# Patient Record
Sex: FEMALE | Race: WHITE | Employment: OTHER | URBAN - METROPOLITAN AREA
[De-identification: names, ages, dates, MRNs, and addresses within clinical notes are randomized per-mention and may not be internally consistent; named-entity substitution may affect disease eponyms.]

---

## 2021-08-01 ENCOUNTER — APPOINTMENT (OUTPATIENT)
Dept: GENERAL RADIOLOGY | Age: 71
End: 2021-08-01
Payer: MEDICARE

## 2021-08-01 ENCOUNTER — HOSPITAL ENCOUNTER (EMERGENCY)
Age: 71
Discharge: HOME | End: 2021-08-01
Attending: EMERGENCY MEDICINE
Payer: MEDICARE

## 2021-08-01 VITALS
SYSTOLIC BLOOD PRESSURE: 150 MMHG | HEART RATE: 100 BPM | WEIGHT: 160 LBS | RESPIRATION RATE: 15 BRPM | OXYGEN SATURATION: 100 % | BODY MASS INDEX: 29.44 KG/M2 | DIASTOLIC BLOOD PRESSURE: 78 MMHG | HEIGHT: 62 IN | TEMPERATURE: 98 F

## 2021-08-01 DIAGNOSIS — S82.892A CLOSED FRACTURE DISLOCATION OF LEFT ANKLE, INITIAL ENCOUNTER: Primary | ICD-10-CM

## 2021-08-01 PROCEDURE — 6360000002 HC RX W HCPCS: Performed by: PHYSICIAN ASSISTANT

## 2021-08-01 PROCEDURE — 94760 N-INVAS EAR/PLS OXIMETRY 1: CPT

## 2021-08-01 PROCEDURE — 99285 EMERGENCY DEPT VISIT HI MDM: CPT

## 2021-08-01 PROCEDURE — 27818 TREATMENT OF ANKLE FRACTURE: CPT

## 2021-08-01 PROCEDURE — 2580000003 HC RX 258: Performed by: PHYSICIAN ASSISTANT

## 2021-08-01 PROCEDURE — 96374 THER/PROPH/DIAG INJ IV PUSH: CPT

## 2021-08-01 PROCEDURE — 2700000000 HC OXYGEN THERAPY PER DAY

## 2021-08-01 PROCEDURE — 6370000000 HC RX 637 (ALT 250 FOR IP): Performed by: PHYSICIAN ASSISTANT

## 2021-08-01 PROCEDURE — 73610 X-RAY EXAM OF ANKLE: CPT

## 2021-08-01 PROCEDURE — 99152 MOD SED SAME PHYS/QHP 5/>YRS: CPT

## 2021-08-01 RX ORDER — 0.9 % SODIUM CHLORIDE 0.9 %
1000 INTRAVENOUS SOLUTION INTRAVENOUS ONCE
Status: COMPLETED | OUTPATIENT
Start: 2021-08-01 | End: 2021-08-01

## 2021-08-01 RX ORDER — OXYCODONE HYDROCHLORIDE AND ACETAMINOPHEN 5; 325 MG/1; MG/1
1 TABLET ORAL ONCE
Status: COMPLETED | OUTPATIENT
Start: 2021-08-01 | End: 2021-08-01

## 2021-08-01 RX ORDER — KETAMINE HYDROCHLORIDE 50 MG/ML
1 INJECTION, SOLUTION, CONCENTRATE INTRAMUSCULAR; INTRAVENOUS ONCE
Status: DISCONTINUED | OUTPATIENT
Start: 2021-08-01 | End: 2021-08-02 | Stop reason: HOSPADM

## 2021-08-01 RX ORDER — OXYCODONE HYDROCHLORIDE AND ACETAMINOPHEN 5; 325 MG/1; MG/1
1 TABLET ORAL EVERY 6 HOURS PRN
Qty: 20 TABLET | Refills: 0 | Status: ON HOLD | OUTPATIENT
Start: 2021-08-01 | End: 2021-08-03 | Stop reason: HOSPADM

## 2021-08-01 RX ADMIN — SODIUM CHLORIDE 1000 ML: 9 INJECTION, SOLUTION INTRAVENOUS at 19:30

## 2021-08-01 RX ADMIN — OXYCODONE HYDROCHLORIDE AND ACETAMINOPHEN 1 TABLET: 5; 325 TABLET ORAL at 23:03

## 2021-08-01 RX ADMIN — HYDROMORPHONE HYDROCHLORIDE 1 MG: 1 INJECTION, SOLUTION INTRAMUSCULAR; INTRAVENOUS; SUBCUTANEOUS at 18:25

## 2021-08-01 ASSESSMENT — ENCOUNTER SYMPTOMS
COLOR CHANGE: 0
VOMITING: 0

## 2021-08-01 ASSESSMENT — PAIN DESCRIPTION - ORIENTATION: ORIENTATION: LEFT;LOWER

## 2021-08-01 ASSESSMENT — PAIN SCALES - GENERAL
PAINLEVEL_OUTOF10: 10
PAINLEVEL_OUTOF10: 10
PAINLEVEL_OUTOF10: 8
PAINLEVEL_OUTOF10: 8

## 2021-08-01 ASSESSMENT — PAIN DESCRIPTION - DESCRIPTORS: DESCRIPTORS: DISCOMFORT

## 2021-08-01 ASSESSMENT — PAIN DESCRIPTION - PAIN TYPE: TYPE: ACUTE PAIN

## 2021-08-01 ASSESSMENT — PAIN DESCRIPTION - LOCATION: LOCATION: LEG

## 2021-08-01 NOTE — ED PROVIDER NOTES
Date of evaluation: 8/1/2021    ED Attending Attestation Note     CHIEF COMPLAINT     I fell and hurt my left ankle  HISTORY OF PRESENT ILLNESS  (Location/Symptom, Timing/Onset,Context/Setting, Quality, Duration, Modifying Factors, Severity). Note limiting factors. This patient was seen by the advance practice provider. I have seen and examined the patient, agree with the workup, evaluation, management and diagnosis. The care plan has been discussed. Chief Complaint   Patient presents with   Jono Dutta     presents via EMS after falling off chair @ home/ injury to left lower leg/ appears deformed/ no loc no head injury        Karey Vega is a 70 y.o. female who presents to the emergency department secondary to concern for left ankle pain status post fall at home. She was standing on a chair when she lost her balance. Denies numbness or tingling. Endorses a lot of pain in her left ankle/foot area, has not been able to walk since then. She reports she is overall very healthy and does not take any medications. She has previously had a left hip arthroplasty. Denies being on any anticoagulant medication. Did not hit her head or lose consciousness. Past medical history significant for PACs.    Social History     Socioeconomic History    Marital status: Unknown     Spouse name: None    Number of children: None    Years of education: None    Highest education level: None   Occupational History    None   Tobacco Use    Smoking status: Never Smoker    Smokeless tobacco: Never Used   Vaping Use    Vaping Use: Never used   Substance and Sexual Activity    Alcohol use: Not Currently    Drug use: Never    Sexual activity: Not Currently   Other Topics Concern    None   Social History Narrative    None     Social Determinants of Health     Financial Resource Strain:     Difficulty of Paying Living Expenses:    Food Insecurity:     Worried About Running Out of Food in the Last Year:     Allyn de jesus Food in the Last Year:    Transportation Needs:     Lack of Transportation (Medical):  Lack of Transportation (Non-Medical):    Physical Activity:     Days of Exercise per Week:     Minutes of Exercise per Session:    Stress:     Feeling of Stress :    Social Connections:     Frequency of Communication with Friends and Family:     Frequency of Social Gatherings with Friends and Family:     Attends Temple Services:     Active Member of Clubs or Organizations:     Attends Club or Organization Meetings:     Marital Status:    Intimate Partner Violence:     Fear of Current or Ex-Partner:     Emotionally Abused:     Physically Abused:     Sexually Abused:      Aside from what is stated above denies any other symptoms or modifying factors. Nursing Notes reviewed. Past Surgical History:   Procedure Laterality Date    TOTAL HIP ARTHROPLASTY      left     History reviewed. No pertinent family history. CURRENT MEDICATIONS       Discharge Medication List as of 8/1/2021 10:48 PM         DIAGNOSTIC RESULTS   RADIOLOGY:   Interpretation per Radiologist below, if available at the time of this note:  XR ANKLE LEFT (MIN 3 VIEWS)   Final Result   Improved alignment of the trimalleolar fracture dislocation. XR ANKLE LEFT (MIN 3 VIEWS)   Final Result   Acute trimalleolar fracture with posterior talar subluxation and disruption   of the mortise. Patient was given the following medications:  Orders Placed This Encounter   Medications    HYDROmorphone (DILAUDID) injection 1 mg    0.9 % sodium chloride bolus    DISCONTD: ketamine (KETALAR) injection 75 mg    DISCONTD: ketamine (KETALAR) injection 75 mg    oxyCODONE-acetaminophen (PERCOCET) 5-325 MG per tablet     Sig: Take 1 tablet by mouth every 6 hours as needed for Pain for up to 20 doses. Sedation precautions. Do not drive or operate machinery while taking this medication. Do not drink alcohol.  This is an addictive medication and should be used sparingly. Dispense:  20 tablet     Refill:  0    oxyCODONE-acetaminophen (PERCOCET) 5-325 MG per tablet 1 tablet       INITIAL VITALS: BP: (!) 165/84, Temp: 98.2 °F (36.8 °C), Pulse: 95, Resp: 18, SpO2: 100 %   RECENT VITALS:  BP: (!) 150/78,Temp: 98 °F (36.7 °C), Pulse: 100, Resp: 15, SpO2: 100 %     My assessment reveals a moderately anxious appearing white female who is sitting up in bed. She answers questions appropriately. Abdomen is benign, no increased work of breathing, left lower extremity with swelling and tenderness to palpation. She has good/easily palpable and equal DP/PT pulses. Sensation is intact. She is able to wiggle her toes. Ketamine sedation for closed reduction of the dislocated fractured ankle was done at the bedside. Please see my procedure note below for sedation, see JEROME note for reduction. 70 35    Procedural sedation    Date/Time: 8/1/2021 8:30 PM  Performed by: Yovani Mota MD  Authorized by: Yovani Mota MD     Consent:     Consent obtained:  Verbal and written    Consent given by:  Patient    Risks discussed: Allergic reaction, inadequate sedation, respiratory compromise necessitating ventilatory assistance and intubation, prolonged hypoxia resulting in organ damage, nausea and vomiting    Alternatives discussed:  Analgesia without sedation and regional anesthesia  Indications:     Sedation is required to allow for: fracture dislocation reduction.     Procedure necessitating sedation performed by:  Physician performing sedation    Intended level of sedation:  Moderate (conscious sedation)  Pre-sedation assessment:     Time since last food or drink:  4 hours    NPO status caution: urgency dictates proceeding with non-ideal NPO status      ASA classification: class 1 - normal, healthy patient      Neck mobility: normal      Mouth opening:  3 or more finger widths    Mallampati score:  I - soft palate, uvula, fauces, pillars visible    Pre-sedation assessments completed and reviewed: airway patency, anesthesia/sedation history, cardiovascular function, hydration status, mental status, pain level and respiratory function      Pre-sedation assessment completed:  8/1/2021 8:35 PM  Procedure details (see MAR for exact dosages):     Sedation start time:  8/1/2021 8:47 PM    Preoxygenation:  Nasal cannula    Sedation:  Ketamine    Intra-procedure monitoring:  Blood pressure monitoring, cardiac monitor, continuous capnometry, continuous pulse oximetry, frequent vital sign checks and frequent LOC assessments    Intra-procedure events: none      Sedation end time:  8/2/2021 9:05 PM    Total sedation time (minutes):  18  Post-procedure details:     Post-sedation assessment completed:  8/1/2021 10:30 PM    Recovery: Patient returned to pre-procedure baseline      Complications:  N/a    Post-sedation assessments completed and reviewed: airway patency, cardiovascular function, hydration status, mental status, nausea/vomiting, pain level, respiratory function and temperature      Specimens recovered:  None    Patient is stable for discharge or admission: yes      Patient tolerance: Tolerated well, no immediate complications        FINAL IMPRESSION      1. Closed fracture dislocation of left ankle, initial encounter        DISPOSITION/PLAN   DISPOSITION Decision To Discharge 08/01/2021 10:28:21 PM      PATIENT REFERRED TO:  Maylin Quijano MD  [address] [address]  [phone]    Call in 1 day  For follow up with orthopedics      DISCHARGE MEDICATIONS:  Discharge Medication List as of 8/1/2021 10:48 PM      START taking these medications    Details   oxyCODONE-acetaminophen (PERCOCET) 5-325 MG per tablet Take 1 tablet by mouth every 6 hours as needed for Pain for up to 20 doses. Sedation precautions. Do not drive or operate machinery while taking this medication. Do not drink alcohol.  This is an addictive medication and should be used sparingly. , Disp-2 0 tablet, R-0Print                  (Please note that portions of this note were completed with a voice recognition program. Efforts were made to edit the dictations but occasionally words are mis-transcribed.)    Virgie Carrasco MD (electronically signed)  Attending Emergency Physician        Virgie Carrasco MD  08/02/21 9018

## 2021-08-02 ENCOUNTER — APPOINTMENT (OUTPATIENT)
Dept: GENERAL RADIOLOGY | Age: 71
DRG: 493 | End: 2021-08-02
Attending: ORTHOPAEDIC SURGERY
Payer: MEDICARE

## 2021-08-02 ENCOUNTER — ANESTHESIA (OUTPATIENT)
Dept: OPERATING ROOM | Age: 71
DRG: 493 | End: 2021-08-02
Payer: MEDICARE

## 2021-08-02 ENCOUNTER — ANESTHESIA EVENT (OUTPATIENT)
Dept: OPERATING ROOM | Age: 71
DRG: 493 | End: 2021-08-02
Payer: MEDICARE

## 2021-08-02 ENCOUNTER — HOSPITAL ENCOUNTER (OUTPATIENT)
Age: 71
Setting detail: OBSERVATION
Discharge: NURSING, REHAB OR LONG-TERM CARE | DRG: 493 | End: 2021-08-03
Attending: ORTHOPAEDIC SURGERY | Admitting: ORTHOPAEDIC SURGERY
Payer: MEDICARE

## 2021-08-02 VITALS
RESPIRATION RATE: 14 BRPM | SYSTOLIC BLOOD PRESSURE: 81 MMHG | TEMPERATURE: 98.6 F | OXYGEN SATURATION: 99 % | DIASTOLIC BLOOD PRESSURE: 51 MMHG

## 2021-08-02 DIAGNOSIS — S93.432A SYNDESMOTIC DISRUPTION OF LEFT ANKLE, INITIAL ENCOUNTER: Primary | ICD-10-CM

## 2021-08-02 PROBLEM — S82.892A CLOSED LEFT ANKLE FRACTURE, INITIAL ENCOUNTER: Status: ACTIVE | Noted: 2021-08-02

## 2021-08-02 PROCEDURE — 3700000000 HC ANESTHESIA ATTENDED CARE: Performed by: ORTHOPAEDIC SURGERY

## 2021-08-02 PROCEDURE — G0378 HOSPITAL OBSERVATION PER HR: HCPCS

## 2021-08-02 PROCEDURE — 6360000002 HC RX W HCPCS: Performed by: ORTHOPAEDIC SURGERY

## 2021-08-02 PROCEDURE — 6360000002 HC RX W HCPCS: Performed by: ANESTHESIOLOGY

## 2021-08-02 PROCEDURE — 3700000001 HC ADD 15 MINUTES (ANESTHESIA): Performed by: ORTHOPAEDIC SURGERY

## 2021-08-02 PROCEDURE — 3600000004 HC SURGERY LEVEL 4 BASE: Performed by: ORTHOPAEDIC SURGERY

## 2021-08-02 PROCEDURE — 27829 TREAT LOWER LEG JOINT: CPT | Performed by: NURSE PRACTITIONER

## 2021-08-02 PROCEDURE — 3209999900 FLUORO FOR SURGICAL PROCEDURES

## 2021-08-02 PROCEDURE — 99219 PR INITIAL OBSERVATION CARE/DAY 50 MINUTES: CPT | Performed by: ORTHOPAEDIC SURGERY

## 2021-08-02 PROCEDURE — 27823 TREATMENT OF ANKLE FRACTURE: CPT | Performed by: NURSE PRACTITIONER

## 2021-08-02 PROCEDURE — 7100000000 HC PACU RECOVERY - FIRST 15 MIN: Performed by: ORTHOPAEDIC SURGERY

## 2021-08-02 PROCEDURE — 7100000001 HC PACU RECOVERY - ADDTL 15 MIN: Performed by: ORTHOPAEDIC SURGERY

## 2021-08-02 PROCEDURE — 2580000003 HC RX 258: Performed by: ORTHOPAEDIC SURGERY

## 2021-08-02 PROCEDURE — 6360000002 HC RX W HCPCS

## 2021-08-02 PROCEDURE — 27829 TREAT LOWER LEG JOINT: CPT | Performed by: ORTHOPAEDIC SURGERY

## 2021-08-02 PROCEDURE — 3600000014 HC SURGERY LEVEL 4 ADDTL 15MIN: Performed by: ORTHOPAEDIC SURGERY

## 2021-08-02 PROCEDURE — 2709999900 HC NON-CHARGEABLE SUPPLY: Performed by: ORTHOPAEDIC SURGERY

## 2021-08-02 PROCEDURE — 6370000000 HC RX 637 (ALT 250 FOR IP): Performed by: ORTHOPAEDIC SURGERY

## 2021-08-02 PROCEDURE — C1713 ANCHOR/SCREW BN/BN,TIS/BN: HCPCS | Performed by: ORTHOPAEDIC SURGERY

## 2021-08-02 PROCEDURE — 96375 TX/PRO/DX INJ NEW DRUG ADDON: CPT

## 2021-08-02 PROCEDURE — 6360000002 HC RX W HCPCS: Performed by: NURSE ANESTHETIST, CERTIFIED REGISTERED

## 2021-08-02 PROCEDURE — 2720000010 HC SURG SUPPLY STERILE: Performed by: ORTHOPAEDIC SURGERY

## 2021-08-02 PROCEDURE — 73600 X-RAY EXAM OF ANKLE: CPT

## 2021-08-02 PROCEDURE — 27823 TREATMENT OF ANKLE FRACTURE: CPT | Performed by: ORTHOPAEDIC SURGERY

## 2021-08-02 PROCEDURE — 2580000003 HC RX 258: Performed by: ANESTHESIOLOGY

## 2021-08-02 PROCEDURE — 2500000003 HC RX 250 WO HCPCS: Performed by: ORTHOPAEDIC SURGERY

## 2021-08-02 PROCEDURE — 2500000003 HC RX 250 WO HCPCS

## 2021-08-02 DEVICE — SCREW BNE L18MM DIA3.5MM HD DIA2.7MM PERIARTC CORT S STL ST: Type: IMPLANTABLE DEVICE | Site: ANKLE | Status: FUNCTIONAL

## 2021-08-02 DEVICE — SCREW BNE L18MM DIA2.7MM HEX HD DIA2.5MM CANC BIODUR 108C: Type: IMPLANTABLE DEVICE | Site: ANKLE | Status: FUNCTIONAL

## 2021-08-02 DEVICE — SCREW BNE L22MM DIA2.7MM CORT ANK FT S STL ST NONCANNULATED: Type: IMPLANTABLE DEVICE | Site: ANKLE | Status: FUNCTIONAL

## 2021-08-02 DEVICE — SCREW BNE L14MM DIA3.5MM HD DIA2.7MM CORT PERIARTC S STL ST: Type: IMPLANTABLE DEVICE | Site: ANKLE | Status: FUNCTIONAL

## 2021-08-02 DEVICE — SCREW BNE L40MM DIA4MM CANC SELF DRL ST CANN NONLOCKING 1/3: Type: IMPLANTABLE DEVICE | Site: ANKLE | Status: FUNCTIONAL

## 2021-08-02 DEVICE — PLATE BNE L80MM 4 H L LAT DST PERIARTC FIBULAR S STL LOK: Type: IMPLANTABLE DEVICE | Site: ANKLE | Status: FUNCTIONAL

## 2021-08-02 DEVICE — SCREW BNE L16MM DIA2.7MM HEX HD DIA2.5MM CANC BIODUR 108C: Type: IMPLANTABLE DEVICE | Site: ANKLE | Status: FUNCTIONAL

## 2021-08-02 DEVICE — SCREW BNE L12MM DIA3.5MM HD DIA2.7MM CORT PERIARTC S STL ST: Type: IMPLANTABLE DEVICE | Site: ANKLE | Status: FUNCTIONAL

## 2021-08-02 DEVICE — IMPLANTABLE DEVICE: Type: IMPLANTABLE DEVICE | Site: ANKLE | Status: FUNCTIONAL

## 2021-08-02 DEVICE — SCREW BNE L44MM DIA3.5MM CORT S STL ST NONCANNULATED IM
Type: IMPLANTABLE DEVICE | Site: ANKLE | Status: NON-FUNCTIONAL
Removed: 2022-01-10

## 2021-08-02 DEVICE — SCREW BNE L14MM DIA2.7MM HEX HD DIA2.5MM CANC BIODUR 108C: Type: IMPLANTABLE DEVICE | Site: ANKLE | Status: FUNCTIONAL

## 2021-08-02 RX ORDER — MEPERIDINE HYDROCHLORIDE 25 MG/ML
12.5 INJECTION INTRAMUSCULAR; INTRAVENOUS; SUBCUTANEOUS
Status: DISCONTINUED | OUTPATIENT
Start: 2021-08-02 | End: 2021-08-02 | Stop reason: HOSPADM

## 2021-08-02 RX ORDER — SODIUM CHLORIDE, SODIUM LACTATE, POTASSIUM CHLORIDE, CALCIUM CHLORIDE 600; 310; 30; 20 MG/100ML; MG/100ML; MG/100ML; MG/100ML
INJECTION, SOLUTION INTRAVENOUS CONTINUOUS
Status: DISCONTINUED | OUTPATIENT
Start: 2021-08-02 | End: 2021-08-03 | Stop reason: HOSPADM

## 2021-08-02 RX ORDER — PROPOFOL 10 MG/ML
INJECTION, EMULSION INTRAVENOUS PRN
Status: DISCONTINUED | OUTPATIENT
Start: 2021-08-02 | End: 2021-08-02 | Stop reason: SDUPTHER

## 2021-08-02 RX ORDER — OXYCODONE HYDROCHLORIDE AND ACETAMINOPHEN 5; 325 MG/1; MG/1
1 TABLET ORAL EVERY 6 HOURS PRN
Status: DISCONTINUED | OUTPATIENT
Start: 2021-08-02 | End: 2021-08-02

## 2021-08-02 RX ORDER — CEFAZOLIN SODIUM 1 G/3ML
2000 INJECTION, POWDER, FOR SOLUTION INTRAMUSCULAR; INTRAVENOUS EVERY 8 HOURS
Status: DISCONTINUED | OUTPATIENT
Start: 2021-08-03 | End: 2021-08-03

## 2021-08-02 RX ORDER — DEXAMETHASONE SODIUM PHOSPHATE 4 MG/ML
INJECTION, SOLUTION INTRA-ARTICULAR; INTRALESIONAL; INTRAMUSCULAR; INTRAVENOUS; SOFT TISSUE PRN
Status: DISCONTINUED | OUTPATIENT
Start: 2021-08-02 | End: 2021-08-02 | Stop reason: SDUPTHER

## 2021-08-02 RX ORDER — FENTANYL CITRATE 50 UG/ML
25 INJECTION, SOLUTION INTRAMUSCULAR; INTRAVENOUS EVERY 5 MIN PRN
Status: DISCONTINUED | OUTPATIENT
Start: 2021-08-02 | End: 2021-08-02 | Stop reason: HOSPADM

## 2021-08-02 RX ORDER — FENTANYL CITRATE 50 UG/ML
INJECTION, SOLUTION INTRAMUSCULAR; INTRAVENOUS PRN
Status: DISCONTINUED | OUTPATIENT
Start: 2021-08-02 | End: 2021-08-02 | Stop reason: SDUPTHER

## 2021-08-02 RX ORDER — SODIUM CHLORIDE 9 MG/ML
INJECTION, SOLUTION INTRAVENOUS CONTINUOUS
Status: DISCONTINUED | OUTPATIENT
Start: 2021-08-02 | End: 2021-08-02

## 2021-08-02 RX ORDER — ACETAMINOPHEN AND CODEINE PHOSPHATE 300; 30 MG/1; MG/1
1 TABLET ORAL EVERY 6 HOURS PRN
Status: DISCONTINUED | OUTPATIENT
Start: 2021-08-02 | End: 2021-08-03

## 2021-08-02 RX ORDER — BUPIVACAINE HYDROCHLORIDE 5 MG/ML
INJECTION, SOLUTION EPIDURAL; INTRACAUDAL
Status: COMPLETED | OUTPATIENT
Start: 2021-08-02 | End: 2021-08-02

## 2021-08-02 RX ORDER — FENTANYL CITRATE 50 UG/ML
50 INJECTION, SOLUTION INTRAMUSCULAR; INTRAVENOUS EVERY 5 MIN PRN
Status: COMPLETED | OUTPATIENT
Start: 2021-08-02 | End: 2021-08-02

## 2021-08-02 RX ORDER — LIDOCAINE HYDROCHLORIDE 20 MG/ML
INJECTION, SOLUTION EPIDURAL; INFILTRATION; INTRACAUDAL; PERINEURAL PRN
Status: DISCONTINUED | OUTPATIENT
Start: 2021-08-02 | End: 2021-08-02 | Stop reason: SDUPTHER

## 2021-08-02 RX ORDER — HYDRALAZINE HYDROCHLORIDE 20 MG/ML
5 INJECTION INTRAMUSCULAR; INTRAVENOUS EVERY 10 MIN PRN
Status: DISCONTINUED | OUTPATIENT
Start: 2021-08-02 | End: 2021-08-02 | Stop reason: HOSPADM

## 2021-08-02 RX ORDER — APREPITANT 40 MG/1
40 CAPSULE ORAL ONCE
Status: COMPLETED | OUTPATIENT
Start: 2021-08-02 | End: 2021-08-02

## 2021-08-02 RX ORDER — ONDANSETRON 2 MG/ML
4 INJECTION INTRAMUSCULAR; INTRAVENOUS
Status: DISCONTINUED | OUTPATIENT
Start: 2021-08-02 | End: 2021-08-02 | Stop reason: HOSPADM

## 2021-08-02 RX ORDER — ONDANSETRON 2 MG/ML
INJECTION INTRAMUSCULAR; INTRAVENOUS PRN
Status: DISCONTINUED | OUTPATIENT
Start: 2021-08-02 | End: 2021-08-02 | Stop reason: SDUPTHER

## 2021-08-02 RX ORDER — ACETAMINOPHEN 325 MG/1
650 TABLET ORAL EVERY 6 HOURS PRN
Status: DISCONTINUED | OUTPATIENT
Start: 2021-08-02 | End: 2021-08-03 | Stop reason: HOSPADM

## 2021-08-02 RX ORDER — GLYCOPYRROLATE 0.2 MG/ML
INJECTION INTRAMUSCULAR; INTRAVENOUS PRN
Status: DISCONTINUED | OUTPATIENT
Start: 2021-08-02 | End: 2021-08-02 | Stop reason: SDUPTHER

## 2021-08-02 RX ADMIN — SODIUM CHLORIDE: 9 INJECTION, SOLUTION INTRAVENOUS at 13:00

## 2021-08-02 RX ADMIN — FENTANYL CITRATE 50 MCG: 50 INJECTION INTRAMUSCULAR; INTRAVENOUS at 17:17

## 2021-08-02 RX ADMIN — LIDOCAINE HYDROCHLORIDE 100 MG: 20 INJECTION, SOLUTION EPIDURAL; INFILTRATION; INTRACAUDAL; PERINEURAL at 17:22

## 2021-08-02 RX ADMIN — FENTANYL CITRATE 50 MCG: 0.05 INJECTION, SOLUTION INTRAMUSCULAR; INTRAVENOUS at 19:36

## 2021-08-02 RX ADMIN — HYDROMORPHONE HYDROCHLORIDE 0.5 MG: 1 INJECTION, SOLUTION INTRAMUSCULAR; INTRAVENOUS; SUBCUTANEOUS at 21:39

## 2021-08-02 RX ADMIN — ONDANSETRON 4 MG: 2 INJECTION INTRAMUSCULAR; INTRAVENOUS at 17:38

## 2021-08-02 RX ADMIN — PROPOFOL 200 MG: 10 INJECTION, EMULSION INTRAVENOUS at 17:22

## 2021-08-02 RX ADMIN — DEXAMETHASONE SODIUM PHOSPHATE 8 MG: 4 INJECTION, SOLUTION INTRAMUSCULAR; INTRAVENOUS at 17:26

## 2021-08-02 RX ADMIN — CEFAZOLIN SODIUM 2000 MG: 10 INJECTION, POWDER, FOR SOLUTION INTRAVENOUS at 17:17

## 2021-08-02 RX ADMIN — GLYCOPYRROLATE 0.1 MG: 0.2 INJECTION, SOLUTION INTRAMUSCULAR; INTRAVENOUS at 17:17

## 2021-08-02 RX ADMIN — APREPITANT 40 MG: 40 CAPSULE ORAL at 16:31

## 2021-08-02 RX ADMIN — FENTANYL CITRATE 50 MCG: 50 INJECTION INTRAMUSCULAR; INTRAVENOUS at 17:35

## 2021-08-02 RX ADMIN — FENTANYL CITRATE 50 MCG: 0.05 INJECTION, SOLUTION INTRAMUSCULAR; INTRAVENOUS at 19:22

## 2021-08-02 RX ADMIN — ACETAMINOPHEN AND CODEINE PHOSPHATE 1 TABLET: 300; 30 TABLET ORAL at 20:39

## 2021-08-02 RX ADMIN — FENTANYL CITRATE 50 MCG: 0.05 INJECTION, SOLUTION INTRAMUSCULAR; INTRAVENOUS at 19:29

## 2021-08-02 RX ADMIN — FENTANYL CITRATE 50 MCG: 0.05 INJECTION, SOLUTION INTRAMUSCULAR; INTRAVENOUS at 19:16

## 2021-08-02 ASSESSMENT — PULMONARY FUNCTION TESTS
PIF_VALUE: 8
PIF_VALUE: 9
PIF_VALUE: 8
PIF_VALUE: 9
PIF_VALUE: 9
PIF_VALUE: 8
PIF_VALUE: 9
PIF_VALUE: 9
PIF_VALUE: 8
PIF_VALUE: 9
PIF_VALUE: 9
PIF_VALUE: 8
PIF_VALUE: 8
PIF_VALUE: 9
PIF_VALUE: 8
PIF_VALUE: 9
PIF_VALUE: 9
PIF_VALUE: 8
PIF_VALUE: 9
PIF_VALUE: 8
PIF_VALUE: 6
PIF_VALUE: 8
PIF_VALUE: 9
PIF_VALUE: 8
PIF_VALUE: 0
PIF_VALUE: 9
PIF_VALUE: 8
PIF_VALUE: 8
PIF_VALUE: 9
PIF_VALUE: 8
PIF_VALUE: 9
PIF_VALUE: 8
PIF_VALUE: 14
PIF_VALUE: 1
PIF_VALUE: 8
PIF_VALUE: 9
PIF_VALUE: 8
PIF_VALUE: 9
PIF_VALUE: 8
PIF_VALUE: 8
PIF_VALUE: 9
PIF_VALUE: 8
PIF_VALUE: 9
PIF_VALUE: 8
PIF_VALUE: 8
PIF_VALUE: 1
PIF_VALUE: 8
PIF_VALUE: 8
PIF_VALUE: 9
PIF_VALUE: 8
PIF_VALUE: 1
PIF_VALUE: 8
PIF_VALUE: 9
PIF_VALUE: 0
PIF_VALUE: 0
PIF_VALUE: 8
PIF_VALUE: 9
PIF_VALUE: 8
PIF_VALUE: 9
PIF_VALUE: 6
PIF_VALUE: 8
PIF_VALUE: 8
PIF_VALUE: 2
PIF_VALUE: 8
PIF_VALUE: 1
PIF_VALUE: 8
PIF_VALUE: 9
PIF_VALUE: 2
PIF_VALUE: 8
PIF_VALUE: 8

## 2021-08-02 ASSESSMENT — PAIN DESCRIPTION - PROGRESSION
CLINICAL_PROGRESSION: NOT CHANGED
CLINICAL_PROGRESSION: NOT CHANGED
CLINICAL_PROGRESSION: GRADUALLY IMPROVING
CLINICAL_PROGRESSION: GRADUALLY IMPROVING
CLINICAL_PROGRESSION: NOT CHANGED
CLINICAL_PROGRESSION: NOT CHANGED
CLINICAL_PROGRESSION: GRADUALLY WORSENING
CLINICAL_PROGRESSION: GRADUALLY WORSENING
CLINICAL_PROGRESSION: GRADUALLY IMPROVING

## 2021-08-02 ASSESSMENT — PAIN DESCRIPTION - ORIENTATION
ORIENTATION: LEFT

## 2021-08-02 ASSESSMENT — PAIN DESCRIPTION - FREQUENCY
FREQUENCY: CONTINUOUS

## 2021-08-02 ASSESSMENT — PAIN SCALES - GENERAL
PAINLEVEL_OUTOF10: 9
PAINLEVEL_OUTOF10: 8
PAINLEVEL_OUTOF10: 0
PAINLEVEL_OUTOF10: 8
PAINLEVEL_OUTOF10: 7
PAINLEVEL_OUTOF10: 9
PAINLEVEL_OUTOF10: 6
PAINLEVEL_OUTOF10: 7
PAINLEVEL_OUTOF10: 7

## 2021-08-02 ASSESSMENT — PAIN DESCRIPTION - ONSET
ONSET: ON-GOING

## 2021-08-02 ASSESSMENT — PAIN DESCRIPTION - LOCATION
LOCATION: ANKLE;FOOT
LOCATION: ANKLE
LOCATION: ANKLE;FOOT
LOCATION: ANKLE;FOOT
LOCATION: ANKLE
LOCATION: ANKLE;FOOT
LOCATION: ANKLE
LOCATION: ANKLE;FOOT

## 2021-08-02 ASSESSMENT — PAIN - FUNCTIONAL ASSESSMENT
PAIN_FUNCTIONAL_ASSESSMENT: PREVENTS OR INTERFERES SOME ACTIVE ACTIVITIES AND ADLS
PAIN_FUNCTIONAL_ASSESSMENT: 0-10
PAIN_FUNCTIONAL_ASSESSMENT: PREVENTS OR INTERFERES SOME ACTIVE ACTIVITIES AND ADLS

## 2021-08-02 ASSESSMENT — PAIN DESCRIPTION - DESCRIPTORS
DESCRIPTORS: ACHING
DESCRIPTORS: ACHING;DISCOMFORT
DESCRIPTORS: ACHING;DISCOMFORT
DESCRIPTORS: ACHING
DESCRIPTORS: ACHING;DISCOMFORT
DESCRIPTORS: ACHING

## 2021-08-02 ASSESSMENT — PAIN SCALES - WONG BAKER
WONGBAKER_NUMERICALRESPONSE: 4
WONGBAKER_NUMERICALRESPONSE: 0
WONGBAKER_NUMERICALRESPONSE: 4

## 2021-08-02 ASSESSMENT — PAIN DESCRIPTION - PAIN TYPE
TYPE: SURGICAL PAIN;ACUTE PAIN
TYPE: SURGICAL PAIN;ACUTE PAIN
TYPE: ACUTE PAIN;SURGICAL PAIN
TYPE: ACUTE PAIN;SURGICAL PAIN
TYPE: SURGICAL PAIN;ACUTE PAIN
TYPE: ACUTE PAIN;SURGICAL PAIN

## 2021-08-02 ASSESSMENT — LIFESTYLE VARIABLES: SMOKING_STATUS: 0

## 2021-08-02 NOTE — PROGRESS NOTES
In room for conscious sedation. Pt is on 2l/m nasal cannula with ETCO2. ETCO2 ranging between 30 to 34. Ambu bag also in room. No issues during procedure.  Spo2 has been 100%

## 2021-08-02 NOTE — H&P
Preoperative H&P Update    The patient's History and Physical in the medical record from today was reviewed by me today. Past Medical History:   Diagnosis Date    Arthritis     Premature atrial contraction      Past Surgical History:   Procedure Laterality Date    TOTAL HIP ARTHROPLASTY      left     No current facility-administered medications on file prior to encounter. Current Outpatient Medications on File Prior to Encounter   Medication Sig Dispense Refill    oxyCODONE-acetaminophen (PERCOCET) 5-325 MG per tablet Take 1 tablet by mouth every 6 hours as needed for Pain for up to 20 doses. Sedation precautions. Do not drive or operate machinery while taking this medication. Do not drink alcohol. This is an addictive medication and should be used sparingly. 20 tablet 0       Allergies   Allergen Reactions    Morphine     Cortizone-10 [Hydrocortisone] Rash    Phenergan [Promethazine] Rash    Sulfa Antibiotics Rash      I reviewed the HPI, medications, allergies, reason for surgery, diagnosis and treatment plan and there has been no change. The patient was evaluated by me today.  Physical exam findings for this update include:    Vitals:    08/02/21 1148   BP: (!) 143/87   Pulse: 88   Resp: 16   Temp: 97.5 °F (36.4 °C)   SpO2: 97%     Airway is intact  Chest: chest clear, no wheezing, rales, normal symmetric air entry, no tachypnea, retractions or cyanosis  Heart: regular rate and rhythm ; heart sounds normal  Findings on exam of the body region where surgery is to be performed include:  Left ankle fracture dislocation, ORIF    Electronically signed by KINA Yarbrough CNP on 8/2/2021 at 2:05 PM

## 2021-08-02 NOTE — ED PROVIDER NOTES
[facility]      Pt Name: Dino Acharya  MRN: [id number]  [place] 1950  Date of evaluation: 8/1/2021  Provider: SUNNY Dangelo    This patient was not seen and evaluated by the attending physician Andrew Steiner MD.    [address] [place] [address]       Chief Complaint   Patient presents with   Donnamaria Mary     presents via EMS after falling off chair @ home/ injury to left lower leg/ appears deformed/ no loc no head injury       CRITICAL CARE TIME   I performed a total Critical Care time of 15 minutes, excluding separately reportable procedures. There was a high probability of clinically significant/life threatening deterioration in the patient's condition which required my urgent intervention. Not limited to multiple reexaminations, discussions with attending physician and consultants. HISTORY OF PRESENT ILLNESS  (Location/Symptom, Timing/Onset, Context/Setting, Quality, Duration, Modifying Factors, Severity.)   Dino Acharya is a 70 y.o. female who presents to the emergency department via EMS accompanied by her daughter whom she lives with. She was standing on a chair when she lost her balance and landed on her left ankle. She denies head or neck injury. She states that the left hip was replaced in [place] couple of years ago but denies pain in the hip. She rates her pain at 10 out of 10. She reports past medical history of PACs but otherwise no known chronic medical problems. No blood thinner use. She was previously living in [place] but moved to [place] to live in June. Nursing Notes were reviewed and I agree. REVIEW OF SYSTEMS    (2-9 systems for level 4, 10 or more for level 5)     Review of Systems   Constitutional: Negative for fever. Gastrointestinal: Negative for vomiting. Musculoskeletal: Positive for arthralgias, gait problem and joint swelling. Skin: Negative for color change, rash and wound. Neurological: Negative for weakness, numbness and headaches. Psychiatric/Behavioral: Negative for agitation, behavioral problems and confusion. Except as noted above the remainder of the review of systems was reviewed and negative. PAST MEDICAL HISTORY         Diagnosis Date    Premature atrial contraction        SURGICAL HISTORY           Procedure Laterality Date    TOTAL HIP ARTHROPLASTY      left       CURRENT MEDICATIONS       Previous Medications    No medications on file       ALLERGIES     Cortizone-10 [hydrocortisone], Phenergan [promethazine], and Sulfa antibiotics    FAMILY HISTORY     History reviewed. No pertinent family history. No family status information on file. SOCIAL HISTORY      reports that she has never smoked. She has never used smokeless tobacco. She reports previous alcohol use. She reports that she does not use drugs. PHYSICAL EXAM    (up to 7 for level 4, 8 or more for level 5)     ED Triage Vitals [08/01/21 1809]   BP Temp Temp Source Pulse Resp SpO2 Height Weight   (!) 165/84 98.2 °F (36.8 °C) Oral 95 18 100 % 5' 2\" (1.575 m) 160 lb (72.6 kg)       Physical Exam  Vitals and nursing note reviewed. Constitutional:       Appearance: Normal appearance. HENT:      Head: Normocephalic and atraumatic. Eyes:      Pupils: Pupils are equal, round, and reactive to light. Cardiovascular:      Rate and Rhythm: Normal rate. Pulses: Normal pulses. Pulmonary:      Effort: Pulmonary effort is normal. No respiratory distress. Musculoskeletal:      Cervical back: Normal range of motion. Left ankle: Swelling present. No lacerations. Tenderness present. Decreased range of motion. Normal pulse. Skin:     General: Skin is warm. Neurological:      General: No focal deficit present. Mental Status: She is alert and oriented to person, place, and time.    Psychiatric:         Mood and Affect: Mood normal.         Behavior: Behavior normal. DIAGNOSTIC RESULTS     RADIOLOGY:   Non-plain film images such as CT, Ultrasound and MRI are read by the radiologist. Plain radiographic images are visualized and preliminarily interpreted by SUNNY Owen with the below findings:    Reviewed radiologist's interpretation. Interpretation per the Radiologist below, if available at the time of this note:    XR ANKLE LEFT (MIN 3 VIEWS)   Final Result   Improved alignment of the trimalleolar fracture dislocation. XR ANKLE LEFT (MIN 3 VIEWS)   Final Result   Acute trimalleolar fracture with posterior talar subluxation and disruption   of the mortise. LABS:  Labs Reviewed - No data to display    All other labs were within normal range or not returned as of this dictation. EMERGENCY DEPARTMENT COURSE and DIFFERENTIAL DIAGNOSIS/MDM:   Vitals:    Vitals:    08/01/21 2052 08/01/21 2056 08/01/21 2101 08/01/21 2113   BP:  (!) 193/120 (!) 196/109 (!) 185/99   Pulse: 107 124 131 107   Resp:  14 17 22   Temp:       TempSrc:       SpO2: 100% 100% 100% 100%   Weight:       Height:         I discussed with Taylor Rhoades and/or family the exam results, diagnosis, care, prognosis, reasons to return and the importance of follow up. Patient and/or family is in full agreement with plan and all questions have been answered. Specific discharge instructions explained, including reasons to return to the emergency department. Taylor Rhoades is well appearing, non-toxic, and afebrile at the time of discharge. Patient fell from a chair and sustained a left-sided trimalleolar ankle fracture. She has good sensation and pulses intact. No pain in her hips head or neck. No blood thinner use. X-ray showed fracture dislocation. She was sedated with her consent and reduction was successful. I contacted orthopedics and sent imaging to them. Should be placed in a sugar tong OCL splint neurovascular intact after placement instructed to rest ice elevate. Discharged with pain medication. Discharged home with her daughter whom she lives with. I estimate there is LOW risk for COMPARTMENT SYNDROME, DEEP VENOUS THROMBOSIS, SEPTIC ARTHRITIS, TENDON OR NEUROVASCULAR INJURY, thus I consider the discharge disposition reasonable. CONSULTS:  IP CONSULT TO ORTHOPEDIC SURGERY    PROCEDURES:  Ortho Injury    Date/Time: 8/1/2021 10:36 PM  Performed by: SUNNY Gerber  Authorized by: Nikko Ann MD   Consent: Verbal consent obtained. Written consent obtained. Consent given by: patient  Imaging studies: imaging studies available  Required items: required blood products, implants, devices, and special equipment available  Patient identity confirmed: arm band and verbally with patient  Injury location: ankle  Location details: left ankle  Injury type: fracture-dislocation  Fracture type: trimalleolar  Pre-procedure neurovascular assessment: neurovascularly intact    Sedation:  Patient sedated: yes  Sedatives: ketamine  Analgesia: hydromorphone  Vitals: Vital signs were monitored during sedation. Manipulation performed: yes  Reduction successful: yes  X-ray confirmed reduction: yes  Immobilization: splint  Splint type: short leg  Supplies used: Ortho-Glass  Post-procedure neurovascular assessment: post-procedure neurovascularly intact  Patient tolerance: patient tolerated the procedure well with no immediate complications  Comments: Mallampati 1  ASA 1            FINAL IMPRESSION      1.  Closed fracture dislocation of left ankle, initial encounter          DISPOSITION/PLAN   DISPOSITION Decision To Discharge 08/01/2021 10:28:21 PM      PATIENT REFERRED TO:  Fermin Ogden MD  [address] [place] [address]  [phone]    Call in 1 day  For follow up with orthopedics      DISCHARGE MEDICATIONS:  New Prescriptions    OXYCODONE-ACETAMINOPHEN (PERCOCET) 5-325 MG PER TABLET    Take 1 tablet by mouth every 6 hours as needed for Pain for up to 20 doses. Sedation precautions. Do not drive or operate machinery while taking this medication. Do not drink alcohol. This is an addictive medication and should be used sparingly.        (Please note that portions of this note were completed with a voice recognition program.  Efforts were made to edit the dictations but occasionally words are mis-transcribed.)    Aquiles Can, [address] , [place]  08/01/21 3341

## 2021-08-02 NOTE — ANESTHESIA POSTPROCEDURE EVALUATION
Department of Anesthesiology  Postprocedure Note    Patient: Ronaldo Ford  MRN: [id number]  YOB: 1950  Date of evaluation: 8/2/2021  Time:  7:01 PM     Procedure Summary     Date: 08/02/21 Room / Location: [address]    Anesthesia Start: 1717 Anesthesia Stop:     Procedure: LEFT ANKLE OPEN REDUCTION INTERNAL FIXATION (Left Ankle) Diagnosis: (unknown)    Surgeons: Ish House MD Responsible Provider: Edwin Torres MD    Anesthesia Type: general ASA Status: 2          Anesthesia Type: No value filed. Jere Phase I: Jere Score: 5    Jere Phase II:      Last vitals: Reviewed and per EMR flowsheets.        Anesthesia Post Evaluation    Patient location during evaluation: PACU  Patient participation: complete - patient participated  Level of consciousness: sleepy but conscious  Pain score: 4  Airway patency: patent  Nausea & Vomiting: no nausea and no vomiting  Complications: no  Cardiovascular status: blood pressure returned to baseline  Respiratory status: acceptable  Hydration status: euvolemic

## 2021-08-02 NOTE — CONSULTS
[facility] Orthopedic Surgery  Consult Note         This patient is seen in consultation at the request of Magaly Hargrove    Reason for Consult:  Left ankle pain / trimalleolar fracture dislocation. CHIEF COMPLAINT:  Left ankle pain / fall. History Obtained From:  patient, family member - daughter, electronic medical record    HISTORY OF PRESENT ILLNESS:    Ms. Shaggy Britt 70 y.o.  female presents today for consultation and evaluation of a left ankle injury which occurred when she fell off a chair. She was first seen and evaluated in [place] ED, where she was x-rayed, splinted and asked to f/u with Orthopedics. She is complaining of medial & lateral ankle pain and swelling. This is better with elevation and worse with bearing any wt. The pain is sharp and not radiating. No numbness or tingling sensation. Alleviating factors: rest. No other complaint. Past Medical History:        Diagnosis Date    Arthritis     Premature atrial contraction        Past Surgical History:        Procedure Laterality Date    ANKLE FRACTURE SURGERY Left 8/2/2021    LEFT ANKLE OPEN REDUCTION INTERNAL FIXATION performed by Miguelina Bustillo MD at [address] [place] [address]       Medications prior to admission:   Prior to Admission medications    Medication Sig Start Date End Date Taking? Authorizing Provider   oxyCODONE-acetaminophen (PERCOCET) 5-325 MG per tablet Take 1 tablet by mouth every 6 hours as needed for Pain for up to 20 doses. Sedation precautions. Do not drive or operate machinery while taking this medication. Do not drink alcohol. This is an addictive medication and should be used sparingly.  8/1/21 8/4/21 Yes SUNNY Hargrove       Current Medications:   Current Facility-Administered Medications: ceFAZolin (ANCEF) 2000 mg in dextrose 5 % 100 mL IVPB, 2,000 mg, Intravenous, Q8H  aspirin chewable tablet 81 mg, 81 mg, Oral, BID  lactated ringers infusion, , Intravenous, Continuous  acetaminophen (TYLENOL) tablet 650 mg, 650 mg, Oral, Q6H PRN  acetaminophen-codeine (TYLENOL #3) 300-30 MG per tablet 1 tablet, 1 tablet, Oral, Q6H PRN  HYDROmorphone (DILAUDID) injection 0.25 mg, 0.25 mg, Intravenous, Q3H PRN **OR** HYDROmorphone (DILAUDID) injection 0.5 mg, 0.5 mg, Intravenous, Q3H PRN    Allergies:  Morphine, Cortizone-10 [hydrocortisone], Phenergan [promethazine], and Sulfa antibiotics    Social History     Socioeconomic History    Marital status: Unknown     Spouse name: Not on file    Number of children: Not on file    Years of education: Not on file    Highest education level: Not on file   Occupational History    Not on file   Tobacco Use    Smoking status: Never Smoker    Smokeless tobacco: Never Used   Vaping Use    Vaping Use: Never used   Substance and Sexual Activity    Alcohol use: Not Currently    Drug use: Never    Sexual activity: Not Currently   Other Topics Concern    Not on file   Social History Narrative    Not on file     Social Determinants of Health     Financial Resource Strain:     Difficulty of Paying Living Expenses:    Food Insecurity:     Worried About Running Out of Food in the Last Year:     Ran Out of Food in the Last Year:    Transportation Needs:     Lack of Transportation (Medical):  Lack of Transportation (Non-Medical):    Physical Activity:     Days of Exercise per Week:     Minutes of Exercise per Session:    Stress:     Feeling of Stress :    Social Connections:     Frequency of Communication with Friends and Family:     Frequency of Social Gatherings with Friends and Family:     Attends Mosque Services:     Active Member of Clubs or Organizations:     Attends Club or Organization Meetings:     Marital Status:    Intimate Partner Violence:     Fear of Current or Ex-Partner:     Emotionally Abused:     Physically Abused:     Sexually Abused:        Family History:  History reviewed. No pertinent family history.       REVIEW OF SYSTEMS: CONSTITUTIONAL: Denies unexplained weight loss, fevers, chills or fatigue  NEUROLOGICAL: Denies unsteady gait or progressive weakness    PSYCHOLOGICAL: Denies anxiety, depression   SKIN: Denies skin changes, delayed healing, rash, itching   HEMATOLOGIC: Denies easy bleeding or bruising  ENDOCRINE: Denies excessive thirst, urination, heat/cold  RESPIRATORY: Denies current dyspnea, cough  CARDIOVASCULAR: Negative for chest pain at this time. EYES: Negative for photophobia and visual disturbance. ENT:  Negative for rhinorrhea, epistaxis, sore throat, or hearing loss. GI: Denies nausea, vomiting, diarrhea   : Denies bowel or bladder issues   MUSCULOSKELETAL: Left ankle pain. All other ROS reviewed in chart or with patient or family and are grossly negative. PHYSICAL EXAMINATION:  Ms. Esther Dean is a very pleasant 70 y.o. female who seen today in no acute distress, awake, alert, and oriented. She is well nourished and groomed. Patient with normal affect. Body mass index is 29.26 kg/m². . Skin warm and dry. Resting respiratory rate is 16. Resp deep and easy. Pulse is with regular rate and rhythm    BP (!) 143/87   Pulse 88   Temp 97.5 °F (36.4 °C) (Temporal)   Resp 16   Ht 5' 2\" (1.575 m)   Wt 160 lb (72.6 kg)   SpO2 97%   BMI 29.26 kg/m²        Airway is intact  Chest: chest clear, no wheezing, rales, normal symmetric air entry, no tachypnea, retractions or cyanosis  Heart: regular rate and rhythm ; heart sounds normal   Hearing intact, pupil equal and reactive bilateral  Lymphatics; No groin or axillary enlarged lymph nodes. Neck; No swelling  Abdomen; soft, non distended. MUSCULOSKELETAL:   Examination of both ankles showing a decreased range of motion of the left ankle compare to the other side. There is moderate swelling that can be seen, as well as ecchymosis. She has intact sensation and good pedal pulses.   She has significant tenderness on deep palpation over the medial & lateral malleolus of the left ankle. NEUROLOGIC:   Sensory:    Touch:                     Right Upper Extremity:  normal                   Left Upper Extremity:  normal                  Right Lower Extremity:  normal                  Left Lower Extremity:  normal        DATA:    CBC: No results found for: WBC, RBC, HGB, HCT, MCV, MCH, MCHC, RDW, PLT, MPV  WBC:  No results found for: WBC  PT/INR:  No results found for: PROTIME, INR  PTT:  No results found for: APTT[APTT    IMAGING: Xrays, 3 views of the left ankle dated 8/1/2021 from [facility],  were reviewed, and showed a markedly displaced trimalleolar fracture dislocation. IMPRESSION: Left ankle pain / trimalleolar fracture dislocation. PLAN:  I discussed with Betty Ayaz the overall alignment of the fracture and treatment options including both surgical and non-surgical treatment, and that my recommendation is an open reduction and internal fixation given the amount of displacement and comminution of the fracture. I discussed the risks and benefits of surgery with the patient, including but not limited to infection, bleeding, pain, injury to nerves or blood vessels failure of the surgery and need for additional surgery. All the patient's questions were answered. We discussed an expected post-operative course. She  is understanding of this and wishes to proceed. Thank you very much for the kind consultation and allowing me to participate in this patient's care. I will continue to keep you apprised of her progress.          Arabella Bond MD   8/2/2021  1:52 PM

## 2021-08-02 NOTE — BRIEF OP NOTE
Brief Postoperative Note      Patient: Aleks Fonseca  YOB: 1950  MRN: [id number]    Date of Procedure: 8/2/2021    Pre-Op Diagnosis: Left ankle trimalleolar fracture with syndesmosis disruption. Post-Op Diagnosis: Same       Procedure(s):  LEFT ANKLE OPEN REDUCTION INTERNAL FIXATION trimalleolar fracture with syndesmosis repair. Surgeon(s):  Judy Fernandez MD    Assistant:  Surgical Assistant: Tracy Moe  Physician Assistant: Andrew Ortega    Anesthesia: General    Estimated Blood Loss (mL): less than 50     Complications: None    Specimens:   * No specimens in log *    Implants:  Implant Name Type Inv. Item Serial No.  Lot No. LRB No. Used Action   PLATE BNE [id number] 4 H L LAT DST PERIARTC FIBULAR S STL SONIA  PLATE BNE [id number] 4 H L LAT DST PERIARTC FIBULAR S STL SONIA  [employer]ISEO [employer]-WD  Left 1 Implanted   SCREW BNE L44MM DIA3.5MM MILLY S STL ST NONCANNULATED IM  SCREW BNE L44MM DIA3.5MM MILLY S STL ST NONCANNULATED IM  [employer]-WD  Left 1 Implanted   SCREW BNE L14MM DIA3. 5MM HD DIA2.7MM MILLY PERIARTC S STL ST  SCREW BNE L14MM DIA3. 5MM HD DIA2.7MM MILLY PERIARTC S STL ST  [employer]ISE[employer]-WD  Left 1 Implanted   SCREW BNE L12MM DIA3. 5MM HD DIA2.7MM MILLY PERIARTC S STL ST  SCREW BNE L12MM DIA3. 5MM HD DIA2.7MM MILLY PERIARTC S STL ST  [employer]ISE[employer]-WD  Left 1 Implanted   SCREW BNE L18MM DIA2.7MM HEX HD DIA2.5MM CANC BIODUR 108C  SCREW BNE L18MM DIA2.7MM HEX HD DIA2.5MM CANC BIODUR 108C  [employer]ISE[employer]-WD  Left 3 Implanted   SCREW BNE L16MM DIA2.7MM HEX HD DIA2.5MM CANC BIODUR 108C  SCREW BNE L16MM DIA2.7MM HEX HD DIA2.5MM CANC BIODUR 108C  [employer]ISE[employer] TRAU[employer]-WD  Left 1 Implanted   SCREW BNE L14MM DIA2.7MM HEX HD DIA2.5MM CANC BIODUR 108C  SCREW BNE L14MM DIA2.7MM HEX HD DIA2.5MM CANC BIODUR 108C  [employer]ISE[employer] TRAU[employer]-WD  Left 1 Implanted   SCREW BNE L18MM DIA2.7MM MILLY ANK FT S STL ST JOSE  SCREW BNE L18MM DIA2.7MM MILLY ANK FT S STL ST JOSE  ELISEO [employer]-WD  Left 1 Implanted   SCREW BNE L22MM DIA2.7MM MILLY ANK FT S STL ST NONCANNULATED  SCREW BNE L22MM DIA2.7MM MILLY ANK FT S STL ST NONCANNULATED  ELISEO [employer]ET TRAUMA-WD  Left 1 Implanted   SCREW BNE L44MM DIA4MM CANC SELF DRL ST JOSE NONLOCKING 1/2  SCREW BNE L44MM DIA4MM CANC SELF DRL ST JOSE NONLOCKING 1/2  [employer]ISE[employer] TRAUMA-WD  Left 1 Implanted   SCREW BNE L40MM DIA4MM CANC SELF DRL ST JOSE NONLOCKING 1/3  SCREW BNE L40MM DIA4MM CANC SELF DRL ST JOSE NONLOCKING 1/3  [employer]ISE[employer]ET TRAU[employer]-WD  Left 1 Implanted         Drains: * No LDAs found *    Findings: Same    Electronically signed by Gretel Willis MD on 8/2/2021 at 6:46 PM

## 2021-08-02 NOTE — ANESTHESIA PRE PROCEDURE
[facility] Department of Anesthesiology  Pre-Anesthesia Evaluation/Consultation       Name:  Madyson Willis  : 1950  Age:  70 y.o. MRN:  [id number]  Date: 2021           Surgeon: Surgeon(s):  Marty Egan MD    Procedure: Procedure(s):  LEFT ANKLE OPEN REDUCTION INTERNAL FIXATION     Allergies   Allergen Reactions    Morphine     Cortizone-10 [Hydrocortisone] Rash    Phenergan [Promethazine] Rash    Sulfa Antibiotics Rash     There is no problem list on file for this patient. Past Medical History:   Diagnosis Date    Arthritis     Premature atrial contraction      Past Surgical History:   Procedure Laterality Date    TOTAL HIP ARTHROPLASTY      left     Social History     Tobacco Use    Smoking status: Never Smoker    Smokeless tobacco: Never Used   Vaping Use    Vaping Use: Never used   Substance Use Topics    Alcohol use: Not Currently    Drug use: Never     Medications  No current facility-administered medications on file prior to encounter. Current Outpatient Medications on File Prior to Encounter   Medication Sig Dispense Refill    oxyCODONE-acetaminophen (PERCOCET) 5-325 MG per tablet Take 1 tablet by mouth every 6 hours as needed for Pain for up to 20 doses. Sedation precautions. Do not drive or operate machinery while taking this medication. Do not drink alcohol. This is an addictive medication and should be used sparingly. 20 tablet 0     No current facility-administered medications for this encounter.      Vital Signs (Current)   Vitals:    21 1148   BP: (!) 143/87   Pulse: 88   Resp: 16   Temp: 97.5 °F (36.4 °C)   TempSrc: Temporal   SpO2: 97%   Weight: 160 lb (72.6 kg)   Height: 5' 2\" (1.575 m)                                            Vital Signs Statistics (for past 48 hrs)     Temp  Av.9 °F (36.6 °C)  Min: 97.5 °F (36.4 °C)   Min taken time: 21 1148  Max: 98.2 °F (36.8 °C)   Max taken time: 21 1809  Pulse  Av.7  Min: 80   Min taken time: 21 1148  Max: 133   Max taken time: 21 2100  Resp  Av.6  Min: 14   Min taken time: 21 205  Max: 22   Max taken time: 21 2113  BP  Min: 143/87   Min taken time: 21 1148  Max: 196/109   Max taken time: 21  MAP (mmHg)  Av  Min: 95   Min taken time: 21 223  Max: 127   Max taken time: 21  SpO2  Av.4 %  Min: 96 %   Min taken time: 21  Max: 100 %   Max taken time: 21  BP Readings from Last 3 Encounters:   21 (!) 143/87   21 (!) 150/78       BMI  Body mass index is 29.26 kg/m². Estimated body mass index is 29.26 kg/m² as calculated from the following:    Height as of this encounter: 5' 2\" (1.575 m). Weight as of this encounter: 160 lb (72.6 kg). CBC No results found for: WBC, RBC, HGB, HCT, MCV, RDW, PLT  CMP  No results found for: NA, K, CL, CO2, BUN, CREATININE, GFRAA, AGRATIO, LABGLOM, GLUCOSE, PROT, CALCIUM, BILITOT, ALKPHOS, AST, ALT  BMP  No results found for: NA, K, CL, CO2, BUN, CREATININE, CALCIUM, GFRAA, LABGLOM, GLUCOSE  POCGlucose  No results for input(s): GLUCOSE in the last 72 hours.    Coags  No results found for: PROTIME, INR, APTT  HCG (If Applicable) No results found for: PREGTESTUR, PREGSERUM, HCG, HCGQUANT   ABGs No results found for: PHART, PO2ART, YGH7YOC, SNE3WZD, BEART, A8QYSOHL   Type & Screen (If Applicable)  No results found for: LABABO, LABRH                         BMI: Wt Readings from Last 3 Encounters:       NPO Status:   Date of last liquid consumption: 21   Time of last liquid consumption: 830   Date of last solid food consumption: 21      Time of last solid consumption: 830       Anesthesia Evaluation  Patient summary reviewed no history of anesthetic complications:   Airway: Mallampati: II  TM distance: >3 FB   Neck ROM: full  Mouth opening: > = 3 FB Dental: normal exam         Pulmonary: breath sounds clear to auscultation      (-) COPD, asthma, sleep apnea and not a current smoker                           Cardiovascular:  Exercise tolerance: good (>4 METS),       (-) hypertension, past MI, dysrhythmias and no hyperlipidemia        Rate: normal                    Neuro/Psych:      (-) seizures, TIA and psychiatric history           GI/Hepatic/Renal:        (-) GERD       Endo/Other:        (-) diabetes mellitus, hypothyroidism               Abdominal:             Vascular:     - DVT and PE. Other Findings:             Anesthesia Plan      general     ASA 2       Induction: intravenous. MIPS: Postoperative opioids intended and Prophylactic antiemetics administered. Anesthetic plan and risks discussed with patient. Plan discussed with CRNA. This pre-anesthesia assessment may be used as a history and physical.    DOS STAFF ADDENDUM:    Pt seen and examined, chart reviewed (including anesthesia, drug and allergy history). No interval changes to history and physical examination. Anesthetic plan, risks, benefits, alternatives, and personnel involved discussed with patient. Patient verbalized an understanding and agrees to proceed.       Nitin Solis MD  August 2, 2021  12:20 PM

## 2021-08-02 NOTE — PROGRESS NOTES
Pt to PACU. Unresponsive with oral airway in. VSS. Does not appear to be in any distress. LLE dressing D&I. Warm to touch, brisk cap refill, normal distal pulses, +1 non-pitting edema noted to LLE. IV site WDL.

## 2021-08-03 ENCOUNTER — TELEPHONE (OUTPATIENT)
Dept: ORTHOPEDIC SURGERY | Age: 71
End: 2021-08-03

## 2021-08-03 ENCOUNTER — HOSPITAL ENCOUNTER (INPATIENT)
Age: 71
LOS: 8 days | Discharge: HOME HEALTH | DRG: 493 | End: 2021-08-11
Attending: PHYSICAL MEDICINE & REHABILITATION | Admitting: PHYSICAL MEDICINE & REHABILITATION
Payer: MEDICARE

## 2021-08-03 VITALS
OXYGEN SATURATION: 94 % | HEIGHT: 62 IN | WEIGHT: 164.24 LBS | BODY MASS INDEX: 30.22 KG/M2 | HEART RATE: 86 BPM | TEMPERATURE: 97.8 F | DIASTOLIC BLOOD PRESSURE: 69 MMHG | RESPIRATION RATE: 16 BRPM | SYSTOLIC BLOOD PRESSURE: 125 MMHG

## 2021-08-03 DIAGNOSIS — S82.852A LEFT TRIMALLEOLAR FRACTURE, CLOSED, INITIAL ENCOUNTER: Primary | ICD-10-CM

## 2021-08-03 LAB
ANION GAP SERPL CALCULATED.3IONS-SCNC: 13 MMOL/L (ref 3–16)
BASOPHILS ABSOLUTE: 0.1 K/UL (ref 0–0.2)
BASOPHILS RELATIVE PERCENT: 0.5 %
BILIRUBIN URINE: NEGATIVE
BILIRUBIN URINE: NEGATIVE
BLOOD, URINE: NEGATIVE
BLOOD, URINE: NEGATIVE
BUN BLDV-MCNC: 16 MG/DL (ref 7–20)
CALCIUM SERPL-MCNC: 9.7 MG/DL (ref 8.3–10.6)
CHLORIDE BLD-SCNC: 102 MMOL/L (ref 99–110)
CLARITY: CLEAR
CLARITY: CLEAR
CO2: 23 MMOL/L (ref 21–32)
COLOR: YELLOW
COLOR: YELLOW
CREAT SERPL-MCNC: 0.7 MG/DL (ref 0.6–1.2)
EOSINOPHILS ABSOLUTE: 0 K/UL (ref 0–0.6)
EOSINOPHILS RELATIVE PERCENT: 0.1 %
GFR AFRICAN AMERICAN: >60
GFR NON-AFRICAN AMERICAN: >60
GLUCOSE BLD-MCNC: 131 MG/DL (ref 70–99)
GLUCOSE URINE: NEGATIVE MG/DL
GLUCOSE URINE: NEGATIVE MG/DL
HCT VFR BLD CALC: 36.6 % (ref 36–48)
HEMOGLOBIN: 12.1 G/DL (ref 12–16)
KETONES, URINE: NEGATIVE MG/DL
KETONES, URINE: NEGATIVE MG/DL
LEUKOCYTE ESTERASE, URINE: NEGATIVE
LEUKOCYTE ESTERASE, URINE: NEGATIVE
LYMPHOCYTES ABSOLUTE: 2.4 K/UL (ref 1–5.1)
LYMPHOCYTES RELATIVE PERCENT: 15.4 %
MCH RBC QN AUTO: 32.1 PG (ref 26–34)
MCHC RBC AUTO-ENTMCNC: 33.1 G/DL (ref 31–36)
MCV RBC AUTO: 96.9 FL (ref 80–100)
MICROSCOPIC EXAMINATION: NORMAL
MICROSCOPIC EXAMINATION: NORMAL
MONOCYTES ABSOLUTE: 1.3 K/UL (ref 0–1.3)
MONOCYTES RELATIVE PERCENT: 8.2 %
NEUTROPHILS ABSOLUTE: 11.7 K/UL (ref 1.7–7.7)
NEUTROPHILS RELATIVE PERCENT: 75.8 %
NITRITE, URINE: NEGATIVE
NITRITE, URINE: NEGATIVE
PDW BLD-RTO: 12.8 % (ref 12.4–15.4)
PH UA: 6 (ref 5–8)
PH UA: 6 (ref 5–8)
PLATELET # BLD: 156 K/UL (ref 135–450)
PLATELET SLIDE REVIEW: ADEQUATE
PMV BLD AUTO: 10.7 FL (ref 5–10.5)
POTASSIUM SERPL-SCNC: 3.7 MMOL/L (ref 3.5–5.1)
PROTEIN UA: NEGATIVE MG/DL
PROTEIN UA: NEGATIVE MG/DL
RBC # BLD: 3.78 M/UL (ref 4–5.2)
SLIDE REVIEW: ABNORMAL
SODIUM BLD-SCNC: 138 MMOL/L (ref 136–145)
SPECIFIC GRAVITY UA: 1.01 (ref 1–1.03)
SPECIFIC GRAVITY UA: 1.01 (ref 1–1.03)
URINE REFLEX TO CULTURE: NORMAL
URINE REFLEX TO CULTURE: NORMAL
URINE TYPE: NORMAL
URINE TYPE: NORMAL
UROBILINOGEN, URINE: 0.2 E.U./DL
UROBILINOGEN, URINE: 0.2 E.U./DL
WBC # BLD: 15.4 K/UL (ref 4–11)

## 2021-08-03 PROCEDURE — 6370000000 HC RX 637 (ALT 250 FOR IP): Performed by: NURSE PRACTITIONER

## 2021-08-03 PROCEDURE — 36415 COLL VENOUS BLD VENIPUNCTURE: CPT

## 2021-08-03 PROCEDURE — G0378 HOSPITAL OBSERVATION PER HR: HCPCS

## 2021-08-03 PROCEDURE — 97162 PT EVAL MOD COMPLEX 30 MIN: CPT

## 2021-08-03 PROCEDURE — 81003 URINALYSIS AUTO W/O SCOPE: CPT

## 2021-08-03 PROCEDURE — 6360000002 HC RX W HCPCS: Performed by: ORTHOPAEDIC SURGERY

## 2021-08-03 PROCEDURE — 0QSH04Z REPOSITION LEFT TIBIA WITH INTERNAL FIXATION DEVICE, OPEN APPROACH: ICD-10-PCS | Performed by: ORTHOPAEDIC SURGERY

## 2021-08-03 PROCEDURE — 80048 BASIC METABOLIC PNL TOTAL CA: CPT

## 2021-08-03 PROCEDURE — 97165 OT EVAL LOW COMPLEX 30 MIN: CPT

## 2021-08-03 PROCEDURE — 6370000000 HC RX 637 (ALT 250 FOR IP): Performed by: PHYSICAL MEDICINE & REHABILITATION

## 2021-08-03 PROCEDURE — 96376 TX/PRO/DX INJ SAME DRUG ADON: CPT

## 2021-08-03 PROCEDURE — 96375 TX/PRO/DX INJ NEW DRUG ADDON: CPT

## 2021-08-03 PROCEDURE — 6370000000 HC RX 637 (ALT 250 FOR IP): Performed by: ORTHOPAEDIC SURGERY

## 2021-08-03 PROCEDURE — 96366 THER/PROPH/DIAG IV INF ADDON: CPT

## 2021-08-03 PROCEDURE — 1280000000 HC REHAB R&B

## 2021-08-03 PROCEDURE — 6360000002 HC RX W HCPCS: Performed by: NURSE PRACTITIONER

## 2021-08-03 PROCEDURE — 97535 SELF CARE MNGMENT TRAINING: CPT

## 2021-08-03 PROCEDURE — 85025 COMPLETE CBC W/AUTO DIFF WBC: CPT

## 2021-08-03 PROCEDURE — 0QSK04Z REPOSITION LEFT FIBULA WITH INTERNAL FIXATION DEVICE, OPEN APPROACH: ICD-10-PCS | Performed by: ORTHOPAEDIC SURGERY

## 2021-08-03 PROCEDURE — 97530 THERAPEUTIC ACTIVITIES: CPT

## 2021-08-03 PROCEDURE — 96365 THER/PROPH/DIAG IV INF INIT: CPT

## 2021-08-03 PROCEDURE — 94150 VITAL CAPACITY TEST: CPT

## 2021-08-03 RX ORDER — OXYCODONE HYDROCHLORIDE 10 MG/1
10 TABLET ORAL EVERY 4 HOURS PRN
Status: CANCELLED | OUTPATIENT
Start: 2021-08-03

## 2021-08-03 RX ORDER — BISACODYL 10 MG
10 SUPPOSITORY, RECTAL RECTAL DAILY PRN
Status: DISCONTINUED | OUTPATIENT
Start: 2021-08-03 | End: 2021-08-11 | Stop reason: HOSPADM

## 2021-08-03 RX ORDER — ONDANSETRON 4 MG/1
4 TABLET, FILM COATED ORAL 3 TIMES DAILY PRN
Qty: 30 TABLET | Refills: 0 | Status: ON HOLD | OUTPATIENT
Start: 2021-08-03 | End: 2021-08-10 | Stop reason: SDUPTHER

## 2021-08-03 RX ORDER — ACETAMINOPHEN AND CODEINE PHOSPHATE 300; 30 MG/1; MG/1
1 TABLET ORAL EVERY 6 HOURS PRN
Qty: 20 TABLET | Refills: 0 | Status: SHIPPED | OUTPATIENT
Start: 2021-08-03 | End: 2021-08-03 | Stop reason: HOSPADM

## 2021-08-03 RX ORDER — HYDRALAZINE HYDROCHLORIDE 10 MG/1
10 TABLET, FILM COATED ORAL EVERY 6 HOURS PRN
Status: CANCELLED | OUTPATIENT
Start: 2021-08-03

## 2021-08-03 RX ORDER — POLYETHYLENE GLYCOL 3350 17 G/17G
17 POWDER, FOR SOLUTION ORAL DAILY PRN
Status: DISCONTINUED | OUTPATIENT
Start: 2021-08-03 | End: 2021-08-11 | Stop reason: HOSPADM

## 2021-08-03 RX ORDER — ONDANSETRON 4 MG/1
4 TABLET, FILM COATED ORAL EVERY 8 HOURS PRN
Status: DISCONTINUED | OUTPATIENT
Start: 2021-08-03 | End: 2021-08-11 | Stop reason: HOSPADM

## 2021-08-03 RX ORDER — OXYCODONE HYDROCHLORIDE 5 MG/1
5 TABLET ORAL EVERY 4 HOURS PRN
Status: DISCONTINUED | OUTPATIENT
Start: 2021-08-03 | End: 2021-08-03 | Stop reason: HOSPADM

## 2021-08-03 RX ORDER — ONDANSETRON 2 MG/ML
4 INJECTION INTRAMUSCULAR; INTRAVENOUS EVERY 6 HOURS PRN
Status: DISCONTINUED | OUTPATIENT
Start: 2021-08-03 | End: 2021-08-11 | Stop reason: HOSPADM

## 2021-08-03 RX ORDER — ACETAMINOPHEN 325 MG/1
650 TABLET ORAL EVERY 6 HOURS PRN
Status: CANCELLED | OUTPATIENT
Start: 2021-08-03

## 2021-08-03 RX ORDER — ONDANSETRON 2 MG/ML
4 INJECTION INTRAMUSCULAR; INTRAVENOUS EVERY 6 HOURS PRN
Status: DISCONTINUED | OUTPATIENT
Start: 2021-08-03 | End: 2021-08-03 | Stop reason: HOSPADM

## 2021-08-03 RX ORDER — ONDANSETRON 2 MG/ML
4 INJECTION INTRAMUSCULAR; INTRAVENOUS EVERY 6 HOURS PRN
Status: CANCELLED | OUTPATIENT
Start: 2021-08-03

## 2021-08-03 RX ORDER — BISACODYL 10 MG
10 SUPPOSITORY, RECTAL RECTAL DAILY PRN
Status: CANCELLED | OUTPATIENT
Start: 2021-08-03

## 2021-08-03 RX ORDER — HYDRALAZINE HYDROCHLORIDE 10 MG/1
10 TABLET, FILM COATED ORAL EVERY 6 HOURS PRN
Status: DISCONTINUED | OUTPATIENT
Start: 2021-08-03 | End: 2021-08-11 | Stop reason: HOSPADM

## 2021-08-03 RX ORDER — ASPIRIN 81 MG/1
81 TABLET ORAL 2 TIMES DAILY
Qty: 60 TABLET | Refills: 0 | Status: ON HOLD | OUTPATIENT
Start: 2021-08-03 | End: 2021-08-10 | Stop reason: SDUPTHER

## 2021-08-03 RX ORDER — OXYCODONE HYDROCHLORIDE 10 MG/1
10 TABLET ORAL EVERY 4 HOURS PRN
Status: DISCONTINUED | OUTPATIENT
Start: 2021-08-03 | End: 2021-08-04

## 2021-08-03 RX ORDER — POLYETHYLENE GLYCOL 3350 17 G/17G
17 POWDER, FOR SOLUTION ORAL DAILY PRN
Status: CANCELLED | OUTPATIENT
Start: 2021-08-03

## 2021-08-03 RX ORDER — LANOLIN ALCOHOL/MO/W.PET/CERES
3 CREAM (GRAM) TOPICAL NIGHTLY PRN
Status: DISCONTINUED | OUTPATIENT
Start: 2021-08-03 | End: 2021-08-04

## 2021-08-03 RX ORDER — ASPIRIN 81 MG/1
81 TABLET, CHEWABLE ORAL 2 TIMES DAILY
Status: CANCELLED | OUTPATIENT
Start: 2021-08-03

## 2021-08-03 RX ORDER — CALAMINE
LOTION (ML) TOPICAL PRN
Status: DISCONTINUED | OUTPATIENT
Start: 2021-08-03 | End: 2021-08-03 | Stop reason: HOSPADM

## 2021-08-03 RX ORDER — SENNA AND DOCUSATE SODIUM 50; 8.6 MG/1; MG/1
1 TABLET, FILM COATED ORAL 2 TIMES DAILY
Status: CANCELLED | OUTPATIENT
Start: 2021-08-03

## 2021-08-03 RX ORDER — CALAMINE
LOTION (ML) TOPICAL PRN
Status: DISCONTINUED | OUTPATIENT
Start: 2021-08-03 | End: 2021-08-11 | Stop reason: HOSPADM

## 2021-08-03 RX ORDER — OXYCODONE HYDROCHLORIDE 5 MG/1
5 TABLET ORAL EVERY 4 HOURS PRN
Status: CANCELLED | OUTPATIENT
Start: 2021-08-03

## 2021-08-03 RX ORDER — ACETAMINOPHEN 325 MG/1
650 TABLET ORAL EVERY 6 HOURS PRN
Status: DISCONTINUED | OUTPATIENT
Start: 2021-08-03 | End: 2021-08-11 | Stop reason: HOSPADM

## 2021-08-03 RX ORDER — CALAMINE
LOTION (ML) TOPICAL
Qty: 1 BOTTLE | Refills: 0 | Status: SHIPPED | OUTPATIENT
Start: 2021-08-03 | End: 2021-10-04

## 2021-08-03 RX ORDER — ONDANSETRON 4 MG/1
4 TABLET, FILM COATED ORAL EVERY 8 HOURS PRN
Status: CANCELLED | OUTPATIENT
Start: 2021-08-03

## 2021-08-03 RX ORDER — LANOLIN ALCOHOL/MO/W.PET/CERES
3 CREAM (GRAM) TOPICAL NIGHTLY PRN
Status: CANCELLED | OUTPATIENT
Start: 2021-08-03

## 2021-08-03 RX ORDER — OXYCODONE HYDROCHLORIDE 5 MG/1
5 TABLET ORAL EVERY 4 HOURS PRN
Qty: 30 TABLET | Refills: 0 | Status: ON HOLD | OUTPATIENT
Start: 2021-08-03 | End: 2021-08-10 | Stop reason: HOSPADM

## 2021-08-03 RX ORDER — ASPIRIN 81 MG/1
81 TABLET, CHEWABLE ORAL 2 TIMES DAILY
Status: DISCONTINUED | OUTPATIENT
Start: 2021-08-03 | End: 2021-08-03 | Stop reason: HOSPADM

## 2021-08-03 RX ORDER — SENNA AND DOCUSATE SODIUM 50; 8.6 MG/1; MG/1
1 TABLET, FILM COATED ORAL 2 TIMES DAILY
Status: DISCONTINUED | OUTPATIENT
Start: 2021-08-03 | End: 2021-08-11 | Stop reason: HOSPADM

## 2021-08-03 RX ORDER — CALAMINE
LOTION (ML) TOPICAL PRN
Status: CANCELLED | OUTPATIENT
Start: 2021-08-03

## 2021-08-03 RX ORDER — ASPIRIN 81 MG/1
81 TABLET, CHEWABLE ORAL 2 TIMES DAILY
Status: DISCONTINUED | OUTPATIENT
Start: 2021-08-04 | End: 2021-08-11 | Stop reason: HOSPADM

## 2021-08-03 RX ORDER — IBUPROFEN 200 MG
800 TABLET ORAL EVERY 6 HOURS PRN
Status: ON HOLD | COMMUNITY
End: 2021-08-03 | Stop reason: HOSPADM

## 2021-08-03 RX ORDER — OXYCODONE HYDROCHLORIDE 5 MG/1
5 TABLET ORAL EVERY 4 HOURS PRN
Status: DISCONTINUED | OUTPATIENT
Start: 2021-08-03 | End: 2021-08-04

## 2021-08-03 RX ORDER — M-VIT,TX,IRON,MINS/CALC/FOLIC 27MG-0.4MG
1 TABLET ORAL DAILY
COMMUNITY

## 2021-08-03 RX ADMIN — ASPIRIN 81 MG: 81 TABLET, CHEWABLE ORAL at 17:33

## 2021-08-03 RX ADMIN — ONDANSETRON 4 MG: 2 INJECTION INTRAMUSCULAR; INTRAVENOUS at 08:44

## 2021-08-03 RX ADMIN — HYDROMORPHONE HYDROCHLORIDE 0.5 MG: 1 INJECTION, SOLUTION INTRAMUSCULAR; INTRAVENOUS; SUBCUTANEOUS at 06:27

## 2021-08-03 RX ADMIN — CEFAZOLIN 2000 MG: 10 INJECTION, POWDER, FOR SOLUTION INTRAVENOUS at 08:23

## 2021-08-03 RX ADMIN — DOCUSATE SODIUM AND SENNOSIDES 1 TABLET: 8.6; 5 TABLET, FILM COATED ORAL at 20:37

## 2021-08-03 RX ADMIN — CEFAZOLIN 2000 MG: 10 INJECTION, POWDER, FOR SOLUTION INTRAVENOUS at 17:37

## 2021-08-03 RX ADMIN — OXYCODONE 5 MG: 5 TABLET ORAL at 16:21

## 2021-08-03 RX ADMIN — ASPIRIN 81 MG: 81 TABLET, CHEWABLE ORAL at 08:13

## 2021-08-03 RX ADMIN — HYDROMORPHONE HYDROCHLORIDE 0.5 MG: 1 INJECTION, SOLUTION INTRAMUSCULAR; INTRAVENOUS; SUBCUTANEOUS at 03:36

## 2021-08-03 RX ADMIN — HYDROMORPHONE HYDROCHLORIDE 0.5 MG: 1 INJECTION, SOLUTION INTRAMUSCULAR; INTRAVENOUS; SUBCUTANEOUS at 09:54

## 2021-08-03 RX ADMIN — HYDROMORPHONE HYDROCHLORIDE 0.5 MG: 1 INJECTION, SOLUTION INTRAMUSCULAR; INTRAVENOUS; SUBCUTANEOUS at 00:37

## 2021-08-03 RX ADMIN — OXYCODONE HYDROCHLORIDE 10 MG: 10 TABLET ORAL at 20:37

## 2021-08-03 RX ADMIN — ACETAMINOPHEN AND CODEINE PHOSPHATE 1 TABLET: 300; 30 TABLET ORAL at 02:09

## 2021-08-03 RX ADMIN — Medication: at 10:48

## 2021-08-03 RX ADMIN — ACETAMINOPHEN 650 MG: 325 TABLET ORAL at 16:21

## 2021-08-03 RX ADMIN — CEFAZOLIN 2000 MG: 10 INJECTION, POWDER, FOR SOLUTION INTRAVENOUS at 00:45

## 2021-08-03 RX ADMIN — OXYCODONE 5 MG: 5 TABLET ORAL at 12:16

## 2021-08-03 RX ADMIN — ONDANSETRON 4 MG: 2 INJECTION INTRAMUSCULAR; INTRAVENOUS at 17:50

## 2021-08-03 RX ADMIN — ACETAMINOPHEN AND CODEINE PHOSPHATE 1 TABLET: 300; 30 TABLET ORAL at 08:13

## 2021-08-03 ASSESSMENT — PAIN DESCRIPTION - ORIENTATION
ORIENTATION: LEFT

## 2021-08-03 ASSESSMENT — PAIN DESCRIPTION - FREQUENCY
FREQUENCY: CONTINUOUS

## 2021-08-03 ASSESSMENT — PAIN - FUNCTIONAL ASSESSMENT
PAIN_FUNCTIONAL_ASSESSMENT: PREVENTS OR INTERFERES SOME ACTIVE ACTIVITIES AND ADLS

## 2021-08-03 ASSESSMENT — PAIN SCALES - GENERAL
PAINLEVEL_OUTOF10: 8
PAINLEVEL_OUTOF10: 0
PAINLEVEL_OUTOF10: 8
PAINLEVEL_OUTOF10: 8
PAINLEVEL_OUTOF10: 10
PAINLEVEL_OUTOF10: 8
PAINLEVEL_OUTOF10: 6
PAINLEVEL_OUTOF10: 0
PAINLEVEL_OUTOF10: 0
PAINLEVEL_OUTOF10: 8
PAINLEVEL_OUTOF10: 8
PAINLEVEL_OUTOF10: 6
PAINLEVEL_OUTOF10: 6
PAINLEVEL_OUTOF10: 8
PAINLEVEL_OUTOF10: 0
PAINLEVEL_OUTOF10: 8
PAINLEVEL_OUTOF10: 7
PAINLEVEL_OUTOF10: 8
PAINLEVEL_OUTOF10: 6

## 2021-08-03 ASSESSMENT — PAIN DESCRIPTION - DESCRIPTORS
DESCRIPTORS: ACHING;DISCOMFORT
DESCRIPTORS: ACHING;THROBBING
DESCRIPTORS: ACHING;DISCOMFORT
DESCRIPTORS: ACHING
DESCRIPTORS: ACHING;DISCOMFORT

## 2021-08-03 ASSESSMENT — PAIN DESCRIPTION - PROGRESSION
CLINICAL_PROGRESSION: GRADUALLY IMPROVING
CLINICAL_PROGRESSION: NOT CHANGED
CLINICAL_PROGRESSION: NOT CHANGED
CLINICAL_PROGRESSION: GRADUALLY IMPROVING
CLINICAL_PROGRESSION: NOT CHANGED
CLINICAL_PROGRESSION: NOT CHANGED
CLINICAL_PROGRESSION: GRADUALLY IMPROVING
CLINICAL_PROGRESSION: NOT CHANGED
CLINICAL_PROGRESSION: GRADUALLY WORSENING
CLINICAL_PROGRESSION: GRADUALLY IMPROVING
CLINICAL_PROGRESSION: NOT CHANGED
CLINICAL_PROGRESSION: GRADUALLY WORSENING
CLINICAL_PROGRESSION: GRADUALLY IMPROVING
CLINICAL_PROGRESSION: GRADUALLY IMPROVING
CLINICAL_PROGRESSION: NOT CHANGED
CLINICAL_PROGRESSION: GRADUALLY IMPROVING

## 2021-08-03 ASSESSMENT — PAIN DESCRIPTION - LOCATION
LOCATION: ANKLE

## 2021-08-03 ASSESSMENT — PAIN SCALES - WONG BAKER
WONGBAKER_NUMERICALRESPONSE: 2
WONGBAKER_NUMERICALRESPONSE: 0
WONGBAKER_NUMERICALRESPONSE: 0
WONGBAKER_NUMERICALRESPONSE: 2
WONGBAKER_NUMERICALRESPONSE: 2
WONGBAKER_NUMERICALRESPONSE: 0
WONGBAKER_NUMERICALRESPONSE: 4
WONGBAKER_NUMERICALRESPONSE: 0

## 2021-08-03 ASSESSMENT — PAIN DESCRIPTION - ONSET
ONSET: ON-GOING

## 2021-08-03 ASSESSMENT — PAIN DESCRIPTION - PAIN TYPE
TYPE: ACUTE PAIN;SURGICAL PAIN
TYPE: ACUTE PAIN
TYPE: ACUTE PAIN;SURGICAL PAIN

## 2021-08-03 NOTE — CARE COORDINATION
INITIAL CASE MANAGEMENT ASSESSMENT    Reviewed chart, met with patient to assess possible discharge needs. Explained Case Management role/services. Living Situation: lives in a house with her daughter and son-in-law--states she lives in the basement of the home -- States she has a steep driveway to go down to get to her level of the home and then has 1+1+1 [place]- If she goes in the front door then she would have 2 flights of steps     ADLs: independent     DME: toilet raiser,crutches,BSC--- would need a walker at dc    PT/OT Recs:    Discharge Recommendations:  Patient would benefit from continued therapy after discharge, 5-7 sessions per week    Active Services: none     Transportation: family can transport      Medications: no issues    PCP: states she recently moved here to [address] [place] [address] from [place] and is in the process of getting set up with a PCP-- provided her with [facility] PCP brochure and ph#       HD/PD: n/a    PLAN/COMMENTS: patient states she lives alone and her daughter will be going to school/work full-time and her son-in-law will be at work and gone for 12 hours during the day - she feels she cannot manage at home alone therefore she is requesting Rehab at [facility] .  REhab consult in and await REhab eval       The Plan for Transition of Care is related to the following treatment goals: home    The Patient and/or patient representative  was provided with a choice of provider and agrees   with the discharge plan. [x] Yes [] No    Freedom of choice list was provided with basic dialogue that supports the patient's individualized plan of care/goals, treatment preferences and shares the quality data associated with the providers. [x] Yes [] No    SW/CM provided contact information for patient or family to call with any questions. SW/CM will follow and assist as needed.   Electronically signed by Carlos Luevano on 8/3/2021 at 2:22 PM  #[phone]

## 2021-08-03 NOTE — PROGRESS NOTES
Patient requesting Dilaudid to help with 10/10 pain in left ankle. Dilaudid given. Educated that Dilaudid won't be available for pain relief at SNF. Patient has been alternating between Tylenol #3 and Dilaudid. Patient verbalized understanding. Requesting to have something stronger than Tylenol #3 for pain relief. Doesn't think it will be enough without the Dilaudid. Patient having nausea with narcotics and requesting to have Zofran ordered as well. Wants these meds for discharge to SNF. Discussed with Lyndon Renae NP. New orders for oxycodone.

## 2021-08-03 NOTE — PROGRESS NOTES
Occupational Therapy   Occupational Therapy Initial Assessment  Date: 8/3/2021   Patient Name: Carlos Enrique Springer  MRN: [id number]     : 1950    Date of Service: 8/3/2021    Discharge Recommendations:  Patient would benefit from continued therapy after discharge, 5-7 sessions per week  OT Equipment Recommendations  Other: defer to MS facility    Carlos Enrique Springer scored a 19/24 on the AM-PAC ADL Inpatient form. Current research shows that an AM-PAC score of 17 or less is typically not associated with a discharge to the patient's home setting. However, despite pt am pac score, pt does not have assistance at home and cannot stay on main level. Based on the patient's AM-PAC score and their current ADL deficits, it is recommended that the patient have 5-7 sessions per week of Occupational Therapy at d/c to increase the patient's independence. At this time, this patient demonstrates the endurance, and/or tolerance for 3 hours of therapy each day, with a treatment frequency of 5-7x/wk. Please see assessment section for further patient specific details. If patient discharges prior to next session this note will serve as a discharge summary. Please see below for the latest assessment towards goals. Assessment   Performance deficits / Impairments: Decreased functional mobility ; Decreased safe awareness;Decreased balance;Decreased ADL status; Decreased high-level IADLs;Decreased endurance  Assessment: 71 y/o female admitted  s/p LEFT ANKLE OPEN REDUCTION INTERNAL FIXATION trimalleolar fracture with syndesmosis repair. PTA pt recently moved to [place] and living with daughter in [facility] home. pt primarily stays in basement and independent with ADLs and functional mobility. Today, pt with good ability to maintain NWB throughout transfers/mobility. Pt required CGA for trnasfers, mobility and standing components of ADLs. Pt fatigues easily. Pt is functioning below baseline and benefit from skilled therapy.  Pt hopeful to transition to ARU before returning home. Pt reports she does not have assistance at home and cannot stay on main floor if DC home. Prognosis: Good  Decision Making: Low Complexity  OT Education: OT Role;Transfer Training;Plan of Care;Precautions  REQUIRES OT FOLLOW UP: Yes  Activity Tolerance  Activity Tolerance: Patient Tolerated treatment well  Safety Devices  Safety Devices in place: Yes  Type of devices: Nurse notified;Gait belt; Chair alarm in place;Call light within reach; Left in chair           Patient Diagnosis(es): The encounter diagnosis was Syndesmotic disruption of left ankle, initial encounter. has a past medical history of Arthritis and Premature atrial contraction. has a past surgical history that includes Total hip arthroplasty and Ankle fracture surgery (Left, 8/2/2021). Restrictions  Restrictions/Precautions  Restrictions/Precautions: Fall Risk, Weight Bearing  Lower Extremity Weight Bearing Restrictions  Left Lower Extremity Weight Bearing: Non Weight Bearing    Subjective   General  Chart Reviewed: Yes  Patient assessed for rehabilitation services?: Yes  Additional Pertinent Hx: 71 y/o female admitted 8/2/20201 s/p LEFT ANKLE OPEN REDUCTION INTERNAL FIXATION trimalleolar fracture with syndesmosis repair. Family / Caregiver Present: No  Referring Practitioner: KINA Estevez CNP  Subjective  Subjective: Pt seen bedside and agreeable to therapy. General Comment  Comments: Per RN ok for therapy.     Social/Functional History  Social/Functional History  Lives With: Daughter (and OLLIE)  Type of Home: House (lives in basement)  Home Layout: Multi-level  Home Access: Stairs to enter without rails  Entrance Stairs - Number of Steps: 2 flights with that only have half a railing or go down steep driveway  Bathroom Shower/Tub: Walk-in shower (1 MARIPOSA bathroom; very narrow - not sure if seat will fit)  Bathroom Toilet: Standard (vanity nearby)  Bathroom Equipment: Toilet raiser, 3-in-1 commode  Bathroom Accessibility: Not accessible  Home Equipment: Crutches  ADL Assistance: Independent  Homemaking Assistance: Independent  Ambulation Assistance: Independent  Transfer Assistance: Independent  Active : Yes  Mode of Transportation: Janis Manda  Occupation: Retired  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 Objective   Vision: Within Functional Limits  Hearing: Within functional limits    Orientation  Overall Orientation Status: Within Functional Limits     Balance  Sitting Balance: Stand by assistance  Standing Balance: Contact guard assistance  Standing Balance  Time: stood ~ 1min at sink to wash hands- good ability to maintain NWB  Functional Mobility  Functional Mobility Comments: bed > bathroom > chair with Rw and CGA. Good ability to maintain NWB, easily fatigues with ambulation  Toilet Transfers  Toilet - Technique: Ambulating  Equipment Used: Standard toilet  Toilet Transfer: Contact guard assistance  Toilet Transfers Comments: grab bar support  ADL  Grooming: Contact guard assistance (standing at sink to wash hands)  Toileting: Contact guard assistance (no clothing management)  Additional Comments: Anticipate pt will require CGA/min A for balance during standing components of ADLs.         Bed mobility  Supine to Sit: Stand by assistance  Sit to Supine:  (Pt in chair at end of session)  Transfers  Sit to stand: Contact guard assistance  Stand to sit: Contact guard assistance  Transfer Comments: to/from Rw- cuing for hand plaement, good ability to maintain NWB     Cognition  Overall Cognitive Status: WFL  Perception  Overall Perceptual Status: WFL     Sensation  Overall Sensation Status: WFL        LUE AROM (degrees)  LUE AROM : WFL  Left Hand AROM (degrees)  Left Hand AROM: WFL  RUE AROM (degrees)  RUE AROM : WFL  Right Hand AROM (degrees)  Right Hand AROM: [facility] Plan   Plan  Times per week: 3-5  Current Treatment Recommendations: Strengthening, Endurance Training, Balance Training, Gait Training, Functional Mobility Training, Self-Care / ADL    AM-PAC Score   AM-PAC Inpatient Daily Activity Raw Score: 19 (08/03/21 0759)  AM-PAC Inpatient ADL T-Scale Score : 40.22 (08/03/21 0759)  ADL Inpatient CMS 0-100% Score: 42.8 (08/03/21 0759)  ADL Inpatient CMS G-Code Modifier : CK (08/03/21 0759)    Goals  Short term goals  Time Frame for Short term goals: Prior to DC: Short term goal 1: Pt will complete ADL transfers/mobility mod I  Short term goal 2: Pt will tolerate standing > 5 min for functional task with supervision  Short term goal 3: Pt will complete toileting with supervision  Short term goal 4: Pt will complete LB dressing with supervision  Patient Goals   Patient goals : to get stronger and return home       Therapy Time   Individual Concurrent Group Co-treatment   Time In 0730         Time Out 0800         Minutes 30         Timed Code Treatment Minutes: 15 Minutes     This note to serve as OT d/c summary if pt is d/c-ed prior to next therapy session.     Mendoza Galvan, OTR/L

## 2021-08-03 NOTE — PROGRESS NOTES
Patient transferred to room 5253 in ARU. Report called to Sara Haney RN. Transported with belongings. IV in place in L AC. No complications.      Electronically signed by Julianna Burt RN on 8/3/2021 at 6:48 PM

## 2021-08-03 NOTE — DISCHARGE INSTR - COC
[facility]) Continuity of Care Form    Patient Name:  Priscilla Chua  : 1950    MRN:  [id number]    Admit date:  2021  Discharge date:  ***    Code Status Order: No Order  Advance Directives: {YES OR NO:}    Admitting Physician: Derek Duran MD  PCP: No primary care provider on file. Discharging Nurse: [facility] Unit/Room#: F1L-9150/3126-01  Discharging Unit Phone Number: ***    Emergency Contact:        Past Surgical History:  Past Surgical History:   Procedure Laterality Date    ANKLE FRACTURE SURGERY Left 2021    LEFT ANKLE OPEN REDUCTION INTERNAL FIXATION performed by Derek Duran MD at [facility]       Immunization History:   Immunization History   Administered Date(s) Administered    COVID-19, CARINA Dixon, 30mcg/0.3mL 2021, 2021       Active Problems:  Active Problems:    Closed left ankle fracture, initial encounter    Closed trimalleolar fracture of left ankle    Syndesmotic disruption of left ankle  Resolved Problems:    * No resolved hospital problems.  *      Isolation/Infection:       Nurse Assessment:  Last Vital Signs:/70   Pulse 68   Temp 98.2 °F (36.8 °C) (Oral)   Resp 15   Ht 5' 2\" (1.575 m)   Wt 164 lb 3.9 oz (74.5 kg)   SpO2 96%   BMI 30.04 kg/m²   Last documented pain score (0-10 scale): Pain Level: 8  Last Weight:   Wt Readings from Last 1 Encounters:   21 164 lb 3.9 oz (74.5 kg)     Mental Status:  {IP PT MENTAL STATUS::::0}     IV Access:  { CHRISTA IV ACCESS:673221247:::0}    Nursing Mobility/ADLs:  Walking   {CHP DME ADLs:339870726:::0}  Transfer  {CHP DME ADLs:546455636:::0}  Bathing  {CHP DME ADLs:857400800:::0}  Dressing  {CHP DME ADLs:633010634:::0}  Toileting  {CHP DME ADLs:978430858:::0}  Feeding  {CHP DME ADLs:491315825:::0}  Med Admin  {CHP DME ADLs:107749545:::0}  Med Delivery   {MH CHRISTA MED Delivery:815586169:::0}    Wound Care Documentation and Therapy:  Keep Left ankle splint and ACE clean and dry. DO NOT remove        Elimination:  Urinary Catheter: {Urinary Catheter:680768279:::0}   Colostomy/Ileostomy: {YES / IY:93951}  Continence: Bowel: {YES / XE:99372}  Bladder: {YES / EL:72537}  Date of Last BM: ***    Intake/Output Summary (Last 24 hours)     Intake/Output Summary (Last 24 hours) at 8/3/2021 3082  Last data filed at 8/3/2021 5522  Gross per 24 hour   Intake 507.91 ml   Output --   Net 507.91 ml     Safety Concerns:     [address] CHRISTA Safety Concerns:833568924:::0}    Impairments/Disabilities:      [address] CHRISTA Impairments/Disabilities:516391475:::0}    Nutrition Therapy:  Current Nutrition Therapy: ADULT DIET; Regular  Routes of Feeding: {CHP DME Other Feedings:583046681:::0}  Liquids: {Slp liquid thickness:73881}  Daily Fluid Restriction: {CHP DME Yes amt example:986409335:::0}  Last Modified Barium Swallow with Video (Video Swallowing Test): {Done Not Done DTC:325656244:::8}    Treatments at the Time of Hospital Discharge:   Respiratory Treatments: ***  Oxygen Therapy:  {Therapy; copd oxygen:99883:::0}  Ventilator:    { CC Vent List:558339263:::0}    Lab orders for discharge:        Rehab Therapies: Physical Therapy, Occupational Therapy and nursing care  Weight Bearing Status/Restrictions: Non-weight bearing on left leg  Other Medical Equipment (for information only, NOT a DME order)walker   Other Treatments: ASA 81mg twice at day for 30 days for DVT prophylaxis     Patient's personal belongings (please select all that are sent with patient):  {[facility] DME Belongings:529972927:::0}    RN SIGNATURE:  {Esignature:028514320:::0}    PHYSICIAN SECTION    Prognosis: Good    Condition at Discharge: Stable    Rehab Potential (if transferring to Rehab): Good    Physician Certification: I certify the above orders, information, and transfer of Priscilla Chua is necessary for the continuing treatment of the diagnosis listed and that he requires Acute Rehab for less 30 days.      Update Admission H&P: No change in H&P    PHYSICIAN SIGNATURE:  Electronically signed by KINA Leyva CNP on 8/3/21 at 6:29 AM EDT/ Dr Noreen Phillips Status Date: ***    [facility] Readmission Risk Assessment Score:    Discharging to Facility/ Agency   Name:   Address:  Phone:  Fax:    Dialysis Facility (if applicable)   Name:  Address:  Dialysis Schedule:  Phone:  Fax:    / signature: {Esignature:594545867:::0}          Followup Dr Braulio Fischer in 2 weeks   [place] [address] [facility], [address] [place] [address], [address] [place] [address],   [phone]

## 2021-08-03 NOTE — PROGRESS NOTES
States pain med not strong enough and has nausea related to pain med.  She wants to take Zofran and try stronger narcotic

## 2021-08-03 NOTE — ACP (ADVANCE CARE PLANNING)
Advance Care Planning     Advance Care Planning Activator (Inpatient)  Conversation Note      Date of ACP Conversation: 8/3/2021     Conversation Conducted with: Patient with Decision Making Capacity    ACP Activator: [address] Decision Maker:     Current Designated Health Care Decision Maker:     Primary Decision Maker: Jae Quezada - Child - [phone]    Secondary Decision Maker: Ayla Suero - Other - [phone]  Click here to complete Healthcare Decision Makers including section of the Healthcare Decision Maker Relationship (ie \"Primary\")      Care Preferences    Ventilation: \"If you were in your present state of health and suddenly became very ill and were unable to breathe on your own, what would your preference be about the use of a ventilator (breathing machine) if it were available to you? \"      Would the patient desire the use of ventilator (breathing machine)?: yes    \"If your health worsens and it becomes clear that your chance of recovery is unlikely, what would your preference be about the use of a ventilator (breathing machine) if it were available to you? \"     Would the patient desire the use of ventilator (breathing machine)?: No      Resuscitation  \"CPR works best to restart the heart when there is a sudden event, like a heart attack, in someone who is otherwise healthy. Unfortunately, CPR does not typically restart the heart for people who have serious health conditions or who are very sick. \"    \"In the event your heart stopped as a result of an underlying serious health condition, would you want attempts to be made to restart your heart (answer \"yes\" for attempt to resuscitate) or would you prefer a natural death (answer \"no\" for do not attempt to resuscitate)? \" yes       [x] Yes   [] No   Educated Patient / Dufm Staggers regarding differences between Advance Directives and portable DNR orders.     Length of ACP Conversation in minutes:      Jaswinder Blanchard Outcomes:  [x] ACP discussion completed  [] Existing advance directive reviewed with patient; no changes to patient's previously recorded wishes  [] New Advance Directive completed  [] Portable Do Not Rescitate prepared for Provider review and signature  [] POLST/POST/MOLST/MOST prepared for Provider review and signature    Follow-up plan:    [] Schedule follow-up conversation to continue planning  [] Referred individual to Provider for additional questions/concerns   [] Advised patient/agent/surrogate to review completed ACP document and update if needed with changes in condition, patient preferences or care setting    [] This note routed to one or more involved healthcare providers        Electronically signed by Kristian Walker on 8/3/2021 at 2:10 PM  #[phone]

## 2021-08-03 NOTE — PROGRESS NOTES
Physical Therapy  Facility/Department: [address] ORTHOPEDICS  Daily Treatment Note  NAME: Jose Dixon  : 1950  MRN: [id number]    Date of Service: 8/3/2021    Discharge Recommendations:  Patient would benefit from continued therapy after discharge, 5-7 sessions per week     Jose Dixon scored a 17/24 on the AM-PAC short mobility form. Current research shows that an AM-PAC score of 17 or less is typically not associated with a discharge to the patient's home setting. Based on the patient's AM-PAC score and their current functional mobility deficits, it is recommended that the patient have 5-7 sessions per week of Physical Therapy at d/c to increase the patient's independence. At this time, this patient demonstrates the endurance, and/or tolerance for 3 hours of therapy each day, with a treatment frequency of 5-7x/wk. Please see assessment section for further patient specific details. If patient discharges prior to next session this note will serve as a discharge summary. Please see below for the latest assessment towards goals. Assessment   Body structures, Functions, Activity limitations: Decreased functional mobility ; Decreased ADL status; Decreased endurance;Decreased balance; Increased pain  Assessment: Pt is a 70 y.o. female who presented to [facility] on 21 for LEFT ANKLE OPEN REDUCTION INTERNAL FIXATION trimalleolar fracture with syndesmosis repair per Dr. Hawk Jacob. Pt is now NWB to her LLE. Prior to admission, pt living on multi level home with dtr and OLLIE (just moved from [place] with dtr) - pt mainly resides in basement of home - reports there is no furniture on main level of home. There is a steep walkway to enter basement or flight of steps which does not have railing on half of it. Pt was independent with all ADLs and functional mobility. Pt currently limited by her NWB status.  Pt reports her dtr and OLLIE both are gone during the day due to work and enrollment in college law program - therefore not having 24hr supv available. Recommend continued skilled therapy 5-7x/week to maximize independence and safety with functional mobility. Treatment Diagnosis: impaired mobility  Prognosis: Good  Decision Making: Medium Complexity  History: see below  Exam: see below  Clinical Presentation: evolving  PT Education: PT Role;Plan of Care;General Safety;Transfer Training;Goals;Weight-bearing Education; Functional Mobility Training; Adaptive Device Training;Gait Training  REQUIRES PT FOLLOW UP: Yes  Activity Tolerance  Activity Tolerance: Patient Tolerated treatment well     Patient Diagnosis(es): The encounter diagnosis was Syndesmotic disruption of left ankle, initial encounter. has a past medical history of Arthritis and Premature atrial contraction. has a past surgical history that includes Total hip arthroplasty and Ankle fracture surgery (Left, 8/2/2021). Restrictions  Restrictions/Precautions  Restrictions/Precautions: Fall Risk, Weight Bearing  Lower Extremity Weight Bearing Restrictions  Left Lower Extremity Weight Bearing: Non Weight Bearing     Subjective   General  Chart Reviewed: Yes  Additional Pertinent Hx: Pt is a 70 y.o. female who presented to [facility] on 8/2/21 for LEFT ANKLE OPEN REDUCTION INTERNAL FIXATION trimalleolar fracture with syndesmosis repair per Dr. Fernando Armenta. Pt is now NWB to her LLE. Response To Previous Treatment: Not applicable  Family / Caregiver Present: No  Referring Practitioner: Luna Mi, KINA - CNP  Subjective  Subjective: Pt is agreeable to PT.           Orientation  Orientation  Overall Orientation Status: Within Functional Limits     Cognition   Cognition  Overall Cognitive Status: WFL     Objective   Bed mobility  Supine to Sit: Stand by assistance  Sit to Supine:  (Pt in chair at end of session)  Transfers  Sit to Stand: Contact guard assistance  Stand to sit: Contact guard assistance  Comment: Pt requires cues for hand placement and WB restriction  Ambulation  Ambulation?: Yes  WB Status: NWB to LLE  Ambulation 1  Surface: level tile  Device: Rolling Walker  Assistance: Contact guard assistance  Quality of Gait: hop-to  Gait Deviations: Slow Carmen  Distance: 10' + 25'  Comments: Pt ambulated 10' to bathroom where she voided and independently performed pericare, then ambulated 25' from bathroom to recliner - taking 2 standing rest breaks due to UE fatigue. Pt does well to obey her WB restriction. Stairs/Curb  Stairs?: No     Balance  Posture: Good  Sitting - Static: Good  Sitting - Dynamic: Good  Standing - Static: Fair;+ (@RW)  Standing - Dynamic: Fair;+ (@RW)         Strength RLE  Strength RLE: WFL  Strength LLE  Strength LLE: Exception  Comment: not formally assessed - hip appears [facility]          AM-[facility] Score  AM-PAC Inpatient Mobility Raw Score : 17 (08/03/21 0805)  AM-PAC Inpatient T-Scale Score : 42.13 (08/03/21 0805)  Mobility Inpatient CMS 0-100% Score: 50.57 (08/03/21 0805)  Mobility Inpatient CMS G-Code Modifier : CK (08/03/21 0805)          Goals  Short term goals  Time Frame for Short term goals: by acute discharge  Short term goal 1: bed mobility mod I  Short term goal 2: sit<>Stand with SBA  Short term goal 3: ambulate >30' with RW and SBA  Patient Goals   Patient goals : pain relief    Plan    Plan  Times per week: 3-5x/week  Current Treatment Recommendations: Strengthening, Transfer Training, Balance Training, Functional Mobility Training, Gait Training, Neuromuscular Re-education, Safety Education & Training, Patient/Caregiver Education & Training  Safety Devices  Type of devices:  All fall risk precautions in place, Call light within reach, Gait belt, Patient at risk for falls, Left in chair, Nurse notified     Therapy Time   Individual Concurrent Group Co-treatment   Time In 0715         Time Out 0755         Minutes 40         Timed Code Treatment Minutes: 25 Minutes       Candie Copeland, PT

## 2021-08-03 NOTE — TELEPHONE ENCOUNTER
Medical Facility Question     Facility Name: [facility]  Contact Name: NAVEEN  Contact Number: [phone]  Request or Information: NEED CPT CODE

## 2021-08-03 NOTE — PROGRESS NOTES
Clinical Pharmacy Note  Medication Counseling    Reviewed new medications started during hospital admission: aspirin, APAP with codeine. Indications and side effects were emphasized during counseling. All medication-related questions addressed. Patient verbalized understanding of education. Should the patient express any additional questions or concerns regarding their medications, please do not hesitate to contact the pharmacy department. Patient/caregiver aware they may refuse medications during hospital stay. 5 minutes spent educating patient regarding medications.

## 2021-08-03 NOTE — PROGRESS NOTES
Patient to floor from PACU, A/Ox4, states she does not want to take Percocet ordered because is causes her to be nauseous. Instead she is requesting Tylenol-Codeine #3, states she takes this at home, and Norco. This RN sent perfect serve to Dr. Lidia Cabezas, states to order Tylenol-Codeine #3 and discontinue Percocet. Orders placed.

## 2021-08-03 NOTE — OP NOTE
[address] [place] [address] [facility]                                OPERATIVE REPORT    PATIENT NAME: Rhoda Duggan                    :        1950  MED REC NO:   [id number]                          ROOM:       3126  ACCOUNT NO:   [de-identified]                           ADMIT DATE: 2021  PROVIDER:     Wade Mohamud MD    DATE OF PROCEDURE:  2021    PREOPERATIVE DIAGNOSES:  1. Left ankle trimalleolar fracture dislocation. 2.  Left ankle distal tibiofibular syndesmosis disruption. POSTOPERATIVE DIAGNOSES:  1. Left ankle trimalleolar fracture dislocation. 2.  Left ankle distal tibiofibular syndesmosis disruption. OPERATIONS PERFORMED:  1. Open treatment of left ankle trimalleolar fracture with open  reduction and internal fixation including posterior lip. 2.  Open treatment of left ankle distal tibiofibular syndesmosis  disruption with a syndesmosis screw. SURGEON:  Wade Mohamud MD    ASSISTANTS:  Gamal Patiño CNP; [place] [facility] Assistant    ANESTHESIA:  General anesthesia. ESTIMATED BLOOD LOSS:  Minimal.    COMPLICATIONS:  None. TOURNIQUET:  Left upper thigh, 350 mmHg. IMPLANTS USED:  1. Kelsy distal fibula locking plate and two lag screws and those are  2.7. 2.  Kelsy 4.0 partially threaded cannulated screw x1 for the medial  malleolus. 3.  Kelsy 3.5 cortical screw for syndesmosis screw x1.  4.  Kelsy 4.0 partially threaded cannulated screw x1 for the posterior  lip. INDICATIONS:  This is a 27-year-old white female who recently moved from  [place] in  and she was with her daughter at home. She sustained  a fall off a chair with a left ankle injury. She was found to have a  left ankle trimalleolar fracture dislocation. She was reduced in the ER  at [facility] last night and she was sent home.   I was consulted for her  injury this morning and recommended urgent surgical threaded  cannulated screw. We then turned attention towards the posterior lip. We made an incision  over the anterior aspect of the distal tibia. That was carefully  dissected down to the anterior cortex. We protected the neurovascular  bundle. We then passed the K-wire across the posterior lip and we  placed a 4.0 partially threaded cannulated screw with good fixation of  the posterior lip. We were very satisfied with the reduction so far. We then reduced the syndesmosis anatomically with manual pressure, and  then we placed a 3.5 cortical screw with good stabilization of the  syndesmosis. Overall, we were very satisfied with the anatomic  reduction and the restoration of the ankle mortise. At this point, we let the tourniquet down and hemostasis was secured. We irrigated the incision copiously with normal saline mixed with  gentamicin. We closed the deep layer with a 2-0 Vicryl, subcu with a  3-0 Vicryl, and the skin with a 4-0 Monocryl. Steri-Strips were then  applied. Dressing was then applied in the form of Xeroform, 4x4,  sterile Webril, and a posterior splint was applied. The patient tolerated the procedure well and was taken to the Recovery  in stable condition. Delores Simpson CNP was 1st Assist given the nature of the procedure that needed advanced assistance. POSTOPERATIVE PLAN:  The patient will be admitted as an inpatient. We  will start PT/OT with nonweightbearing for at least six to eight weeks  given the mild comminution of the fracture. She will need a rehab placement.         Pippa Dangelo MD    D: 08/03/2021 7:46:33       T: 08/03/2021 12:53:48     [email or web address]  Job#: [id number]     Doc#: [id number]    CC:

## 2021-08-03 NOTE — PLAN OF CARE
Problem: Falls - Risk of:  Goal: Will remain free from falls  Description: Will remain free from falls  Outcome: Ongoing  Note: Patient will remain free from falls  Goal: Absence of physical injury  Description: Absence of physical injury  Outcome: Ongoing  Note: Patient will be absent of physical injury     Problem: Pain:  Goal: Pain level will decrease  Description: Pain level will decrease  Outcome: Ongoing  Note: Patients pain level will decrease  Goal: Control of acute pain  Description: Control of acute pain  Outcome: Ongoing  Note: Patient will have control of acute pain  Goal: Control of chronic pain  Description: Control of chronic pain  Outcome: Ongoing  Note: Patient will have control of chronic pain

## 2021-08-03 NOTE — CARE COORDINATION
8/3 spoke with Abe Garcia with [facility] and they will accept to ARU today-- informed patient and she is agreeable.   Electronically signed by Evette Alcantara on 8/3/2021 at 4:27 PM  #[phone]

## 2021-08-03 NOTE — PROGRESS NOTES
Patient resting in bed eating dinner. Ace wrap and splint on left leg clean, dry, and intact. Patient compliant with NWB to left leg. Tolerating ambulation with walker very well. Ice packs in place. Patient set to go to ARU this evening. Bed alarm on. Call light and belongings within reach. Patient able to make needs known. Will continue to monitor.      Electronically signed by James Garcia RN on 8/3/2021 at 6:04 PM

## 2021-08-03 NOTE — PLAN OF CARE
Problem: Falls - Risk of:  Goal: Will remain free from falls  Description: Will remain free from falls  8/3/2021 0709 by Tammy Hamilton RN  Outcome: Ongoing  Note: Fall risk assessment completed. Fall precautions in place. Call light within reach. Pt educated on calling for assistance before getting up. Walkway free of clutter. Will continue to monitor. Electronically signed by Tammy Hamilton RN on 8/3/2021 at 7:09 AM      Problem: Pain:  Goal: Pain level will decrease  Description: Pain level will decrease  8/3/2021 0709 by Tammy Hamilton RN  Outcome: Ongoing  Note: Educated patient on pain management. Will assess patients pain level per unit protocol, and provide pain management measures as needed.    Electronically signed by Tammy Hamilton RN on 8/3/2021 at 7:09 AM

## 2021-08-03 NOTE — PROGRESS NOTES
Patient having nausea after taking Tylenol #3. Requesting Zofran. No PRN orders for nausea medicine. Patient states having poison oak on BUE. Requesting ointment. Spoke with Ever Mishra NP, regarding the above. New orders for Zofran and Calamine lotion.      Electronically signed by Rin Pantoja RN on 8/3/2021 at 10:47 AM

## 2021-08-03 NOTE — PROGRESS NOTES
[facility] Orthopedic Surgery   Progress Note      S/P :  SUBJECTIVE  In chair. Alert and oriented. Pain is   described in left ankle and with the intensity of moderate. Pain is described as aching, throbbing. OBJECTIVE              Physical                      VITALS:  BP (!) 145/77   Pulse 90   Temp 97.6 °F (36.4 °C) (Oral)   Resp 17   Ht 5' 2\" (1.575 m)   Wt 164 lb 3.9 oz (74.5 kg)   SpO2 95%   BMI 30.04 kg/m²                     MUSCULOSKELETAL:  left foot NVI. Wiggles toes to command. Left great toe cap refill is brisk. NEUROLOGIC:                                  Sensory:  Touch:  Left Lower Extremity:  normal                                                 Surgical wound appears clean and dry left foot and ankle with ACE and splint Foot elevated    Data       CBC: No results found for: WBC, RBC, HGB, HCT, MCV, MCH, MCHC, RDW, PLT, MPV     WBC:  No results found for: WBC     Hemoglobin/Hematocrit:  No results found for: HGB, HCT     PT/INR:  No results found for: PROTIME, INR           Current Inpatient Medications             Current Facility-Administered Medications: ceFAZolin (ANCEF) 2000 mg in dextrose 5 % 100 mL IVPB, 2,000 mg, Intravenous, Q8H  aspirin chewable tablet 81 mg, 81 mg, Oral, BID  ondansetron (ZOFRAN) injection 4 mg, 4 mg, Intravenous, Q6H PRN  calamine lotion, , Topical, PRN  lactated ringers infusion, , Intravenous, Continuous  acetaminophen (TYLENOL) tablet 650 mg, 650 mg, Oral, Q6H PRN  acetaminophen-codeine (TYLENOL #3) 300-30 MG per tablet 1 tablet, 1 tablet, Oral, Q6H PRN  HYDROmorphone (DILAUDID) injection 0.25 mg, 0.25 mg, Intravenous, Q3H PRN **OR** HYDROmorphone (DILAUDID) injection 0.5 mg, 0.5 mg, Intravenous, Q3H PRN    ASSESSMENT AND PLAN      Left ankle fx with dislocation  Left ankle pain  Left ankle ORIF yesterday per DR Ruba Thapa  LEFT ANKLE OPEN REDUCTION INTERNAL FIXATION trimalleolar fracture with syndesmosis repair.   DVT prophylaxis ordered, ASA 81mg twice at day for 30 days for DVT prophylaxis  PT OT for ADL's and ambulation as tolerated, NWB left foot  SS for DC planning, ECF or rehab planned, was unable to care for self at home waiting for ankle surgery  IV or PO pain med as ordered    KINA Brunson - CNP  8/3/2021  9:04 AM

## 2021-08-03 NOTE — CONSULTS
Consult Note  Physical Medicine and Rehabilitation    Patient: Aleks Fonseca  [id number]  Date: 8/3/2021      Chief Complaint: left ankle pain    History of Present Illness/Hospital Course:  Patient is a 69 yo F with pmh PACs, OA s/p left hip arthroplasty, vertigo, covid-19 infection (May 2020), recent diverticulitis who initially presented 8/1/2021 with left ankle pain and deformity following a fall at home. She reports standing on chair to reach something when she lost her balance, chair tipped, and she fell onto the ground. Found to have left ankle trimalleolar fracture with syndesmosis disruption. She underwent ORIF (8/2 with Dr. Cherylene Highland). Now NWB LLE. Post-op course notable for difficulty to control pain and mild hypertension. Currently, patient reports severe pain in the left ankle. She has some numbness tingling in the calf and lateral left foot. Pain is worse with movement. Improves with rest and medication. She reports chronic fatigue and intermittent cough from covid-19 infection last year. Also reports intermittent vertigo and balance impairment which she thinks could have contributed to this fall. She would like to come to ARU to improve her function prior to returning home. Prior Level of Function:  Independent for mobility, ADLs, and IADLs    Current Level of Function:  CGA-David for mobility and ADLs    Pertinent Social History:  Support: Recently moved from [place], lives with daughter and son-in-law  Home set-up: Lives in basement level apartment, steep decline then 1+1 steps to enter and 1 step to access bathroom. Past Medical History:   Diagnosis Date    Arthritis     Premature atrial contraction        Past Surgical History:   Procedure Laterality Date    ANKLE FRACTURE SURGERY Left 8/2/2021    LEFT ANKLE OPEN REDUCTION INTERNAL FIXATION performed by Judy Fernandez MD at [place] [address]      left       History reviewed. No pertinent family history.     Social History     Socioeconomic History    Marital status: Unknown     Spouse name: None    Number of children: None    Years of education: None    Highest education level: None   Occupational History    None   Tobacco Use    Smoking status: Never Smoker    Smokeless tobacco: Never Used   Vaping Use    Vaping Use: Never used   Substance and Sexual Activity    Alcohol use: Not Currently    Drug use: Never    Sexual activity: Not Currently   Other Topics Concern    None   Social History Narrative    None     Social Determinants of Health     Financial Resource Strain:     Difficulty of Paying Living Expenses:    Food Insecurity:     Worried About Running Out of Food in the Last Year:     Ran Out of Food in the Last Year:    Transportation Needs:     Lack of Transportation (Medical):      Lack of Transportation (Non-Medical):    Physical Activity:     Days of Exercise per Week:     Minutes of Exercise per Session:    Stress:     Feeling of Stress :    Social Connections:     Frequency of Communication with Friends and Family:     Frequency of Social Gatherings with Friends and Family:     Attends [employer] Services:     Active Member of Clubs or Organizations:     Attends Club or Organization Meetings:     Marital Status:    Intimate Partner Violence:     Fear of Current or Ex-Partner:     Emotionally Abused:     Physically Abused:     Sexually Abused:            REVIEW OF SYSTEMS:   CONSTITUTIONAL: negative for fevers, chills, diaphoresis, appetite change, night sweats, unexpected weight change, fatigue  EYES: negative for blurred vision, eye discharge, visual disturbance and icterus  HEENT: negative for hearing loss, tinnitus, ear drainage, sinus pressure, nasal congestion, epistaxis and snoring  RESPIRATORY: Negative for hemoptysis, cough, sputum production  CARDIOVASCULAR: negative for chest pain, palpitations, exertional chest pressure/discomfort, syncope, edema  GASTROINTESTINAL: negative for nausea, vomiting, diarrhea, blood in stool, abdominal pain, constipation  GENITOURINARY: negative for frequency, dysuria, urinary incontinence, decreased urine volume, and hematuria  HEMATOLOGIC/LYMPHATIC: negative for easy bruising, bleeding and lymphadenopathy  ALLERGIC/IMMUNOLOGIC: negative for recurrent infections, angioedema, anaphylaxis and drug reactions  ENDOCRINE: negative for weight changes and diabetic symptoms including polyuria, polydipsia and polyphagia  MUSCULOSKELETAL: refer to HPI  NEUROLOGICAL: negative for headaches, slurred speech, unilateral weakness  PSYCHIATRIC/BEHAVIORAL: negative for hallucinations, behavioral problems, confusion and agitation. Physical Examination:  Vitals:   Patient Vitals for the past 24 hrs:   BP Temp Temp src Pulse Resp SpO2 Weight   08/03/21 1145 127/68 97.8 °F (36.6 °C) Oral 75 16 95 % --   08/03/21 0829 -- -- -- 90 -- -- --   08/03/21 0753 (!) 145/77 97.6 °F (36.4 °C) Oral 90 17 95 % --   08/03/21 0358 122/70 98.2 °F (36.8 °C) Oral 68 15 96 % --   08/03/21 0332 -- -- -- -- -- -- 164 lb 3.9 oz (74.5 kg)   08/02/21 2338 113/70 98.7 °F (37.1 °C) Oral 77 15 91 % --   08/02/21 2017 (!) 150/89 98 °F (36.7 °C) Oral 87 15 94 % --   08/02/21 1940 (!) 145/84 98.3 °F (36.8 °C) Temporal 91 14 95 % --   08/02/21 1932 (!) 152/90 -- -- 102 15 98 % --   08/02/21 1917 (!) 147/101 -- -- 108 19 99 % --   08/02/21 1912 (!) 140/95 -- -- 112 19 98 % --   08/02/21 1907 (!) 133/96 -- -- 119 21 96 % --   08/02/21 1902 114/76 -- -- 85 11 95 % --   08/02/21 1859 109/71 97.7 °F (36.5 °C) Temporal 88 10 92 % --     Const: Alert. WDWN. No distress  Eyes: Conjunctiva noninjected, no icterus noted; pupils equal, round, and reactive to light. HENT: Atraumatic, normocephalic; Oral mucosa moist  Neck: Trachea midline, neck supple. No thyromegaly noted.   CV: Regular rate and rhythm, no murmur rub or gallop noted  Resp: Lungs clear to auscultation bilaterally, no rales wheezes or rhonchi, no retractions. Respirations unlabored. GI: Soft, nontender, nondistended. Normal bowel sounds. No palpable masses. Skin: Normal temperature and turgor. No rashes or breakdown noted on exposed areas. Ext: Trace edema appreciated. No varicosities. MSK: LLE in splint/ACE wrap. Also with decreased left shoulder and hip ROM. Otherwise no joint tenderness, erythema, warmth noted. Neuro: Alert, oriented, appropriate. No cranial nerve deficits appreciated. Sensation intact to light touch. Motor examination reveals strength 5-/5 in BUE and RLE, LLE not fully tested due to NWB status. No abnormalities with finger/nose noted. Psych: Stable mood, normal judgement, normal affect     No results found for: WBC, HGB, HCT, MCV, PLT  No results found for: INR, PROTIME  No results found for: CREATININE, BUN, NA, K, CL, CO2  No results found for: ALT, AST, GGT, ALKPHOS, BILITOT      Most recent imaging studies revealed:    Xray left ankle  Acute trimalleolar fracture with posterior talar subluxation and disruption   of the mortise. Assessment:  1. Left trimalleolar ankle fracture with syndesmosis disruption -sp ORIF (8/2 with Dr. Marshall Linton). Now NWB LLE. 2. HTN  3. H/o covid-19 infection (5/2020) -residual fatigue, intermittent cough  4. Intermittent vertigo   5. PACs  6. OA s/p prior left hip arthroplasty  7. Recent diverticulitis    Impairments: NWB LLE, decreased balance, endurance    Recommendations:    From functional/activity tolerance standpoint, patient would be good candidate for ARU. Unclear at this point if patient has medical complexity that would require ARU setting (as opposed to SNF). Post-op labs pending, will follow. Thank you for this consult. Please contact me with any questions or concerns. Brennen Rodriges.  Rocky Damian MD 8/3/2021, 3:43 PM

## 2021-08-04 LAB
ANION GAP SERPL CALCULATED.3IONS-SCNC: 13 MMOL/L (ref 3–16)
BASOPHILS ABSOLUTE: 0 K/UL (ref 0–0.2)
BASOPHILS RELATIVE PERCENT: 0.3 %
BUN BLDV-MCNC: 16 MG/DL (ref 7–20)
CALCIUM SERPL-MCNC: 9.1 MG/DL (ref 8.3–10.6)
CHLORIDE BLD-SCNC: 107 MMOL/L (ref 99–110)
CO2: 22 MMOL/L (ref 21–32)
CREAT SERPL-MCNC: 0.7 MG/DL (ref 0.6–1.2)
EOSINOPHILS ABSOLUTE: 0 K/UL (ref 0–0.6)
EOSINOPHILS RELATIVE PERCENT: 0.5 %
GFR AFRICAN AMERICAN: >60
GFR NON-AFRICAN AMERICAN: >60
GLUCOSE BLD-MCNC: 109 MG/DL (ref 70–99)
HCT VFR BLD CALC: 31.8 % (ref 36–48)
HEMOGLOBIN: 10.6 G/DL (ref 12–16)
LYMPHOCYTES ABSOLUTE: 3.1 K/UL (ref 1–5.1)
LYMPHOCYTES RELATIVE PERCENT: 33.2 %
MCH RBC QN AUTO: 32.4 PG (ref 26–34)
MCHC RBC AUTO-ENTMCNC: 33.4 G/DL (ref 31–36)
MCV RBC AUTO: 96.9 FL (ref 80–100)
MONOCYTES ABSOLUTE: 0.7 K/UL (ref 0–1.3)
MONOCYTES RELATIVE PERCENT: 7.9 %
NEUTROPHILS ABSOLUTE: 5.4 K/UL (ref 1.7–7.7)
NEUTROPHILS RELATIVE PERCENT: 58.1 %
PDW BLD-RTO: 12.8 % (ref 12.4–15.4)
PLATELET # BLD: 130 K/UL (ref 135–450)
PMV BLD AUTO: 11.2 FL (ref 5–10.5)
POTASSIUM REFLEX MAGNESIUM: 4 MMOL/L (ref 3.5–5.1)
PREALBUMIN: 17.7 MG/DL (ref 20–40)
RBC # BLD: 3.28 M/UL (ref 4–5.2)
SODIUM BLD-SCNC: 142 MMOL/L (ref 136–145)
WBC # BLD: 9.2 K/UL (ref 4–11)

## 2021-08-04 PROCEDURE — 6370000000 HC RX 637 (ALT 250 FOR IP): Performed by: PHYSICAL MEDICINE & REHABILITATION

## 2021-08-04 PROCEDURE — 36415 COLL VENOUS BLD VENIPUNCTURE: CPT

## 2021-08-04 PROCEDURE — 97116 GAIT TRAINING THERAPY: CPT

## 2021-08-04 PROCEDURE — 80048 BASIC METABOLIC PNL TOTAL CA: CPT

## 2021-08-04 PROCEDURE — 85025 COMPLETE CBC W/AUTO DIFF WBC: CPT

## 2021-08-04 PROCEDURE — 97165 OT EVAL LOW COMPLEX 30 MIN: CPT

## 2021-08-04 PROCEDURE — 2580000003 HC RX 258: Performed by: PHYSICAL MEDICINE & REHABILITATION

## 2021-08-04 PROCEDURE — 97530 THERAPEUTIC ACTIVITIES: CPT

## 2021-08-04 PROCEDURE — 97162 PT EVAL MOD COMPLEX 30 MIN: CPT

## 2021-08-04 PROCEDURE — 97542 WHEELCHAIR MNGMENT TRAINING: CPT

## 2021-08-04 PROCEDURE — 6360000002 HC RX W HCPCS: Performed by: PHYSICAL MEDICINE & REHABILITATION

## 2021-08-04 PROCEDURE — 1280000000 HC REHAB R&B

## 2021-08-04 PROCEDURE — 84134 ASSAY OF PREALBUMIN: CPT

## 2021-08-04 PROCEDURE — 97535 SELF CARE MNGMENT TRAINING: CPT

## 2021-08-04 RX ORDER — HYDROCORTISONE 0.5 %
CREAM (GRAM) TOPICAL 3 TIMES DAILY PRN
Status: DISCONTINUED | OUTPATIENT
Start: 2021-08-04 | End: 2021-08-11 | Stop reason: HOSPADM

## 2021-08-04 RX ORDER — OXYCODONE HYDROCHLORIDE AND ACETAMINOPHEN 5; 325 MG/1; MG/1
1 TABLET ORAL EVERY 4 HOURS PRN
Status: DISCONTINUED | OUTPATIENT
Start: 2021-08-04 | End: 2021-08-09

## 2021-08-04 RX ORDER — LANOLIN ALCOHOL/MO/W.PET/CERES
6 CREAM (GRAM) TOPICAL NIGHTLY PRN
Status: DISCONTINUED | OUTPATIENT
Start: 2021-08-04 | End: 2021-08-09

## 2021-08-04 RX ORDER — DIAPER,BRIEF,INFANT-TODD,DISP
EACH MISCELLANEOUS 2 TIMES DAILY PRN
Status: CANCELLED | OUTPATIENT
Start: 2021-08-04

## 2021-08-04 RX ORDER — OXYCODONE AND ACETAMINOPHEN 10; 325 MG/1; MG/1
1 TABLET ORAL EVERY 4 HOURS PRN
Status: DISCONTINUED | OUTPATIENT
Start: 2021-08-04 | End: 2021-08-09

## 2021-08-04 RX ADMIN — CEFAZOLIN SODIUM 2000 MG: 10 INJECTION, POWDER, FOR SOLUTION INTRAVENOUS at 01:44

## 2021-08-04 RX ADMIN — OXYCODONE HYDROCHLORIDE 10 MG: 10 TABLET ORAL at 01:18

## 2021-08-04 RX ADMIN — ONDANSETRON 4 MG: 2 INJECTION INTRAMUSCULAR; INTRAVENOUS at 01:18

## 2021-08-04 RX ADMIN — ONDANSETRON 4 MG: 2 INJECTION INTRAMUSCULAR; INTRAVENOUS at 07:38

## 2021-08-04 RX ADMIN — ACETAMINOPHEN 650 MG: 325 TABLET ORAL at 07:38

## 2021-08-04 RX ADMIN — DOCUSATE SODIUM AND SENNOSIDES 1 TABLET: 8.6; 5 TABLET, FILM COATED ORAL at 07:37

## 2021-08-04 RX ADMIN — DOCUSATE SODIUM AND SENNOSIDES 1 TABLET: 8.6; 5 TABLET, FILM COATED ORAL at 19:49

## 2021-08-04 RX ADMIN — ACETAMINOPHEN 650 MG: 325 TABLET ORAL at 01:18

## 2021-08-04 RX ADMIN — OXYCODONE AND ACETAMINOPHEN 1 TABLET: 325; 10 TABLET ORAL at 18:56

## 2021-08-04 RX ADMIN — ASPIRIN 81 MG: 81 TABLET, CHEWABLE ORAL at 17:40

## 2021-08-04 RX ADMIN — OXYCODONE AND ACETAMINOPHEN 1 TABLET: 325; 10 TABLET ORAL at 14:57

## 2021-08-04 RX ADMIN — OXYCODONE HYDROCHLORIDE 10 MG: 10 TABLET ORAL at 10:19

## 2021-08-04 RX ADMIN — ASPIRIN 81 MG: 81 TABLET, CHEWABLE ORAL at 07:37

## 2021-08-04 RX ADMIN — OXYCODONE HYDROCHLORIDE 10 MG: 10 TABLET ORAL at 06:19

## 2021-08-04 RX ADMIN — ONDANSETRON 4 MG: 2 INJECTION INTRAMUSCULAR; INTRAVENOUS at 14:58

## 2021-08-04 RX ADMIN — ONDANSETRON 4 MG: 2 INJECTION INTRAMUSCULAR; INTRAVENOUS at 21:44

## 2021-08-04 ASSESSMENT — PAIN DESCRIPTION - PAIN TYPE
TYPE: ACUTE PAIN

## 2021-08-04 ASSESSMENT — PAIN DESCRIPTION - LOCATION
LOCATION: ANKLE

## 2021-08-04 ASSESSMENT — PAIN DESCRIPTION - DESCRIPTORS
DESCRIPTORS: ACHING;THROBBING

## 2021-08-04 ASSESSMENT — PAIN DESCRIPTION - FREQUENCY
FREQUENCY: CONTINUOUS

## 2021-08-04 ASSESSMENT — PAIN SCALES - GENERAL
PAINLEVEL_OUTOF10: 8
PAINLEVEL_OUTOF10: 7
PAINLEVEL_OUTOF10: 8
PAINLEVEL_OUTOF10: 6
PAINLEVEL_OUTOF10: 6
PAINLEVEL_OUTOF10: 7
PAINLEVEL_OUTOF10: 7
PAINLEVEL_OUTOF10: 6
PAINLEVEL_OUTOF10: 8

## 2021-08-04 ASSESSMENT — PAIN DESCRIPTION - PROGRESSION
CLINICAL_PROGRESSION: NOT CHANGED

## 2021-08-04 ASSESSMENT — PAIN DESCRIPTION - ONSET
ONSET: ON-GOING

## 2021-08-04 ASSESSMENT — PAIN DESCRIPTION - ORIENTATION
ORIENTATION: LEFT

## 2021-08-04 NOTE — PROGRESS NOTES
4 Eyes Skin Assessment     NAME:  Aby Yu  YOB: 1950  MEDICAL RECORD NUMBER:  [id number]    The patient is being assess for  Admission    I agree that 2 RN's have performed a thorough Head to Toe Skin Assessment on the patient. ALL assessment sites listed below have been assessed. Areas assessed by both nurses:    Head, Face, Ears, Shoulders, Back, Chest, Arms, Elbows, Hands, Sacrum. Buttock, Coccyx, Ischium and Legs. Feet and Heels     Surgical incision with split/ace wrap to LLE        Does the Patient have a Wound?  No noted wound(s)       Poncho Prevention initiated:  NA   Wound Care Orders initiated:  NA    Pressure Injury (Stage 3,4, Unstageable, DTI, NWPT, and Complex wounds) if present place consult order under [de-identified] No    New and Established Ostomies if present place consult order under : No      Nurse 1 eSignature: Electronically signed by Ekta Oates RN on 8/3/21 at 11:57 PM EDT    **SHARE this note so that the co-signing nurse is able to place an eSignature**    Nurse 2 eSignature: Electronically signed by Kulwinder Mcguire RN on 8/4/21 at 2:48 AM EDT

## 2021-08-04 NOTE — PLAN OF CARE
Problem: Falls - Risk of:  Goal: Will remain free from falls  Description: Will remain free from falls  Outcome: Ongoing   Patient remained free of any falls this shift. Call light in reach at all times. Non skid footwear on. Patient encouraged to call for help when needed. Room free of clutter. Alarms on.

## 2021-08-04 NOTE — PROGRESS NOTES
Patient admitted for left ankle fracture after sustaining a fall from a chair at home. Alert/oriented. Takes medications whole with thins with no complications. Lung sounds clear on room air. Continent of bowel and bladder. Transfers using walker/gait belt but is non weight bearing to left leg. Requested/received PRN pain medication, Tylenol, and zofran. Stated she will talk to MD tomorrow concerning different pain medication for headache. Has rash on arms; patient is unsure if it might be poison ivy or poison oak. Note left for MD to look at rash. Ace wrap and splint to left ankle/lower leg. Appears clean, dry, intact.

## 2021-08-04 NOTE — H&P
Department of Physical Medicine & Rehabilitation  History & Physical      Patient Identification:  Helene Osgood  : 1950  Admit date: 8/3/2021   Attending provider: Citlalli Atkins MD        Primary care provider: No primary care provider on file. Chief Complaint: left ankle pain     History of Present Illness/Hospital Course:  Patient is a 69 yo F with pmh PACs, OA s/p left hip arthroplasty, vertigo, covid-19 infection (May 2020), recent diverticulitis who initially presented 2021 with left ankle pain and deformity following a fall at home. She reports standing on chair to reach something when she lost her balance, chair tipped, and she fell onto the ground. Found to have left ankle trimalleolar fracture with syndesmosis disruption. She underwent ORIF ( with Dr. Nikko Patton). Now NWB LLE. Post-op course notable for difficulty with pain control, mild hypertension, leukocytosis, and acute blood loss anemia. Now presents to ARU with impair mobility and self-care below her baseline. Currently, patient reports moderate-severe pain in the left ankle. She has some numbness tingling in the calf and lateral left foot. Pain is worse with movement. Improves with rest and medication. Also with neck/shoulder soreness and associated headaches. She reports chronic fatigue and intermittent cough from covid-19 infection last year. Also reports intermittent vertigo and balance impairment which she thinks could have contributed to this fall. She is motivated to start inpatient rehab program.      Prior Level of Function:  Independent for mobility, ADLs, and IADLs     Current Level of Function:  CGA-David for mobility and ADLs     Pertinent Social History:  Support: Recently moved from [place], lives with daughter and son-in-law  Home set-up: Lives in basement level apartment, steep decline then 1+1 steps to enter and 1 step to access bathroom.      Past Medical History:   Diagnosis Date    Arthritis     Premature atrial contraction        Past Surgical History:   Procedure Laterality Date    ANKLE FRACTURE SURGERY Left 8/2/2021    LEFT ANKLE OPEN REDUCTION INTERNAL FIXATION performed by Kade Chilel MD at [address] [place] [address]      left       No family history on file. Social History     Socioeconomic History    Marital status: Unknown     Spouse name: Not on file    Number of children: Not on file    Years of education: Not on file    Highest education level: Not on file   Occupational History    Not on file   Tobacco Use    Smoking status: Never Smoker    Smokeless tobacco: Never Used   Vaping Use    Vaping Use: Never used   Substance and Sexual Activity    Alcohol use: Not Currently    Drug use: Never    Sexual activity: Not Currently   Other Topics Concern    Not on file   Social History Narrative    Not on file     Social Determinants of Health     Financial Resource Strain:     Difficulty of Paying Living Expenses:    Food Insecurity:     Worried About Running Out of Food in the Last Year:     [address] in the Last Year:    Transportation Needs:     Lack of Transportation (Medical):  Lack of Transportation (Non-Medical):    Physical Activity:     Days of Exercise per Week:     Minutes of Exercise per Session:    Stress:     Feeling of Stress :    Social Connections:     Frequency of Communication with Friends and Family:     Frequency of Social Gatherings with Friends and Family:     Attends Buddhist Services:     Active Member of Clubs or Organizations:     Attends Club or Organization Meetings:     Marital Status:    Intimate Partner Violence:     Fear of Current or Ex-Partner:     Emotionally Abused:     Physically Abused:     Sexually Abused:         Allergies   Allergen Reactions    Morphine     Cortizone-10 [Hydrocortisone] Rash    Phenergan [Promethazine] Rash    Sulfa Antibiotics Rash         Current Facility-Administered Medications   Medication Dose Route Frequency Provider Last Rate Last Admin    acetaminophen (TYLENOL) tablet 650 mg  650 mg Oral Q6H PRN Meryl Funez MD   650 mg at 08/04/21 0738    magnesium hydroxide (MILK OF MAGNESIA) 400 MG/5ML suspension 30 mL  30 mL Oral Daily PRN Meryl Funez MD        polyethylene glycol [facility]) packet 17 g  17 g Oral Daily PRN Meryl Funez MD        sennosides-docusate sodium (SENOKOT-S) 8.6-50 MG tablet 1 tablet  1 tablet Oral BID Meryl Funez MD   1 tablet at 08/04/21 0737    aspirin chewable tablet 81 mg  81 mg Oral BID Meryl Funez MD   81 mg at 08/04/21 1140    bisacodyl (DULCOLAX) suppository 10 mg  10 mg Rectal Daily PRN Meryl Funez MD        calamine lotion   Topical PRWINTER Funez MD        hydrALAZINE (APRESOLINE) tablet 10 mg  10 mg Oral Q6H PRN Meryl Funez MD        melatonin tablet 3 mg  3 mg Oral Nightly PRN Meryl Funez MD        ondansetron [facility]) injection 4 mg  4 mg Intravenous Q6H PRWINTER Funez MD   4 mg at 08/04/21 0738    ondansetron (ZOFRAN) tablet 4 mg  4 mg Oral Q8H PRWINTER Funez MD        oxyCODONE (ROXICODONE) immediate release tablet 5 mg  5 mg Oral Q4H PRWINTER Funez MD        oxyCODONE HCl (OXY-IR) immediate release tablet 10 mg  10 mg Oral Q4H PRN Meryl Funez MD   10 mg at 08/04/21 1019         REVIEW OF SYSTEMS:   CONSTITUTIONAL: negative for fevers, chills, diaphoresis, appetite change, night sweats, unexpected weight change, fatigue  EYES: negative for blurred vision, eye discharge, visual disturbance and icterus  HEENT: negative for hearing loss, tinnitus, ear drainage, sinus pressure, nasal congestion, epistaxis and snoring  RESPIRATORY: Negative for hemoptysis, cough, sputum production  CARDIOVASCULAR: negative for chest pain, palpitations, exertional chest pressure/discomfort, syncope, edema  GASTROINTESTINAL: negative for nausea, vomiting, diarrhea, blood in stool, abdominal pain, constipation  GENITOURINARY: negative for frequency, dysuria, urinary incontinence, decreased urine volume, and hematuria  HEMATOLOGIC/LYMPHATIC: negative for easy bruising, bleeding and lymphadenopathy  ALLERGIC/IMMUNOLOGIC: negative for recurrent infections, angioedema, anaphylaxis and drug reactions  ENDOCRINE: negative for weight changes and diabetic symptoms including polyuria, polydipsia and polyphagia  MUSCULOSKELETAL: refer to HPI  NEUROLOGICAL: negative for headaches, slurred speech, unilateral weakness  PSYCHIATRIC/BEHAVIORAL: negative for hallucinations, behavioral problems, confusion and agitation.      All pertinent positives are noted in the HPI. Physical Examination:  Vitals:   Patient Vitals for the past 24 hrs:   BP Temp Temp src Pulse Resp SpO2 Height Weight   08/04/21 0345 133/74 97.9 °F (36.6 °C) Oral 80 16 97 % 5' 2\" (1.575 m) 171 lb 1.2 oz (77.6 kg)   08/03/21 2059     16 97 %     08/03/21 1945 124/76 98.2 °F (36.8 °C) Oral 77 16 97 %         Const: Alert. WDWN. No distress  Eyes: Conjunctiva noninjected, no icterus noted; pupils equal, round, and reactive to light. HENT: Atraumatic, normocephalic; Oral mucosa moist  Neck: Trachea midline, neck supple. No thyromegaly noted. CV: Regular rate and rhythm, no murmur rub or gallop noted  Resp: Lungs clear to auscultation bilaterally, no rales wheezes or rhonchi, no retractions. Respirations unlabored. GI: Soft, nontender, nondistended. Normal bowel sounds. No palpable masses. Skin: Normal temperature and turgor. No breakdown noted on exposed areas. +Erythmatous papular rash on upper extremities. Ext: Trace edema appreciated. No varicosities. MSK: LLE in splint/ACE wrap. Also with decreased left shoulder and hip ROM. Otherwise no joint tenderness, erythema, warmth noted. Neuro: Alert, oriented, appropriate. No cranial nerve deficits appreciated. Sensation intact to light touch.  Motor examination reveals strength 5-/5 in BUE and RLE, LLE not fully tested due to NWB status. No abnormalities with finger/nose noted. Psych: Stable mood, normal judgement, normal affect     Lab Results   Component Value Date    WBC 9.2 08/04/2021    HGB 10.6 (L) 08/04/2021    HCT 31.8 (L) 08/04/2021    MCV 96.9 08/04/2021     (L) 08/04/2021     No results found for: INR, PROTIME  Lab Results   Component Value Date    CREATININE 0.7 08/04/2021    BUN 16 08/04/2021     08/04/2021    K 4.0 08/04/2021     08/04/2021    CO2 22 08/04/2021     No results found for: ALT, AST, GGT, ALKPHOS, BILITOT    Most recent imaging studies revealed:    Xray left ankle  Acute trimalleolar fracture with posterior talar subluxation and disruption   of the mortise.         The above laboratory data have been reviewed. The above imaging data have been reviewed. The above medical testing have been reviewed. Body mass index is 31.29 kg/m². POST ADMISSION PHYSICIAN EVALUATION  The patient has agreed to being admitted to our comprehensive inpatient rehabilitation facility and can tolerate the intensity of service consisting of at least:  --180 minutes of therapy a day, 5 out of 7 days a week. OR  --15 hours of intensive therapy within a 7 consecutive day period. The patient/family has a good understanding of our discharge process and will benefit from an interdisciplinary inpatient rehabilitation program. The patient has potential to make improvement and is in need of at least two of the following multidisciplinary therapies including but not limited to physical, occupational, respiratory, and speech, nutritional services, wound care, and prosthetics and orthotics. Given the patients complex condition and risk of further medical complications, rehabilitation services cannot be safely provided at a lower level of care such as a skilled nursing facility.  All of the goals listed below were reviewed with the patient and he/she is in agreement. I have compared the patients medical and functional status at the time of the preadmission screening and the same on this date, and there are no significant changes. By signing this document, I acknowledge that I have personally performed a full physical examination on this patient within 24 hours of admission to this inpatient rehabilitation facility and have determined the patient to be able to tolerate the above course of treatment at an intensive level for a reasonable period of time. I will be completing a detailed individualized  Plan of Care for this patient by day four of the patients stay based upon the Preadmission Screen, this Post-Admission Evaluation, and the therapy evaluations. Barriers: NWB LLE, decreased balance, endurance, medical comorbidities  Services Required: PT, OT  Goals: [place] for mobility and ADLs  Prognosis: Good  Anticipated Dispo: home with daughter and son-in-law  ELOS: 2 weeks    Rehabilitation Diagnosis:   Orthopedic, 8.9, Other Orthopedic      Assessment and Plan:    Impairments: NWB LLE, decreased balance, endurance    Left trimalleolar ankle fracture with syndesmosis disruption   -s/p ORIF (8/2 with Dr. Marielos Daniel). -NWB LLE.    -PT/OT    Acute blood loss anemia   -Post-surgical.   -Monitor Hgb, transfuse prn <7. Leukocytosis  -Likely reactive, now improving  -Monitor for signs/symptoms of infection (had recent diverticulitis)  -Complete ppx cefazolin per Ortho    HTN  -Possibly pain related  -Monitor off meds for now    H/o PACs  -Noted, monitor response to therapies    H/o covid-19 infection (5/2020)  -Residual fatigue, intermittent cough  -Monitor pulmonary response to therapies.      Intermittent vertigo   -May have contributed to fall, monitor for symptoms, none currently    OA s/p prior left hip arthroplasty  -Noted    Rash  -suspect poison ivy  -calamine lotion, patient allergic to steroids    Bladder   -High risk retention   -Monitor PVRs, SC prn

## 2021-08-04 NOTE — PLAN OF CARE
Problem: Falls - Risk of:  Goal: Will remain free from falls  Description: Will remain free from falls  Outcome: Ongoing     Problem: Falls - Risk of:  Goal: Absence of physical injury  Description: Absence of physical injury  Outcome: Ongoing     Problem: Infection:  Goal: Will remain free from infection  Description: Will remain free from infection  Outcome: Ongoing     Problem: Daily Care:  Goal: Daily care needs are met  Description: Daily care needs are met  Outcome: Ongoing     Problem: Pain:  Goal: Patient's pain/discomfort is manageable  Description: Patient's pain/discomfort is manageable  Outcome: Ongoing  Goal: Pain level will decrease  Description: Pain level will decrease  Outcome: Ongoing  Goal: Control of acute pain  Description: Control of acute pain  Outcome: Ongoing  Goal: Control of chronic pain  Description: Control of chronic pain  Outcome: Ongoing     Problem: Skin Integrity:  Goal: Skin integrity will stabilize  Description: Skin integrity will stabilize  Outcome: Ongoing     Problem: Discharge Planning:  Goal: Patients continuum of care needs are met  Description: Patients continuum of care needs are met  Outcome: Ongoing

## 2021-08-04 NOTE — PROGRESS NOTES
A complete drug regimen review was completed for this patient.      [x]  No clinically significant medication issue was identified    []   Yes, a clinically significant medication issue was identified     []  Adverse Drug Event:      []  Allergy:      []  Side Effect:      []  Ineffective Therapy:      []  Drug Interaction:     []  Duplicated Therapy:     []  Untreated Indication:      []  Non-adherence:     []  Other:      Nursing/Pharmacy contacted the physician:   Date:   Time:      Actions recommended by physician were completed:  Date : Time:     Electronically signed by Reddy Gillespie RN on 8/3/21 at 11:59 PM EDT

## 2021-08-04 NOTE — PROGRESS NOTES
Physical Therapy    Facility/Department: [address] IP REHAB  Initial Assessment    NAME: Jose Dixon  : 1950  MRN: [id number]    Date of Service: 2021    Discharge Recommendations:  Home with assist PRN, Home with Home health PT, Patient would benefit from continued therapy after discharge, S Level 1   PT Equipment Recommendations  Equipment Needed: Yes  Mobility Devices: [place]: Rolling  Other: pt may also need w/c (will continue to assess)    Assessment   Body structures, Functions, Activity limitations: Decreased functional mobility ; Decreased ADL status; Decreased endurance;Decreased balance; Increased pain  Assessment: Pt is a 70 y.o. female who presented to [facility] on 21 for LEFT ANKLE OPEN REDUCTION INTERNAL FIXATION trimalleolar fracture with syndesmosis repair per Dr. Hawk Jacob. Pt is now NWB to her LLE. Prior to admission, pt living in the basement studio apt  of her [facility] and OLLIE's home (just moved from [place] [address] with [facility]). There is a steep walkway to enter [facility] with 1 MARIPOSA or flight of steps which does not have railing on half of it. PTA, pt was indep ambulating without an AD. Today, pt able to hop 7' with RW and CGA and completed transfers with CGA. She was able to hop up/down 4\" curb step with CGA-David. Pt did well to maintain her NWB status throughout the session but limited by severe pain. She is far below her functional baseline and will benefit from 1 week on ARU to improve safety and independence with functional mobility prior to returning home with assist PRN from family. Treatment Diagnosis: impaired mobility  Prognosis: Good  Decision Making: Medium Complexity  History: Per Balbir Doll MD \"Patient is a 71 yo F with pmh PACs, OA s/p left hip arthroplasty, vertigo, covid-19 infection (May 2020), recent diverticulitis who initially presented 2021 with left ankle pain and deformity following a fall at home.  She reports standing on chair to reach something when she lost her balance, chair tipped, and she fell onto the ground. Found to have left ankle trimalleolar fracture with syndesmosis disruption. She underwent ORIF (8/2 with Dr. Tien Quintero). Now NWB LLE. Post-op course notable for difficulty to control pain and mild hypertension. Currently, patient reports severe pain in the left ankle. She has some numbness tingling in the calf and lateral left foot. Pain is worse with movement. Improves with rest and medication. She reports chronic fatigue and intermittent cough from covid-19 infection last year. Also reports intermittent vertigo and balance impairment which she thinks could have contributed to this fall. \"  Exam: functional mobility, ROM, strength  Clinical Presentation: evolving  PT Education: PT Role;Plan of Care;General Safety;Transfer Training;Goals;Weight-bearing Education; Functional Mobility Training;Gait Training;Disease Specific Education  Barriers to Learning: none  REQUIRES PT FOLLOW UP: Yes  Activity Tolerance  Activity Tolerance: Patient limited by pain; Patient Tolerated treatment well       Patient Diagnosis(es): There were no encounter diagnoses. has a past medical history of Arthritis and Premature atrial contraction. has a past surgical history that includes Total hip arthroplasty and Ankle fracture surgery (Left, 8/2/2021).     Restrictions  Restrictions/Precautions  Restrictions/Precautions: Fall Risk, Weight Bearing  Lower Extremity Weight Bearing Restrictions  Right Lower Extremity Weight Bearing: Non Weight Bearing  Left Lower Extremity Weight Bearing: Non Weight Bearing     Vision/Hearing  Vision: Impaired  Vision Exceptions: Wears glasses for reading  Hearing: Within functional limits       Subjective  General  Chart Reviewed: Yes  Patient assessed for rehabilitation services?: Yes  Additional Pertinent Hx: Per Aurea Gonzalez MD \"Patient is a 69 yo F with [facility] PACs, OA s/p left hip arthroplasty, vertigo, covid-19 infection (May 2020), recent diverticulitis who initially presented 8/1/2021 with left ankle pain and deformity following a fall at home. She reports standing on chair to reach something when she lost her balance, chair tipped, and she fell onto the ground. Found to have left ankle trimalleolar fracture with syndesmosis disruption. She underwent ORIF (8/2 with Dr. Janeth Spaulding). Now NWB LLE. Post-op course notable for difficulty to control pain and mild hypertension. Currently, patient reports severe pain in the left ankle. She has some numbness tingling in the calf and lateral left foot. Pain is worse with movement. Improves with rest and medication. She reports chronic fatigue and intermittent cough from covid-19 infection last year. Also reports intermittent vertigo and balance impairment which she thinks could have contributed to this fall. \"  Response To Previous Treatment: Not applicable  Family / Caregiver Present: No  Referring Practitioner: Wil Estrada MD  Referral Date : 08/03/21  Diagnosis: LEFT ANKLE OPEN REDUCTION INTERNAL FIXATION trimalleolar fracture with syndesmosis repair  Follows Commands: Within Functional Limits          Orientation  Orientation  Overall Orientation Status: Within Functional Limits (BIMS 15/15)     Social/Functional History  Social/Functional History  Lives With: Daughter (and OLLIE; one works and one goes to law school)  Type of Home: House (lives in studio apt in walk-out basement; does not need to go upstairs for [employer], laundry or bathroom)  Home Layout: Multi-level  Home Access: Stairs to enter without rails  Entrance Stairs - Number of Steps: 2 flights with that only have half a railing or go down steep driveway but daughter can drive car to where pt does not have to negotiate steps but then has to hop approx 50' down a decline; then 1 MARIPOSA plus threshold to get into basement apt  Bathroom Shower/Tub: Walk-in shower, Curtain (1 MARIPOSA bathroom; very narrow - not sure if seat will fit)  Bathroom Toilet: Standard (vanity 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   Only one recent fall that lead to this hospitalization (fell from a chair)       Objective          AROM RLE (degrees)  RLE AROM: WFL  AROM LLE (degrees)  LLE General AROM: knee flex/ext and hip flex limited by pain; ankle limited by splint and post-op status  AROM RUE (degrees)  RUE AROM : WFL  AROM LUE (degrees)  LUE AROM : WFL  Strength RLE  Comment: knee flex/ext, ankle DF and hip flex 4+/5  Strength LLE  Comment: hip flex 2/5, knee not assess due to pain but at least 3/5 knee ext, ankle NT due to NWB status  Strength Other  Other:  strength WFL but slightly decreased on the L  Tone RLE  RLE Tone: Normotonic  Tone LLE  LLE Tone: Normotonic  Motor Control  Gross Motor?: WFL  Sensation  Overall Sensation Status: Impaired (reports numbness and tingling in last 2 digits of L hand and intermittent tingling in L foot since injury)     Bed mobility  Rolling to Left: Unable to assess (deferred due to post-op pain)  Rolling to Right: Stand by assistance  Supine to Sit: Stand by assistance  Sit to Supine: Stand by assistance  Scooting: Stand by assistance  Comment: flat bed in ADL apt, no rails     Transfers  Sit to Stand: Contact guard assistance  Stand to sit: Contact guard assistance  Bed to Chair: Contact guard assistance (with RW)  Car Transfer: Contact guard assistance (cues for technique and hand placement)  Comment: occasional cues for hand placement; pt able to maintain WB restriction without cuing or assist Ambulation  Ambulation?: Yes  WB Status: NWB to LLE  More Ambulation?: No  Ambulation 1  Surface: level tile  Device: Rolling Walker  Other Apparatus: Wheelchair follow  Assistance: Contact guard assistance  Quality of Gait: hop-to gait pattern with early fatigue; able to maintain NWB LLE without assist or cues  Gait Deviations: Slow Carmen  Distance: 7'  Stairs/Curb  Stairs?: Yes  Stairs  Curbs: 4\" (completed 4 trials)  Device: Rolling walker  Assistance: Contact guard assistance;Minimal assistance  Comment: pt ascended backwards and descended forwards 4\" curb step 4 trials with CGA-David for balance and did required David for balance when bringing walker up onto step; cues for technique  Wheelchair Activities  Wheelchair Size: 18\"  Wheelchair Type: Standard  Wheelchair Cushion:  (waffle cushion)  Pressure Relief Type: Self Adjusts  Wheelchair Parts Management: Yes  Right Leg Rest Level of Assistance: Stand by assistance  Left Brakes Level of Assistance: Stand by assistance  Propulsion: Yes  Propulsion 1  Propulsion: Manual  Level: Level Tile  Method: RUE;LUE  Level of Assistance: Stand by assistance  Description/ Details: pt able to propel safely including turns and backing up wtih minimal cuing  Distance: 150' with 2 turns     Balance  Posture: Good  Sitting - Static: Good  Sitting - Dynamic: Good  Standing - Static: Fair;+  Standing - Dynamic: Fair;+        Plan   Plan  Times per week: 5-6x/wk  Times per day: Twice a day  Plan weeks: 1  Current Treatment Recommendations: Strengthening, Transfer Training, Balance Training, Functional Mobility Training, Gait Training, Neuromuscular Re-education, Safety Education & Training, Patient/Caregiver Education & Training, Stair training, Pain Management, Home Exercise Program, ROM, Wheelchair Mobility Training, Equipment Evaluation, Education, & procurement  Safety Devices  Type of devices:  All fall risk precautions in place, Call light within reach, Gait belt, Patient at risk for falls, Bed alarm in place, Left in bed, Nurse notified  Restraints  Initially in place: No    Goals  Short term goals  Time Frame for Short term goals: 1 week from 8/4 or upon d/c  Short term goal 1: bed mobility mod I  Short term goal 2: transfers mod I  Short term goal 3: ambulate 48' with RW mod I maintaining NWB LLE  Short term goal 4: propel w/c 150' mod I  Short term goal 5: ascend/descend curb step with RW and SBA  Long term goals  Time Frame for Long term goals : LTG=STG  Patient Goals   Patient goals : \"to be able to get to and from the bathroom on my own and be independent\"       Therapy Time   Individual Concurrent Group Co-treatment   Time In 0900         Time Out 1030         Minutes 90         Timed Code Treatment Minutes: 75 Minutes         Electronically signed by Taylor Anthony, PT [id number] on 8/4/2021 at 11:28 AM

## 2021-08-04 NOTE — PROGRESS NOTES
past medical history of Arthritis and Premature atrial contraction. has a past surgical history that includes Total hip arthroplasty and Ankle fracture surgery (Left, 8/2/2021). Restrictions  Restrictions/Precautions  Restrictions/Precautions: Fall Risk, Weight Bearing  Lower Extremity Weight Bearing Restrictions  Right Lower Extremity Weight Bearing: Non Weight Bearing  Left Lower Extremity Weight Bearing: Non Weight Bearing    Subjective   General  Chart Reviewed: Yes, Orders, Progress Notes, History and Physical  Patient assessed for rehabilitation services?: Yes  Additional Pertinent Hx: Per Dr. Addison Diss is a 71 yo F with pmh PACs, OA s/p left hip arthroplasty, vertigo, covid-19 infection (May 2020), recent diverticulitis who initially presented 8/1/2021 with left ankle pain and deformity following a fall at home. She reports standing on chair to reach something when she lost her balance, chair tipped, and she fell onto the ground. Found to have left ankle trimalleolar fracture with syndesmosis disruption. She underwent ORIF (8/2 with Dr. Gildardo Fermin). Now NWB LLE. Post-op course notable for difficulty to control pain and mild hypertension. Currently, patient reports severe pain in the left ankle. She has some numbness tingling in the calf and lateral left foot. Pain is worse with movement. Improves with rest and medication. She reports chronic fatigue and intermittent cough from covid-19 infection last year. Also reports intermittent vertigo and balance impairment which she thinks could have contributed to this fall. She would like to come to ARU to improve her function prior to returning home. \"  Referring Practitioner: Dr. Song Dixon  Diagnosis: L ankle fx  Subjective  Subjective: Pt seen bedside and agreeable to shower, c/o pain \"8/10\", RN in to admin pain med.     Social/Functional History  Social/Functional History  Lives With: Daughter (and OLLIE)  Type of Home: House (lives in basement)  Home Layout: Multi-level  Home Access: Stairs to enter without rails  Entrance Stairs - Number of Steps: 2 flights with that only have half a railing or go down steep driveway  Bathroom Shower/Tub: Curtain  Bathroom Toilet: Standard (vanity nearby)  Bathroom Equipment: Toilet raiser, 3-in-1 commode  Bathroom Accessibility: Not accessible  Home Equipment: Crutches  ADL Assistance: Independent  Homemaking Assistance: Independent  Ambulation Assistance: Independent  Transfer Assistance: Independent  Active : Yes  Mode of Transportation: [employer].  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 Objective   Vision: Within Functional Limits  Hearing: Within functional limits    Orientation  Overall Orientation Status: Within Functional Limits     Balance  Sitting Balance: Stand by assistance  Standing Balance: Contact guard assistance  Functional Mobility  Functional - Mobility Device: Rolling Walker  Activity: To/from bathroom  Assist Level: Contact guard assistance  Functional Mobility Comments: Able to maneuver RW well for amb and maintaining NWB but fatigues fairly quickly (pt very interested in getting 4 WW for use at home to allow for seated rest breaks, defer to PT).   ADL  Feeding: Independent  Grooming: Stand by assistance;Setup (brushed teeth and hair while seated after set up)  UE Bathing: Stand by assistance  LE Bathing: Contact guard assistance (For stance to dry bottom, leans side to side while seated to wash buttocks)  UE Dressing: Stand by assistance  LE Dressing:  (OT donned socks, CGA for pants)  Toileting: Contact guard assistance (for stance)  Tone RUE  RUE Tone: Normotonic  Tone LUE  LUE Tone: Normotonic  Coordination  Movements Are Fluid And Coordinated: Yes     Bed mobility  Supine to Sit: Stand by assistance  Sit to Supine: Unable to assess (left up in recliner at end of session)  Transfers  Sit to stand: Contact guard assistance  Stand to sit: Contact guard assistance     Cognition  Overall Cognitive Status: WFL        Sensation  Overall Sensation Status: [facility]                               Plan   Plan  Times per week: 5-6  Times per day: Twice a day  Current Treatment Recommendations: Strengthening, Endurance Training, Balance Training, Functional Mobility Training, Self-Care / ADL, Equipment Evaluation, Education, & procurement, Patient/Caregiver Education & Training, Safety Education & Training, Home Management Training                 Goals  Short term goals  Time Frame for Short term goals: 1 week  Short term goal 1: Pt will be mod I with functional transfers  Short term goal 2: Pt will be mod I with functional mobility  Short term goal 3: Pt will be mod I with toileting  Short term goal 4: Pt will be mod I with bathing and dressing  Short term goal 5: Pt will be mod I with bed mobility  Long term goals  Time Frame for Long term goals : STG = LTG  Patient Goals   Patient goals :  To be able to \"do the step ups comfortably and go to the bathroom\" independently       Therapy Time   Individual Concurrent Group Co-treatment   Time In 0730         Time Out 0900         Minutes 90         Timed Code Treatment Minutes: Luis Enrique 91, OT

## 2021-08-04 NOTE — PLAN OF CARE
[address] [address]   [facility] [address]  [phone]    Benji Muñiz    : 1950  Acct #: [de-identified]  MRN: [id number]   PHYSICIAN:  Gloria Sanchez MD    Rehabilitation Diagnosis:    Left trimalleolar ankle fracture with syndesmosis disruption   -s/p ORIF ( with Dr. Supriya Larson).   -NWB LLE.    -PT/OT     Acute blood loss anemia   -Post-surgical.   -Monitor Hgb, transfuse prn <7.      Leukocytosis  -Likely reactive, now improving  -Monitor for signs/symptoms of infection (had recent diverticulitis)  -Complete ppx cefazolin per Ortho     HTN  -Possibly pain related  -Monitor off meds for now     H/o PACs  -Noted, monitor response to therapies     H/o covid-19 infection (2020)  -Residual fatigue, intermittent cough  -Monitor pulmonary response to therapies.      Intermittent vertigo   -May have contributed to fall, monitor for symptoms, none currently     OA s/p prior left hip arthroplasty  -Noted     Rash  -suspect poison ivy  -calamine lotion, patient allergic to steroids     Bladder   -High risk retention   -Monitor PVRs, SC prn >300cc     Bowel   -High risk constipation, note recent diverticulitis  -senna+colace BID, PRN miralax, MoM, and bisacodyl supp.     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      CARE PLAN     NURSING:  Benji Muñiz while on this unit will:     [x] Be continent of bowel and bladder     [x] Have an adequate number of bowel movements  [x] Urinate with no urinary retention >300ml in bladder  [] Complete bladder protocol with stacy removal  [x] Maintain O2 SATs at 92%  [x] Have pain managed while on ARU       [] Be pain free by discharge   [x] Have no skin breakdown while on ARU  [] Have improved skin integrity via wound measurements  [x] Have no signs/symptoms of infection at the wound site  [x] Be free from injury during hospitalization   [x] Complete education with patient/family with understanding demonstrated for:  [x] Adjustment   [x] Other: non weight bearing to left ankle, ace splint care. Nursing interventions may include bowel/bladder training, education for medical assistive devices, medication education, O2 saturation management, energy conservation, stress management techniques, fall prevention, alarms protocol, seating and positioning, skin/wound care, pressure relief instruction,dressing changes,  infection protection, DVT prophylaxis, and/or assistance with in room safety with transfers to bed, toilet, wheelchair, shower as well as bathroom activities and hygiene. Patient/caregiver education for:   [x] Disease/sustained injury/management      [x] Medication Use   [x] Surgical intervention   [x] Safety   [x] Body mechanics and or joint protection   [x] Health maintenance         PHYSICAL THERAPY:  Goals:                  Short term goals  Time Frame for Short term goals: 1 week from 8/4 or upon d/c  Short term goal 1: bed mobility mod I  Short term goal 2: transfers mod I  Short term goal 3: ambulate 48' with RW mod I maintaining NWB LLE  Short term goal 4: propel w/c 150' mod I  Short term goal 5: ascend/descend curb step with RW and SBA            Long term goals  Time Frame for Long term goals : LTG=STG  These goals were reviewed with this patient at the time of assessment and Tea Blunt is in agreement. Plan of Care: Pt to be seen 90 mins per day for 5-6 days/week for 1 week.                    Current Treatment Recommendations: Strengthening, Transfer Training, Balance Training, Functional Mobility Training, Gait Training, Neuromuscular Re-education, Safety Education & Training, Patient/Caregiver Education & Training, Stair training, Pain Management, Home Exercise Program, ROM, Wheelchair Mobility Training, Equipment Evaluation, Education, & procurement      OCCUPATIONAL THERAPY:  Goals:             Short term goals  Time Frame for Short term goals: 1 week  Short term goal 1: Pt will be mod I with functional transfers  Short term goal 2: Pt will be mod I with functional mobility  Short term goal 3: Pt will be mod I with toileting  Short term goal 4: Pt will be mod I with bathing and dressing  Short term goal 5: Pt will be mod I with bed mobility :  Long term goals  Time Frame for Long term goals : STG = LTG :    These goals were reviewed with this patient at the time of assessment and Mukesh Valdez is in agreement    Plan of Care:  Pt to be seen 90   mins per day for 5-6 day/week 1 week. Patient Education: rehab procedures      SPEECH THERAPY: Goals will be left blank if speech is not following this patient. Goals:                                                    CASE MANAGEMENT:  Goals:   Assist patient/family with discharge planning, patient/family counseling,   and coordination with insurance during ARU stay.       Admission Period/Goal QIM CODES usual performance per care team   QIM  Admit/Goal Score    Eating  6/6   Oral Hygiene  5/6   [address]  4/6   Shower/Bathe Self  3/6   Upper Body Dressing  4/6   Lower Body Dressing  3/6   Putting on/Taking off Footwear  2/6   Roll Left and Right  88/6   Sit to Lying  4/6   Lying to Sitting on Side of Bed  4/6   Sit to Stand  4/6   Chair/Bed to Chair Transfer  4/6   Toilet Transfer  4/6   Car Transfer  4/6   Walk 10 feet  88/6   Walk 50 feet with 2 Turns  88/6   Walk 150 feet with 2 turns  88   Walk 10 feet on Uneven Surfaces  88   1 Step  3/4   4 Steps chair to reach something when she lost her balance, chair tipped, and she fell onto the ground. Found to have left ankle trimalleolar fracture with syndesmosis disruption. She underwent ORIF (8/2 with Dr. Stevie Hartley). Now NWB LLE. Post-op course notable for difficulty with pain control, mild hypertension, leukocytosis, and acute blood loss anemia. Now presents to ARU with impair mobility and self-care below her baseline. She requires comprehensive inpatient rehab program in order to return to community setting. I have reviewed this initial plan of care and agree with its contents:    Title   Name    Date    Time    Physician: Rg Howell.  Andrew Edouard MD 8/5/2021, 3:17 PM      Case Mgmt:  [place], [place]     Case Management   [phone]    8/6/2021  4:35 PM      OT: Katie Laura OTR/TONA #[id number] 8/4/2021 10:53 AM    PT: Electronically signed by Tomas Jimenes on 8/4/21 at 10:54 AM EDT      RN: Charan Emerson RN, CRRN 08/04/2021 11:48 am    ST:    : Justus Noguera 8/5/21 2148    Other:

## 2021-08-05 LAB
ANION GAP SERPL CALCULATED.3IONS-SCNC: 7 MMOL/L (ref 3–16)
BASOPHILS ABSOLUTE: 0 K/UL (ref 0–0.2)
BASOPHILS RELATIVE PERCENT: 0.5 %
BUN BLDV-MCNC: 19 MG/DL (ref 7–20)
CALCIUM SERPL-MCNC: 8.9 MG/DL (ref 8.3–10.6)
CHLORIDE BLD-SCNC: 104 MMOL/L (ref 99–110)
CO2: 28 MMOL/L (ref 21–32)
CREAT SERPL-MCNC: 0.8 MG/DL (ref 0.6–1.2)
EOSINOPHILS ABSOLUTE: 0.1 K/UL (ref 0–0.6)
EOSINOPHILS RELATIVE PERCENT: 1.7 %
GFR AFRICAN AMERICAN: >60
GFR NON-AFRICAN AMERICAN: >60
GLUCOSE BLD-MCNC: 107 MG/DL (ref 70–99)
HCT VFR BLD CALC: 32.8 % (ref 36–48)
HEMOGLOBIN: 10.9 G/DL (ref 12–16)
LYMPHOCYTES ABSOLUTE: 2.6 K/UL (ref 1–5.1)
LYMPHOCYTES RELATIVE PERCENT: 34.5 %
MCH RBC QN AUTO: 32.4 PG (ref 26–34)
MCHC RBC AUTO-ENTMCNC: 33.3 G/DL (ref 31–36)
MCV RBC AUTO: 97.2 FL (ref 80–100)
MONOCYTES ABSOLUTE: 0.7 K/UL (ref 0–1.3)
MONOCYTES RELATIVE PERCENT: 8.7 %
NEUTROPHILS ABSOLUTE: 4.1 K/UL (ref 1.7–7.7)
NEUTROPHILS RELATIVE PERCENT: 54.6 %
PDW BLD-RTO: 13 % (ref 12.4–15.4)
PLATELET # BLD: 139 K/UL (ref 135–450)
PMV BLD AUTO: 10.8 FL (ref 5–10.5)
POTASSIUM REFLEX MAGNESIUM: 4.1 MMOL/L (ref 3.5–5.1)
RBC # BLD: 3.37 M/UL (ref 4–5.2)
SODIUM BLD-SCNC: 139 MMOL/L (ref 136–145)
WBC # BLD: 7.6 K/UL (ref 4–11)

## 2021-08-05 PROCEDURE — 36415 COLL VENOUS BLD VENIPUNCTURE: CPT

## 2021-08-05 PROCEDURE — 80048 BASIC METABOLIC PNL TOTAL CA: CPT

## 2021-08-05 PROCEDURE — 97530 THERAPEUTIC ACTIVITIES: CPT

## 2021-08-05 PROCEDURE — 97542 WHEELCHAIR MNGMENT TRAINING: CPT

## 2021-08-05 PROCEDURE — 85025 COMPLETE CBC W/AUTO DIFF WBC: CPT

## 2021-08-05 PROCEDURE — 1280000000 HC REHAB R&B

## 2021-08-05 PROCEDURE — 97110 THERAPEUTIC EXERCISES: CPT

## 2021-08-05 PROCEDURE — 97116 GAIT TRAINING THERAPY: CPT

## 2021-08-05 PROCEDURE — 6370000000 HC RX 637 (ALT 250 FOR IP): Performed by: PHYSICAL MEDICINE & REHABILITATION

## 2021-08-05 PROCEDURE — 94761 N-INVAS EAR/PLS OXIMETRY MLT: CPT

## 2021-08-05 RX ORDER — PANTOPRAZOLE SODIUM 40 MG/1
40 TABLET, DELAYED RELEASE ORAL
Status: DISCONTINUED | OUTPATIENT
Start: 2021-08-06 | End: 2021-08-11 | Stop reason: HOSPADM

## 2021-08-05 RX ORDER — CALCIUM CARBONATE 200(500)MG
500 TABLET,CHEWABLE ORAL 3 TIMES DAILY PRN
Status: DISCONTINUED | OUTPATIENT
Start: 2021-08-05 | End: 2021-08-06

## 2021-08-05 RX ADMIN — OXYCODONE AND ACETAMINOPHEN 1 TABLET: 325; 10 TABLET ORAL at 16:47

## 2021-08-05 RX ADMIN — ANTACID TABLETS 500 MG: 500 TABLET, CHEWABLE ORAL at 09:01

## 2021-08-05 RX ADMIN — ASPIRIN 81 MG: 81 TABLET, CHEWABLE ORAL at 08:42

## 2021-08-05 RX ADMIN — Medication 6 MG: at 00:21

## 2021-08-05 RX ADMIN — ANTACID TABLETS 500 MG: 500 TABLET, CHEWABLE ORAL at 22:21

## 2021-08-05 RX ADMIN — MAGNESIUM HYDROXIDE 30 ML: 400 SUSPENSION ORAL at 08:51

## 2021-08-05 RX ADMIN — OXYCODONE AND ACETAMINOPHEN 1 TABLET: 325; 10 TABLET ORAL at 08:41

## 2021-08-05 RX ADMIN — MENTHOL, METHYL SALICYLATE: 10; 15 CREAM TOPICAL at 00:21

## 2021-08-05 RX ADMIN — ASPIRIN 81 MG: 81 TABLET, CHEWABLE ORAL at 17:52

## 2021-08-05 RX ADMIN — OXYCODONE AND ACETAMINOPHEN 1 TABLET: 325; 10 TABLET ORAL at 20:58

## 2021-08-05 RX ADMIN — ANTACID TABLETS 500 MG: 500 TABLET, CHEWABLE ORAL at 15:36

## 2021-08-05 RX ADMIN — DOCUSATE SODIUM AND SENNOSIDES 1 TABLET: 8.6; 5 TABLET, FILM COATED ORAL at 20:58

## 2021-08-05 RX ADMIN — DOCUSATE SODIUM AND SENNOSIDES 1 TABLET: 8.6; 5 TABLET, FILM COATED ORAL at 08:40

## 2021-08-05 RX ADMIN — OXYCODONE AND ACETAMINOPHEN 1 TABLET: 325; 10 TABLET ORAL at 12:38

## 2021-08-05 ASSESSMENT — PAIN SCALES - GENERAL
PAINLEVEL_OUTOF10: 6
PAINLEVEL_OUTOF10: 5
PAINLEVEL_OUTOF10: 8
PAINLEVEL_OUTOF10: 0
PAINLEVEL_OUTOF10: 8
PAINLEVEL_OUTOF10: 7
PAINLEVEL_OUTOF10: 8
PAINLEVEL_OUTOF10: 7

## 2021-08-05 ASSESSMENT — PAIN DESCRIPTION - PAIN TYPE
TYPE: ACUTE PAIN
TYPE: ACUTE PAIN;SURGICAL PAIN
TYPE: ACUTE PAIN
TYPE: ACUTE PAIN;SURGICAL PAIN

## 2021-08-05 ASSESSMENT — PAIN DESCRIPTION - ORIENTATION
ORIENTATION: LEFT

## 2021-08-05 ASSESSMENT — PAIN - FUNCTIONAL ASSESSMENT
PAIN_FUNCTIONAL_ASSESSMENT: ACTIVITIES ARE NOT PREVENTED
PAIN_FUNCTIONAL_ASSESSMENT: PREVENTS OR INTERFERES SOME ACTIVE ACTIVITIES AND ADLS
PAIN_FUNCTIONAL_ASSESSMENT: PREVENTS OR INTERFERES SOME ACTIVE ACTIVITIES AND ADLS
PAIN_FUNCTIONAL_ASSESSMENT: ACTIVITIES ARE NOT PREVENTED

## 2021-08-05 ASSESSMENT — PAIN DESCRIPTION - DESCRIPTORS
DESCRIPTORS: ACHING;THROBBING
DESCRIPTORS: ACHING
DESCRIPTORS: ACHING;THROBBING
DESCRIPTORS: ACHING;THROBBING

## 2021-08-05 ASSESSMENT — PAIN DESCRIPTION - PROGRESSION
CLINICAL_PROGRESSION: NOT CHANGED

## 2021-08-05 ASSESSMENT — PAIN DESCRIPTION - FREQUENCY
FREQUENCY: CONTINUOUS

## 2021-08-05 ASSESSMENT — PAIN DESCRIPTION - LOCATION
LOCATION: ANKLE

## 2021-08-05 ASSESSMENT — PAIN DESCRIPTION - ONSET
ONSET: ON-GOING

## 2021-08-05 NOTE — PROGRESS NOTES
Occupational Therapy  Facility/Department: [address] REHAB  Daily Treatment Note  NAME: Sofía Record  : 1950  MRN: [id number]    Date of Service: 2021    Discharge Recommendations:  Home with assist PRN, Patient would benefit from continued therapy after discharge, Home with Home health OT  OT Equipment Recommendations  Equipment Needed: Yes  Mobility Devices: ADL Assistive Devices  ADL Assistive Devices: Shower Chair without back;Long-handled Sponge;Elastic Shoe Laces; Reacher    Assessment   Performance deficits / Impairments: Decreased functional mobility ; Decreased safe awareness;Decreased balance;Decreased ADL status; Decreased high-level IADLs;Decreased endurance  Assessment: Pt tolerated tx session well - she is very hardworking and motivated to progress. Pt completed shower transfer simulating home setup w/ SBA/CGA - she problem solved ways to complete independently by propping left LE up on bar of RW during shower. Pt practiced bed<>BSC transfer w/ CGA which she will be able to complete at nighttime upon d/c. Pt tolerated UE therex well. SHe is able to maintain NWB L LE throughout. Cont per POC  Prognosis: Good  OT Education: OT Role;Transfer Training;Plan of Care;Precautions; Equipment  Patient Education: Shower transfers/BSC transfers w/ NWB L LE  REQUIRES OT FOLLOW UP: Yes  Activity Tolerance  Activity Tolerance: Patient Tolerated treatment well  Safety Devices  Safety Devices in place: Yes  Type of devices: Gait belt; Chair alarm in place;Call light within reach; Left in chair         Patient Diagnosis(es): There were no encounter diagnoses. has a past medical history of Arthritis and Premature atrial contraction. has a past surgical history that includes Total hip arthroplasty and Ankle fracture surgery (Left, 2021).     Restrictions  Restrictions/Precautions  Restrictions/Precautions: Fall Risk, Weight Bearing  Lower Extremity Weight Bearing Restrictions  Right Lower Extremity Weight Bearing: Non Weight Bearing  Left Lower Extremity Weight Bearing: Non Weight Bearing  Subjective   General  Chart Reviewed: Yes  Patient assessed for rehabilitation services?: Yes  Additional Pertinent Hx: Per Dr. Ayden Valencia is a 69 yo F with pmh PACs, OA s/p left hip arthroplasty, vertigo, covid-19 infection (May 2020), recent diverticulitis who initially presented 8/1/2021 with left ankle pain and deformity following a fall at home. She reports standing on chair to reach something when she lost her balance, chair tipped, and she fell onto the ground. Found to have left ankle trimalleolar fracture with syndesmosis disruption. She underwent ORIF (8/2 with Dr. Cherylene Highland). Now NWB LLE. Post-op course notable for difficulty to control pain and mild hypertension. Currently, patient reports severe pain in the left ankle. She has some numbness tingling in the calf and lateral left foot. Pain is worse with movement. Improves with rest and medication. She reports chronic fatigue and intermittent cough from covid-19 infection last year. Also reports intermittent vertigo and balance impairment which she thinks could have contributed to this fall. She would like to come to ARU to improve her function prior to returning home. \"  Family / Caregiver Present: No  Referring Practitioner: Dr. Terrell Arriola  Diagnosis: L ankle fx  Subjective  Subjective: Pt met in dept for OT. Pt agreeable to therapy, c/o pain 8/10 in left ankle.       Orientation  Orientation  Overall Orientation Status: Within Functional Limits  Objective             Balance  Sitting Balance: Stand by assistance  Standing Balance: Contact guard assistance (SBA/CGA)  Functional Mobility  Functional - Mobility Device: Rolling Walker (+ w/c)  Activity: Other  Assist Level: Contact guard assistance  Functional Mobility Comments: Pt propelled w/c short distances in dept, performed fxl mobility w/ RW ~15 feet from bed > w/c w/ CGA/SBA - able to maintain NWB throughout  Toilet Transfers  Toilet - Technique: Stand pivot  Equipment Used: Standard bedside commode  Toilet Transfer: Contact guard assistance  Toilet Transfers Comments: Practiced transfer from bed <> BSC w/ CGA both ways - pt reports she needs a BSC for nighttime use as her basement apt has uneven rui and she is concerned w/ ambulating to/from BR at night  Shower Transfers  Shower - Transfer From: Franny Vargas - Transfer Type: To and From  Shower - Transfer To: Shower seat without back  Shower - Technique: Ambulating  Shower Transfers: Contact Guard  Shower Transfers Comments: Pt reports her shower at home is a small 3x3 stall and she will only be able to fit a small stool in there. Pt also reports she plans to prop her left LE up on bar of RW so that she can elevate her left LE during shower independently. Pt was able to back up to simulated shower stall w/ CGA using RW, sit<>stand to shower stool w/ CGA, then able to swing R LE in/out w/ SBA and prop left LE up on RW w/ SBA  Wheelchair Bed Transfers  Wheelchair/Bed - Technique: Ambulating  Equipment Used: Wheelchair; Other (therapy mat)  Level of Asssistance: Contact guard assistance;Stand by assistance                             Cognition  Overall Cognitive Status: WNL                    Type of ROM/Therapeutic Exercise  Type of ROM/Therapeutic Exercise: Free weights  Comment: 2# weights for the exercises below in order to increase strength/endurance for ADLs/transfers  Exercises  Shoulder Flexion: x10  Shoulder Extension: x10  Elbow Flexion: x15  Elbow Extension: x15  Supination: x15  Pronation: x15  Other: x10 chest press        PM Session:  Subjective: Pt met in dept for OT. Pt agreeable to therapy, c/o pain in left LE and both shoulders from pushing through RW    Objective:  IADL: Pt completed w/c mobility <> OT kitchen w/ SBA. Pt reports she plans to use only RW at home.  OT educated pt in use of walker basket - pt interested in purchasing for home. Also discussed rearranging kitchen so pt is able to reach table easily to pass items to from counter or fridge and pt reports she would be able to do this. Pt completed sit>stand from w/c SBA. Pt hopped in OT kitchen x1.5 mins to retrieve bowl, oatmeal packet from counter. After this, pt reported needing to sit d/t fatigue in B UEs and pain in left LE. Pt then reports there is no way she could be independent in IADLs at home w/ RW and her dtr and OLLIE work long hours. OT and pt discussed using w/c at home for safe mobility and increasing independence in IADL tasks and pt verbalized understanding. Pt then completed w/c mob in OT kitchen w/ SBA to prepare oatmeal in fridge, then transport to table in middle of the kitchen. Will continue to trial RW vs w/c at ARU, however OT feels pt will be much more independent and safer from w/c level - she will be home alone 12+ hours per day. Therex: Pt used 2# medicine ball for x10 reps: bicep curls, chest press, horizontal abduction/adduction, front raises, shoulder press. Pt tolerated well with no complaints. Assessment: Pt tolerated tx well. After attempting kitchen task using RW, pt now feels she will need to function from w/c level for IADLs at home in order to be independent.  Cont per POC    Plan   Plan  Times per week: 5-6  Times per day: Twice a day  Current Treatment Recommendations: Strengthening, Endurance Training, Balance Training, Functional Mobility Training, Self-Care / ADL, Equipment Evaluation, Education, & procurement, Patient/Caregiver Education & Training, Safety Education & Training, Home Management Training    Goals  Short term goals  Time Frame for Short term goals: 1 week  Short term goal 1: Pt will be mod I with functional transfers  Short term goal 2: Pt will be mod I with functional mobility  Short term goal 3: Pt will be mod I with toileting  Short term goal 4: Pt will be mod I with bathing and dressing  Short term goal 5: Pt will be mod I with bed mobility  Long term goals  Time Frame for Long term goals : STG = LTG  Patient Goals   Patient goals :  To be able to \"do the step ups comfortably and go to the bathroom\" independently       Therapy Time   Individual Concurrent Group Co-treatment   Time In 1115         Time Out 1200         Minutes 45               Therapy Time   Individual Co-treatment   Time In [address] [facility]     Time Out [place]     Minutes [address], [place] [address]

## 2021-08-05 NOTE — PROGRESS NOTES
Department of Physical Medicine & Rehabilitation  Progress Note    Patient Identification:  Sheree Weinberg  [id number]  : 1950  Admit date: 8/3/2021    Chief Complaint: Left trimalleolar fracture, closed, initial encounter    Subjective:   No acute events overnight. Patient seen this afternoon sitting up in room. She reports therapy going well. She is finding techniques very helpful. HAd heartburn overnight, improved with Tums today. Will start ppi as well. Of note, while I was in the room, PCA was bringing tray closer to patient and accidentally bumped Left foot. This caused severe pain for patient which seemed to subside over a few minutes. Will check xray to ensure proper alignment/hardware position. Labs reviewed. ROS: No f/c, n/v, cp     Objective:  Patient Vitals for the past 24 hrs:   BP Temp Temp src Pulse Resp SpO2 Height Weight   21 0944       5' 2\" (1.575 m)    21 0839      96 %     21 0530 126/69 98 °F (36.7 °C) Oral 73 16 94 %  175 lb 11.3 oz (79.7 kg)   21 1520 116/74 97.8 °F (36.6 °C) Oral 69 16 94 %       Const: Alert. No distress, pleasant. HEENT: Normocephalic, atraumatic. Normal sclera/conjunctiva. MMM. CV: Regular rate and rhythm. Resp: No respiratory distress. Lungs CTAB. Abd: Soft, nontender, nondistended, NABS+   Ext: LLE in splint/ace wrap, swelling of distal foot/digits noted. NVI. Neuro: Alert, oriented, appropriately interactive. Psych: Cooperative, appropriate mood and affect    Laboratory data: Available via EMR.    Last 24 hour lab  Recent Results (from the past 24 hour(s))   Basic Metabolic Panel w/ Reflex to MG    Collection Time: 21  6:39 AM   Result Value Ref Range    Sodium 139 136 - 145 mmol/L    Potassium reflex Magnesium 4.1 3.5 - 5.1 mmol/L    Chloride 104 99 - 110 mmol/L    CO2 28 21 - 32 mmol/L    Anion Gap 7 3 - 16    Glucose 107 (H) 70 - 99 mg/dL    BUN 19 7 - 20 mg/dL    CREATININE 0.8 0.6 - 1.2 mg/dL    GFR Non-African American >60 >60    GFR African American >60 >60    Calcium 8.9 8.3 - 10.6 mg/dL   CBC Auto Differential    Collection Time: 08/05/21  6:39 AM   Result Value Ref Range    WBC 7.6 4.0 - 11.0 K/uL    RBC 3.37 (L) 4.00 - 5.20 M/uL    Hemoglobin 10.9 (L) 12.0 - 16.0 g/dL    Hematocrit 32.8 (L) 36.0 - 48.0 %    MCV 97.2 80.0 - 100.0 fL    MCH 32.4 26.0 - 34.0 pg    MCHC 33.3 31.0 - 36.0 g/dL    RDW 13.0 12.4 - 15.4 %    Platelets 348 590 - 119 K/uL    MPV 10.8 (H) 5.0 - 10.5 fL    Neutrophils % 54.6 %    Lymphocytes % 34.5 %    Monocytes % 8.7 %    Eosinophils % 1.7 %    Basophils % 0.5 %    Neutrophils Absolute 4.1 1.7 - 7.7 K/uL    Lymphocytes Absolute 2.6 1.0 - 5.1 K/uL    Monocytes Absolute 0.7 0.0 - 1.3 K/uL    Eosinophils Absolute 0.1 0.0 - 0.6 K/uL    Basophils Absolute 0.0 0.0 - 0.2 K/uL       Therapy progress:  PT     Objective     Sit to Stand: Contact guard assistance  Stand to sit: Contact guard assistance  Bed to Chair: Contact guard assistance (with RW)  Device: Rolling Walker  Other Apparatus: Wheelchair follow  Assistance: Contact guard assistance  Distance: 6' with two turns x2  OT  PT Equipment Recommendations  Equipment Needed: Yes  Mobility Devices: Thamas Chao: Rolling  Other: pt may also need w/c (will continue to assess)  Toilet - Technique: Stand pivot  Equipment Used: Standard bedside commode  Toilet Transfers Comments: Practiced transfer from bed <> BSC w/ CGA both ways - pt reports she needs a BSC for nighttime use as her basement apt has uneven rui and she is concerned w/ ambulating to/from BR at night  Assessment        SLP          Body mass index is 32.14 kg/m². Rehabilitation Diagnosis:   Orthopedic, 8.9, Other Orthopedic     Assessment and Plan:     Impairments: NWB LLE, decreased balance, endurance     Left trimalleolar ankle fracture with syndesmosis disruption   -s/p ORIF (8/2 with Dr. Marielos Daniel).   -NWB LLE.    -PT/OT     Acute blood loss anemia -Post-surgical.   -Monitor Hgb, transfuse prn <7.      Leukocytosis  -Likely reactive, now resolved  -Monitor for signs/symptoms of infection (had recent diverticulitis)  -Complete ppx cefazolin per Ortho     HTN  -Possibly pain related  -Monitor off meds for now     H/o PACs  -Noted, monitor response to therapies     H/o covid-19 infection (5/2020)  -Residual fatigue, intermittent cough  -Monitor pulmonary response to therapies.      Intermittent vertigo   -May have contributed to fall, monitor for symptoms, none currently     OA s/p prior left hip arthroplasty  -Noted     Rash  -suspect poison ivy  -calamine lotion, patient allergic to steroids     Bladder   -High risk retention   -Monitor PVRs, SC prn >300cc     Bowel   -High risk constipation, note recent diverticulitis  -senna+colace BID, PRN miralax, MoM, and bisacodyl supp.     Safety   -fall precautions     Pain control  -prn percocet, add Bengay for neck/shoulder pain     DVT ppx  -ASA 81 BID per Ortho    Rehab Progress: Making progress. Working on functional mobility, compensatory strategies. Anticipated Dispo: home with daughter and son-in-law  Services/DME: TBD  ELOS: 7-10 days      [address]  Andrew Edouard MD 8/5/2021, 3:18 PM

## 2021-08-05 NOTE — PLAN OF CARE
Problem: Falls - Risk of:  Goal: Will remain free from falls  Description: Will remain free from falls  8/5/2021 0012 by Gucci Ann RN  Outcome: Ongoing     Problem: Pain:  Goal: Patient's pain/discomfort is manageable  Description: Patient's pain/discomfort is manageable  8/5/2021 0012 by Gucci Ann RN  Outcome: Ongoing     Problem: Skin Integrity:  Goal: Skin integrity will stabilize  Description: Skin integrity will stabilize  8/5/2021 0012 by Gucci Ann RN  Outcome: Ongoing     Problem: Discharge Planning:  Goal: Patients continuum of care needs are met  Description: Patients continuum of care needs are met  8/5/2021 0012 by Gucci Ann RN  Outcome: Ongoing

## 2021-08-05 NOTE — PROGRESS NOTES
Physical Therapy  Facility/Department: [address] REHAB  Daily Treatment Note  NAME: Marybeth Quijano  : 1950  MRN: [id number]    Date of Service: 2021    Discharge Recommendations:  Home with assist PRN, Home with Home health PT, Patient would benefit from continued therapy after discharge, S Level 1   PT Equipment Recommendations  Equipment Needed: Yes  Mobility Devices: Wheelchair  Anastasia Torres: Rolling  Wheelchair: Standard (elevating leg rests, antitippers, standard cushion)    Assessment   Body structures, Functions, Activity limitations: Decreased functional mobility ; Decreased ADL status; Decreased endurance;Decreased balance; Increased pain  Assessment: Pt is a 70 y.o. female who presented to [facility] on 21 for LEFT ANKLE OPEN REDUCTION INTERNAL FIXATION trimalleolar fracture with syndesmosis repair per Dr. Pushpa Guzman. Pt is now NWB to her LLE. Prior to admission, pt living in the basement studio apt  of her r and OLLIE's home (just moved from [place] [address] with [facility]). There is a steep walkway to enter [facility] apt with 1 [address]IP[address] or flight of steps which does not have railing on half of it. PTA, pt was indep ambulating without an AD. Today, ,  pt able to hop ~6' with 2 turns using RW and CGA. She completed transfers with CGA. She was able to hop up/down 4\" curb step using RW with CGA. Pt did well to maintain her NWB status throughout the session and did not require cues to maintain it. The pt shows good control while propelling the wc community distances. She is far below her functional baseline and will benefit from 1 week on ARU to improve safety and independence with functional mobility prior to returning home with assist PRN from family. Treatment Diagnosis: impaired mobility  Prognosis: Good  PT Education: PT Role;Plan of Care;General Safety;Transfer Training;Goals;Weight-bearing Education; Functional Mobility Training;Gait Training;Disease Specific Education  Activity Tolerance  Activity Tolerance: Patient able to take leg rests on/off wc SBA, brake management, putting L LE on and off leg rest  Sitting there ex: BL marching x15, BL LAQ x15, R TB HS Curls x15    15' hopping with RW to commode   Stand<>sit from RW to commode CGA  Ind for pants and wiping management  Hop 5' with RW to standing at sink to wash hands ~30 sec SBA  Hop 10' with RW and SBA-CGA to bed  Stand to sit on EOB SBA  Sit to supine SBA  A: The pt continues to show improvements in wc management and ambulation/hopping endurance using RW. She has continued BL UE and LE fatigue due to compensating. Pt motivated to continue exercises in room and a starting HEP was provided. The use of a wc and RW at home is recommended at this time.     Safety Device - Type of devices:  [x]  All fall risk precautions in place [x] Bed alarm in place  [x] Call light within reach [] Chair alarm in place [] Positioning belt [x] Gait belt [x] Patient at risk for falls [] Left in bed [] Left in chair [] Telesitter in use [] Sitter present [x] Nurse notified []  None     Goals  Short term goals  Time Frame for Short term goals: 1 week from 8/4 or upon d/c  Short term goal 1: bed mobility mod I  Short term goal 2: transfers mod I  Short term goal 3: ambulate 48' with RW mod I maintaining NWB LLE  Short term goal 4: propel w/c 150' mod I  Short term goal 5: ascend/descend curb step with RW and SBA  Long term goals  Time Frame for Long term goals : LTG=STG  Patient Goals   Patient goals : \"to be able to get to and from the bathroom on my own and be independent\"    Plan    Plan  Times per week: 5-6x/wk  Times per day: Twice a day  Plan weeks: 1  Current Treatment Recommendations: Strengthening, Transfer Training, Balance Training, Functional Mobility Training, Gait Training, Neuromuscular Re-education, Safety Education & Training, Patient/Caregiver Education & Training, Stair training, Pain Management, Home Exercise Program, ROM, Wheelchair Mobility Training, Equipment Evaluation, Education, & procurement  Safety Devices  Type of devices:  All fall risk precautions in place, Call light within reach, Gait belt, Patient at risk for falls, Bed alarm in place, Left in bed, Nurse notified  Restraints  Initially in place: No     Therapy Time   Individual Concurrent Group Co-treatment   Time In 1030         Time Out 1115         [address]   Time In 1430     Time Out 1515     Minutes 45          Electronically signed by Deyanira Noland, PT [id number] on 8/5/2021 at 2:32 PM

## 2021-08-05 NOTE — DISCHARGE SUMMARY
Physician Discharge Summary     Patient ID:  Marybeth Quijano  [id number]  70 y.o.  1950    Admit date: 8/2/2021    Discharge date and time: 8/3/2021  7:00 PM     Admitting Physician: Trisha Allen MD     Discharge Physician: Divine Cameron    Admission Diagnoses: Closed left ankle fracture, initial encounter [S82.892A]    Discharge Diagnoses: left close ankle fx    Admission Condition: good    Discharged Condition: good    Indication for Admission: The pt had a fall at  Her home and sustained left ankle fx. She was brought to ER at [facility] and required stabilization of left ankle in surgery. She was admitted for surgical intervention. Surgical procedure: ORIF left ankle    Consults: PT OT SS    This patient had no postoperative complications. They has PT and OT for ADL's . IV and PO pain med for pain control and was eventually DC in stable condition    Treatments: analgesia,  therapies: PT OT,  and surgery      Disposition: rehab unit    Patient Instructions:   [unfilled]  Activity: activity as tolerated  Diet: regular diet  Wound Care: keep wound clean and dry    Follow-up with Dr Pushpa Guzman in 2 weeks.     Signed:  KINA Orourke CNP  8/5/2021  1:16 PM

## 2021-08-06 ENCOUNTER — APPOINTMENT (OUTPATIENT)
Dept: GENERAL RADIOLOGY | Age: 71
DRG: 493 | End: 2021-08-06
Attending: PHYSICAL MEDICINE & REHABILITATION
Payer: MEDICARE

## 2021-08-06 PROCEDURE — 97110 THERAPEUTIC EXERCISES: CPT

## 2021-08-06 PROCEDURE — 97530 THERAPEUTIC ACTIVITIES: CPT

## 2021-08-06 PROCEDURE — 6370000000 HC RX 637 (ALT 250 FOR IP): Performed by: PHYSICAL MEDICINE & REHABILITATION

## 2021-08-06 PROCEDURE — 97535 SELF CARE MNGMENT TRAINING: CPT

## 2021-08-06 PROCEDURE — 94761 N-INVAS EAR/PLS OXIMETRY MLT: CPT

## 2021-08-06 PROCEDURE — 1280000000 HC REHAB R&B

## 2021-08-06 PROCEDURE — 73610 X-RAY EXAM OF ANKLE: CPT

## 2021-08-06 RX ORDER — CALCIUM CARBONATE 200(500)MG
1000 TABLET,CHEWABLE ORAL 3 TIMES DAILY PRN
Status: DISCONTINUED | OUTPATIENT
Start: 2021-08-06 | End: 2021-08-11 | Stop reason: HOSPADM

## 2021-08-06 RX ORDER — SUCRALFATE ORAL 1 G/10ML
1 SUSPENSION ORAL 4 TIMES DAILY PRN
Status: DISCONTINUED | OUTPATIENT
Start: 2021-08-06 | End: 2021-08-06

## 2021-08-06 RX ORDER — MAGNESIUM HYDROXIDE/ALUMINUM HYDROXICE/SIMETHICONE 120; 1200; 1200 MG/30ML; MG/30ML; MG/30ML
30 SUSPENSION ORAL EVERY 6 HOURS PRN
Status: DISCONTINUED | OUTPATIENT
Start: 2021-08-06 | End: 2021-08-11 | Stop reason: HOSPADM

## 2021-08-06 RX ORDER — SUCRALFATE 1 G/1
1 TABLET ORAL 4 TIMES DAILY PRN
Status: DISCONTINUED | OUTPATIENT
Start: 2021-08-06 | End: 2021-08-06

## 2021-08-06 RX ORDER — SUCRALFATE ORAL 1 G/10ML
1 SUSPENSION ORAL 4 TIMES DAILY PRN
Status: DISCONTINUED | OUTPATIENT
Start: 2021-08-06 | End: 2021-08-06 | Stop reason: CLARIF

## 2021-08-06 RX ORDER — SUCRALFATE 1 G/1
1 TABLET ORAL 4 TIMES DAILY PRN
Status: DISCONTINUED | OUTPATIENT
Start: 2021-08-06 | End: 2021-08-11 | Stop reason: HOSPADM

## 2021-08-06 RX ADMIN — POLYETHYLENE GLYCOL 3350 17 G: 17 POWDER, FOR SOLUTION ORAL at 17:48

## 2021-08-06 RX ADMIN — DOCUSATE SODIUM AND SENNOSIDES 1 TABLET: 8.6; 5 TABLET, FILM COATED ORAL at 19:54

## 2021-08-06 RX ADMIN — ASPIRIN 81 MG: 81 TABLET, CHEWABLE ORAL at 08:03

## 2021-08-06 RX ADMIN — PANTOPRAZOLE SODIUM 40 MG: 40 TABLET, DELAYED RELEASE ORAL at 06:05

## 2021-08-06 RX ADMIN — ANTACID TABLETS 500 MG: 500 TABLET, CHEWABLE ORAL at 11:49

## 2021-08-06 RX ADMIN — DOCUSATE SODIUM AND SENNOSIDES 1 TABLET: 8.6; 5 TABLET, FILM COATED ORAL at 08:03

## 2021-08-06 RX ADMIN — OXYCODONE AND ACETAMINOPHEN 1 TABLET: 325; 10 TABLET ORAL at 05:49

## 2021-08-06 RX ADMIN — OXYCODONE AND ACETAMINOPHEN 1 TABLET: 325; 10 TABLET ORAL at 17:51

## 2021-08-06 RX ADMIN — OXYCODONE AND ACETAMINOPHEN 1 TABLET: 325; 10 TABLET ORAL at 01:26

## 2021-08-06 RX ADMIN — SUCRALFATE 1 G: 1 TABLET ORAL at 23:37

## 2021-08-06 RX ADMIN — BISACODYL 10 MG: 10 SUPPOSITORY RECTAL at 14:40

## 2021-08-06 RX ADMIN — ASPIRIN 81 MG: 81 TABLET, CHEWABLE ORAL at 17:48

## 2021-08-06 RX ADMIN — OXYCODONE AND ACETAMINOPHEN 1 TABLET: 325; 10 TABLET ORAL at 09:52

## 2021-08-06 RX ADMIN — OXYCODONE AND ACETAMINOPHEN 1 TABLET: 325; 10 TABLET ORAL at 13:54

## 2021-08-06 ASSESSMENT — PAIN DESCRIPTION - PAIN TYPE
TYPE: ACUTE PAIN;SURGICAL PAIN

## 2021-08-06 ASSESSMENT — PAIN DESCRIPTION - ORIENTATION
ORIENTATION: LEFT

## 2021-08-06 ASSESSMENT — PAIN SCALES - GENERAL
PAINLEVEL_OUTOF10: 4
PAINLEVEL_OUTOF10: 4
PAINLEVEL_OUTOF10: 7
PAINLEVEL_OUTOF10: 8
PAINLEVEL_OUTOF10: 0
PAINLEVEL_OUTOF10: 8
PAINLEVEL_OUTOF10: 0
PAINLEVEL_OUTOF10: 7
PAINLEVEL_OUTOF10: 8

## 2021-08-06 ASSESSMENT — PAIN - FUNCTIONAL ASSESSMENT
PAIN_FUNCTIONAL_ASSESSMENT: ACTIVITIES ARE NOT PREVENTED

## 2021-08-06 ASSESSMENT — PAIN DESCRIPTION - DESCRIPTORS
DESCRIPTORS: ACHING;DISCOMFORT
DESCRIPTORS: ACHING;THROBBING
DESCRIPTORS: ACHING;DISCOMFORT
DESCRIPTORS: ACHING;THROBBING

## 2021-08-06 ASSESSMENT — PAIN DESCRIPTION - PROGRESSION
CLINICAL_PROGRESSION: NOT CHANGED

## 2021-08-06 ASSESSMENT — PAIN DESCRIPTION - ONSET
ONSET: ON-GOING

## 2021-08-06 ASSESSMENT — PAIN DESCRIPTION - FREQUENCY
FREQUENCY: CONTINUOUS

## 2021-08-06 ASSESSMENT — PAIN DESCRIPTION - LOCATION
LOCATION: ANKLE

## 2021-08-06 NOTE — PLAN OF CARE
Problem: Falls - Risk of:  Goal: Will remain free from falls  Description: Will remain free from falls  Outcome: Ongoing  Pt free from falls this shift. Fall precautions in place at all times. Call light always within reach. Pt able and agreeable to contact for safety appropriately. Problem: Pain:  Goal: Patient's pain/discomfort is manageable  Description: Patient's pain/discomfort is manageable  Outcome: Ongoing   Pain/discomfort being managed with PRN analgesics per MD orders. Pt able to express presence and absence of pain and rate pain appropriately using numerical scale. Problem: Skin Integrity:  Goal: Skin integrity will stabilize  Description: Skin integrity will stabilize  Outcome: Ongoing  Skin integrity being maintained at baseline at this time.

## 2021-08-06 NOTE — PATIENT CARE CONFERENCE
[facility]  Inpatient Rehabilitation  Weekly Team Conference Note    Date: 2021  Patient Name: Curtis Prescott        MRN: [id number]    : 1950  (75 y.o.)  Gender: female   Referring Practitioner: Wilfredo Canavan, MD  Diagnosis: LEFT ANKLE OPEN REDUCTION INTERNAL FIXATION trimalleolar fracture with syndesmosis repair    CASE MANAGEMENT  Assessment:   Goal is home with family support and home care      PHYSICAL THERAPY    Bed mobility  Rolling to Left: Unable to assess (deferred due to post-op pain)  Rolling to Right: Stand by assistance  Supine to Sit: Stand by assistance  Sit to Supine: Stand by assistance  Scooting: Stand by assistance  Comment: flat bed in ADL apt, no rails    Transfers:  Sit to Stand: Stand by assistance  Stand to sit: Stand by assistance  Bed to Chair: Contact guard assistance (with RW)  Comment: Pt did not require cues for hand placement    WB Status: NWB to LLE  Ambulation 1  Surface: level tile  Device: Rolling Walker  Other Apparatus: Wheelchair follow  Assistance: Stand by assistance, Contact guard assistance  Quality of Gait: hop-to gait pattern with early fatigue; able to maintain NWB LLE without assist or cues  Gait Deviations: Slow Carmen  Distance: 10'x2, 12' x2, 15' x2  Comments: from wc <> curb, wc <> commode, wc <>car    Propulsion: Manual  Level: Level Tile (50' on uneven therapy terrace)  Method: BRANDO STEWART  Level of Assistance: Stand by assistance  Description/ Details: pt able to propel safely including turns and backing up without cueing, intermittent cues for wc parts management  Distance: 200' x2, 40', 48' with two turns      Stairs  Curbs: 4\" (completed 1 trial)  Device: Rolling walker  Assistance: Contact guard assistance, Stand by assistance  Comment: pt ascended backwards and descended forwards 4\" curb step with SBA-CGA for balance; without cues for technique    Car Transfer: Stand by assistance (cues for technique and hand placement; pt sat first non-skid surface. Pt bagged own leg w/ min A + OT checking to ensure it was sealed (pt did not want to double bag it d/t pain)    Bed mobility  Rolling to Left: Unable to assess (deferred due to post-op pain)  Rolling to Right: Stand by assistance  Supine to Sit: Stand by assistance  Sit to Supine: Stand by assistance  Scooting: Stand by assistance  Comment: flat bed in ADL apt, no rails    Functional Transfers: Toilet Transfers  Toilet - Technique: Ambulating (RW)  Equipment Used: Raised toilet seat with rails (BSC placed over commode)  Toilet Transfer: Stand by assistance  Toilet Transfers Comments: Practiced transfer from bed <> BSC w/ CGA both ways - pt reports she needs a BSC for nighttime use as her basement apt has uneven rui and she is concerned w/ ambulating to/from BR at night     Shower Transfers  Shower - Transfer From: Aye Mendoza - Transfer Type: To and From  Shower - Transfer To: Shower seat without back  Shower - Technique: Ambulating, Stand pivot  Shower Transfers: Stand by assistance  Shower Transfers Comments: Ambulated w/ RW to enter shower; SPT to w/c to exit shower      UE Function:  WFL    Assessment: Pt tolerated tx session well and continues to make good progress toward goals. Pt completed fxl transfers and mobility w/ RW and w/c SBA. Pt completed shower-level bathing w/ SBA - completed all seated on shower chair. She was able to bag her left LE to keep dry w/ just min A + vc's. Pt completed UB dressing w/ setup, seated grooming w/ supervision, LB dressing w/ SBA using AE prn. Pt demo'd good safety, good problem solving throughout for best ways to complete ADL. Pt continues to be limited by left LE pain and needed it propped up during most of the session. Anticipate d/c by middle-end of next week. Pt will be home alone most of the day and would be most independent from w/c level - especially for IADLs.  Cont per POC      NUTRITION  Weight: 172 lb 6.4 oz (78.2 kg) / Body mass index is 31.53 kg/m². Please see nutrition note for details. NURSING  Continent of bladder and bowel. Monitor and maintain skin integrity, splint and ace to left ankle. Family Education: Patient education: non weight bearing left ankle, skin care and prevention, monitor of ace and splint, medications, education to prevent constipation, safety and fall prevention, pain control. MEDICAL   Active management of constipation and heartburn/indigestion      TEAM SUMMARY AND DISCHARGE PLAN  Estimated Length of Stay: [place] 8/11/21  Destination: home with home care and family support as needed (lives with daughter and OLLIE)  · Anticipated Services at Discharge:               [x]? OT    [x]? PT    []? SLP    [x]? RN   []? Home Health aide []? [facility] Resources: _______________________________  Equipment recommendations:  []? Hospital bed []? Tub bench  []? Shower chair []? Hand held shower  []? Raised toilet seat []? Toilet safety frame [x]? Bedside commode   [x]? W/C: _____  [x]? Rolling Walker []? Standard walker []? Gait belt []? cane: _________  []? Sliding board []? Alternate seating/furniture []? O2 []? Hip Kit: _______  []? Life Line []? Other: reacher, sock aid ______  Factors facilitating achievement of predicted outcomes: Family support, Caregiver support, Cooperative and Good insight into deficits   Barriers to the achievement of predicted outcomes/Interventions:   NWB LLE- strengthening, balance and coordination training,  Adaptive equipment, compensatory strategies for safe self care and mobility          Interdisciplinary Individualized Plan of Care Review:    · Continue Current Plan of Care: Yes and no    · Modifications:_begin transition home with home care _____     Special Needs in the Upcoming Week :    []? Family/Caregiver Education  []? Home visit  []? Therapeutic Pass   []? Consults:_______    []? Other;_______     Patient Rehab Team Goals for the Upcoming Week:  1.  Independent with transfers and household mobility with DME as needed   2. Self care independent with DME as needed   2. Safe transition home with home care 8/11/21          Team Members Present at Conference:  Physician:  Dr Jimmy Martinez  : Francisco Elizabeth    Occupational Therapist: Lester Carranza OTR/L  Physical Therapist: Curry Frank, DPT [id number]  Speech Therapist:   Nurse: Aj Colindres RN, CRRN   Rosie George      I led this team conference and I approve the established interdisciplinary plan of care as documented within the medical record of Claudia Muse. MD: Monie Montes.  Jimmy Martinez MD 8/9/2021, 10:38 AM

## 2021-08-06 NOTE — PROGRESS NOTES
Occupational Therapy  Facility/Department: [address] REHAB  Daily Treatment Note  NAME: Tej Jacob  : 1950  MRN: [id number]    Date of Service: 2021    Discharge Recommendations:  Home with assist PRN, Patient would benefit from continued therapy after discharge, Home with Home health OT  OT Equipment Recommendations  Equipment Needed: Yes  Mobility Devices: ADL Assistive Devices  ADL Assistive Devices: Shower Chair without back;Long-handled Sponge;Elastic Shoe Laces; Reacher  Other: has hand held shower head that she was about to return that she can use    Assessment   Performance deficits / Impairments: Decreased functional mobility ; Decreased safe awareness;Decreased balance;Decreased ADL status; Decreased high-level IADLs;Decreased endurance  Assessment: Pt tolerated tx session well and continues to make good progress toward goals. Pt completed fxl transfers and mobility w/ RW and w/c SBA. Pt completed shower-level bathing w/ SBA - completed all seated on shower chair. She was able to bag her left LE to keep dry w/ just min A + vc's. Pt completed UB dressing w/ setup, seated grooming w/ supervision, LB dressing w/ SBA using AE prn. Pt demo'd good safety, good problem solving throughout for best ways to complete ADL. Pt continues to be limited by left LE pain and needed it propped up during most of the session. Cont per POC  Prognosis: Good  OT Education: OT Role;Transfer Training;Plan of Care;Precautions; Equipment;ADL Adaptive Strategies  REQUIRES OT FOLLOW UP: Yes  Activity Tolerance  Activity Tolerance: Patient Tolerated treatment well  Safety Devices  Safety Devices in place: Yes  Type of devices: Gait belt;Call light within reach; Bed alarm in place; Left in bed         Patient Diagnosis(es): There were no encounter diagnoses. has a past medical history of Arthritis and Premature atrial contraction.    has a past surgical history that includes Total hip arthroplasty and Ankle fracture surgery (Left, 8/2/2021). Restrictions  Restrictions/Precautions  Restrictions/Precautions: Fall Risk, Weight Bearing  Lower Extremity Weight Bearing Restrictions  Right Lower Extremity Weight Bearing: Non Weight Bearing  Left Lower Extremity Weight Bearing: Non Weight Bearing  Subjective   General  Chart Reviewed: Yes  Patient assessed for rehabilitation services?: Yes  Additional Pertinent Hx: Per Dr. Black Jones is a 71 yo F with pmh PACs, OA s/p left hip arthroplasty, vertigo, covid-19 infection (May 2020), recent diverticulitis who initially presented 8/1/2021 with left ankle pain and deformity following a fall at home. She reports standing on chair to reach something when she lost her balance, chair tipped, and she fell onto the ground. Found to have left ankle trimalleolar fracture with syndesmosis disruption. She underwent ORIF (8/2 with Dr. Tien Quintero). Now NWB LLE. Post-op course notable for difficulty to control pain and mild hypertension. Currently, patient reports severe pain in the left ankle. She has some numbness tingling in the calf and lateral left foot. Pain is worse with movement. Improves with rest and medication. She reports chronic fatigue and intermittent cough from covid-19 infection last year. Also reports intermittent vertigo and balance impairment which she thinks could have contributed to this fall. She would like to come to ARU to improve her function prior to returning home. \"  Family / Caregiver Present: No  Referring Practitioner: Dr. Aurea Gonzalez  Diagnosis: L ankle fx  Subjective  Subjective: Pt met b/s for OT. Pt in bed, agreeable to therapy/ADL. C/o pain in left LE but did not rate - very tender to touch      Orientation     Objective    ADL  Grooming: Setup (Pt performed oral care during shower, completed hair care seated in w/c at sink)  UE Bathing: Supervision (seated on shower chair)  LE Bathing: Stand by assistance (Pt leaned to both sides to bathe buttocks.  Pt was able to bag own leg w/ just min A - pt very particular and sensitive to any touch to left LE)  UE Dressing: Setup  LE Dressing: Stand by assistance (Pt used reacher to push briefs/pants off left LE; donned briefs, pants, socks w/o AE)  Toileting: Stand by assistance (vioded urine + small BM, SBA for stance only)  Additional Comments: Shower seated on shower chair w/ towel on floor for non-skid surface. Pt bagged own leg w/ min A + OT checking to ensure it was sealed (pt did not want to double bag it d/t pain)        Balance  Sitting Balance: Supervision  Standing Balance: Stand by assistance (RW)  Functional Mobility  Functional - Mobility Device: Rolling Walker  Activity: To/from bathroom  Assist Level: Stand by assistance  Functional Mobility Comments: Pt completed fxl mobility from recliner > BR > bed w/ SBA using RW; also propelled w/c in BR short distances following shower  Toilet Transfers  Toilet - Technique: Ambulating (RW)  Equipment Used: Raised toilet seat with rails (BSC placed over commode)  Toilet Transfer: Stand by assistance  Shower Transfers  Shower - Transfer From: Walker  Shower - Transfer Type: To and From  Shower - Transfer To: Shower seat without back  Shower - Technique: Ambulating;Stand pivot  Shower Transfers: Stand by assistance  Shower Transfers Comments: Ambulated w/ RW to enter shower; SPT to w/c to exit shower  Wheelchair Bed Transfers  Wheelchair/Bed - Technique: Ambulating (RW)  Equipment Used: Other;Bed (recliner)  Level of Asssistance: Stand by assistance     Transfers  Sit to stand: Stand by assistance  Stand to sit: Stand by assistance  Transfer Comments: RW                       Cognition  Overall Cognitive Status: WNL  Cognition Comment: Good organization/problem solving during ADL           PM:  S: Pt met b/s for OT. Pt in bed, resting after PT d/t increased discomfort from heartburn. Pt agreeable, c/o pain 4/10 in LLE.  Requesting to do laundry    O:  Fxl mobility: Pt propelled w/c from room <> laundry area SBA. Pt able to flip w/c leg rests into place per self. Pt ambulated <> BR w/ SBA using RW. Toilet transfer w/ SBA to [facility] placed over commode. Sit<>stand to w/c SBA throughout session. IADL: Pt loaded front loading washer from w/c level w/ SBA; OT assisted to pour detergent into machine. Pt reports she plans to put RW in her laundry room prior to doing laundry at home so she will be able to stand PRN. Pt started washing machine per self w/o cues. ADL: Pt completed toilet (voided urine) w/ SBA for stance only, completed pericare seated. Pt stood at sink w/ SBA using RW to wash hands. Therex: Pt completed UE HEP using medium resistance theraband. OT issued handout for pt to complete in the room and at d/c. Pt completed x10 reps each and tolerated well. Returned to room in w/c. Transferred from w/c > bed w/ SBA. Left positioned for comfort in bed w/ alarm engaged and needs in reach. A: Pt tolerated tx well. She is making good progress w/ therapy but is below PLOF and will benefit from continued OT on ARU until at least the end of next week. Plan   Plan  Times per week: 5-6  Times per day: Twice a day  Current Treatment Recommendations: Strengthening, Endurance Training, Balance Training, Functional Mobility Training, Self-Care / ADL, Equipment Evaluation, Education, & procurement, Patient/Caregiver Education & Training, Safety Education & Training, Home Management Training       Goals  Short term goals  Time Frame for Short term goals: 1 week  Short term goal 1: Pt will be mod I with functional transfers  Short term goal 2: Pt will be mod I with functional mobility  Short term goal 3: Pt will be mod I with toileting  Short term goal 4: Pt will be mod I with bathing and dressing  Short term goal 5: Pt will be mod I with bed mobility  Long term goals  Time Frame for Long term goals : STG = LTG  Patient Goals   Patient goals :  To be able to \"do the step ups comfortably and go to the bathroom\" independently       Therapy Time   Individual Concurrent Group Co-treatment   Time In 0840         Time Out 0940         Minutes 60            Therapy Time   Individual Co-treatment   Time In [id number]     Time Out 1430     Minutes [address], [place] [address]

## 2021-08-06 NOTE — PROGRESS NOTES
Department of Physical Medicine & Rehabilitation  Progress Note    Patient Identification:  Aleks Fonseca  [id number]  : 1950  Admit date: 8/3/2021    Chief Complaint: Left trimalleolar fracture, closed, initial encounter    Subjective:   No acute events overnight. Repeat ankle xray pending. Patient seen this afternoon sitting up in room. Having persistent issue with heartburn and indigestions. Still no BM. Plan for supp this afternoon following therapies. Additional prn medication added for indigestion and ppu was started this morning. Labs reviewed. ROS: No f/c, n/v, cp     Objective:  Patient Vitals for the past 24 hrs:   BP Temp Temp src Pulse Resp SpO2 Weight   21 0930     18 94 %    21 0800 119/78 95 °F (35 °C) Oral 82 18 97 %    21 0341 139/82 98 °F (36.7 °C) Oral 81 18 92 % 172 lb 6.4 oz (78.2 kg)   21 1534 (!) 144/83 97.9 °F (36.6 °C) Oral 74 18 94 %      Const: Alert. No distress, pleasant. HEENT: Normocephalic, atraumatic. Normal sclera/conjunctiva. MMM. CV: Regular rate and rhythm. Resp: No respiratory distress. Lungs CTAB. Abd: Soft, nontender, nondistended, NABS+   Ext: LLE in splint/ace wrap, swelling of distal foot/digits noted. NVI. Neuro: Alert, oriented, appropriately interactive. Psych: Cooperative, appropriate mood and affect    Laboratory data: Available via EMR. Last 24 hour lab  No results found for this or any previous visit (from the past 24 hour(s)).     Therapy progress:  PT     Objective     Sit to Stand: Stand by assistance  Stand to sit: Stand by assistance  Bed to Chair: Contact guard assistance (with RW)  Device: Rolling Walker  Other Apparatus: Wheelchair follow  Assistance: Stand by assistance, Contact guard assistance  Distance: 10'x2, 15' x2, 15' x2  OT  PT Equipment Recommendations  Equipment Needed: Yes  Mobility Devices: Wheelchair  Walker: Rolling  Wheelchair: Standard (18\", elevating leg rests, antitippers, standard cushion)  Other: pt may also need w/c (will continue to assess)  Toilet - Technique: Ambulating (RW)  Equipment Used: Raised toilet seat with rails (BSC placed over commode)  Toilet Transfers Comments: Practiced transfer from bed <> BSC w/ CGA both ways - pt reports she needs a BSC for nighttime use as her basement apt has uneven rui and she is concerned w/ ambulating to/from BR at night  Assessment        SLP          Body mass index is 31.53 kg/m². Rehabilitation Diagnosis:   Orthopedic, 8.9, Other Orthopedic     Assessment and Plan:     Impairments: NWB LLE, decreased balance, endurance     Left trimalleolar ankle fracture with syndesmosis disruption   -s/p ORIF (8/2 with Dr. Ilda Anguiano). -NWB LLE.    -PT/OT     Acute blood loss anemia   -Post-surgical.   -Monitor Hgb, transfuse prn <7.      Leukocytosis  -Likely reactive, now resolved  -Monitor for signs/symptoms of infection (had recent diverticulitis)  -Complete ppx cefazolin per Ortho     HTN  -Possibly pain related  -Monitor off meds for now     H/o PACs  -Noted, monitor response to therapies     H/o covid-19 infection (5/2020)  -Residual fatigue, intermittent cough  -Monitor pulmonary response to therapies.      Intermittent vertigo   -May have contributed to fall, monitor for symptoms, none currently     OA s/p prior left hip arthroplasty  -Noted     Rash  -suspect poison ivy  -calamine lotion, patient allergic to steroids    Heartburn/indigestion  -ppi, prn tums and maalox     Bladder   -High risk retention   -Monitor PVRs, SC prn >300cc     Bowel   -High risk constipation, note recent diverticulitis  -senna+colace BID, PRN miralax, MoM, and bisacodyl supp. --Plan for supp this afternoon, enema if still no BM     Safety   -fall precautions     Pain control  -prn percocet, add Bengay for neck/shoulder pain     DVT ppx  -ASA 81 BID per Ortho    Rehab Progress: Making progress. Working on functional mobility, compensatory strategies. Anticipated Dispo: home with daughter and son-in-law  Services/DME: TBD  ELOS: 7-10 days      [address]  Russel Bucio MD 8/6/2021, 12:23 PM

## 2021-08-06 NOTE — PROGRESS NOTES
Patient c/o \"burning in chest\", Tums administered as ordered, patient was unable to eat lunch d/t irritation. Last BM this am, MD aware, will see patient during rounds.

## 2021-08-06 NOTE — PLAN OF CARE
Problem: Falls - Risk of:  Goal: Will remain free from falls  Description: Will remain free from falls  Note: Fall risk band on patient. Yellow light on outside of room. Non skid footwear in place. Alarms used appropriately. Patient instructed to call and wait for staff before getting up. Rounding done to anticipate needs. Appropriate safety devices used for transfers. Problem: Skin Integrity:  Goal: Skin integrity will stabilize  Description: Skin integrity will stabilize  Note: Skin assessment completed on admission and every shift. Barrier wipes used in the event of incontinence. Pressure relief techniques used as needed while in chair and in bed. Position changes encouraged at least every two hours while in bed.

## 2021-08-06 NOTE — PROGRESS NOTES
Physical Therapy  Facility/Department: [address] IP REHAB  Daily Treatment Note  NAME: Lea Anguiano  : 1950  MRN: [id number]    Date of Service: 2021    Discharge Recommendations:  Home with assist PRN, Home with Home health PT, Patient would benefit from continued therapy after discharge, S Level 1   PT Equipment Recommendations  Equipment Needed: Yes  Jodeen Risen: Rolling  Wheelchair: Standard (18\", elevating leg rests, antitippers, standard cushion)    Assessment   Body structures, Functions, Activity limitations: Decreased functional mobility ; Decreased ADL status; Decreased endurance;Decreased balance; Increased pain  Assessment: Pt is a 70 y.o. female who presented to [facility] on 21 for LEFT ANKLE OPEN REDUCTION INTERNAL FIXATION trimalleolar fracture with syndesmosis repair per Dr. William Pickett. Pt is now NWB to her LLE. Prior to admission, pt living in the basement studio apt  of her [facility] and OLLIE's home (just moved from [place] [address] with [facility]). There is a steep walkway to enter [facility] with 1 MARIPOSA or flight of steps which does not have railing on half of it. PTA, pt was indep ambulating without an AD. Today, ,  pt able to hop increased distances several times throughout the session using RW and SBA-CGA. She completed transfers with SBA-CGA, including at the car. She was able to hop up/down 4\" curb step using RW with SBA-CGA and minimal cueing. The pt shows improvement with wc parts management and set up for transfers. She completes wc navigation in tight spaces, over thresholds and for household and community distances. She is far below her functional baseline and will benefit from 1 week on ARU to improve safety and independence with functional mobility prior to returning home with assist PRN from family. Treatment Diagnosis: impaired mobility  Prognosis: Good  PT Education: PT Role;Plan of Care;General Safety;Transfer Training;Goals;Weight-bearing Education; Functional Mobility Training;Gait Training;Disease Specific Education  Patient Education: WC parts management, completing activities within the limits of pain  Activity Tolerance  Activity Tolerance: Patient Tolerated treatment well;Patient limited by pain     Patient Diagnosis(es): There were no encounter diagnoses. has a past medical history of Arthritis and Premature atrial contraction. has a past surgical history that includes Total hip arthroplasty and Ankle fracture surgery (Left, 8/2/2021). Restrictions  Restrictions/Precautions  Restrictions/Precautions: Fall Risk, Weight Bearing  Lower Extremity Weight Bearing Restrictions  Right Lower Extremity Weight Bearing: Non Weight Bearing  Left Lower Extremity Weight Bearing: Non Weight Bearing     Subjective   General  Chart Reviewed: Yes  Additional Pertinent Hx: Per Song Dixon MD \"Patient is a 69 yo F with [facility] PACs, OA s/p left hip arthroplasty, vertigo, covid-19 infection (May 2020), recent diverticulitis who initially presented 8/1/2021 with left ankle pain and deformity following a fall at home. She reports standing on chair to reach something when she lost her balance, chair tipped, and she fell onto the ground. Found to have left ankle trimalleolar fracture with syndesmosis disruption. She underwent ORIF (8/2 with Dr. Gildardo Fermin). Now NWB LLE. Post-op course notable for difficulty to control pain and mild hypertension. Currently, patient reports severe pain in the left ankle. She has some numbness tingling in the calf and lateral left foot. Pain is worse with movement. Improves with rest and medication. She reports chronic fatigue and intermittent cough from covid-19 infection last year. Also reports intermittent vertigo and balance impairment which she thinks could have contributed to this fall. \"  Response To Previous Treatment: Patient with no complaints from previous session. Family / Caregiver Present: No  Subjective  Subjective: Pt reports she did not sleep very well last night.  Reports 4/10 L foot/leg pain at this time. General Comment  Comments: Pt supine in bed upon arrival         Objective   Transfers  Sit to Stand: Stand by assistance  Stand to sit: Stand by assistance  Car Transfer: Stand by assistance (cues for technique and hand placement; pt sat first then brought LE into car; pt shows good understanding of NWB throughout)  Comment: Pt did not require cues for hand placement  Ambulation  Ambulation?: Yes  WB Status: NWB to LLE  More Ambulation?: No  Ambulation 1  Surface: level tile  Device: Rolling Walker  Assistance: Stand by assistance;Contact guard assistance  Quality of Gait: hop-to gait pattern with early fatigue; able to maintain NWB LLE without assist or cues  Gait Deviations: Slow Carmen  Distance: 10'x2, 15' x2, 15' x2  Comments: from wc <> curb, wc <> commode, wc <>car  Stairs/Curb  Stairs?: Yes  Stairs  Curbs: 4\" (completed 1 trial)  Device: Rolling walker  Assistance: Contact guard assistance;Stand by assistance  Comment: pt ascended backwards and descended forwards 4\" curb step with SBA-CGA for balance; without cues for technique  Wheelchair Activities  Wheelchair Size: 18\"  Wheelchair Type: Standard  Wheelchair Cushion:  (waffle cushion)  Pressure Relief Type: Self Adjusts  Wheelchair Parts Management: Yes  Left Leg Rest Level of Assistance: Stand by assistance  Right Leg Rest Level of Assistance: Stand by assistance  Left Brakes Level of Assistance: Stand by assistance  Right Brakes Level of Assistance: Stand by Assist  Propulsion: Yes  Propulsion 1  Propulsion: Manual  Level: Level Tile (50' on uneven therapy terrace)  Method: RUE;LUE  Level of Assistance: Stand by assistance  Description/ Details: pt able to propel safely including turns and backing up without cueing, intermittent cues for wc parts management  Distance: 200' x2, 40', 48' with two turns        Comment: Pty requested to use commode during the session.  SBA for amb to commode, SBA for pants management and wiping, SBA for RW<>commode              PM session:   S: pt laying in bed upon arrival. Pt reports \"i'm not well\" and that her stomach and chest are burning. States her L foot is not bothering her at this time due to distraction. Pt with increased burping throughout session. Pt reports 4-5/10 L foot/ankle pain. O:  Vitals taken in supine with HOB elevated : /100, pulse 73, SpO2 98%. RNPraveen notified of BP. Supine to sit on flat bed without Hrs SBA  Sit to stand SBA  amb 20' with RW and SBA  Sit<>stand from commode SBA  Pants management and wiping supervision  amb 6' to sink SBA  SBA to wash hands ~30 sec  With RW with UE support on sink   Sit to supine on flat bed   Rolling R and L SBA  Pt continued to report chest burning with increase burping. /99 pulse 77. RNPraveen, notified and she notified Dr. Arya Terry. Supine with HOB elevated ther ex: R marches x15, R heel slides with maxi slide x15, R hip abduction with maxi slide x15    A: The pt was limited in this session by increased heart burn and GI discomfort. She reports she has not had a decent BM in 4 days. She reported that being flat and standing made her feel worse. Continues to show improvements with transfers, bed mobility and ambulation/hopping.    Safety Device - Type of devices:  [x]  All fall risk precautions in place [x] Bed alarm in place  [x] Call light within reach [] Chair alarm in place [] Positioning belt [x] Gait belt [x] Patient at risk for falls [x] Left in bed [] Left in chair [] Telesitter in use [] Sitter present [x] Nurse notified []  None     Goals  Short term goals  Time Frame for Short term goals: 1 week from 8/4 or upon d/c  Short term goal 1: bed mobility mod I  Short term goal 2: transfers mod I  Short term goal 3: ambulate 48' with RW mod I maintaining NWB LLE  Short term goal 4: propel w/c 150' mod I  Short term goal 5: ascend/descend curb step with RW and SBA  Long term goals  Time Frame for Long term goals : LTG=STG  Patient Goals   Patient goals : \"to be able to get to and from the bathroom on my own and be independent\"    Plan    Plan  Times per week: 5-6x/wk  Times per day: Twice a day  Plan weeks: 1  Current Treatment Recommendations: Strengthening, Transfer Training, Balance Training, Functional Mobility Training, Gait Training, Neuromuscular Re-education, Safety Education & Training, Patient/Caregiver Education & Training, Stair training, Pain Management, Home Exercise Program, ROM, Wheelchair Mobility Training, Equipment Evaluation, Education, & procurement  Safety Devices  Type of devices:  All fall risk precautions in place, Call light within reach, Gait belt, Patient at risk for falls, Bed alarm in place, Left in bed, Nurse notified  Restraints  Initially in place: No     Therapy Time   Individual Concurrent Group Co-treatment   Time In 1030         Time Out 1120         Minutes 50             [facility]   Time In 1300     Time Out 1340     Minutes 40           Electronically signed by Marrian Shone, PT [id number] on 8/6/2021 at 11:37 AM

## 2021-08-06 NOTE — CARE COORDINATION
SOCIAL WORK ASSESSMENT      GOAL:     To return home with daughter and son in [address] [facility]:  Patient recently moved ( one month ago from [place]) to [facility] to [facility] with daughter who is in law school and her spouse who is a  for high school. They live in a house and she lives in basement apartment. She will pull her car around the back of the house and will walk to the house with one step plus one step to enter. There is no incline. She does have one step into the bathroom. Personal Goals: To return home. PRIOR LEVEL OF FUNCTIONING:               PERSONAL CARE:    Totally independent             Drives:   yes            Finances: Totally independent            Meals:   Likes to microwave meals and eat salads. She has a kitchenette in her apt. Grocery Shopping: They order via computer for [address] and family . DME CURRENTLY AT HOME:   Crutches, toilet riser, 3 in 1 commode. CURRENT HOME CARE/SERVICES:  None. Informed her of possible post acute services such as home care or outpatient services. Provided her with CMS star rated list of agencies and gave education regarding star rating. She prefers to use [facility]      PREFERRED HOME CARE:   [facility]. TEAM CONFERENCE DAY:  Mondays. Informd her of weekly Team Confrences where Team will review her progress. Goals, DME recs and DC date. This worker will return to give this update and plan for DC needs. [facility]     [place], [place]     Case Management   [phone]    8/6/2021  4:41 PM

## 2021-08-07 PROCEDURE — 6370000000 HC RX 637 (ALT 250 FOR IP): Performed by: PHYSICAL MEDICINE & REHABILITATION

## 2021-08-07 PROCEDURE — 97110 THERAPEUTIC EXERCISES: CPT

## 2021-08-07 PROCEDURE — 94761 N-INVAS EAR/PLS OXIMETRY MLT: CPT

## 2021-08-07 PROCEDURE — 1280000000 HC REHAB R&B

## 2021-08-07 PROCEDURE — 97116 GAIT TRAINING THERAPY: CPT

## 2021-08-07 PROCEDURE — 97535 SELF CARE MNGMENT TRAINING: CPT

## 2021-08-07 PROCEDURE — 97530 THERAPEUTIC ACTIVITIES: CPT

## 2021-08-07 RX ADMIN — OXYCODONE AND ACETAMINOPHEN 1 TABLET: 325; 10 TABLET ORAL at 18:25

## 2021-08-07 RX ADMIN — OXYCODONE AND ACETAMINOPHEN 1 TABLET: 325; 10 TABLET ORAL at 22:37

## 2021-08-07 RX ADMIN — OXYCODONE AND ACETAMINOPHEN 1 TABLET: 325; 10 TABLET ORAL at 14:11

## 2021-08-07 RX ADMIN — Medication 6 MG: at 00:10

## 2021-08-07 RX ADMIN — Medication 6 MG: at 22:37

## 2021-08-07 RX ADMIN — POLYETHYLENE GLYCOL 3350 17 G: 17 POWDER, FOR SOLUTION ORAL at 14:20

## 2021-08-07 RX ADMIN — ONDANSETRON HYDROCHLORIDE 4 MG: 4 TABLET, FILM COATED ORAL at 11:20

## 2021-08-07 RX ADMIN — ASPIRIN 81 MG: 81 TABLET, CHEWABLE ORAL at 10:00

## 2021-08-07 RX ADMIN — DOCUSATE SODIUM AND SENNOSIDES 1 TABLET: 8.6; 5 TABLET, FILM COATED ORAL at 10:00

## 2021-08-07 RX ADMIN — MAGNESIUM HYDROXIDE 30 ML: 400 SUSPENSION ORAL at 08:59

## 2021-08-07 RX ADMIN — PANTOPRAZOLE SODIUM 40 MG: 40 TABLET, DELAYED RELEASE ORAL at 05:16

## 2021-08-07 RX ADMIN — DOCUSATE SODIUM AND SENNOSIDES 1 TABLET: 8.6; 5 TABLET, FILM COATED ORAL at 21:15

## 2021-08-07 RX ADMIN — SUCRALFATE 1 G: 1 TABLET ORAL at 08:59

## 2021-08-07 RX ADMIN — OXYCODONE AND ACETAMINOPHEN 1 TABLET: 325; 10 TABLET ORAL at 08:59

## 2021-08-07 RX ADMIN — ASPIRIN 81 MG: 81 TABLET, CHEWABLE ORAL at 18:20

## 2021-08-07 RX ADMIN — OXYCODONE AND ACETAMINOPHEN 1 TABLET: 325; 10 TABLET ORAL at 00:10

## 2021-08-07 RX ADMIN — SUCRALFATE 1 G: 1 TABLET ORAL at 18:29

## 2021-08-07 ASSESSMENT — PAIN DESCRIPTION - PAIN TYPE
TYPE: SURGICAL PAIN
TYPE: ACUTE PAIN
TYPE: ACUTE PAIN

## 2021-08-07 ASSESSMENT — PAIN SCALES - GENERAL
PAINLEVEL_OUTOF10: 8
PAINLEVEL_OUTOF10: 8
PAINLEVEL_OUTOF10: 4
PAINLEVEL_OUTOF10: 4
PAINLEVEL_OUTOF10: 9
PAINLEVEL_OUTOF10: 7
PAINLEVEL_OUTOF10: 4
PAINLEVEL_OUTOF10: 7
PAINLEVEL_OUTOF10: 4

## 2021-08-07 ASSESSMENT — PAIN DESCRIPTION - ONSET
ONSET: ON-GOING

## 2021-08-07 ASSESSMENT — PAIN DESCRIPTION - LOCATION
LOCATION: ANKLE

## 2021-08-07 ASSESSMENT — PAIN DESCRIPTION - DIRECTION
RADIATING_TOWARDS: KNEE

## 2021-08-07 ASSESSMENT — PAIN DESCRIPTION - PROGRESSION
CLINICAL_PROGRESSION: GRADUALLY WORSENING
CLINICAL_PROGRESSION: GRADUALLY WORSENING
CLINICAL_PROGRESSION: GRADUALLY IMPROVING
CLINICAL_PROGRESSION: NOT CHANGED
CLINICAL_PROGRESSION: GRADUALLY IMPROVING
CLINICAL_PROGRESSION: GRADUALLY WORSENING
CLINICAL_PROGRESSION: NOT CHANGED

## 2021-08-07 ASSESSMENT — PAIN DESCRIPTION - DESCRIPTORS
DESCRIPTORS: ACHING;DISCOMFORT
DESCRIPTORS: ACHING;DISCOMFORT;THROBBING
DESCRIPTORS: ACHING;DISCOMFORT

## 2021-08-07 ASSESSMENT — PAIN DESCRIPTION - FREQUENCY
FREQUENCY: CONTINUOUS

## 2021-08-07 ASSESSMENT — PAIN DESCRIPTION - ORIENTATION
ORIENTATION: LEFT

## 2021-08-07 ASSESSMENT — PAIN - FUNCTIONAL ASSESSMENT

## 2021-08-07 NOTE — PLAN OF CARE
Problem: Falls - Risk of:  Goal: Will remain free from falls  Description: Will remain free from falls  8/7/2021 1036 by June Albert RN  Outcome: Ongoing  Note: Fall risk assessment completed. Fall precautions in place. Call light within reach. Pt educated on calling for assistance before getting up. Walkway free of clutter. Will continue to monitor. Problem: Daily Care:  Goal: Daily care needs are met  Description: Daily care needs are met  8/7/2021 1036 by June Albert RN  Outcome: Ongoing  Note: Pt is A&O x4 and calls appropriately. Call light within reach at all times. RN/PCA doing hourly rounds. Needs are being met this shift. Will continue to monitor. Problem: Pain:  Goal: Patient's pain/discomfort is manageable  Description: Patient's pain/discomfort is manageable  8/7/2021 1036 by June Albert RN  Outcome: Ongoing  Note: Pt assessed for pain. Pt in pain and assessed with 0-10 pain rating scale. Pt given prescribed analgesic for pain. (See eMar) Pt satisfied with pain relief thus far. Will reassess and continue to monitor. The use of non-pharmacological measures are used appropriately, such as rest, repositioning, and relaxation techniques. Problem: Skin Integrity:  Goal: Skin integrity will stabilize  Description: Skin integrity will stabilize  8/7/2021 1036 by June Albert RN  Outcome: Ongoing  Note: Patient's skin was assessed. Pt has surgical incision on LLE and scattered bruising. No new findings were noted. Patient is being educated on importance of turning/repostioning at least every two hours. RN will continue to educate and monitor.

## 2021-08-07 NOTE — PROGRESS NOTES
Physical Therapy  Facility/Department: [address] IP REHAB  Daily Treatment Note  NAME: Cesar Maguire  : 1950  MRN: [id number]    Date of Service: 2021    Discharge Recommendations:  Home with assist PRN, Home with Home health PT, Patient would benefit from continued therapy after discharge, S Level 1   PT Equipment Recommendations  Equipment Needed: Yes  Nelly Rota: Rolling  Wheelchair: Standard (18\", elevating leg rests, antitippers, standard cushion)    Assessment   Body structures, Functions, Activity limitations: Decreased functional mobility ; Decreased ADL status; Decreased endurance;Decreased balance; Increased pain  Assessment: Pt is a 70 y.o. female who presented to [facility] on 21 for LEFT ANKLE OPEN REDUCTION INTERNAL FIXATION trimalleolar fracture with syndesmosis repair per Dr. Sophia Obrien. Pt is now NWB to her LLE. Prior to admission, pt living in the basement studio apt  of her r and OLLIE's home (just moved from [place] [address] with [facility]). There is a steep walkway to enter [facility] with 1 MARIPOSA or flight of steps which does not have railing on half of it. PTA, pt was indep ambulating without an AD. Today, ,  pt with continued GI discomfort and heart burn. She is consistently able to hop ~10' with RW SBA, but is limited in standing and ambulating tolerance due to R LE pain and fatigue. She will require a wc at home for longer distance travel due to weightbearing status and L ankle pain. She shows good safety awareness for shorter distance ambulation and navigation with the RW. She completed transfers with SBA. The pt shows improvement with wc parts management and set up for transfers. She completes wc navigation in tight spaces, over thresholds and for household and community distances with SBA-supervision . She is below her functional baseline and will benefit from 1 week on ARU to improve safety and independence with functional mobility prior to returning home with assist PRN from family.   Treatment Diagnosis: impaired mobility  Prognosis: Good  PT Education: PT Role;Plan of Care;General Safety;Transfer Training;Goals;Weight-bearing Education; Functional Mobility Training;Gait Training;Disease Specific Education  Patient Education: WC parts management, completing activities within the limits of pain  Activity Tolerance  Activity Tolerance: Patient Tolerated treatment well;Patient limited by pain; Patient limited by fatigue     Patient Diagnosis(es): There were no encounter diagnoses. has a past medical history of Arthritis and Premature atrial contraction. has a past surgical history that includes Total hip arthroplasty and Ankle fracture surgery (Left, 8/2/2021). Restrictions  Restrictions/Precautions  Restrictions/Precautions: Fall Risk, Weight Bearing  Lower Extremity Weight Bearing Restrictions  Right Lower Extremity Weight Bearing: Non Weight Bearing  Left Lower Extremity Weight Bearing: Non Weight Bearing     Subjective    General  Chart Reviewed: Yes  Additional Pertinent Hx: Per Danyell Jara MD \"Patient is a 69 yo F with [facility] PACs, OA s/p left hip arthroplasty, vertigo, covid-19 infection (May 2020), recent diverticulitis who initially presented 8/1/2021 with left ankle pain and deformity following a fall at home. She reports standing on chair to reach something when she lost her balance, chair tipped, and she fell onto the ground. Found to have left ankle trimalleolar fracture with syndesmosis disruption. She underwent ORIF (8/2 with Dr. Sindy Velazquez). [facility] Post-op course notable for difficulty to control pain and mild hypertension. Currently, patient reports severe pain in the left ankle. She has some numbness tingling in the calf and lateral left foot. Pain is worse with movement. Improves with rest and medication. She reports chronic fatigue and intermittent cough from covid-19 infection last year.  Also reports intermittent vertigo and balance impairment which she thinks could have contributed to this fall. \"  Response To Previous Treatment: Patient with no complaints from previous session. Family / Caregiver Present: No  Subjective  Subjective: Pt reports she did not sleep very well last night and continues to have heart burn like symptoms. She reports she still feels very constipated and uncomfortable. Pt reports she cannot eat anything solid right now. General Comment  Comments: Pt supine in bed upon arrival with breakfast in front of her.     Objective      Transfers  Sit to Stand: Stand by assistance  Stand to sit: Stand by assistance  Comment: Pt did not require cues for hand placement  Ambulation  Ambulation?: Yes  WB Status: NWB to LLE  More Ambulation?: No  Ambulation 1  Surface: level tile  Device: Rolling Walker  Assistance: Stand by assistance;Contact guard assistance  Quality of Gait: hop-to gait pattern with early fatigue; able to maintain NWB LLE without assist or cues  Gait Deviations: Slow Carmen  Distance: 8' x2, 10' x2  Stairs/Curb  Stairs?: No  Wheelchair Activities  Wheelchair Size: 18\"  Wheelchair Type: Standard  Wheelchair Cushion:  (waffle cushion)  Pressure Relief Type: Self Adjusts  Wheelchair Parts Management: Yes  Left Leg Rest Level of Assistance: Stand by assistance  Right Leg Rest Level of Assistance: Stand by assistance  Left Brakes Level of Assistance: Stand by assistance  Right Brakes Level of Assistance: Stand by Assist  Propulsion: Yes  Propulsion 1  Propulsion: Manual  Level: Level Tile (50' on uneven therapy terrace)  Method: RUE;LUE  Level of Assistance: Supervision  Description/ Details: pt able to propel safely including turns and backing up without cueing, intermittent cues for wc parts management  Distance: 360', 75' x2        Exercises  Hip Flexion: BL marching x15  Knee Long Arc Quad: R x20, L x15  Ankle Pumps: R x20  Upper Extremity: shoulder flexion BL x20, elbow flexion/extension x15 with emphasis on biceps and triceps contraction         Comment: Pty requested to use commode during the session. SBA for amb to commode, SBA for pants management and wiping, SBA for RW<>commode. Pt stood at sink with L UE support ~ 30 sec to wash hands SBA. Pt propped L LE on a chair with pillow while using commode           Goals  Short term goals  Time Frame for Short term goals: 1 week from 8/4 or upon d/c  Short term goal 1: bed mobility mod I  Short term goal 2: transfers mod I  Short term goal 3: ambulate 48' with RW mod I maintaining NWB LLE  Short term goal 4: propel w/c 150' mod I  Short term goal 5: ascend/descend curb step with RW and SBA  Long term goals  Time Frame for Long term goals : LTG=STG  Patient Goals   Patient goals : \"to be able to get to and from the bathroom on my own and be independent\"    Plan    Plan  Times per week: 5-6x/wk  Times per day: Twice a day  Plan weeks: 1  Current Treatment Recommendations: Strengthening, Transfer Training, Balance Training, Functional Mobility Training, Gait Training, Neuromuscular Re-education, Safety Education & Training, Patient/Caregiver Education & Training, Stair training, Pain Management, Home Exercise Program, ROM, Wheelchair Mobility Training, Equipment Evaluation, Education, & procurement  Safety Devices  Type of devices:  All fall risk precautions in place, Call light within reach, Gait belt, Patient at risk for falls, Bed alarm in place, Left in bed, Ice pack on BL sides of L ankle   Restraints  Initially in place: No     Therapy Time   Individual Concurrent Group Co-treatment   Time In [address]   Time In 1130     Time Out 1240     Minutes 70        Electronically signed by Marrian Shone, PT [id number] on 8/7/2021 at 12:50 PM

## 2021-08-07 NOTE — PROGRESS NOTES
Occupational Therapy  Facility/Department: [address] IP REHAB  Daily Treatment Note  NAME: Priscilla Chua  : 1950  MRN: [id number]    Date of Service: 2021    Discharge Recommendations:  Home with assist PRN, Patient would benefit from continued therapy after discharge, Home with Home health OT       Assessment   Performance deficits / Impairments: Decreased functional mobility ; Decreased safe awareness;Decreased balance;Decreased ADL status; Decreased high-level IADLs;Decreased endurance  Assessment: PT tolerate session well.  she completed mobility with a walker and wheelchair with SBA. She was able to propel the wheelchair in the kitchen and gym. She completed simulated meal prep with discussion of her setup with SBA. She is hoping to be able to go home at the beginning to middle of next week. Prognosis: Good  REQUIRES OT FOLLOW UP: Yes  Activity Tolerance  Activity Tolerance: Patient Tolerated treatment well  Safety Devices  Type of devices: Call light within reach; Left in chair;Gait belt         Patient Diagnosis(es): There were no encounter diagnoses. has a past medical history of Arthritis and Premature atrial contraction. has a past surgical history that includes Total hip arthroplasty and Ankle fracture surgery (Left, 2021). Restrictions  Restrictions/Precautions  Restrictions/Precautions: Fall Risk, Weight Bearing  Lower Extremity Weight Bearing Restrictions  Right Lower Extremity Weight Bearing: Non Weight Bearing  Left Lower Extremity Weight Bearing: Non Weight Bearing  Subjective   General  Chart Reviewed: Yes, Progress Notes  Patient assessed for rehabilitation services?: Yes  Additional Pertinent Hx: Per Dr. Landry Billing is a 71 yo F with pmh PACs, OA s/p left hip arthroplasty, vertigo, covid-19 infection (May 2020), recent diverticulitis who initially presented 2021 with left ankle pain and deformity following a fall at home.  She reports standing on chair to reach something when she lost her balance, chair tipped, and she fell onto the ground. Found to have left ankle trimalleolar fracture with syndesmosis disruption. She underwent ORIF (8/2 with Dr. Supriya Larson). Now NWB LLE. Post-op course notable for difficulty to control pain and mild hypertension. Currently, patient reports severe pain in the left ankle. She has some numbness tingling in the calf and lateral left foot. Pain is worse with movement. Improves with rest and medication. She reports chronic fatigue and intermittent cough from covid-19 infection last year. Also reports intermittent vertigo and balance impairment which she thinks could have contributed to this fall. She would like to come to ARU to improve her function prior to returning home. \"  Family / Caregiver Present: No  Referring Practitioner: Dr. Abel Sharma  Diagnosis: L ankle fx  Subjective  Subjective: Pt seen beside initially and agreed to OT treatment. Pt declined bathing and dressing as pt already dressed. Objective    ADL  Grooming: Modified independent  (Brushed teeth, hair, and washed hands seated at the sink)  Toileting: Stand by assistance  Meal Prep  Meal Prep Level: Wheelchair  Meal Prep Level of Assistance: Stand by assistance  Meal Preparation: Simulated microwaving a frozen dinner. Pt reports has a small refrigerator/freezer combo with a microwave to the left. She required cues for problem solving to position the wheelchair to allow her to open the door of the freezer and then reach. She retrieved an item from the freezer and placed it on the table next to the refrigerator to simulate her microwave. Discussed using a towel on her lap to allow her to place the meal and transport to her table. Light Housekeeping  Light Housekeeping Level: Wheelchair  Light Housekeeping: Discussed washing her dished at the utility tub. Recommended sitting sideways on front of the tub to allow her to better reach the faucet and wash dishes.   She cannot go head on at this time as she cannot tolerate her foot being down for any period of time. Balance  Standing Balance: Stand by assistance  Functional Mobility  Functional - Mobility Device: Rolling Walker  Activity: To/from bathroom  Assist Level: Stand by assistance  Functional Mobility Comments: Completed mobility from door of the bathroom in the gym to the toilet. Propelled wheelchair around the gym and from room to the gym. Toilet Transfers  Toilet - Technique: Ambulating (RW)  Equipment Used: Grab bars (Toilet safety frames)  Toilet Transfer: Stand by assistance  Toilet Transfers Comments: Pt used toilet safety frame on the toilet. She would benefit from using these on her toilet at home due to NWB left LE and will require support of both arms to safely transfer to the toilet. She also reports needing bedside commode at night as there is a ledge to get into her bathroom.   Wheelchair Bed Transfers  Wheelchair/Bed - Technique: Ambulating  Equipment Used: Wheelchair  Level of Asssistance: Stand by assistance     Transfers  Sit to stand: Stand by assistance  Stand to sit: Stand by assistance  Transfer Comments: RW                                                                 Plan   Plan  Times per week: 5-6  Times per day: Twice a day  Current Treatment Recommendations: Strengthening, Endurance Training, Balance Training, Functional Mobility Training, Self-Care / ADL, Equipment Evaluation, Education, & procurement, Patient/Caregiver Education & Training, Safety Education & Training, Home Management Training    Goals  Short term goals  Time Frame for Short term goals: 1 week  Short term goal 1: Pt will be mod I with functional transfers  Short term goal 2: Pt will be mod I with functional mobility  Short term goal 3: Pt will be mod I with toileting  Short term goal 4: Pt will be mod I with bathing and dressing  Short term goal 5: Pt will be mod I with bed mobility  Long term goals  Time Frame for Long term goals : STG = LTG  Patient Goals   Patient goals :  To be able to \"do the step ups comfortably and go to the bathroom\" independently       Therapy Time   Individual Concurrent Group Co-treatment   Time In 0945         Time Out 1 [facility],[address], [address] [place] [address]

## 2021-08-07 NOTE — PLAN OF CARE
Problem: Falls - Risk of:  Goal: Will remain free from falls  Description: Will remain free from falls  8/7/2021 0036 by Nick Hirsch RN  Outcome: Ongoing   Patient remained free of any falls this shift. Call light in reach at all times. Non skid footwear on. Patient encouraged to call for help when needed. Room free of clutter. Alarms on. Problem: Infection:  Goal: Will remain free from infection  Description: Will remain free from infection  Outcome: Ongoing     Problem: Daily Care:  Goal: Daily care needs are met  Description: Daily care needs are met  Outcome: Ongoing     Problem: Pain:  Goal: Patient's pain/discomfort is manageable  Description: Patient's pain/discomfort is manageable  Outcome: Ongoing   Pain assessed using 0-10 scale. Offer PRN medication as ordered by MD. Kelley Venegas 30 minutes after giving. Problem: Skin Integrity:  Goal: Skin integrity will stabilize  Description: Skin integrity will stabilize  8/7/2021 0036 by Nick Hirsch RN  Outcome: Ongoing   Patient is able to shift weight without assistance. Reposition every two hours. Skin assessed every shift. No new area of breakdown. Skin warm and dry to touch. Waffle cushion in chair.

## 2021-08-08 PROCEDURE — 6370000000 HC RX 637 (ALT 250 FOR IP): Performed by: PHYSICAL MEDICINE & REHABILITATION

## 2021-08-08 PROCEDURE — 1280000000 HC REHAB R&B

## 2021-08-08 PROCEDURE — 94761 N-INVAS EAR/PLS OXIMETRY MLT: CPT

## 2021-08-08 RX ORDER — LACTULOSE 10 G/15ML
20 SOLUTION ORAL DAILY PRN
Status: DISCONTINUED | OUTPATIENT
Start: 2021-08-08 | End: 2021-08-11 | Stop reason: HOSPADM

## 2021-08-08 RX ADMIN — DOCUSATE SODIUM AND SENNOSIDES 1 TABLET: 8.6; 5 TABLET, FILM COATED ORAL at 11:13

## 2021-08-08 RX ADMIN — BISACODYL 5 MG: 5 TABLET, COATED ORAL at 11:13

## 2021-08-08 RX ADMIN — OXYCODONE AND ACETAMINOPHEN 1 TABLET: 325; 10 TABLET ORAL at 05:01

## 2021-08-08 RX ADMIN — ASPIRIN 81 MG: 81 TABLET, CHEWABLE ORAL at 11:13

## 2021-08-08 RX ADMIN — SUCRALFATE 1 G: 1 TABLET ORAL at 05:00

## 2021-08-08 RX ADMIN — ANTACID TABLETS 1000 MG: 500 TABLET, CHEWABLE ORAL at 20:35

## 2021-08-08 RX ADMIN — OXYCODONE AND ACETAMINOPHEN 1 TABLET: 325; 10 TABLET ORAL at 13:15

## 2021-08-08 RX ADMIN — PANTOPRAZOLE SODIUM 40 MG: 40 TABLET, DELAYED RELEASE ORAL at 06:54

## 2021-08-08 RX ADMIN — ASPIRIN 81 MG: 81 TABLET, CHEWABLE ORAL at 18:21

## 2021-08-08 RX ADMIN — OXYCODONE AND ACETAMINOPHEN 1 TABLET: 325; 10 TABLET ORAL at 18:21

## 2021-08-08 RX ADMIN — SUCRALFATE 1 G: 1 TABLET ORAL at 13:15

## 2021-08-08 RX ADMIN — SUCRALFATE 1 G: 1 TABLET ORAL at 18:20

## 2021-08-08 RX ADMIN — OXYCODONE AND ACETAMINOPHEN 1 TABLET: 325; 10 TABLET ORAL at 09:13

## 2021-08-08 RX ADMIN — DOCUSATE SODIUM AND SENNOSIDES 1 TABLET: 8.6; 5 TABLET, FILM COATED ORAL at 20:35

## 2021-08-08 ASSESSMENT — PAIN SCALES - GENERAL
PAINLEVEL_OUTOF10: 4
PAINLEVEL_OUTOF10: 4
PAINLEVEL_OUTOF10: 5
PAINLEVEL_OUTOF10: 7
PAINLEVEL_OUTOF10: 5

## 2021-08-08 ASSESSMENT — PAIN DESCRIPTION - ONSET
ONSET: ON-GOING

## 2021-08-08 ASSESSMENT — PAIN DESCRIPTION - FREQUENCY
FREQUENCY: CONTINUOUS

## 2021-08-08 ASSESSMENT — PAIN DESCRIPTION - LOCATION
LOCATION: ANKLE

## 2021-08-08 ASSESSMENT — PAIN DESCRIPTION - ORIENTATION
ORIENTATION: LEFT

## 2021-08-08 ASSESSMENT — PAIN DESCRIPTION - PAIN TYPE
TYPE: SURGICAL PAIN

## 2021-08-08 ASSESSMENT — PAIN DESCRIPTION - PROGRESSION
CLINICAL_PROGRESSION: GRADUALLY WORSENING
CLINICAL_PROGRESSION: NOT CHANGED
CLINICAL_PROGRESSION: GRADUALLY IMPROVING
CLINICAL_PROGRESSION: GRADUALLY WORSENING
CLINICAL_PROGRESSION: GRADUALLY IMPROVING
CLINICAL_PROGRESSION: GRADUALLY IMPROVING
CLINICAL_PROGRESSION: GRADUALLY WORSENING

## 2021-08-08 ASSESSMENT — PAIN - FUNCTIONAL ASSESSMENT
PAIN_FUNCTIONAL_ASSESSMENT: PREVENTS OR INTERFERES SOME ACTIVE ACTIVITIES AND ADLS

## 2021-08-08 ASSESSMENT — PAIN DESCRIPTION - DESCRIPTORS
DESCRIPTORS: ACHING;DISCOMFORT;THROBBING
DESCRIPTORS: ACHING;DISCOMFORT

## 2021-08-08 ASSESSMENT — PAIN DESCRIPTION - DIRECTION
RADIATING_TOWARDS: KNEE
RADIATING_TOWARDS: KNEE

## 2021-08-08 NOTE — PROGRESS NOTES
Patient here s/p Left ankle fracture. Alert and oriented. Non weight bearing to left lower extremity. Transfers via walker with CGA X 1 and gait belt. Tolerating regular diet. Aspirin for DVT prophylaxis. Continent of bowel and bladder. Last BM-small-8/8/2021. Patient taking pain medications prn left ankle pain with relief. Uses rest, repositioning and elavation to assist with pain management. All safety precautions in place.

## 2021-08-08 NOTE — PLAN OF CARE
Problem: Falls - Risk of:  Goal: Will remain free from falls  Description: Will remain free from falls  Outcome: Ongoing  Goal: Absence of physical injury  Description: Absence of physical injury  Outcome: Ongoing   Patient without falls thus far. Safety precautions in place consisting of side rails up  X3, bed in lowest position, non skid slippers, fall bracelet, fall sign, alarms, call light and all possessions in reach. Problem: Infection:  Goal: Will remain free from infection  Description: Will remain free from infection  Outcome: Ongoing  No s/s of infection noted. Problem: Daily Care:  Goal: Daily care needs are met  Description: Daily care needs are met  Outcome: Ongoing  Checking on patient Q2h for nutritional needs, hygiene needs, comfort measures, mobility, fall risk interventions and safe environment. All precautions and interventions in place . Problem: Pain:  Description: Pain management should include both nonpharmacologic and pharmacologic interventions. Goal: Patient's pain/discomfort is manageable  Description: Patient's pain/discomfort is manageable  Outcome: Ongoing  Goal: Pain level will decrease  Description: Pain level will decrease  Outcome: Ongoing  Goal: Control of acute pain  Description: Control of acute pain  Outcome: Ongoing  Goal: Control of chronic pain  Description: Control of chronic pain  Outcome: Ongoing  Patient taking pain medication as needed with relief. Rest, repositioning and elevation of left lower leg used to help with pain management. Problem: Skin Integrity:  Goal: Skin integrity will stabilize  Description: Skin integrity will stabilize  Outcome: Ongoing. Patient able to reposition self in bed. Encouraged to do so at a minimum of every two hours. No skin breakdown noted.     Problem: Discharge Planning:  Goal: Patients continuum of care needs are met  Description: Patients continuum of care needs are met  Outcome: Ongoing

## 2021-08-08 NOTE — PROGRESS NOTES
Patient admitted to rehab with left ankle fracture. A&Ox4. Transfers with walker/gait belt x1 assist, NWB LLE. On regular diet, tolerating well. Medications taken whole with thin liquids. On ASA for DVT prophylaxis. On room air. Has been continent of bowel and bladder. LBM 8/8/2021. Pt rates pain/discomfort 7/10, pain med given as ordered and per pt request. Splint/ACE wrap on LLE C/D/I. Chair/bed alarms in use and call light in reach. Will continue to monitor.

## 2021-08-08 NOTE — PROGRESS NOTES
Pt requesting to transfer self to [facility] from bed d/t urgency/frequency. RN notified Dr. Andrew Edouard of pt's request. MD wants pt to be evaluated by therapy first and to discuss with nurse manager tomorrow for permission. RN to discuss plans with pt.

## 2021-08-09 LAB
ANION GAP SERPL CALCULATED.3IONS-SCNC: 10 MMOL/L (ref 3–16)
BASOPHILS ABSOLUTE: 0 K/UL (ref 0–0.2)
BASOPHILS RELATIVE PERCENT: 0.4 %
BUN BLDV-MCNC: 15 MG/DL (ref 7–20)
CALCIUM SERPL-MCNC: 9.3 MG/DL (ref 8.3–10.6)
CHLORIDE BLD-SCNC: 102 MMOL/L (ref 99–110)
CO2: 26 MMOL/L (ref 21–32)
CREAT SERPL-MCNC: 0.6 MG/DL (ref 0.6–1.2)
EOSINOPHILS ABSOLUTE: 0.2 K/UL (ref 0–0.6)
EOSINOPHILS RELATIVE PERCENT: 1.8 %
GFR AFRICAN AMERICAN: >60
GFR NON-AFRICAN AMERICAN: >60
GLUCOSE BLD-MCNC: 106 MG/DL (ref 70–99)
HCT VFR BLD CALC: 37.7 % (ref 36–48)
HEMOGLOBIN: 12.7 G/DL (ref 12–16)
LYMPHOCYTES ABSOLUTE: 2.4 K/UL (ref 1–5.1)
LYMPHOCYTES RELATIVE PERCENT: 28.8 %
MCH RBC QN AUTO: 32.5 PG (ref 26–34)
MCHC RBC AUTO-ENTMCNC: 33.7 G/DL (ref 31–36)
MCV RBC AUTO: 96.4 FL (ref 80–100)
MONOCYTES ABSOLUTE: 0.9 K/UL (ref 0–1.3)
MONOCYTES RELATIVE PERCENT: 10.7 %
NEUTROPHILS ABSOLUTE: 4.9 K/UL (ref 1.7–7.7)
NEUTROPHILS RELATIVE PERCENT: 58.3 %
PDW BLD-RTO: 12.7 % (ref 12.4–15.4)
PLATELET # BLD: 208 K/UL (ref 135–450)
PMV BLD AUTO: 10.2 FL (ref 5–10.5)
POTASSIUM REFLEX MAGNESIUM: 4.3 MMOL/L (ref 3.5–5.1)
RBC # BLD: 3.91 M/UL (ref 4–5.2)
SODIUM BLD-SCNC: 138 MMOL/L (ref 136–145)
WBC # BLD: 8.5 K/UL (ref 4–11)

## 2021-08-09 PROCEDURE — 97110 THERAPEUTIC EXERCISES: CPT

## 2021-08-09 PROCEDURE — 97535 SELF CARE MNGMENT TRAINING: CPT

## 2021-08-09 PROCEDURE — 1280000000 HC REHAB R&B

## 2021-08-09 PROCEDURE — 97116 GAIT TRAINING THERAPY: CPT

## 2021-08-09 PROCEDURE — 85025 COMPLETE CBC W/AUTO DIFF WBC: CPT

## 2021-08-09 PROCEDURE — 97530 THERAPEUTIC ACTIVITIES: CPT

## 2021-08-09 PROCEDURE — 36415 COLL VENOUS BLD VENIPUNCTURE: CPT

## 2021-08-09 PROCEDURE — 80048 BASIC METABOLIC PNL TOTAL CA: CPT

## 2021-08-09 PROCEDURE — 6370000000 HC RX 637 (ALT 250 FOR IP): Performed by: PHYSICAL MEDICINE & REHABILITATION

## 2021-08-09 RX ORDER — HYDROCODONE BITARTRATE AND ACETAMINOPHEN 10; 325 MG/1; MG/1
1 TABLET ORAL EVERY 4 HOURS PRN
Status: DISCONTINUED | OUTPATIENT
Start: 2021-08-09 | End: 2021-08-11 | Stop reason: HOSPADM

## 2021-08-09 RX ORDER — LANOLIN ALCOHOL/MO/W.PET/CERES
6 CREAM (GRAM) TOPICAL NIGHTLY
Status: DISCONTINUED | OUTPATIENT
Start: 2021-08-09 | End: 2021-08-11 | Stop reason: HOSPADM

## 2021-08-09 RX ORDER — HYDROCODONE BITARTRATE AND ACETAMINOPHEN 5; 325 MG/1; MG/1
1 TABLET ORAL EVERY 4 HOURS PRN
Status: DISCONTINUED | OUTPATIENT
Start: 2021-08-09 | End: 2021-08-11 | Stop reason: HOSPADM

## 2021-08-09 RX ADMIN — BISACODYL 10 MG: 10 SUPPOSITORY RECTAL at 14:49

## 2021-08-09 RX ADMIN — ANTACID TABLETS 1000 MG: 500 TABLET, CHEWABLE ORAL at 17:06

## 2021-08-09 RX ADMIN — Medication 6 MG: at 01:16

## 2021-08-09 RX ADMIN — ONDANSETRON HYDROCHLORIDE 4 MG: 4 TABLET, FILM COATED ORAL at 06:11

## 2021-08-09 RX ADMIN — SUCRALFATE 1 G: 1 TABLET ORAL at 16:15

## 2021-08-09 RX ADMIN — HYDROCODONE BITARTRATE AND ACETAMINOPHEN 1 TABLET: 10; 325 TABLET ORAL at 19:15

## 2021-08-09 RX ADMIN — ASPIRIN 81 MG: 81 TABLET, CHEWABLE ORAL at 07:34

## 2021-08-09 RX ADMIN — OXYCODONE AND ACETAMINOPHEN 1 TABLET: 325; 10 TABLET ORAL at 05:18

## 2021-08-09 RX ADMIN — Medication 6 MG: at 21:54

## 2021-08-09 RX ADMIN — HYDROCODONE BITARTRATE AND ACETAMINOPHEN 1 TABLET: 10; 325 TABLET ORAL at 14:41

## 2021-08-09 RX ADMIN — DOCUSATE SODIUM AND SENNOSIDES 1 TABLET: 8.6; 5 TABLET, FILM COATED ORAL at 21:54

## 2021-08-09 RX ADMIN — BISACODYL 5 MG: 5 TABLET, COATED ORAL at 07:35

## 2021-08-09 RX ADMIN — MAGNESIUM HYDROXIDE 30 ML: 400 SUSPENSION ORAL at 07:44

## 2021-08-09 RX ADMIN — PANTOPRAZOLE SODIUM 40 MG: 40 TABLET, DELAYED RELEASE ORAL at 06:10

## 2021-08-09 RX ADMIN — POLYETHYLENE GLYCOL 3350 17 G: 17 POWDER, FOR SOLUTION ORAL at 07:35

## 2021-08-09 RX ADMIN — ASPIRIN 81 MG: 81 TABLET, CHEWABLE ORAL at 17:06

## 2021-08-09 RX ADMIN — DOCUSATE SODIUM AND SENNOSIDES 1 TABLET: 8.6; 5 TABLET, FILM COATED ORAL at 07:35

## 2021-08-09 RX ADMIN — SUCRALFATE 1 G: 1 TABLET ORAL at 05:18

## 2021-08-09 RX ADMIN — PANTOPRAZOLE SODIUM 40 MG: 40 TABLET, DELAYED RELEASE ORAL at 06:07

## 2021-08-09 RX ADMIN — OXYCODONE AND ACETAMINOPHEN 1 TABLET: 325; 10 TABLET ORAL at 10:04

## 2021-08-09 ASSESSMENT — PAIN DESCRIPTION - PROGRESSION
CLINICAL_PROGRESSION: GRADUALLY IMPROVING

## 2021-08-09 ASSESSMENT — PAIN DESCRIPTION - FREQUENCY
FREQUENCY: CONTINUOUS

## 2021-08-09 ASSESSMENT — PAIN DESCRIPTION - ONSET
ONSET: ON-GOING

## 2021-08-09 ASSESSMENT — PAIN SCALES - GENERAL
PAINLEVEL_OUTOF10: 7
PAINLEVEL_OUTOF10: 8
PAINLEVEL_OUTOF10: 5
PAINLEVEL_OUTOF10: 5
PAINLEVEL_OUTOF10: 7
PAINLEVEL_OUTOF10: 8

## 2021-08-09 ASSESSMENT — PAIN DESCRIPTION - DESCRIPTORS
DESCRIPTORS: ACHING;DISCOMFORT

## 2021-08-09 ASSESSMENT — PAIN - FUNCTIONAL ASSESSMENT
PAIN_FUNCTIONAL_ASSESSMENT: ACTIVITIES ARE NOT PREVENTED

## 2021-08-09 ASSESSMENT — PAIN DESCRIPTION - LOCATION
LOCATION: ANKLE

## 2021-08-09 ASSESSMENT — PAIN DESCRIPTION - PAIN TYPE
TYPE: SURGICAL PAIN

## 2021-08-09 ASSESSMENT — PAIN DESCRIPTION - ORIENTATION
ORIENTATION: LEFT

## 2021-08-09 ASSESSMENT — PAIN DESCRIPTION - DIRECTION
RADIATING_TOWARDS: KNEE
RADIATING_TOWARDS: KNEE

## 2021-08-09 NOTE — PROGRESS NOTES
Physical Therapy  Facility/Department: [address] IP REHAB  Daily Treatment Note  NAME: Mayra Mcgee  : 1950  MRN: [id number]    Date of Service: 2021    Discharge Recommendations:  Home with assist PRN, Home with Home health PT, Patient would benefit from continued therapy after discharge, S Level 1   PT Equipment Recommendations  Equipment Needed: Yes  Theador Corpus: Rolling  Wheelchair: Standard (18\", elevating leg rests, antitippers, standard cushion)    Assessment   Body structures, Functions, Activity limitations: Decreased functional mobility ; Decreased ADL status; Decreased endurance;Decreased balance; Increased pain  Assessment: Pt is a 70 y.o. female who presented to [facility] on 21 for LEFT ANKLE OPEN REDUCTION INTERNAL FIXATION trimalleolar fracture with syndesmosis repair per Dr. Fernando Armenta. Pt is now NWB to her LLE. Prior to admission, pt living in the basement studio apt  of her [facility] and OLLIE's home (just moved from [place] [address] with [facility]). There is a steep walkway to enter [facility] with 1 [address] or flight of steps which does not have railing on half of it. PTA, pt was indep ambulating without an AD. Today, ,  pt continues to have discomfort due to constipation . Pt able to hop short distances of ~10' using RW and SBA but is limited due to L LE pain and fatigue. Pt consistently completes transfers SBA. Pt will require a wc at home for longer distance travel due to weightbearing status and L ankle pain and a RW to get in and out of her bathroom due to s small step. The pt is SBA for wc part management and set up for transfers. She completes wc navigation in tight spaces, over thresholds and for household and community distances with supervision . She is below her functional baseline and will benefit from 1 week on ARU to improve safety and independence with functional mobility prior to returning home with assist PRN from family. Pt agreeable to planned dc of 2021 home with assist PRN.   Treatment Diagnosis: impaired mobility  Prognosis: Good  PT Education: PT Role;Plan of Care;General Safety;Transfer Training;Goals;Weight-bearing Education; Functional Mobility Training;Gait Training;Disease Specific Education  Patient Education: WC parts management, completing activities within the limits of pain  Activity Tolerance  Activity Tolerance: Patient Tolerated treatment well;Patient limited by pain     Patient Diagnosis(es): There were no encounter diagnoses. has a past medical history of Arthritis and Premature atrial contraction. has a past surgical history that includes Total hip arthroplasty and Ankle fracture surgery (Left, 8/2/2021). Restrictions  Restrictions/Precautions  Restrictions/Precautions: Fall Risk, Weight Bearing  Lower Extremity Weight Bearing Restrictions  Right Lower Extremity Weight Bearing: Non Weight Bearing  Left Lower Extremity Weight Bearing: Non Weight Bearing     Subjective   General  Chart Reviewed: Yes  Additional Pertinent Hx: Per Yaneli Kang MD \"Patient is a 71 yo F with [facility] PACs, OA s/p left hip arthroplasty, vertigo, covid-19 infection (May 2020), recent diverticulitis who initially presented 8/1/2021 with left ankle pain and deformity following a fall at home. She reports standing on chair to reach something when she lost her balance, chair tipped, and she fell onto the ground. Found to have left ankle trimalleolar fracture with syndesmosis disruption. She underwent ORIF (8/2 with Dr. Blu Burgess). Now NWB LLE. Post-op course notable for difficulty to control pain and mild hypertension. Currently, patient reports severe pain in the left ankle. She has some numbness tingling in the calf and lateral left foot. Pain is worse with movement. Improves with rest and medication. She reports chronic fatigue and intermittent cough from covid-19 infection last year. Also reports intermittent vertigo and balance impairment which she thinks could have contributed to this fall. \"  Response To Previous Treatment: Patient with no complaints from previous session. Family / Caregiver Present: No  Referring Practitioner: Latasha Chavez MD  Subjective  Subjective: Pt sitting on commode upon arrival. Pt reports just receiving pain meds and L ankle pain was 3/10 at rest.    Objective   Bed mobility  Supine to Sit: Stand by assistance  Transfers  Sit to Stand: Supervision (from commode and )  Stand to sit: Supervision  Ambulation  Ambulation?: Yes  WB Status: NWB to LLE  More Ambulation?: No  Ambulation 1  Surface: level tile  Device: Rolling Walker  Assistance: Stand by assistance  Quality of Gait: hop-to gait pattern; able to maintain NWB LLE without assist or cues  Distance: 8', 20, 18' to curb completed curb step twice then 18' back to wc, 6'  Comments: Pt able to go fwd/bwd side/side with good control using small hops. Pt limited by L foot pain/throbbing  Stairs/Curb  Stairs?: Yes  Stairs  Stairs Height: 4\" (with RW curb with SBA. two trials)  Wheelchair Activities  Wheelchair Size: 18\"  Wheelchair Type: Standard  Wheelchair Cushion: Standard  Pressure Relief Type: Self Adjusts  Wheelchair Parts Management: Yes  Left Leg Rest Level of Assistance: Stand by assistance  Right Leg Rest Level of Assistance: Stand by assistance  Left Brakes Level of Assistance: Stand by assistance  Right Brakes Level of Assistance: Stand by Assist  Propulsion: Yes  Propulsion 1  Propulsion: Power  Level: Level Tile  Method: RUE;LUE  Level of Assistance: Supervision  Description/ Details: pt able to propel safely including turns and backing up without cueing, intermittent cues for wc parts management  Distance: 75', 360'        Exercises  Hip Flexion: BL marching x15  Knee Long Arc Quad: R x20  Ankle Pumps: R x20           PM session:   S: Pt sitting in recliner upon arrival. Pt reports continued R LE throbbing pain.   O:   Hop 20' with RW and Supervision  Sit<>stand supervision  WC parts management supervision  WC propulsion 150' with supervision with BL UE  Seated there ex: hip adduction with LE extended using small ball x15, R hip abd with orange TB x15, HS curls with orange TB x20, heel/toe raises  WC navigated over threshold, through tight spaces and on varied surfaces supervision 100'   Ascend/descend 6\" curb step with RW and bwd approach SBA-CGA    A: Pt shows continued improvement with all functional mobility. Able to ascend/descend 6\" curb using RW and SBA-CGA for safety. WC management and navigation for community distances supervision. Pt with L LE pain and throbbing with dependent positioning in standing. Hops short distances with RW and Supervision. Safety Device - Type of devices:  [x]  All fall risk precautions in place [] Bed alarm in place  [] Call light within reach [] Chair alarm in place [] Positioning belt [x] Gait belt [x] Patient at risk for falls [] Left in bed [x] Left in chair [] Telesitter in use [] Sitter present [] Nurse notified [x]  Left with OT in therapy gym     .   Goals  Short term goals  Time Frame for Short term goals: 1 week from 8/4 or upon d/c  Short term goal 1: bed mobility mod I  Short term goal 2: transfers mod I  Short term goal 3: ambulate 48' with RW mod I maintaining NWB LLE  Short term goal 4: propel w/c 150' mod I  Short term goal 5: ascend/descend curb step with RW and SBA  Long term goals  Time Frame for Long term goals : LTG=STG  Patient Goals   Patient goals : \"to be able to get to and from the bathroom on my own and be independent\"    Plan    Plan  Times per week: 5-6x/wk  Times per day: Twice a day  Plan weeks: 1  Current Treatment Recommendations: Strengthening, Transfer Training, Balance Training, Functional Mobility Training, Gait Training, Neuromuscular Re-education, Safety Education & Training, Patient/Caregiver Education & Training, Stair training, Pain Management, Home Exercise Program, ROM, Wheelchair Mobility Training, Equipment Evaluation, Education, & procurement  Safety Devices  Type of devices:  All fall risk precautions in place, Call light within reach, Gait belt, Patient at risk for falls, Bed alarm in place, Left in bed  Restraints  Initially in place: No     Therapy Time   Individual Concurrent Group Co-treatment   Time In 1115         Time Out 1200         Minutes [address]   Time In 1300     Time Out 1345     Minutes 45          Electronically signed by Addison Fitzgerald, [place] [id number] on 8/9/2021 at 12:20 PM

## 2021-08-09 NOTE — PROGRESS NOTES
Occupational Therapy  Facility/Department: [address] IP REHAB  Daily Treatment Note  NAME: Radha Hobson  : 1950  MRN: [id number]    Date of Service: 2021    Discharge Recommendations:  Home with assist PRN, Patient would benefit from continued therapy after discharge, Home with Home health OT  OT Equipment Recommendations  ADL Assistive Devices: Shower Chair without back;Long-handled Sponge;Elastic Shoe Laces; Reacher  Other: may be interested in toilet safety rail, needs a bedside commode for night time use. Assessment   Performance deficits / Impairments: Decreased functional mobility ; Decreased safe awareness;Decreased balance;Decreased ADL status; Decreased high-level IADLs;Decreased endurance  Assessment: Seen in room, completed transfers and mobility with SBA maintaining NWB on . Pt completed ADLs with SBA/S.  ANticipate d/c on WEdnesday with HHOT. Cont OT POC. Prognosis: Good  History: see above  Exam: mobility, self care  Assistance / Modification: assist of , RW  OT Education: OT Role;Transfer Training;Plan of Care;Precautions; Equipment;ADL Adaptive Strategies  Patient Education: Discuss DME for use at home (shower chair)  REQUIRES OT FOLLOW UP: Yes  Activity Tolerance  Activity Tolerance: Patient Tolerated treatment well  Safety Devices  Safety Devices in place: Yes  Type of devices: Left in chair;Gait belt;Bed alarm in place; Left in bed;Call light within reach         Patient Diagnosis(es): There were no encounter diagnoses. has a past medical history of Arthritis and Premature atrial contraction. has a past surgical history that includes Total hip arthroplasty and Ankle fracture surgery (Left, 2021).     Restrictions  Restrictions/Precautions  Restrictions/Precautions: Fall Risk, Weight Bearing  Lower Extremity Weight Bearing Restrictions  Right Lower Extremity Weight Bearing: Non Weight Bearing  Left Lower Extremity Weight Bearing: Non Weight Bearing  Subjective General  Chart Reviewed: Yes, Progress Notes  Patient assessed for rehabilitation services?: Yes  Additional Pertinent Hx: Per Dr. Moniheidy Ayon is a 69 yo F with pmh PACs, OA s/p left hip arthroplasty, vertigo, covid-19 infection (May 2020), recent diverticulitis who initially presented 8/1/2021 with left ankle pain and deformity following a fall at home. She reports standing on chair to reach something when she lost her balance, chair tipped, and she fell onto the ground. Found to have left ankle trimalleolar fracture with syndesmosis disruption. She underwent ORIF (8/2 with Dr. Keyon Hanks). Now NWB LLE. Post-op course notable for difficulty to control pain and mild hypertension. Currently, patient reports severe pain in the left ankle. She has some numbness tingling in the calf and lateral left foot. Pain is worse with movement. Improves with rest and medication. She reports chronic fatigue and intermittent cough from covid-19 infection last year. Also reports intermittent vertigo and balance impairment which she thinks could have contributed to this fall. She would like to come to ARU to improve her function prior to returning home. \"  Family / Caregiver Present: No  Referring Practitioner: Dr. Latasha Chavez  Diagnosis: L ankle fx  Subjective  Subjective: Seen in room, agreed to OT.   Complains of pain when LLE down on the ground (requested to keep elevated at all times)      Orientation  Orientation  Overall Orientation Status: Within Functional Limits  Objective    ADL  Feeding: Independent  Grooming: Modified independent  (completed grooming by sink without assist)  UE Bathing: Independent  LE Bathing: Supervision (Seated on shower chair in shower, no assist (did not stand for periarea))  UE Dressing: Setup  LE Dressing:  (used reacher to doffed pants over LLE dressing but able to don pants and sock without AE)  Additional Comments: OT double bagged LLE, seated on shower chair with LLE elevated throughout

## 2021-08-09 NOTE — PROGRESS NOTES
Department of Physical Medicine & Rehabilitation  Progress Note    Patient Identification:  Lea Anguiano  [id number]  : 1950  Admit date: 8/3/2021    Chief Complaint: Left trimalleolar fracture, closed, initial encounter    Subjective:   No acute events overnight. Patient seen this am sitting up in room. She reports still feeling constipated though has been having small BMs daily. Took multiple prn laxatives this morning and plans to use supp after therapy. Her indigestion/heartburn is much improved with carafate. She would like to start decreasing pain medications and asked to be switched to [address] [place] [address],[address] for slightly decreased potency. Labs reviewed. ROS: No f/c, n/v, cp     Objective:  Patient Vitals for the past 24 hrs:   BP Temp Temp src Pulse Resp SpO2 Weight   21 0615       162 lb 0.6 oz (73.5 kg)   21 0518 135/82 98.3 °F (36.8 °C) Oral 92 16     21 1502 132/84 98.5 °F (36.9 °C) Oral 64 16 96 %      Const: Alert. No distress, pleasant. HEENT: Normocephalic, atraumatic. Normal sclera/conjunctiva. MMM. CV: Regular rate and rhythm. Resp: No respiratory distress. Lungs CTAB. Abd: Soft, nontender, nondistended, NABS+   Ext: LLE in splint/ace wrap, swelling of distal foot/digits noted. NVI. Neuro: Alert, oriented, appropriately interactive. Psych: Cooperative, appropriate mood and affect    Laboratory data: Available via EMR.    Last 24 hour lab  Recent Results (from the past 24 hour(s))   Basic Metabolic Panel w/ Reflex to MG    Collection Time: 21  5:44 AM   Result Value Ref Range    Sodium 138 136 - 145 mmol/L    Potassium reflex Magnesium 4.3 3.5 - 5.1 mmol/L    Chloride 102 99 - 110 mmol/L    CO2 26 21 - 32 mmol/L    Anion Gap 10 3 - 16    Glucose 106 (H) 70 - 99 mg/dL    BUN 15 7 - 20 mg/dL    CREATININE 0.6 0.6 - 1.2 mg/dL    GFR Non-African American >60 >60    GFR African American >60 >60    Calcium 9.3 8.3 - 10.6 mg/dL   CBC Auto Differential Collection Time: 08/09/21  5:44 AM   Result Value Ref Range    WBC 8.5 4.0 - 11.0 K/uL    RBC 3.91 (L) 4.00 - 5.20 M/uL    Hemoglobin 12.7 12.0 - 16.0 g/dL    Hematocrit 37.7 36.0 - 48.0 %    MCV 96.4 80.0 - 100.0 fL    MCH 32.5 26.0 - 34.0 pg    MCHC 33.7 31.0 - 36.0 g/dL    RDW 12.7 12.4 - 15.4 %    Platelets 199 543 - 738 K/uL    MPV 10.2 5.0 - 10.5 fL    Neutrophils % 58.3 %    Lymphocytes % 28.8 %    Monocytes % 10.7 %    Eosinophils % 1.8 %    Basophils % 0.4 %    Neutrophils Absolute 4.9 1.7 - 7.7 K/uL    Lymphocytes Absolute 2.4 1.0 - 5.1 K/uL    Monocytes Absolute 0.9 0.0 - 1.3 K/uL    Eosinophils Absolute 0.2 0.0 - 0.6 K/uL    Basophils Absolute 0.0 0.0 - 0.2 K/uL       Therapy progress:  PT     Objective     Sit to Stand: Stand by assistance  Stand to sit: Stand by assistance  Bed to Chair: Contact guard assistance (with RW)  Device: Rolling Walker  Other Apparatus: Wheelchair follow  Assistance: Stand by assistance, Contact guard assistance  Distance: 8' x2, 10' x2  OT  PT Equipment Recommendations  Equipment Needed: Yes  Mobility Devices: Wheelchair  Walker: Rolling  Wheelchair: Standard (18\", elevating leg rests, antitippers, standard cushion)  Other: pt may also need w/c (will continue to assess)  Toilet - Technique: Ambulating (RW)  Equipment Used: Grab bars (Toilet safety frames)  Toilet Transfers Comments: Pt used toilet safety frame on the toilet. She would benefit from using these on her toilet at home due to NWB left LE and will require support of both arms to safely transfer to the toilet. She also reports needing bedside commode at night as there is a ledge to get into her bathroom. Assessment        SLP          Body mass index is 29.64 kg/m².       Rehabilitation Diagnosis:   Orthopedic, 8.9, Other Orthopedic     Assessment and Plan:     Impairments: NWB LLE, decreased balance, endurance     Left trimalleolar ankle fracture with syndesmosis disruption   -s/p ORIF (8/2 with  Arebi). -NWB LLE.    -PT/OT     Acute blood loss anemia   -Post-surgical.   -Monitor Hgb, transfuse prn <7.      Leukocytosis  -Likely reactive, now resolved  -Monitor for signs/symptoms of infection (had recent diverticulitis)  -Complete ppx cefazolin per Ortho     HTN  -Possibly pain related  -Monitor off meds for now     H/o PACs  -Noted, monitor response to therapies     H/o covid-19 infection (5/2020)  -Residual fatigue, intermittent cough  -Monitor pulmonary response to therapies.      Intermittent vertigo   -May have contributed to fall, monitor for symptoms, none currently     OA s/p prior left hip arthroplasty  -Noted     Rash  -suspect poison ivy  -calamine lotion, patient allergic to steroids    Heartburn/indigestion  -ppi, carafate, prn tums and maalox  -addressing constipation as below     Bladder   -High risk retention   -Monitor PVRs, SC prn >300cc     Bowel   -High risk constipation, note recent diverticulitis  -senna+colace BID, bisacodyl po daily, PRN miralax, MoM, lactulose, bisacodyl supp, and enema.     Safety   -fall precautions     Pain control  -prn Norco, add Bengay for neck/shoulder pain     DVT ppx  -ASA 81 BID per Ortho    Rehab Progress: Interdisciplinary team conference was held today with entire rehab treatment team including PT, OT, SLP, Dietician, RN, and SW. Discussion focused on progress toward rehab goals and discharge planning. Making progress. Working on functional mobility, compensatory strategies. Separate conference then held with patient/family, questions answered and concerns addressed. Total treatment time >35 min with greater than 50% spent in care coordination. Anticipated Dispo: home with daughter and son-in-law  Services:  PT, OT, RN  DME: manual wheelchair, rolling walker, bedside commode, shower chair without back  ELOS: 8/11      Glenn Del Valle MD 8/9/2021, 10:32 AM

## 2021-08-09 NOTE — PROGRESS NOTES
Occupational Therapy  OCCUPATIONAL THERAPY  Progress Note   Second Session    Patient Name: Trisha Bueno  Medical Record Number: [id number]    Treatment Diagnosis: impaired mobility    General  Chart Reviewed: Yes, Progress Notes  Patient assessed for rehabilitation services?: Yes  Additional Pertinent Hx: Per Dr. Chong Alfonso is a 71 yo F with pmh PACs, OA s/p left hip arthroplasty, vertigo, covid-19 infection (May 2020), recent diverticulitis who initially presented 8/1/2021 with left ankle pain and deformity following a fall at home. She reports standing on chair to reach something when she lost her balance, chair tipped, and she fell onto the ground. Found to have left ankle trimalleolar fracture with syndesmosis disruption. She underwent ORIF (8/2 with Dr. Diana Luke). Now NWB LLE. Post-op course notable for difficulty to control pain and mild hypertension. Currently, patient reports severe pain in the left ankle. She has some numbness tingling in the calf and lateral left foot. Pain is worse with movement. Improves with rest and medication. She reports chronic fatigue and intermittent cough from covid-19 infection last year. Also reports intermittent vertigo and balance impairment which she thinks could have contributed to this fall. She would like to come to ARU to improve her function prior to returning home. \"  Family / Caregiver Present: No  Referring Practitioner: Dr. Lisa Carmen  Diagnosis: L ankle fx     Restrictions/Precautions  Restrictions/Precautions: Fall Risk, Weight Bearing  Lower Extremity Weight Bearing Restrictions  Right Lower Extremity Weight Bearing: Non Weight Bearing  Left Lower Extremity Weight Bearing: Non Weight Bearing          Subjective: Met pt in therapy dept, agreeable for therapy. Reports between 4-6/10 pain.     Objective: UE strengthening, medication management    Assessment: Pt completed series of UB exercises using 2lb weight and 3lb  squeeze to increase independence w/ t/f and fxl mob using RW w/ precautions, pt limited w/ R shoulder exercises d/t pain from using RW while maintaining weight bearing precautions. Pt expressed desire to talk w/  before DC Wednesday about necessary DME needs and transportation to/from appointments, OT contacted [employer] DME liason about TSF which cost $40--pt agreeable for out of pocket expense for this items. Pt discussed how she would get to street level, discussed bumping up stairs on her buttocks. Pt reported that she takes 1 vitamin at home but will need to take pain medication after DC. While seated at table, pt created schedule for pain medication management at home to ensure she takes it every 4 hours. Pt given 8 copies of medication schedule to take home. Pt aware S/OT & OTR will see pt in AM for bathing task--she states she is able to bag her leg prior to shower. Cont w/ POC & DC planning.     Safety Device - Type of devices:  []  All fall risk precautions in place [] Bed alarm in place  [] Call light within reach [] Chair alarm in place [] Positioning belt [] Gait belt [] Patient at risk for falls [] Left in bed [] Left in chair [] Telesitter in use [] Sitter present [] Nurse notified []  None      Therapy Time   Individual Co-treatment   Time In [id number]     Time Out 1430     Minutes 45       Electronically signed by Amanda Colon, S/OT on 8/9/2021 at 1:54 PM Hu Arthur OTR/L #[id number] provided direct supervision to student and concur with PN

## 2021-08-09 NOTE — CARE COORDINATION
Shaneka received referral from  for:    Wheelchair  Bedside Commode    Will need DME Orders. Patient also wishes to purchase a Toilet Safety Frame from [employer]. (Per , patient will purchase [employer] online)    Shaneka will verify patient's insurance and deliver the ordered equipment prior to discharge on Wed 8/11/21.     Thank you for the referral.  Electronically signed by Josr Beck on 8/9/2021 at 5:06 PM Cell ph# [phone]

## 2021-08-10 ENCOUNTER — APPOINTMENT (OUTPATIENT)
Dept: GENERAL RADIOLOGY | Age: 71
DRG: 493 | End: 2021-08-10
Attending: PHYSICAL MEDICINE & REHABILITATION
Payer: MEDICARE

## 2021-08-10 PROCEDURE — 97530 THERAPEUTIC ACTIVITIES: CPT

## 2021-08-10 PROCEDURE — 97542 WHEELCHAIR MNGMENT TRAINING: CPT

## 2021-08-10 PROCEDURE — 73610 X-RAY EXAM OF ANKLE: CPT

## 2021-08-10 PROCEDURE — 97116 GAIT TRAINING THERAPY: CPT

## 2021-08-10 PROCEDURE — 1280000000 HC REHAB R&B

## 2021-08-10 PROCEDURE — 97535 SELF CARE MNGMENT TRAINING: CPT

## 2021-08-10 PROCEDURE — 6370000000 HC RX 637 (ALT 250 FOR IP): Performed by: PHYSICAL MEDICINE & REHABILITATION

## 2021-08-10 RX ORDER — ONDANSETRON 4 MG/1
4 TABLET, FILM COATED ORAL 3 TIMES DAILY PRN
Qty: 30 TABLET | Refills: 0 | Status: SHIPPED | OUTPATIENT
Start: 2021-08-10 | End: 2021-10-04 | Stop reason: ALTCHOICE

## 2021-08-10 RX ORDER — PANTOPRAZOLE SODIUM 40 MG/1
40 TABLET, DELAYED RELEASE ORAL
Qty: 30 TABLET | Refills: 0 | Status: SHIPPED | OUTPATIENT
Start: 2021-08-11 | End: 2021-10-04

## 2021-08-10 RX ORDER — HYDROCODONE BITARTRATE AND ACETAMINOPHEN 5; 325 MG/1; MG/1
1-2 TABLET ORAL EVERY 6 HOURS PRN
Qty: 40 TABLET | Refills: 0 | Status: SHIPPED | OUTPATIENT
Start: 2021-08-10 | End: 2021-08-17

## 2021-08-10 RX ORDER — ASPIRIN 81 MG/1
81 TABLET ORAL 2 TIMES DAILY
Qty: 42 TABLET | Refills: 0 | Status: SHIPPED
Start: 2021-08-10 | End: 2021-11-09

## 2021-08-10 RX ORDER — SUCRALFATE 1 G/1
1 TABLET ORAL 4 TIMES DAILY PRN
Qty: 28 TABLET | Refills: 0 | Status: SHIPPED | OUTPATIENT
Start: 2021-08-10 | End: 2021-10-04 | Stop reason: ALTCHOICE

## 2021-08-10 RX ORDER — BISACODYL 10 MG
10 SUPPOSITORY, RECTAL RECTAL DAILY PRN
Qty: 16 SUPPOSITORY | Refills: 0 | Status: SHIPPED | OUTPATIENT
Start: 2021-08-10 | End: 2021-09-09

## 2021-08-10 RX ORDER — POLYETHYLENE GLYCOL 3350 17 G/17G
17 POWDER, FOR SOLUTION ORAL 2 TIMES DAILY
Qty: 60 EACH | Refills: 0 | Status: SHIPPED | OUTPATIENT
Start: 2021-08-10 | End: 2021-09-09

## 2021-08-10 RX ADMIN — HYDROCODONE BITARTRATE AND ACETAMINOPHEN 1 TABLET: 10; 325 TABLET ORAL at 22:24

## 2021-08-10 RX ADMIN — HYDROCODONE BITARTRATE AND ACETAMINOPHEN 1 TABLET: 10; 325 TABLET ORAL at 14:10

## 2021-08-10 RX ADMIN — SUCRALFATE 1 G: 1 TABLET ORAL at 18:20

## 2021-08-10 RX ADMIN — DOCUSATE SODIUM AND SENNOSIDES 1 TABLET: 8.6; 5 TABLET, FILM COATED ORAL at 07:34

## 2021-08-10 RX ADMIN — DOCUSATE SODIUM AND SENNOSIDES 1 TABLET: 8.6; 5 TABLET, FILM COATED ORAL at 22:23

## 2021-08-10 RX ADMIN — SUCRALFATE 1 G: 1 TABLET ORAL at 00:30

## 2021-08-10 RX ADMIN — PANTOPRAZOLE SODIUM 40 MG: 40 TABLET, DELAYED RELEASE ORAL at 05:44

## 2021-08-10 RX ADMIN — BISACODYL 5 MG: 5 TABLET, COATED ORAL at 07:34

## 2021-08-10 RX ADMIN — ASPIRIN 81 MG: 81 TABLET, CHEWABLE ORAL at 07:34

## 2021-08-10 RX ADMIN — ASPIRIN 81 MG: 81 TABLET, CHEWABLE ORAL at 18:16

## 2021-08-10 RX ADMIN — Medication 6 MG: at 22:24

## 2021-08-10 RX ADMIN — HYDROCODONE BITARTRATE AND ACETAMINOPHEN 1 TABLET: 10; 325 TABLET ORAL at 10:08

## 2021-08-10 RX ADMIN — SUCRALFATE 1 G: 1 TABLET ORAL at 10:08

## 2021-08-10 RX ADMIN — HYDROCODONE BITARTRATE AND ACETAMINOPHEN 1 TABLET: 10; 325 TABLET ORAL at 00:30

## 2021-08-10 RX ADMIN — LACTULOSE 20 G: 20 SOLUTION ORAL at 05:44

## 2021-08-10 RX ADMIN — HYDROCODONE BITARTRATE AND ACETAMINOPHEN 1 TABLET: 10; 325 TABLET ORAL at 05:44

## 2021-08-10 RX ADMIN — HYDROCODONE BITARTRATE AND ACETAMINOPHEN 1 TABLET: 10; 325 TABLET ORAL at 18:16

## 2021-08-10 ASSESSMENT — PAIN SCALES - GENERAL
PAINLEVEL_OUTOF10: 7
PAINLEVEL_OUTOF10: 8
PAINLEVEL_OUTOF10: 7
PAINLEVEL_OUTOF10: 7
PAINLEVEL_OUTOF10: 8
PAINLEVEL_OUTOF10: 8
PAINLEVEL_OUTOF10: 7
PAINLEVEL_OUTOF10: 5
PAINLEVEL_OUTOF10: 8

## 2021-08-10 ASSESSMENT — PAIN - FUNCTIONAL ASSESSMENT
PAIN_FUNCTIONAL_ASSESSMENT: ACTIVITIES ARE NOT PREVENTED
PAIN_FUNCTIONAL_ASSESSMENT: PREVENTS OR INTERFERES SOME ACTIVE ACTIVITIES AND ADLS
PAIN_FUNCTIONAL_ASSESSMENT: ACTIVITIES ARE NOT PREVENTED
PAIN_FUNCTIONAL_ASSESSMENT: PREVENTS OR INTERFERES SOME ACTIVE ACTIVITIES AND ADLS

## 2021-08-10 ASSESSMENT — PAIN DESCRIPTION - ORIENTATION
ORIENTATION: LEFT

## 2021-08-10 ASSESSMENT — PAIN DESCRIPTION - DESCRIPTORS
DESCRIPTORS: ACHING;DISCOMFORT;THROBBING
DESCRIPTORS: ACHING;DISCOMFORT
DESCRIPTORS: THROBBING
DESCRIPTORS: ACHING;DISCOMFORT

## 2021-08-10 ASSESSMENT — PAIN DESCRIPTION - FREQUENCY
FREQUENCY: CONTINUOUS

## 2021-08-10 ASSESSMENT — PAIN DESCRIPTION - PAIN TYPE
TYPE: SURGICAL PAIN

## 2021-08-10 ASSESSMENT — PAIN DESCRIPTION - DIRECTION
RADIATING_TOWARDS: KNEE

## 2021-08-10 ASSESSMENT — PAIN DESCRIPTION - ONSET
ONSET: ON-GOING

## 2021-08-10 ASSESSMENT — PAIN DESCRIPTION - LOCATION
LOCATION: ANKLE

## 2021-08-10 ASSESSMENT — PAIN DESCRIPTION - PROGRESSION
CLINICAL_PROGRESSION: GRADUALLY WORSENING
CLINICAL_PROGRESSION: GRADUALLY IMPROVING
CLINICAL_PROGRESSION: GRADUALLY WORSENING
CLINICAL_PROGRESSION: GRADUALLY WORSENING
CLINICAL_PROGRESSION: GRADUALLY IMPROVING
CLINICAL_PROGRESSION: GRADUALLY IMPROVING

## 2021-08-10 NOTE — DISCHARGE INSTR - COC
Assisted  Bathing  Assisted  Dressing  Assisted  Toileting  Assisted  Feeding  Independent  Med Admin  Assisted  Med Delivery   whole    Wound Care Documentation and Therapy:Keep current ankle steri strips in place     Followup Dr Stevie Hartley in office 5-6 weeks after surgery        Elimination:  Continence: Bowel: Yes  Bladder: Yes  Urinary Catheter: None   Colostomy/Ileostomy/Ileal Conduit: No       Date of Last BM: 08/10/2021    Intake/Output Summary (Last 24 hours) at 8/10/2021 1602  Last data filed at 8/10/2021 1303  Gross per 24 hour   Intake 900 ml   Output    Net 900 ml     I/O last 3 completed shifts: In: 900 [P.O.:900]  Out: -     Safety Concerns:     History of Falls (last 30 days)    Impairments/Disabilities:      non weight bearing to left lower extremety    Nutrition Therapy:  Current Nutrition Therapy:   - Oral Diet:  General    Routes of Feeding: Oral  Liquids: Thin Liquids  Daily Fluid Restriction: no  Last Modified Barium Swallow with Video (Video Swallowing Test): not done    Treatments at the Time of Hospital Discharge:   Respiratory Treatments: none  Oxygen Therapy:  is not on home oxygen therapy.   Ventilator:    - No ventilator support    Rehab Therapies: Physical Therapy, Occupational Therapy and RN  Weight Bearing Status/Restrictions: Non-weight bearing on left leg  Other Medical Equipment (for information only, NOT a DME order):  wheelchair, walker, bedside commode and grab bars for the toilet  Other Treatments: boot delivered to the room per [facility]    Patient's personal belongings (please select all that are sent with patient):  Glasses, readers only, extension cord, , cell, luggage and beauty aids from home    RN SIGNATURE:  Electronically signed by Leo Humphrey RN on 8/11/21 at 9:40 AM EDT    CASE MANAGEMENT/SOCIAL WORK SECTION    Inpatient Status Date: 8-3-2021    Readmission Risk Assessment Score:  Readmission Risk              Risk of Unplanned Readmission:  8 Discharging to Facility/ Agency   Name:  [facility] care    Address: [address] [place] [address] Drive., [facility]., [place]ma Rush  Phone: [phone]  Fax: [phone]      / signature: Francisco Briceño     Case Management   [phone]    8/10/2021  4:11 PM      PHYSICIAN SECTION    Prognosis: Good    Condition at Discharge: Stable    Rehab Potential (if transferring to Rehab): Good    Recommended Labs or Other Treatments After Discharge:   Home care PT, OT, RN  Follow-up with Dr. Ofelia Diaz in 1 week. If unable, ok for home care RN to remove staples 14-16 days pos-op. Establish care with new PCP (Dr. Tatiana Cleveland)  Continue NWB LLE until cleared by Ortho    Physician Certification: I certify the above information and transfer of Kayode Figueroa  is necessary for the continuing treatment of the diagnosis listed and that she requires [address] for less 30 days.      Update Admission H&P: No change in H&P    PHYSICIAN SIGNATURE:  Electronically signed by Serafin Arciniega MD on 8/10/21 at 4:03 PM EDT

## 2021-08-10 NOTE — PLAN OF CARE
Problem: Falls - Risk of:  Goal: Will remain free from falls  Description: Will remain free from falls  Outcome: Ongoing   Patient remained free of any falls this shift. Call light in reach at all times. Non skid footwear on. Patient encouraged to call for help when needed. Room free of clutter. Alarms on.      Problem: Daily Care:  Goal: Daily care needs are met  Description: Daily care needs are met  Outcome: Ongoing     Problem: Pain:  Goal: Patient's pain/discomfort is manageable  Description: Patient's pain/discomfort is manageable  Outcome: Ongoing     Problem: Skin Integrity:  Goal: Skin integrity will stabilize  Description: Skin integrity will stabilize  Outcome: Ongoing     Problem: Discharge Planning:  Goal: Patients continuum of care needs are met  Description: Patients continuum of care needs are met  Outcome: Ongoing

## 2021-08-10 NOTE — PROGRESS NOTES
Department of Physical Medicine & Rehabilitation  Progress Note    Patient Identification:  Gunjan Blackwood  [id number]  : 1950  Admit date: 8/3/2021    Chief Complaint: Left trimalleolar fracture, closed, initial encounter    Subjective:   No acute events overnight. Patient seen this afternoon sitting up in room. She reports feeling well and looking forward to HI home tomorrow. We discussed medications, home care, and follow-up plan. She explained that she will have limited transportation and will not be able to follow-up with Dr. Stephanie Ramos as recommended. I told her that we could request Ortho see her before discharge but that this would be short notice and that I anticipate it is too early for staple/suture removal which will still need to be done ~2 weeks post-op. Labs reviewed. ROS: No f/c, n/v, cp     Objective:  Patient Vitals for the past 24 hrs:   BP Temp Temp src Pulse Resp SpO2 Weight   08/10/21 1514 (!) 142/87 97.5 °F (36.4 °C) Oral 72 16 95 %    08/10/21 0544 133/78 97.8 °F (36.6 °C) Oral 76 18 93 % 161 lb 6 oz (73.2 kg)     Const: Alert. No distress, pleasant. HEENT: Normocephalic, atraumatic. Normal sclera/conjunctiva. MMM. CV: Regular rate and rhythm. Resp: No respiratory distress. Lungs CTAB. Abd: Soft, nontender, nondistended, NABS+   Ext: LLE in splint/ace wrap, swelling of distal foot/digits noted. NVI. Neuro: Alert, oriented, appropriately interactive. Psych: Cooperative, appropriate mood and affect    Laboratory data: Available via EMR. Last 24 hour lab  No results found for this or any previous visit (from the past 24 hour(s)).     Therapy progress:  PT     Objective     Sit to Stand: Modified independent  Stand to sit: Modified independent  Bed to Chair: Contact guard assistance (with RW)  Device: Rolling Walker  Other Apparatus: Wheelchair follow  Assistance: Modified Independent  Distance: 12' x2, 10' to curb completes step the 10' back, two turns per amb  OT  PT Equipment Recommendations  Equipment Needed: Yes  Mobility Devices: Wheelchair  Walker: Rolling  Wheelchair: Standard (18\", elevating leg rests, antitippers, standard cushion)  Other: pt may also need w/c (will continue to assess)  Toilet - Technique: Ambulating  Equipment Used: Extra wide bedside commode  Toilet Transfers Comments: pt completed SPT from bed to [facility] w/ Mod I, uses support of both arms  Assessment        SLP          Body mass index is 29.52 kg/m². Rehabilitation Diagnosis:   Orthopedic, 8.9, Other Orthopedic     Assessment and Plan:     Impairments: NWB LLE, decreased balance, endurance     Left trimalleolar ankle fracture with syndesmosis disruption   -s/p ORIF (8/2 with Dr. Trever Alberto). -NWB LLE.    -PT/OT     Acute blood loss anemia   -Post-surgical.   -Hgb now trending up >12     Leukocytosis  -Likely reactive, now resolved  -No signs/symptoms of infection  -Complete ppx cefazolin per Ortho     HTN  -Possibly pain related  -Overall at goal on no medication currently     H/o PACs  -Noted     H/o covid-19 infection (5/2020)  -Residual fatigue, intermittent cough  -Remains stable on room air      Intermittent vertigo   -May have contributed to fall, no current symptoms     OA s/p prior left hip arthroplasty  -Noted     Rash  -suspect poison ivy, steadily improving  -calamine lotion, patient allergic to steroids    Heartburn/indigestion  -ppi, carafate, prn tums and maalox  -addressing constipation as below     Bladder   -High risk retention   -Monitor PVRs, SC prn >300cc     Bowel   -Constipation, note recent diverticulitis  -senna+colace BID, bisacodyl po daily, PRN miralax, MoM, lactulose, bisacodyl supp, and enema.     Safety   -fall precautions     Pain control  -prn Norco, add Bengay for neck/shoulder pain     DVT ppx  -ASA 81 BID per Ortho    Rehab Progress: Making progress. Working on functional mobility, compensatory strategies.    Anticipated Dispo: home with daughter and son-in-law  Services: Marlen Teague RN  DME: manual wheelchair, rolling walker, bedside commode, shower chair without back  ELOS: 8/11    Stephanie Everett was evaluated today and a DME order was entered for a standard wheelchair because she requires this to successfully complete daily living tasks of ambulating. A standard manual wheelchair is necessary due to patient's impaired ambulation and mobility restrictions and would be unable to resolve these daily living tasks using a cane or walker. The patient is capable of using a standard wheelchair safely in their home and can maneuver within their home with adequate access. There is a caregiver available to provide necessary assistance. The need for this equipment was discussed with the patient and she understands, is in agreement, and has not expressed an unwillingness to use the wheelchair. Stephanie Everett can self propel a wheelchair and needs anti-rollback device because of ramps. Stephanie Everett requires elevating legrest due to a musculoskeletal condition or the presence of a cast or brace which prevents 90 degree flexion at the knee. Stephanie Everett was evaluated today and a DME order was entered for a wheeled walker because she requires this to successfully complete daily living tasks of transfers to wheelchair. A wheeled walker is necessary due to the patient's unsteady gait, upper body weakness, and inability to  an ambulation device; and she can transfer only by pushing a walker instead of a lesser assistive device such as a cane, crutch, or standard walker. The need for this equipment was discussed with the patient and she understands and is in agreement. Carmen Ponce.  Aurea Gonzalez MD 8/10/2021, 3:49 PM

## 2021-08-10 NOTE — PROGRESS NOTES
Patient admitted to rehab with left ankle fracture. A&Ox4. Transfers with walker/gait belt x1 assist, NWB LLE. On regular diet, tolerating well. Medications taken whole with thin liquids. On ASA for DVT prophylaxis. On room air. Has been continent of bowel and bladder. LBM 8/10/2021. Pt rates pain/discomfort 8/10, pain med given as ordered and per pt request. Splint/ACE wrap on LLE C/D/I. Chair/bed alarms in use and call light in reach.  Will continue to [employer]

## 2021-08-10 NOTE — PROGRESS NOTES
Patient admitted with left ankle fracture. Ankle with splint and ace wrap intact. Alert/oriented. On aspirin for DVT prophylaxis. Takes medications whole with thins with no complication. Requested/received PRN pain medication. Also carafate and lactulose. Continent of bowel and bladder. Patient used bedside commode overnight. Small BM's noted. On room air with clear lungs. Transfers using walker; does well. Uses call light appropriately. Safety precautions in place.

## 2021-08-10 NOTE — PROGRESS NOTES
Dr. Antoni Weeks requested RN to call David Gruber from [facility] to ask if she's able to see pt before discharge tomorrow. RN notified David Gruber. XR to be ordered today, David Gruber will attempt to see pt tomorrow before 1000, sutures to be removed on POD 14-16 by home care nurse, if not, pt needs to f/u with Dr. Kassy Hubbard next week to have staples removed.

## 2021-08-10 NOTE — CARE COORDINATION
SOCIAL WORK DISCHARGE SUMMARY:      DISCHARGE DATE:                 Wednesday, 8-      DISCHARGE PLACE:                home               Discharging to Facility/ Agency   · Name:  [facility]    · Address: [address] [place] [address]., [address] [place] [address] Road., [place]  · Phone: [phone]  · Fax: [phone]  ·       TRANSPORTATION:                dgtr             TIME:                                9:59 am     Needs to leave early      PREFERRED PHARMACY:       Retail             NUMBER:                          md orders; Sn/pt/ot    mabel[facility]    Wheelchair, TSF, BSC, john steinberg.         Mar Mel[place], [place]     Case Management   [phone]    8/10/2021  4:25 PM

## 2021-08-10 NOTE — PROGRESS NOTES
Occupational Therapy  Facility/Department: [facility]  Daily Treatment Note/ DC summary  NAME: Radha Hobson  : 1950  MRN: [id number]    Date of Service: 8/10/2021    Discharge Recommendations:  Home with assist PRN, Patient would benefit from continued therapy after discharge, Home with Home health OT  OT Equipment Recommendations  ADL Assistive Devices: Shower Chair without back;Long-handled Sponge;Elastic Shoe Laces; Reacher  Other: may be interested in toilet safety rail, needs a bedside commode for night time use. HOME HEALTH CARE: LEVEL 1 STANDARD     -Initial home health evaluation to occur within 24-48 hours, in patient home    -Home health agency to establish plan of care for patient over 60 day period    -Medication Reconciliation    -PCP Visit scheduled within seven days of discharge    -PT/OT to evaluate with goal of regaining prior level of functioning    -OT to evaluate if patient has [address] needs for personal care     Assessment   Performance deficits / Impairments: Decreased functional mobility ; Decreased safe awareness;Decreased balance;Decreased ADL status; Decreased high-level IADLs;Decreased endurance  Assessment: Pt made steady gains w/ OT intervention, met 5/5 goals. Pt completed oral care w/ Mod I while seated at sink. She completed UB bathing w/ Mod I, seated on shower chair turned toward curtain. She completed LB bathing w/ Mod I, able to double bag L leg w/ leg elevated on chair outside of shower, safety concerns d/t pt rushing through ADL. She completed UB dressing w/ Mod I, retrieved and transported clothing to bathroom over the front of RW, donned shirt while seated in w/c. She completed LB dressing w/ Mod I, majority of dressing while seated in w/c but stood to don shorts and underwear over buttocks, shifted weight on each arm of w/c to maintain balance. She completed toileting w/ Mod I using BSC, SPT to/from bed.  Pt to DC home tomorrow 21 w/ Notes  Patient assessed for rehabilitation services?: Yes  Additional Pertinent Hx: Per Dr. Mckeon Home is a 69 yo F with pmh PACs, OA s/p left hip arthroplasty, vertigo, covid-19 infection (May 2020), recent diverticulitis who initially presented 8/1/2021 with left ankle pain and deformity following a fall at home. She reports standing on chair to reach something when she lost her balance, chair tipped, and she fell onto the ground. Found to have left ankle trimalleolar fracture with syndesmosis disruption. She underwent ORIF (8/2 with Dr. Stacey Nix). Now NWB LLE. Post-op course notable for difficulty to control pain and mild hypertension. Currently, patient reports severe pain in the left ankle. She has some numbness tingling in the calf and lateral left foot. Pain is worse with movement. Improves with rest and medication. She reports chronic fatigue and intermittent cough from covid-19 infection last year. Also reports intermittent vertigo and balance impairment which she thinks could have contributed to this fall. She would like to come to ARU to improve her function prior to returning home. \"  Family / Caregiver Present: No  Referring Practitioner: Dr. Kaylin Alonso  Diagnosis: L ankle fx  Subjective  Subjective: met pt in room, in bed  General Comment  Comments: agreeable for shower      Orientation     Objective    ADL  Equipment Provided: Reacher  Feeding: Independent  Grooming: Modified independent  (completed oral care and applied deoderant seated at sink)  UE Bathing: Independent (seated on shower chair facing toward curtain)  LE Bathing: Modified independent  (donned both red bags over L leg, completed bathing while seated on shower chair, safety concerns d/t rushing through ADLs)  UE Dressing: Modified independent  (retrieved and transported clothing over front of RW, donned shirt while seated in w/c)  LE Dressing: Modified independent  (completed majority of LB dressing while seated in w/c, stood w/ Mod I to pull shorts and underwear over buttocks, shifted weight on w/c arms for balance)  Toileting: Modified independent  (SPT from bed, used BS)  Additional Comments: pt double bagged LLE, seated on shower chair with LLE elevated throughout        Balance  Sitting Balance: Modified independent   Standing Balance: Modified independent   Standing Balance  Time: @30 seconds-1 minute  Activity: fxl mob, t/f, ADLs  Functional Mobility  Functional - Mobility Device: Rolling Walker  Activity: To/from bathroom;Transport items; Retrieve items  Assist Level: Modified independent   Functional Mobility Comments: completed mobiltiy from bed to bathroom, retrieved clothing and transported to bathroom  Toilet Transfers  Toilet - Technique: Ambulating  Equipment Used: Extra wide bedside commode  Toilet Transfer: Modified independent  Toilet Transfers Comments: pt completed SPT from bed to [facility] w/ Mod I, uses support of both arms  Shower Transfers  Shower - Transfer From: Dispatch - Transfer Type: To  Shower - Transfer To:  Shower seat with back  Shower - Technique: Ambulating  Shower Transfers: Modified independence  Shower Transfers Comments: Ambulated w/ RW to enter shower; SPT to w/c to exit shower  Wheelchair Bed Transfers  Wheelchair/Bed - Technique: Stand pivot  Equipment Used: Wheelchair;Bed  Level of Asssistance: Modified independent   Wheelchair Transfers Comments: w/c<> bed x2 times using SPT, used bed rail for support to pull up on  Bed mobility  Supine to Sit: Modified independent  Sit to Supine: Modified independent  Comment: reclined hospital bed, used rail for support  Transfers  Sit to stand: Modified independent  Stand to sit: Modified independent  Transfer Comments: used RW                       Cognition  Overall Cognitive Status: WNL  Cognition Comment: Good organization/problem solving during ADL, moves quickly/rushes during shower                                         Plan   Plan  Times per week: 5-6  Times per day: Twice a day  Plan weeks: pt to DC tomorrow 8/11/21  Specific instructions for Next Treatment: laundry  Current Treatment Recommendations: Strengthening, Endurance Training, Balance Training, Functional Mobility Training, Self-Care / ADL, Equipment Evaluation, Education, & procurement, Patient/Caregiver Education & Training, Safety Education & Training, Home Management Training    Goals  Short term goals  Time Frame for Short term goals: 1 week  Short term goal 1: Pt will be mod I with functional transfers--MET  Short term goal 2: Pt will be mod I with functional mobility--MET  Short term goal 3: Pt will be mod I with toileting--MET  Short term goal 4: Pt will be mod I with bathing and dressing--MET  Short term goal 5: Pt will be mod I with bed mobility--MET  Long term goals  Time Frame for Long term goals : STG = LTG  Patient Goals   Patient goals :  To be able to \"do the step ups comfortably and go to the bathroom\" independently       Therapy Time   Individual Concurrent Group PM treatment   Time In 0915      1345   Time Out 1010      1430   Minutes 55      45   Timed Code Treatment Minutes: [address], S/OT Sharmila Smiley [facility][id number] provided direct supervision to student and concur with PN

## 2021-08-10 NOTE — PROGRESS NOTES
Physical Therapy  Facility/Department: [address] IP REHAB  Daily Treatment Note  NAME: Reford Chadwick  : 1950  MRN: [id number]    Date of Service: 8/10/2021    Discharge Recommendations:  Home with assist PRN, Home with Home health PT, Patient would benefit from continued therapy after discharge, S Level 1   PT Equipment Recommendations  Equipment Needed: Yes  Wanda Sheets: Rolling  Wheelchair: Standard (18\", elevating leg rests, antitippers, standard cushion)    Assessment   Body structures, Functions, Activity limitations: Decreased functional mobility ; Decreased ADL status; Decreased endurance;Decreased balance; Increased pain  Assessment: Pt is a 70 y.o. female who presented to [facility] on 21 for LEFT ANKLE OPEN REDUCTION INTERNAL FIXATION trimalleolar fracture with syndesmosis repair per Dr. Janeth Spaulding. Pt is now NWB to her LLE. Prior to admission, pt living in the basement studio apt  of her [facility] and OLLIE's home (just moved from [place] [address] with [facility]). There is a steep walkway to enter [facility] with 1 [address] or flight of steps which does not have railing on half of it. PTA, pt was indep ambulating without an AD. Today, 8/10 pt able to hop with RW short distances up to ~ 30' mod I but is limited due to L LE pain and fatigue. Pt consistently completes transfers mod I. The pt is Dionne for wc part management and set up for transfers. She completes wc navigation in tight spaces, over thresholds and for household and community distances Dionne . Pt agreeable to planned dc tomorrow of 2021 home with assist PRN. She will benefit from continued PT to address LE strengthening and continued functional mobility training as WB status progresses. Pt met 4/5 goals while on ARU. Treatment Diagnosis: impaired mobility  Prognosis: Good  PT Education: PT Role;Plan of Care;General Safety;Transfer Training;Goals;Weight-bearing Education; Functional Mobility Training;Gait Training;Disease Specific Education  Patient Education: WC parts management, completing activities within the limits of pain  Activity Tolerance  Activity Tolerance: Patient Tolerated treatment well;Patient limited by pain     Patient Diagnosis(es): There were no encounter diagnoses. has a past medical history of Arthritis and Premature atrial contraction. has a past surgical history that includes Total hip arthroplasty and Ankle fracture surgery (Left, 8/2/2021). Restrictions  Restrictions/Precautions  Restrictions/Precautions: Fall Risk, Weight Bearing  Lower Extremity Weight Bearing Restrictions  Right Lower Extremity Weight Bearing: Non Weight Bearing  Left Lower Extremity Weight Bearing: Non Weight Bearing     Subjective   General  Chart Reviewed: Yes  Additional Pertinent Hx: Per Abel Sharma MD \"Patient is a 69 yo F with pmh PACs, OA s/p left hip arthroplasty, vertigo, covid-19 infection (May 2020), recent diverticulitis who initially presented 8/1/2021 with left ankle pain and deformity following a fall at home. She reports standing on chair to reach something when she lost her balance, chair tipped, and she fell onto the ground. Found to have left ankle trimalleolar fracture with syndesmosis disruption. She underwent ORIF (8/2 with Dr. Supriya Larson). Now NWB LLE. Post-op course notable for difficulty to control pain and mild hypertension. Currently, patient reports severe pain in the left ankle. She has some numbness tingling in the calf and lateral left foot. Pain is worse with movement. Improves with rest and medication. She reports chronic fatigue and intermittent cough from covid-19 infection last year. Also reports intermittent vertigo and balance impairment which she thinks could have contributed to this fall. \"  Response To Previous Treatment: Patient with no complaints from previous session. Family / Caregiver Present: No  Referring Practitioner: Abel Sharma MD  Subjective  Subjective: Pt reports minimal success with having BMs last night.  States 5/10 L ankle pain.  General Comment  Comments: Pt in wc upon arrival.         Orientation  Orientation  Overall Orientation Status:  (BIMs 15/15)    Objective   Bed mobility  Bridging: Modified independent   Rolling to Left: Unable to assess (deferred dt severe pain. Per pt she is able to do it if she absolutely has to but knows it is very painful for the L LE)  Rolling to Right: Modified independent (severe L ankle pain)  Supine to Sit: Modified independent  Sit to Supine: Modified independent  Scooting: Modified independent  Comment: Flat ADL bed without  Transfers  Sit to Stand: Modified independent  Stand to sit: Modified independent  Stand Pivot Transfers: Modified independent  Car Transfer: Modified independent  Comment: Pt did not require cues for hand placement; pt able to maintain WB status throughout all transfers. Safe wc management and set-up consistently  Ambulation  Ambulation?: Yes  WB Status: NWB to LLE  More Ambulation?: No  Ambulation 1  Surface: level tile  Device: Rolling Walker  Assistance: Modified Independent  Quality of Gait: hop-to gait pattern; able to maintain NWB LLE without assist or cues  Distance: 15' x2, 10' to curb completes step the 10' back, two turns per amb  Comments: Pt able to go fwd/bwd side/side with good control using small hops.  Pt limited by L foot pain/throbbing  Stairs/Curb  Stairs?: No  Stairs  Curbs: 4\" (with RW curb Dionne)  Wheelchair Activities  Wheelchair Size: 18\"  Wheelchair Type: Standard  Wheelchair Cushion: Standard  Pressure Relief Type: Self Adjusts  Wheelchair Parts Management: Yes  Left Leg Rest Level of Assistance: Modified independent  Right Leg Rest Level of Assistance: Modified independent  Left Brakes Level of Assistance: Modified independent  Right Brakes Level of Assistance: Modified independent  Propulsion: Yes  Propulsion 1  Propulsion: Manual  Level: Level Tile  Method: RUE;LUE  Level of Assistance: Modified independent  Description/ Details: pt able to propel safely including turns and backing up without cueing, intermittent cues for wc parts management  Distance: 150' x2 with two turns in last 48', 48' with two turns          PM session:   S: Pt supine in bed upon arrival. Reports 8/10 L LE pain. Reports eating better today but continues to have constipation. O:  Stand pivot Dionne  WC parts management mod I  Propelled wc with BL ' Dionne  Sit<>stand to RW Dionne  amb 20' with three turns on carpet Dionne  Propelled wc using BL UE on tile and carpet, through tight tights and spaces and over thresholds Dionne. Obstacle course set up for pt and pt able to safely Dionne  WC propulsion 230' with BL UE Dionne  Over large threshold Dionne using UE     A: The pt shows good understanding of safe and independent functional mobility. She is limited with hopping distance by L LE throbbing pain. Denied any questions or concerns pertaining to her dc tomorrow, 8/11. Has a HEP provided for LE strength. Safety Device - Type of devices:  [x]  All fall risk precautions in place [] Bed alarm in place  [] Call light within reach [] Chair alarm in place [] Positioning belt [x] Gait belt [] Patient at risk for falls [] Left in bed [] Left in chair [] Telesitter in use [] Sitter present [] Nurse notified [x]  Left with OT in therapy gym.     Goals  Short term goals  Time Frame for Short term goals: 1 week from 8/4 or upon d/c  Short term goal 1: bed mobility mod I- met  Short term goal 2: transfers mod I- met  Short term goal 3: ambulate 48' with RW mod I maintaining NWB LLE  Short term goal 4: propel w/c 150' mod I- met  Short term goal 5: ascend/descend curb step with RW and SBA- met  Long term goals  Time Frame for Long term goals : LTG=STG  Patient Goals   Patient goals : \"to be able to get to and from the bathroom on my own and be independent\"    Plan    Plan  Times per week: 5-6x/wk  Times per day: Twice a day  Plan weeks: 1  Current Treatment Recommendations: Strengthening, Transfer Training, Balance Training, Functional Mobility Training, Gait Training, Neuromuscular Re-education, Safety Education & Training, Patient/Caregiver Education & Training, Stair training, Pain Management, Home Exercise Program, ROM, Wheelchair Mobility Training, Equipment Evaluation, Education, & procurement  Safety Devices  Type of devices:  All fall risk precautions in place, Call light within reach, Gait belt, Patient at risk for falls  Restraints  Initially in place: No     Therapy Time   Individual Concurrent Group Co-treatment   Time In 0815         Time Out 0900         Minutes [address]   Time In 1300     Time Out 1345     Minutes 45          Electronically signed by Laxmi Lynch, [place] [id number] on 8/10/2021 at 1:04 PM

## 2021-08-10 NOTE — CARE COORDINATION
Met with patient to review DC for tomorrow. She wants to use [facility] for home care orders. The Plan for Transition of Care is related to the following treatment goals: To continue her progress in personal care and ambulation needs in home setting. The Patient  was provided with a choice of provider and agrees   with the discharge plan. [x] Yes [] No    Freedom of choice list was provided with basic dialogue that supports the patient's individualized plan of care/goals, treatment preferences and shares the quality data associated with the providers. [x] Yes [] No'  She wants to use [facility] for scripts. Her daughter will  at 9:59 am in hopes to get out very early. Gave resources for wheelchair transportation needs specifically to /from MD appointments in a wheelchair. Gave resources for [employer] and [employer] services. IMM letter presented.   [place], [place]     Case Management   [phone]    8/10/2021  4:32 PM

## 2021-08-11 VITALS
HEIGHT: 62 IN | HEART RATE: 70 BPM | WEIGHT: 162.26 LBS | RESPIRATION RATE: 17 BRPM | SYSTOLIC BLOOD PRESSURE: 127 MMHG | BODY MASS INDEX: 29.86 KG/M2 | TEMPERATURE: 98.2 F | OXYGEN SATURATION: 96 % | DIASTOLIC BLOOD PRESSURE: 77 MMHG

## 2021-08-11 PROCEDURE — 94761 N-INVAS EAR/PLS OXIMETRY MLT: CPT

## 2021-08-11 PROCEDURE — 6370000000 HC RX 637 (ALT 250 FOR IP): Performed by: PHYSICAL MEDICINE & REHABILITATION

## 2021-08-11 RX ADMIN — ONDANSETRON HYDROCHLORIDE 4 MG: 4 TABLET, FILM COATED ORAL at 05:13

## 2021-08-11 RX ADMIN — PANTOPRAZOLE SODIUM 40 MG: 40 TABLET, DELAYED RELEASE ORAL at 07:41

## 2021-08-11 RX ADMIN — DOCUSATE SODIUM AND SENNOSIDES 1 TABLET: 8.6; 5 TABLET, FILM COATED ORAL at 07:43

## 2021-08-11 RX ADMIN — SUCRALFATE 1 G: 1 TABLET ORAL at 05:08

## 2021-08-11 RX ADMIN — ASPIRIN 81 MG: 81 TABLET, CHEWABLE ORAL at 07:42

## 2021-08-11 RX ADMIN — ANTACID TABLETS 1000 MG: 500 TABLET, CHEWABLE ORAL at 09:20

## 2021-08-11 RX ADMIN — HYDROCODONE BITARTRATE AND ACETAMINOPHEN 1 TABLET: 10; 325 TABLET ORAL at 09:20

## 2021-08-11 RX ADMIN — HYDROCODONE BITARTRATE AND ACETAMINOPHEN 1 TABLET: 10; 325 TABLET ORAL at 05:08

## 2021-08-11 ASSESSMENT — PAIN DESCRIPTION - ONSET
ONSET: ON-GOING
ONSET: ON-GOING

## 2021-08-11 ASSESSMENT — PAIN - FUNCTIONAL ASSESSMENT
PAIN_FUNCTIONAL_ASSESSMENT: ACTIVITIES ARE NOT PREVENTED
PAIN_FUNCTIONAL_ASSESSMENT: PREVENTS OR INTERFERES SOME ACTIVE ACTIVITIES AND ADLS

## 2021-08-11 ASSESSMENT — PAIN DESCRIPTION - DIRECTION: RADIATING_TOWARDS: KNEE

## 2021-08-11 ASSESSMENT — PAIN DESCRIPTION - DESCRIPTORS
DESCRIPTORS: THROBBING
DESCRIPTORS: THROBBING

## 2021-08-11 ASSESSMENT — PAIN DESCRIPTION - PROGRESSION
CLINICAL_PROGRESSION: GRADUALLY IMPROVING
CLINICAL_PROGRESSION: GRADUALLY IMPROVING

## 2021-08-11 ASSESSMENT — PAIN DESCRIPTION - ORIENTATION
ORIENTATION: LEFT
ORIENTATION: LEFT

## 2021-08-11 ASSESSMENT — PAIN DESCRIPTION - LOCATION
LOCATION: ANKLE
LOCATION: ANKLE

## 2021-08-11 ASSESSMENT — PAIN DESCRIPTION - PAIN TYPE
TYPE: SURGICAL PAIN
TYPE: SURGICAL PAIN

## 2021-08-11 ASSESSMENT — PAIN DESCRIPTION - FREQUENCY
FREQUENCY: CONTINUOUS
FREQUENCY: CONTINUOUS

## 2021-08-11 ASSESSMENT — PAIN SCALES - GENERAL
PAINLEVEL_OUTOF10: 5
PAINLEVEL_OUTOF10: 7
PAINLEVEL_OUTOF10: 8

## 2021-08-11 NOTE — DISCHARGE SUMMARY
Physical Medicine & Rehabilitation  Discharge Summary     Patient Identification:  Radha Hobson  : 1950  Admit date: 8/3/2021  Discharge date:  2021  Attending provider: Julissa Bowers MD        Primary care provider: [facility]     Discharge Diagnoses:   Patient Active Problem List   Diagnosis    Closed left ankle fracture, initial encounter    Closed trimalleolar fracture of left ankle    Syndesmotic disruption of left ankle    Left trimalleolar fracture, closed, initial encounter       History of Present Illness/Acute Hospital Course:  Patient is a 71 yo F with pmh PACs, OA s/p left hip arthroplasty, vertigo, covid-19 infection (May 2020), recent diverticulitis who initially presented 2021 with left ankle pain and deformity following a fall at home. She reports standing on chair to reach something when she lost her balance, chair tipped, and she fell onto the ground. Found to have left ankle trimalleolar fracture with syndesmosis disruption. She underwent ORIF ( with Dr. Trever Alberto). Now NWB LLE. Post-op course notable for difficulty with pain control, mild hypertension, leukocytosis, and acute blood loss anemia. Now presents to ARU with impair mobility and self-care below her baseline. Inpatient Rehabilitation Course:   Radha Hobson is a 70 y.o. female admitted to inpatient rehabilitation on 8/3/2021 with Left trimalleolar fracture, closed, initial encounter. The patient participated in an aggressive multidisciplinary inpatient rehabilitation program involving 3 hours of therapy per day, at least 5 days per week. She made good progress with regard to functional mobility, compensatory strategies. Now overall modified independent. Will be primarily wheelchair level for mobility given NWB LLE. Discharging home with daughter and son-in-law. Home care services and DME ordered as below. Patient will follow-up with PCP and Ortho.      Medical Management:    Left trimalleolar ankle fracture with syndesmosis disruption   -s/p ORIF (8/2 with Dr. Ilda Anguiano). -NWB LLE.    -Seen by Ortho NP on day of discharge and provided with fracture boot prior to discharge. -PT/OT     Acute blood loss anemia   -Post-surgical.   -Hgb now trending up >12     Leukocytosis  -Likely reactive, now resolved  -No signs/symptoms of infection  -Complete ppx cefazolin per Ortho     HTN  -Possibly pain related  -Overall at goal on no medication currently     H/o PACs  -Noted     H/o covid-19 infection (5/2020)  -Residual fatigue, intermittent cough  -Remains stable on room air      Intermittent vertigo   -May have contributed to fall, no current symptoms     OA s/p prior left hip arthroplasty  -Noted     Rash  -suspect poison ivy, steadily improving  -calamine lotion, patient states allergic to steroids     Heartburn/indigestion  -Started ppi and multiple prn medications, but continued to have severe symptoms. Now improved on carafate.   -May need to consider GI referral as outpatient if persists  -addressing constipation as below     Constipation  -Discharging on bisacodyl 5mg po daily, miralax BID, and prn bisacodyl supp. Pain control  -prn Norco, Bengay for neck/shoulder pain     DVT ppx  -ASA 81 BID x 30 days per Ortho (21 more days at time of discharge)    Discharge Exam:  Const: Alert. No distress, pleasant. HEENT: Normocephalic, atraumatic. Normal sclera/conjunctiva. MMM. CV: Regular rate and rhythm. Resp: No respiratory distress. Lungs CTAB. Abd: Soft, nontender, nondistended, NABS+   Ext: LLE in splint/ace wrap, swelling of distal foot/digits noted. NVI. Neuro: Alert, oriented, appropriately interactive.    Psych: Cooperative, appropriate mood and affect     Discharge Functional Status:    Physical therapy:  Bed Mobility: Scooting: Modified independent  Transfers: Sit to Stand: Modified independent  Stand to sit: Modified independent  Bed to Chair: Contact guard assistance (with RW)  Comment: Pty requested to use commode during the session. SBA for amb to commode, SBA for pants management and wiping, SBA for RW<>commode. Pt stood at sink with L UE support ~ 30 sec to wash hands SBA. Pt propped L LE on a chair with pillow while using commode, WB Status: NWB to LLE  Ambulation 1  Surface: level tile  Device: Rolling Walker  Other Apparatus: Wheelchair follow  Assistance: Modified Independent  Quality of Gait: hop-to gait pattern; able to maintain NWB LLE without assist or cues  Gait Deviations: Slow Carmen  Distance: 15' x2, 10' to curb completes step the 10' back, two turns per amb  Comments: Pt able to go fwd/bwd side/side with good control using small hops. Pt limited by L foot pain/throbbing, Stairs  Stairs Height: 4\" (with RW curb with SBA. two trials)  Curbs: 4\" (with RW curb Dionne)  Device: Rolling walker  Assistance: Contact guard assistance, Stand by assistance  Comment: pt ascended backwards and descended forwards 4\" curb step with SBA-CGA for balance; without cues for technique  Mobility:  , PT Equipment Recommendations  Equipment Needed: Yes  Mobility Devices: Wheelchair  Walker: Rolling  Wheelchair: Standard (18\", elevating leg rests, antitippers, standard cushion)  Other: pt may also need w/c (will continue to assess), Assessment: Pt is a 70 y.o. female who presented to [facility] on 8/2/21 for LEFT ANKLE OPEN REDUCTION INTERNAL FIXATION trimalleolar fracture with syndesmosis repair per Dr. Meseret Au. Pt is now NWB to her LLE. Prior to admission, pt living in the basement studio apt  of her r and OLLIE's home (just moved from [place] with [facility]). There is a steep walkway to enter [facility] apt with 1 MARIPOSA or flight of steps which does not have railing on half of it. PTA, pt was indep ambulating without an AD. Today, 8/10,  pt continues to have discomfort due to constipation . Pt able to hop short distances of ~10' using RW and SBA but is limited due to L LE pain and fatigue.  Pt consistently completes transfers SBA. Pt will require a wc at home for longer distance travel due to weightbearing status and L ankle pain and a RW to get in and out of her bathroom due to s small step. The pt is SBA for wc part management and set up for transfers. She completes wc navigation in tight spaces, over thresholds and for household and community distances with supervision . She is below her functional baseline and will benefit from 1 week on ARU to improve safety and independence with functional mobility prior to returning home with assist PRN from family. Pt agreeable to planned dc of 8/11/2021 home with assist PRN. Occupational therapy:  ,  , Assessment: Pt made steady gains w/ OT intervention, met 5/5 goals. Pt completed oral care w/ Mod I while seated at sink. She completed UB bathing w/ Mod I, seated on shower chair turned toward curtain. She completed LB bathing w/ Mod I, able to double bag L leg w/ leg elevated on chair outside of shower, safety concerns d/t pt rushing through ADL. She completed UB dressing w/ Mod I, retrieved and transported clothing to bathroom over the front of RW, donned shirt while seated in w/c. She completed LB dressing w/ Mod I, majority of dressing while seated in w/c but stood to don shorts and underwear over buttocks, shifted weight on each arm of w/c to maintain balance. She completed toileting w/ Mod I using BSC, SPT to/from bed. Pt to DC home tomorrow 8/11/21 w/ HHOT.     Speech therapy:         Significant Diagnostics:   Lab Results   Component Value Date    CREATININE 0.6 08/09/2021    BUN 15 08/09/2021     08/09/2021    K 4.3 08/09/2021     08/09/2021    CO2 26 08/09/2021       Lab Results   Component Value Date    WBC 8.5 08/09/2021    HGB 12.7 08/09/2021    HCT 37.7 08/09/2021    MCV 96.4 08/09/2021     08/09/2021       Disposition:  Home with daughter and son-in-law  Services: HH PT, OT, RN  DME: manual wheelchair, rolling walker, bedside commode, shower chair without back    Discharge Condition: Stable    Follow-up:  See after visit summary from hospitalization    Discharge Medications:     Medication List      START taking these medications    * bisacodyl 10 MG suppository  Commonly known as: DULCOLAX  Place 1 suppository rectally daily as needed for Constipation     * bisacodyl 5 MG EC tablet  Commonly known as: DULCOLAX  Take 1 tablet by mouth daily     HYDROcodone-acetaminophen 5-325 MG per tablet  Commonly known as: NORCO  Take 1-2 tablets by mouth every 6 hours as needed for Pain for up to 7 days. pantoprazole 40 MG tablet  Commonly known as: PROTONIX  Take 1 tablet by mouth every morning (before breakfast)     polyethylene glycol 17 g packet  Commonly known as: GLYCOLAX  Take 17 g by mouth 2 times daily     sucralfate 1 GM tablet  Commonly known as: CARAFATE  Take 1 tablet by mouth 4 times daily as needed (heartburn/indigestion)         * This list has 2 medication(s) that are the same as other medications prescribed for you. Read the directions carefully, and ask your doctor or other care provider to review them with you. CONTINUE taking these medications    aspirin EC 81 MG EC tablet  Take 1 tablet by mouth 2 times daily for 21 days Take for DVT blood clot prophylaxis. Please avoid missing doses. calamine lotion  Apply topically as needed.      ondansetron 4 MG tablet  Commonly known as: ZOFRAN  Take 1 tablet by mouth 3 times daily as needed for Nausea or Vomiting     Sleep Aid 25 MG tablet  Generic drug: doxyLAMINE succinate     therapeutic multivitamin-minerals tablet        STOP taking these medications    oxyCODONE 5 MG immediate release tablet  Commonly known as: Martha Hanson           Where to Get Your Medications      These medications were sent to [address] [place] [address] & [facility]  [address] [address], [address] [address]    Phone: [phone]   · bisacodyl 10 MG suppository  · HYDROcodone-acetaminophen 5-325 MG per tablet  · ondansetron 4 MG tablet  · pantoprazole 40 MG tablet  · polyethylene glycol 17 g packet  · sucralfate 1 GM tablet     Information about where to get these medications is not yet available    Ask your nurse or doctor about these medications  · aspirin EC 81 MG EC tablet  · bisacodyl 5 MG EC tablet           I spent over 35 minutes on this discharge encounter between counseling, coordination of care, and medication reconciliation. To comply with [facility] bylaw R.II.4.1:   Discharge order placed in advance to facilitate patients discharge needs.       Kee Carlin MD

## 2021-08-11 NOTE — PROGRESS NOTES
Patient admitted to rehab with left ankle fracture. Splint/bandage in place with no drainage. Alert/oriented. Transfers using walker; does well. On aspirin for DVT prophylaxis. Requested/received PRN pain medication, carafate, and zofran. Wants to wait to take Protonix this am.   Medications are taken whole with thins with no complication. Patient using BSC to void and is transferring self from bed to [facility], refusing alarm on bed. Continues to have small BM's when getting up to void. Stool appear soft, small, round. Continent of bowel and bladder. On room air with clear lungs. Scheduled for d/c in early am.  Uses call light appropriately. Update - patient called this writer in room this morning to share she had a regular type BM.

## 2021-08-11 NOTE — PLAN OF CARE
Problem: Falls - Risk of:  Goal: Will remain free from falls  Description: Will remain free from falls  8/11/2021 1001 by Maribel Walker RN  Outcome: Completed     Problem: Falls - Risk of:  Goal: Absence of physical injury  Description: Absence of physical injury  Outcome: Completed     Problem: Infection:  Goal: Will remain free from infection  Description: Will remain free from infection  Outcome: Completed     Problem: Daily Care:  Goal: Daily care needs are met  Description: Daily care needs are met  Outcome: Completed     Problem: Pain:  Goal: Patient's pain/discomfort is manageable  Description: Patient's pain/discomfort is manageable  8/11/2021 1001 by Maribel Walker RN  Outcome: Completed     Problem: Pain:  Goal: Pain level will decrease  Description: Pain level will decrease  Outcome: Completed     Problem: Pain:  Goal: Control of acute pain  Description: Control of acute pain  Outcome: Completed     Problem: Pain:  Goal: Control of chronic pain  Description: Control of chronic pain  Outcome: Completed     Problem: Skin Integrity:  Goal: Skin integrity will stabilize  Description: Skin integrity will stabilize  8/11/2021 1001 by Maribel Walker RN  Outcome: Completed     Problem: Discharge Planning:  Goal: Patients continuum of care needs are met  Description: Patients continuum of care needs are met  8/11/2021 1001 by Maribel Walker RN  Outcome: Completed

## 2021-08-11 NOTE — PLAN OF CARE
Problem: Falls - Risk of:  Goal: Will remain free from falls  Description: Will remain free from falls  Outcome: Ongoing   Patient remained free of any falls this shift. Call light in reach at all times. Non skid footwear on. Patient encouraged to call for help when needed. Room free of clutter. Alarms on. Problem: Pain:  Goal: Patient's pain/discomfort is manageable  Description: Patient's pain/discomfort is manageable  Outcome: Ongoing   Pain assessed using 0-10 scale. Offer PRN medication as ordered by MD. Ronna Guadarrama 30 minutes after giving. Problem: Skin Integrity:  Goal: Skin integrity will stabilize  Description: Skin integrity will stabilize  Outcome: Ongoing   Patient is able to shift weight without assistance. Reposition every two hours. Skin assessed every shift. No new area of breakdown. Skin warm and dry to touch. Waffle cushion in chair.      Problem: Discharge Planning:  Goal: Patients continuum of care needs are met  Description: Patients continuum of care needs are met  Outcome: Ongoing

## 2021-08-11 NOTE — CARE COORDINATION
[employer] rep delivered the following DME and reviewed insurance coverage, rental & pickup process, and equipment setup with patient. Standard Manual Wheelchair with ELR's, Anti-Tippers, & Seat Cushion. Bedside Commode    Wheeled Walker & Toilet Safety Frame (purchased items)    RN aware.     Thank you for the referral.  Electronically signed by Adore Dickey on 8/11/2021 at 10:27 AM Cell ph# [phone]

## 2021-08-11 NOTE — PROGRESS NOTES
[address] [facility] Orthopedic Surgery   Progress Note      S/P :  SUBJECTIVE  Pt seen at bedside. Post left ankle ORIF 8/2/21 per Dr Baron Yen. She is going home from rehab today. She is alert and pleasant. Anxious for DC to home. Pain is   described in left ankle and with the intensity of moderate. Pain is described as aching, tender. OBJECTIVE              Physical                      VITALS:  /77   Pulse 70   Temp 98.2 °F (36.8 °C) (Oral)   Resp 17   Ht 5' 2\" (1.575 m)   Wt 162 lb 4.1 oz (73.6 kg)   SpO2 96%   BMI 29.68 kg/m²                     MUSCULOSKELETAL:  Left ankle swollen and bruised. left foot NVI. Wiggles toes to command. Pedal pulses are palpable. NEUROLOGIC:                                  Sensory:  Touch:  Left Lower Extremity:  normal                                                 Surgical wound appears clean and dry  With steri strips. NO suture or staples seen. Splint and ACE removed by me at this time and pt fitted in tall fx boot per DR Baron Yen directions. I place Mepilex AG dressings over wound left ankle at pt request for padding in boot. Pt tolerated well.      Data       CBC:   Lab Results   Component Value Date    WBC 8.5 08/09/2021    RBC 3.91 08/09/2021    HGB 12.7 08/09/2021    HCT 37.7 08/09/2021    MCV 96.4 08/09/2021    MCH 32.5 08/09/2021    MCHC 33.7 08/09/2021    RDW 12.7 08/09/2021     08/09/2021    MPV 10.2 08/09/2021        WBC:    Lab Results   Component Value Date    WBC 8.5 08/09/2021        Hemoglobin/Hematocrit:    Lab Results   Component Value Date    HGB 12.7 08/09/2021    HCT 37.7 08/09/2021        PT/INR:  No results found for: PROTIME, INR           Current Inpatient Medications             Current Facility-Administered Medications: HYDROcodone-acetaminophen (NORCO)  MG per tablet 1 tablet, 1 tablet, Oral, Q4H PRN  HYDROcodone-acetaminophen (NORCO) 5-325 MG per tablet 1 tablet, 1 tablet, Oral, Q4H PRN  melatonin tablet 6 mg, 6 mg, Oral, Nightly  bisacodyl (DULCOLAX) EC tablet 5 mg, 5 mg, Oral, Daily  lactulose (CHRONULAC) 10 GM/15ML solution 20 g, 20 g, Oral, Daily PRN  calcium carbonate (TUMS) chewable tablet 1,000 mg, 1,000 mg, Oral, TID PRN  aluminum & magnesium hydroxide-simethicone (MAALOX) 200-200-20 MG/5ML suspension 30 mL, 30 mL, Oral, Q6H PRN  sucralfate (CARAFATE) tablet 1 g, 1 g, Oral, 4x Daily PRN  pantoprazole (PROTONIX) tablet 40 mg, 40 mg, Oral, QAM AC  methyl salicylate-menthol (HALEIGH TRIPLETT GREASELESS) 10-15 % cream CREA, , Apply externally, TID PRN  acetaminophen (TYLENOL) tablet 650 mg, 650 mg, Oral, Q6H PRN  magnesium hydroxide (MILK OF MAGNESIA) 400 MG/5ML suspension 30 mL, 30 mL, Oral, Daily PRN  polyethylene glycol (GLYCOLAX) packet 17 g, 17 g, Oral, Daily PRN  sennosides-docusate sodium (SENOKOT-S) 8.6-50 MG tablet 1 tablet, 1 tablet, Oral, BID  aspirin chewable tablet 81 mg, 81 mg, Oral, BID  bisacodyl (DULCOLAX) suppository 10 mg, 10 mg, Rectal, Daily PRN  calamine lotion, , Topical, PRN  hydrALAZINE (APRESOLINE) tablet 10 mg, 10 mg, Oral, Q6H PRN  ondansetron (ZOFRAN) injection 4 mg, 4 mg, Intravenous, Q6H PRN  ondansetron (ZOFRAN) tablet 4 mg, 4 mg, Oral, Q8H PRN    ASSESSMENT AND PLAN    Left trimalleolar ankle fx  Post ORIF left ankle, stable exam and xrays  Discussed with Dr Diana Luke by phone  Plan cont NWB left foot in fx boot all times unless dressing or bathing  FU in office in 4 weeks. Cont ASA bid as ordered for DVT prophylaxis.        Mitzi Cohen, KINA - CNP  8/11/2021  9:58 AM 12-Sep-2018

## 2021-08-11 NOTE — PROGRESS NOTES
Patient agreed to discharge. Patient and spouse in room for discharge instructions, questions answered with verbal acknowledgement received, papers signed and copies placed in soft chart, prescriptions picked up per patient from [facility] located in the [facility]. Staff transported patient via wheel chair to [facility] and discharged home with all documented belongings except front wheeled walker that was found in room upon the return from the [facility]. Call in to pt cell phone provided by face sheet, no answer but message left to return call back at [phone].  Electronically signed by Cristino Magaña RN on 8/11/2021 at 11:00 AM

## 2021-08-11 NOTE — CARE COORDINATION
[facility]  Received referral from case management   Faxed orders to [address] care to see patient by   8/13/21  Montana Lux LPN    [facility] CTN Cell [phone], Fax [phone]

## 2021-08-16 ENCOUNTER — TELEPHONE (OUTPATIENT)
Dept: ORTHOPEDIC SURGERY | Age: 71
End: 2021-08-16

## 2021-08-16 NOTE — TELEPHONE ENCOUNTER
Patient was told that she is allowed to take the hard plastic to the boot off at night to sleep. The foot /ankle is still protected even without this on at night. Patient acknowledged.

## 2021-08-31 ENCOUNTER — TELEPHONE (OUTPATIENT)
Dept: ORTHOPEDIC SURGERY | Age: 71
End: 2021-08-31

## 2021-08-31 NOTE — TELEPHONE ENCOUNTER
Called and spoke with patient.  Discussed from previous call on 8/16 that she may sleep without the boot

## 2021-09-03 ENCOUNTER — TELEPHONE (OUTPATIENT)
Dept: ORTHOPEDIC SURGERY | Age: 71
End: 2021-09-03

## 2021-09-03 NOTE — TELEPHONE ENCOUNTER
Called and spoke with patient. She fell off of her knee scooter and thinks she \"cracked her rib\". Let her know this is not something [facility] treats but we can note if for when she comes into her post op appointment. She understood.

## 2021-09-03 NOTE — TELEPHONE ENCOUNTER
General Question     Subject:   Patient is calling with question about her ankle, because she fell on her ankle she just had ankle surgery with Dr Kiko Gomes       Patient [address] [facility] [address] Number:    [phone]

## 2021-09-04 ENCOUNTER — HOSPITAL ENCOUNTER (INPATIENT)
Age: 71
LOS: 1 days | Discharge: HOME | DRG: 552 | End: 2021-09-06
Attending: EMERGENCY MEDICINE | Admitting: INTERNAL MEDICINE
Payer: MEDICARE

## 2021-09-04 DIAGNOSIS — S30.1XXA CONTUSION OF FLANK, INITIAL ENCOUNTER: ICD-10-CM
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
## 2021-09-04 DIAGNOSIS — M54.9 INTRACTABLE BACK PAIN: Primary | ICD-10-CM

## 2021-09-04 DIAGNOSIS — Z74.09 LIMITED MOBILITY: ICD-10-CM

## 2021-09-04 DIAGNOSIS — R10.9 LEFT FLANK PAIN: ICD-10-CM

## 2021-09-04 DIAGNOSIS — S82.852A LEFT TRIMALLEOLAR FRACTURE, CLOSED, INITIAL ENCOUNTER: ICD-10-CM

## 2021-09-04 LAB
ANION GAP SERPL CALCULATED.3IONS-SCNC: 14 MMOL/L (ref 3–16)
BASOPHILS ABSOLUTE: 0.1 K/UL (ref 0–0.2)
BASOPHILS RELATIVE PERCENT: 0.6 %
BUN BLDV-MCNC: 15 MG/DL (ref 7–20)
CALCIUM SERPL-MCNC: 10.1 MG/DL (ref 8.3–10.6)
CHLORIDE BLD-SCNC: 102 MMOL/L (ref 99–110)
CO2: 24 MMOL/L (ref 21–32)
CREAT SERPL-MCNC: 0.7 MG/DL (ref 0.6–1.2)
EOSINOPHILS ABSOLUTE: 0.1 K/UL (ref 0–0.6)
EOSINOPHILS RELATIVE PERCENT: 0.4 %
GFR AFRICAN AMERICAN: >60
GFR NON-AFRICAN AMERICAN: >60
GLUCOSE BLD-MCNC: 102 MG/DL (ref 70–99)
HCT VFR BLD CALC: 42.1 % (ref 36–48)
HEMOGLOBIN: 14.1 G/DL (ref 12–16)
LYMPHOCYTES ABSOLUTE: 3.2 K/UL (ref 1–5.1)
LYMPHOCYTES RELATIVE PERCENT: 26.7 %
MCH RBC QN AUTO: 31.9 PG (ref 26–34)
MCHC RBC AUTO-ENTMCNC: 33.5 G/DL (ref 31–36)
MCV RBC AUTO: 95.2 FL (ref 80–100)
MONOCYTES ABSOLUTE: 0.8 K/UL (ref 0–1.3)
MONOCYTES RELATIVE PERCENT: 6.4 %
NEUTROPHILS ABSOLUTE: 7.9 K/UL (ref 1.7–7.7)
NEUTROPHILS RELATIVE PERCENT: 65.9 %
PDW BLD-RTO: 13.5 % (ref 12.4–15.4)
PLATELET # BLD: 193 K/UL (ref 135–450)
PMV BLD AUTO: 10.3 FL (ref 5–10.5)
POTASSIUM REFLEX MAGNESIUM: 3.8 MMOL/L (ref 3.5–5.1)
RBC # BLD: 4.43 M/UL (ref 4–5.2)
SODIUM BLD-SCNC: 140 MMOL/L (ref 136–145)
WBC # BLD: 11.9 K/UL (ref 4–11)

## 2021-09-04 PROCEDURE — 99285 EMERGENCY DEPT VISIT HI MDM: CPT

## 2021-09-04 PROCEDURE — 6360000002 HC RX W HCPCS: Performed by: EMERGENCY MEDICINE

## 2021-09-04 PROCEDURE — G0378 HOSPITAL OBSERVATION PER HR: HCPCS

## 2021-09-04 PROCEDURE — 36415 COLL VENOUS BLD VENIPUNCTURE: CPT

## 2021-09-04 PROCEDURE — G0378 HOSPITAL OBSERVATION PER HR: HCPCS | Performed by: INTERNAL MEDICINE

## 2021-09-04 PROCEDURE — 6360000004 HC RX CONTRAST MEDICATION: Performed by: EMERGENCY MEDICINE

## 2021-09-04 PROCEDURE — 6370000000 HC RX 637 (ALT 250 FOR IP): Performed by: EMERGENCY MEDICINE

## 2021-09-04 PROCEDURE — 80048 BASIC METABOLIC PNL TOTAL CA: CPT

## 2021-09-04 PROCEDURE — 6370000000 HC RX 637 (ALT 250 FOR IP): Performed by: INTERNAL MEDICINE

## 2021-09-04 PROCEDURE — 2580000003 HC RX 258: Performed by: INTERNAL MEDICINE

## 2021-09-04 PROCEDURE — 96376 TX/PRO/DX INJ SAME DRUG ADON: CPT

## 2021-09-04 PROCEDURE — 85025 COMPLETE CBC W/AUTO DIFF WBC: CPT

## 2021-09-04 PROCEDURE — 96375 TX/PRO/DX INJ NEW DRUG ADDON: CPT

## 2021-09-04 PROCEDURE — 96374 THER/PROPH/DIAG INJ IV PUSH: CPT

## 2021-09-04 PROCEDURE — 6360000002 HC RX W HCPCS: Performed by: INTERNAL MEDICINE

## 2021-09-04 RX ORDER — ASPIRIN 81 MG/1
81 TABLET ORAL 2 TIMES DAILY
Status: DISCONTINUED | OUTPATIENT
Start: 2021-09-04 | End: 2021-09-06 | Stop reason: HOSPADM

## 2021-09-04 RX ORDER — LIDOCAINE 4 G/G
1 PATCH TOPICAL DAILY
Status: DISCONTINUED | OUTPATIENT
Start: 2021-09-04 | End: 2021-09-06 | Stop reason: HOSPADM

## 2021-09-04 RX ORDER — ACETAMINOPHEN 325 MG/1
650 TABLET ORAL EVERY 6 HOURS PRN
Status: DISCONTINUED | OUTPATIENT
Start: 2021-09-04 | End: 2021-09-06 | Stop reason: HOSPADM

## 2021-09-04 RX ORDER — ONDANSETRON 2 MG/ML
4 INJECTION INTRAMUSCULAR; INTRAVENOUS ONCE
Status: COMPLETED | OUTPATIENT
Start: 2021-09-04 | End: 2021-09-04

## 2021-09-04 RX ORDER — ORPHENADRINE CITRATE 30 MG/ML
30 INJECTION INTRAMUSCULAR; INTRAVENOUS ONCE
Status: COMPLETED | OUTPATIENT
Start: 2021-09-04 | End: 2021-09-04

## 2021-09-04 RX ORDER — SODIUM CHLORIDE 0.9 % (FLUSH) 0.9 %
10 SYRINGE (ML) INJECTION EVERY 12 HOURS SCHEDULED
Status: DISCONTINUED | OUTPATIENT
Start: 2021-09-04 | End: 2021-09-06 | Stop reason: HOSPADM

## 2021-09-04 RX ORDER — SODIUM CHLORIDE 0.9 % (FLUSH) 0.9 %
10 SYRINGE (ML) INJECTION PRN
Status: DISCONTINUED | OUTPATIENT
Start: 2021-09-04 | End: 2021-09-06 | Stop reason: HOSPADM

## 2021-09-04 RX ORDER — ACETAMINOPHEN 650 MG/1
650 SUPPOSITORY RECTAL EVERY 6 HOURS PRN
Status: DISCONTINUED | OUTPATIENT
Start: 2021-09-04 | End: 2021-09-06 | Stop reason: HOSPADM

## 2021-09-04 RX ORDER — MAGNESIUM SULFATE IN WATER 40 MG/ML
2000 INJECTION, SOLUTION INTRAVENOUS PRN
Status: DISCONTINUED | OUTPATIENT
Start: 2021-09-04 | End: 2021-09-06 | Stop reason: HOSPADM

## 2021-09-04 RX ORDER — ONDANSETRON 2 MG/ML
4 INJECTION INTRAMUSCULAR; INTRAVENOUS EVERY 6 HOURS PRN
Status: DISCONTINUED | OUTPATIENT
Start: 2021-09-04 | End: 2021-09-06 | Stop reason: HOSPADM

## 2021-09-04 RX ORDER — PANTOPRAZOLE SODIUM 40 MG/1
40 TABLET, DELAYED RELEASE ORAL
Status: DISCONTINUED | OUTPATIENT
Start: 2021-09-05 | End: 2021-09-06 | Stop reason: HOSPADM

## 2021-09-04 RX ORDER — BISACODYL 10 MG
10 SUPPOSITORY, RECTAL RECTAL DAILY PRN
Status: DISCONTINUED | OUTPATIENT
Start: 2021-09-04 | End: 2021-09-06 | Stop reason: HOSPADM

## 2021-09-04 RX ORDER — LABETALOL HYDROCHLORIDE 5 MG/ML
10 INJECTION, SOLUTION INTRAVENOUS EVERY 4 HOURS PRN
Status: DISCONTINUED | OUTPATIENT
Start: 2021-09-04 | End: 2021-09-06 | Stop reason: HOSPADM

## 2021-09-04 RX ORDER — SUCRALFATE 1 G/1
1 TABLET ORAL 4 TIMES DAILY PRN
Status: DISCONTINUED | OUTPATIENT
Start: 2021-09-04 | End: 2021-09-06 | Stop reason: HOSPADM

## 2021-09-04 RX ORDER — ACETAMINOPHEN 325 MG/1
650 TABLET ORAL ONCE
Status: COMPLETED | OUTPATIENT
Start: 2021-09-04 | End: 2021-09-04

## 2021-09-04 RX ORDER — SODIUM CHLORIDE 9 MG/ML
25 INJECTION, SOLUTION INTRAVENOUS PRN
Status: DISCONTINUED | OUTPATIENT
Start: 2021-09-04 | End: 2021-09-06 | Stop reason: HOSPADM

## 2021-09-04 RX ORDER — ONDANSETRON 4 MG/1
4 TABLET, ORALLY DISINTEGRATING ORAL EVERY 8 HOURS PRN
Status: DISCONTINUED | OUTPATIENT
Start: 2021-09-04 | End: 2021-09-06 | Stop reason: HOSPADM

## 2021-09-04 RX ORDER — POTASSIUM CHLORIDE 7.45 MG/ML
10 INJECTION INTRAVENOUS PRN
Status: DISCONTINUED | OUTPATIENT
Start: 2021-09-04 | End: 2021-09-06 | Stop reason: HOSPADM

## 2021-09-04 RX ADMIN — ORPHENADRINE CITRATE 30 MG: 30 INJECTION INTRAMUSCULAR; INTRAVENOUS at 17:23

## 2021-09-04 RX ADMIN — ACETAMINOPHEN 650 MG: 325 TABLET ORAL at 17:17

## 2021-09-04 RX ADMIN — IOPAMIDOL 75 ML: 755 INJECTION, SOLUTION INTRAVENOUS at 15:31

## 2021-09-04 RX ADMIN — SODIUM CHLORIDE, PRESERVATIVE FREE 10 ML: 5 INJECTION INTRAVENOUS at 21:42

## 2021-09-04 RX ADMIN — HYDROMORPHONE HYDROCHLORIDE 0.5 MG: 1 INJECTION, SOLUTION INTRAMUSCULAR; INTRAVENOUS; SUBCUTANEOUS at 14:36

## 2021-09-04 RX ADMIN — HYDROMORPHONE HYDROCHLORIDE 0.5 MG: 1 INJECTION, SOLUTION INTRAMUSCULAR; INTRAVENOUS; SUBCUTANEOUS at 16:13

## 2021-09-04 RX ADMIN — HYDROMORPHONE HYDROCHLORIDE 0.5 MG: 1 INJECTION, SOLUTION INTRAMUSCULAR; INTRAVENOUS; SUBCUTANEOUS at 21:39

## 2021-09-04 RX ADMIN — ASPIRIN 81 MG: 81 TABLET, COATED ORAL at 21:40

## 2021-09-04 RX ADMIN — ONDANSETRON 4 MG: 2 INJECTION INTRAMUSCULAR; INTRAVENOUS at 14:35

## 2021-09-04 ASSESSMENT — PAIN SCALES - WONG BAKER
WONGBAKER_NUMERICALRESPONSE: 0
WONGBAKER_NUMERICALRESPONSE: 0

## 2021-09-04 ASSESSMENT — PAIN DESCRIPTION - LOCATION
LOCATION: BACK
LOCATION: BACK

## 2021-09-04 ASSESSMENT — PAIN SCALES - GENERAL
PAINLEVEL_OUTOF10: 10
PAINLEVEL_OUTOF10: 8
PAINLEVEL_OUTOF10: 5
PAINLEVEL_OUTOF10: 7
PAINLEVEL_OUTOF10: 10
PAINLEVEL_OUTOF10: 8

## 2021-09-04 ASSESSMENT — PAIN DESCRIPTION - FREQUENCY
FREQUENCY: CONTINUOUS
FREQUENCY: CONTINUOUS

## 2021-09-04 ASSESSMENT — PAIN DESCRIPTION - ORIENTATION
ORIENTATION: LEFT;LOWER
ORIENTATION: LEFT;LOWER

## 2021-09-04 ASSESSMENT — PAIN DESCRIPTION - PAIN TYPE
TYPE: ACUTE PAIN
TYPE: ACUTE PAIN

## 2021-09-04 ASSESSMENT — PAIN DESCRIPTION - ONSET
ONSET: ON-GOING
ONSET: ON-GOING

## 2021-09-04 ASSESSMENT — PAIN - FUNCTIONAL ASSESSMENT
PAIN_FUNCTIONAL_ASSESSMENT: PREVENTS OR INTERFERES SOME ACTIVE ACTIVITIES AND ADLS
PAIN_FUNCTIONAL_ASSESSMENT: PREVENTS OR INTERFERES SOME ACTIVE ACTIVITIES AND ADLS

## 2021-09-04 ASSESSMENT — PAIN DESCRIPTION - DESCRIPTORS
DESCRIPTORS: STABBING
DESCRIPTORS: SHARP;RADIATING;STABBING

## 2021-09-04 ASSESSMENT — PAIN DESCRIPTION - PROGRESSION
CLINICAL_PROGRESSION: NOT CHANGED
CLINICAL_PROGRESSION: NOT CHANGED

## 2021-09-04 NOTE — ED NOTES
Bed: [facility]  Expected date: 9/4/21  Expected time: 11:59 AM  Means of arrival: [facility]  Comments:  71F diff breathing, fall 2 days ago thinks she broke ribs     Kirk Cabello RN  09/04/21 6733

## 2021-09-05 ENCOUNTER — APPOINTMENT (OUTPATIENT)
Dept: GENERAL RADIOLOGY | Age: 71
DRG: 552 | End: 2021-09-05
Payer: MEDICARE

## 2021-09-05 LAB
ANION GAP SERPL CALCULATED.3IONS-SCNC: 13 MMOL/L (ref 3–16)
BUN BLDV-MCNC: 17 MG/DL (ref 7–20)
CALCIUM SERPL-MCNC: 9.6 MG/DL (ref 8.3–10.6)
CHLORIDE BLD-SCNC: 102 MMOL/L (ref 99–110)
CO2: 23 MMOL/L (ref 21–32)
CREAT SERPL-MCNC: 0.8 MG/DL (ref 0.6–1.2)
GFR AFRICAN AMERICAN: >60
GFR NON-AFRICAN AMERICAN: >60
GLUCOSE BLD-MCNC: 109 MG/DL (ref 70–99)
HCT VFR BLD CALC: 40.9 % (ref 36–48)
HEMOGLOBIN: 13.8 G/DL (ref 12–16)
MCH RBC QN AUTO: 32.4 PG (ref 26–34)
MCHC RBC AUTO-ENTMCNC: 33.8 G/DL (ref 31–36)
MCV RBC AUTO: 96.1 FL (ref 80–100)
PDW BLD-RTO: 13.1 % (ref 12.4–15.4)
PLATELET # BLD: 155 K/UL (ref 135–450)
PMV BLD AUTO: 9.8 FL (ref 5–10.5)
POTASSIUM REFLEX MAGNESIUM: 3.9 MMOL/L (ref 3.5–5.1)
RBC # BLD: 4.26 M/UL (ref 4–5.2)
SODIUM BLD-SCNC: 138 MMOL/L (ref 136–145)
WBC # BLD: 6.4 K/UL (ref 4–11)

## 2021-09-05 PROCEDURE — 97530 THERAPEUTIC ACTIVITIES: CPT

## 2021-09-05 PROCEDURE — 6370000000 HC RX 637 (ALT 250 FOR IP): Performed by: INTERNAL MEDICINE

## 2021-09-05 PROCEDURE — 6370000000 HC RX 637 (ALT 250 FOR IP): Performed by: EMERGENCY MEDICINE

## 2021-09-05 PROCEDURE — 85027 COMPLETE CBC AUTOMATED: CPT

## 2021-09-05 PROCEDURE — 2580000003 HC RX 258: Performed by: INTERNAL MEDICINE

## 2021-09-05 PROCEDURE — 36415 COLL VENOUS BLD VENIPUNCTURE: CPT

## 2021-09-05 PROCEDURE — 80048 BASIC METABOLIC PNL TOTAL CA: CPT

## 2021-09-05 PROCEDURE — 73610 X-RAY EXAM OF ANKLE: CPT

## 2021-09-05 PROCEDURE — 6360000002 HC RX W HCPCS: Performed by: INTERNAL MEDICINE

## 2021-09-05 PROCEDURE — G0378 HOSPITAL OBSERVATION PER HR: HCPCS

## 2021-09-05 PROCEDURE — 97162 PT EVAL MOD COMPLEX 30 MIN: CPT

## 2021-09-05 RX ORDER — CYCLOBENZAPRINE HCL 10 MG
10 TABLET ORAL 3 TIMES DAILY PRN
Status: DISCONTINUED | OUTPATIENT
Start: 2021-09-05 | End: 2021-09-06 | Stop reason: HOSPADM

## 2021-09-05 RX ORDER — HYDROCODONE BITARTRATE AND ACETAMINOPHEN 5; 325 MG/1; MG/1
1 TABLET ORAL EVERY 6 HOURS PRN
Status: DISCONTINUED | OUTPATIENT
Start: 2021-09-05 | End: 2021-09-06 | Stop reason: HOSPADM

## 2021-09-05 RX ADMIN — ENOXAPARIN SODIUM 40 MG: 40 INJECTION SUBCUTANEOUS at 07:58

## 2021-09-05 RX ADMIN — CYCLOBENZAPRINE 10 MG: 10 TABLET, FILM COATED ORAL at 20:43

## 2021-09-05 RX ADMIN — SODIUM CHLORIDE, PRESERVATIVE FREE 10 ML: 5 INJECTION INTRAVENOUS at 21:16

## 2021-09-05 RX ADMIN — HYDROCODONE BITARTRATE AND ACETAMINOPHEN 1 TABLET: 5; 325 TABLET ORAL at 11:29

## 2021-09-05 RX ADMIN — PANTOPRAZOLE SODIUM 40 MG: 40 TABLET, DELAYED RELEASE ORAL at 05:36

## 2021-09-05 RX ADMIN — BISACODYL 5 MG: 5 TABLET, COATED ORAL at 07:58

## 2021-09-05 RX ADMIN — CYCLOBENZAPRINE 10 MG: 10 TABLET, FILM COATED ORAL at 11:29

## 2021-09-05 RX ADMIN — ONDANSETRON 4 MG: 4 TABLET, ORALLY DISINTEGRATING ORAL at 02:48

## 2021-09-05 RX ADMIN — HYDROMORPHONE HYDROCHLORIDE 0.5 MG: 1 INJECTION, SOLUTION INTRAMUSCULAR; INTRAVENOUS; SUBCUTANEOUS at 07:57

## 2021-09-05 RX ADMIN — ASPIRIN 81 MG: 81 TABLET, COATED ORAL at 07:58

## 2021-09-05 RX ADMIN — ASPIRIN 81 MG: 81 TABLET, COATED ORAL at 20:43

## 2021-09-05 RX ADMIN — SODIUM CHLORIDE, PRESERVATIVE FREE 10 ML: 5 INJECTION INTRAVENOUS at 08:04

## 2021-09-05 RX ADMIN — HYDROMORPHONE HYDROCHLORIDE 0.5 MG: 1 INJECTION, SOLUTION INTRAMUSCULAR; INTRAVENOUS; SUBCUTANEOUS at 02:48

## 2021-09-05 RX ADMIN — ACETAMINOPHEN 650 MG: 325 TABLET ORAL at 05:36

## 2021-09-05 RX ADMIN — Medication 10 ML: at 21:07

## 2021-09-05 RX ADMIN — ONDANSETRON 4 MG: 4 TABLET, ORALLY DISINTEGRATING ORAL at 11:32

## 2021-09-05 RX ADMIN — HYDROCODONE BITARTRATE AND ACETAMINOPHEN 1 TABLET: 5; 325 TABLET ORAL at 21:07

## 2021-09-05 ASSESSMENT — PAIN DESCRIPTION - LOCATION: LOCATION: BACK

## 2021-09-05 ASSESSMENT — PAIN SCALES - GENERAL
PAINLEVEL_OUTOF10: 9
PAINLEVEL_OUTOF10: 8
PAINLEVEL_OUTOF10: 9

## 2021-09-05 ASSESSMENT — PAIN SCALES - WONG BAKER: WONGBAKER_NUMERICALRESPONSE: 8

## 2021-09-05 ASSESSMENT — PAIN DESCRIPTION - PAIN TYPE: TYPE: ACUTE PAIN

## 2021-09-05 ASSESSMENT — PAIN DESCRIPTION - ORIENTATION: ORIENTATION: LEFT;LOWER

## 2021-09-05 ASSESSMENT — PAIN DESCRIPTION - PROGRESSION: CLINICAL_PROGRESSION: NOT CHANGED

## 2021-09-05 ASSESSMENT — PAIN DESCRIPTION - ONSET: ONSET: ON-GOING

## 2021-09-05 ASSESSMENT — PAIN DESCRIPTION - FREQUENCY: FREQUENCY: CONTINUOUS

## 2021-09-05 ASSESSMENT — PAIN DESCRIPTION - DESCRIPTORS: DESCRIPTORS: SHARP;RADIATING

## 2021-09-05 ASSESSMENT — PAIN DESCRIPTION - DIRECTION: RADIATING_TOWARDS: KNEE

## 2021-09-05 ASSESSMENT — PAIN - FUNCTIONAL ASSESSMENT: PAIN_FUNCTIONAL_ASSESSMENT: PREVENTS OR INTERFERES SOME ACTIVE ACTIVITIES AND ADLS

## 2021-09-05 NOTE — PROGRESS NOTES
4 Eyes Skin Assessment     NAME:  Karey Vega  YOB: 1950  MEDICAL RECORD NUMBER:  [id number]    The patient is being assess for  Admission    I agree that 2 RN's have performed a thorough Head to Toe Skin Assessment on the patient. ALL assessment sites listed below have been assessed. Areas assessed by both nurses:    Head, Face, Ears, Shoulders, Back, Chest, Arms, Elbows, Hands, Sacrum. Buttock, Coccyx, Ischium and Legs. Feet and Heels        Does the Patient have a Wound?  No noted wound(s)       Poncho Prevention initiated:  NA   Wound Care Orders initiated:  NA    Pressure Injury (Stage 3,4, Unstageable, DTI, NWPT, and Complex wounds) if present place consult order under [de-identified] NA    New and Established Ostomies if present place consult order under : NA      Nurse 1 eSignature: Electronically signed by Mckay Anguiano RN on 9/4/21 at 10:04 PM EDT    **SHARE this note so that the co-signing nurse is able to place an eSignature**    Nurse 2 eSignature: {Esignature:[id number]}

## 2021-09-05 NOTE — PROGRESS NOTES
Presentation: evolving  PT Education: Goals;PT Role;Plan of Care;Transfer Training;General Safety  Patient Education: potential benefit of cold pack to reduce irritation/inflammation, vs heat. REQUIRES PT FOLLOW UP: Yes  Activity Tolerance  Activity Tolerance: Patient limited by pain       Patient Diagnosis(es): The primary encounter diagnosis was Intractable back pain. Diagnoses of Contusion of flank, initial encounter, Limited mobility, and Fall from wheelchair, initial encounter were also pertinent to this visit. has a past medical history of Arthritis and Premature atrial contraction. has a past surgical history that includes Total hip arthroplasty and Ankle fracture surgery (Left, 8/2/2021). Restrictions  Restrictions/Precautions  Restrictions/Precautions: Weight Bearing, Fall Risk  Lower Extremity Weight Bearing Restrictions  Left Lower Extremity Weight Bearing: Non Weight Bearing (in boot)     Vision/Hearing  Vision: Within Functional Limits  Hearing: Within functional limits       Subjective  General  Chart Reviewed: Yes  Additional Pertinent Hx: 69 yo female with hx of L ankle trimalleolar fracture due to fall, with repair 8-2-2021 and on our inpatient rehab unit 8-3 to 8-, presents to hopital 9-4-2021 with hx of fall 2 days ago, with L flank pain. Patient tells PT she was sitting on seat of rollator in her laundry room and the rollator \"went away from me and my L side hit the rollator. \" Chest/Abdomen CT negative for acute process. L ankle xrays pending. With respect to L ankle fracture and functional mobility on rehab, patient was at [place] [address] level with transfers, WC mobility, and short distance 2 wh walker amb. Response To Previous Treatment: Not applicable  Family / Caregiver Present: No  Referring Practitioner: Verl Klinefelter  Referral Date : 09/04/21  Subjective  Subjective: Patient in bed. Agreeable to therapy. Reports intermittent L flank muscle spasm pain. Rates at 8/10.  As she performs bed mobility, transfers, and stance to RW from EOB, becomes tearful and reports increased L flank pain, and inability to continue further mobility. Orientation  WNL    Social/Functional History  Social/Functional History  Lives With: Family (in basment of Dtr/son in [address] [facility].)  Home Layout: One level  Bathroom Shower/Tub: Walk-in shower  Bathroom Toilet: Standard  Bathroom Equipment: 3-in-1 commode (toilet safety frames)  Home Equipment: Rolling walker, Wheelchair-manual (with elevating leg rests, anti tippers, and cushion.)  ADL Assistance: Independent (showers only when family home)  Ambulation Assistance: Independent ([facility] apartment (unlevel floors; at times effortful per patient); short distance ambulation with 2 wh RW. States sits on rollator and negotiates around Merrill Roberts (\"I would rather I do my own laundry\"))  Transfer Assistance: Independent  Active : No  Additional Comments: PT/OT/ and RN were coming to house at Rehab unit DC to home, but all had recently [facility] services, and patient states she was doing well. Family brought patient into home via her [employer]. 2 platform steps. Cognition   Cognition  Overall Cognitive Status: WFL  Cognition Comment: possibly a degree of self limiting, due to reported high pain L flank. Objective  AROM RLE (degrees)  RLE AROM: WFL  AROM LLE (degrees)  LLE AROM : WFL  Strength RLE  Strength RLE: WFL  Strength LLE  Comment: able to perform SLR, and maintain NWBng in stance  Sensation  Overall Sensation Status:  (reports tingling of L toes and L lateral foot.)  Bed mobility  Supine to Sit: Minimal assistance (exiting to her R side. HOB up, 1 rail. A to attain upright trunk. Reports high L flank pain during.)  Sit to Supine: Stand by assistance (effortful.)  Scooting: Stand by assistance (HOB very inclined.  Uses B UEs to rails and R foot -- no indications of L flank pain during.)  Transfers  Sit to Stand: Contact guard assistance;Stand by assistance (cues for hand placement. Patient reporting/indicating high L flank pain during. 4 attempts before successful stand.)  Stand to sit: Stand by assistance  Ambulation  Ambulation?: No (patient reports unable, citing high L flank pain.)  Balance  Posture: Good  Sitting - Static: Good  Sitting - Dynamic: Good  Standing - Static: Good (Fair tolerance, at [facility])  Exercises  Comments: Patient instructed in deep breathing/full breathing, and gentle and tolerable shoulder flexion/abduction while deep breathing, to stretch flank musculature. Plan   Plan  Times per week: 3-5  Current Treatment Recommendations: Functional Mobility Training, Transfer Training, Gait Training, Wheelchair Mobility Training, Safety Education & Training  Safety Devices  Type of devices: Bed alarm in place, Call light within reach, Patient at risk for falls, Left in bed (patient declines gait belt.)    AM-PAC Score  AM-PAC Inpatient Mobility Raw Score : 11 (09/05/21 1307)  AM-PAC Inpatient T-Scale Score : 33.86 (09/05/21 1307)  Mobility Inpatient CMS 0-100% Score: 72.57 (09/05/21 1307)  Mobility Inpatient CMS G-Code Modifier : CL (09/05/21 1307)     Goals  Short term goals  Time Frame for Short term goals: By hospital DC  Short term goal 1: Bed mobility SBA  Short term goal 2: Transfers SBA-CGA, NWBng L LE  Short term goal 3: Either WC mobility or RW amb (whichever is less painful) functional distances, with CGA-SBA  Patient Goals   Patient goals : \"Less hitchcock. To have Dr. Briana Farias check my xrays and see if I am allowed to use my L leg to walk. \"    Therapy Time   Individual Concurrent Group Co-treatment   Time In 1200         Time Out 3784         Minutes 54           Ev 15; TA 36    Zully Salcedo PT  Electronically signed by Tom Fink PT [phone])  on 9/5/2021 at 1:40 PM

## 2021-09-05 NOTE — PROGRESS NOTES
Patient transferred from ED via stretcher. A&Ox4. VSS, pain 10/10. oriented patient to room and call light. Bed locked in lowest position. Needed items in reach. All questions answered. Will continue to monitor.

## 2021-09-05 NOTE — PROGRESS NOTES
Hospitalist Progress Note      PCP: No primary care provider on file. Date of Admission: 9/4/2021    Chief Complaint: left posterior rib pain    Hospital Course: Patient is 51-year-old female with past medical history of GERD who presented to hospital for fall. According to the patient she had a fall 2 days ago, she was not able to walk, she was recently discharged from acute rehab facility. Patient mentioned she has severe back pain, she had sneezing episode today and she felt she broke her rib. Patient also mentions her pain is 10/10, not radiating, not associated with nausea vomiting, localized, sharp. Patient mentions she is not able to perform her usual activities of daily living, she lives by herself. Subjective: Pt seen and examined. Feels ok. Having a significant amount of pain with movement. Medications:  Reviewed    Infusion Medications    sodium chloride       Scheduled Medications    lidocaine  1 patch TransDERmal Daily    sodium chloride flush  10 mL IntraVENous 2 times per day    enoxaparin  40 mg SubCUTAneous Daily    aspirin EC  81 mg Oral BID    bisacodyl  5 mg Oral Daily    pantoprazole  40 mg Oral QAM AC     PRN Meds: cyclobenzaprine, HYDROcodone 5 mg - acetaminophen, sodium chloride flush, sodium chloride, potassium chloride, magnesium sulfate, ondansetron **OR** ondansetron, magnesium hydroxide, acetaminophen **OR** acetaminophen, bisacodyl, sucralfate, labetalol, HYDROmorphone      Intake/Output Summary (Last 24 hours) at 9/5/2021 1408  Last data filed at 9/5/2021 0543  Gross per 24 hour   Intake 1000 ml   Output 100 ml   Net 900 ml       Exam:    /72   Pulse 69   Temp 98.2 °F (36.8 °C) (Oral)   Resp 16   Ht 5' 2\" (1.575 m)   Wt 158 lb 15.2 oz (72.1 kg)   SpO2 94%   BMI 29.07 kg/m²     General appearance: No apparent distress, appears stated age and cooperative. HEENT: Pupils equal, round, and reactive to light. Conjunctivae/corneas clear.   Neck: Supple, with full range of motion. No jugular venous distention. Trachea midline. Respiratory:  Normal respiratory effort. Clear to auscultation, bilaterally without Rales/Wheezes/Rhonchi. Cardiovascular: Regular rate and rhythm with normal S1/S2 without murmurs, rubs or gallops. Abdomen: Soft, non-tender, non-distended with normal bowel sounds. Musculoskeletal: Left foot in surgical dressing. No clubbing, cyanosis or edema bilaterally. Full range of motion without deformity. Skin: Skin color, texture, turgor normal.  No rashes or lesions. Neurologic:  Neurovascularly intact without any focal sensory/motor deficits. Cranial nerves: II-XII intact, grossly non-focal.  Psychiatric: Alert and oriented, thought content appropriate, normal insight  Capillary Refill: Brisk,< 3 seconds   Peripheral Pulses: +2 palpable, equal bilaterally       Labs:   Recent Labs     09/04/21  1436 09/05/21  0621   WBC 11.9* 6.4   HGB 14.1 13.8   HCT 42.1 40.9    155     Recent Labs     09/04/21  1436 09/05/21  0621    138   K 3.8 3.9    102   CO2 24 23   BUN 15 17   CREATININE 0.7 0.8   CALCIUM 10.1 9.6     No results for input(s): AST, ALT, BILIDIR, BILITOT, ALKPHOS in the last 72 hours. No results for input(s): INR in the last 72 hours. No results for input(s): Katy Lowers in the last 72 hours. Urinalysis:      Lab Results   Component Value Date    NITRU Negative 08/03/2021    BLOODU Negative 08/03/2021    SPECGRAV 1.009 08/03/2021    GLUCOSEU Negative 08/03/2021       Radiology:  XR ANKLE LEFT (MIN 3 VIEWS)   Preliminary Result   Distal fibula and tibial fractures status post ORIF. No definite acute   osseous abnormality noted. CT CHEST W CONTRAST   Final Result   No gross acute process. CT ABDOMEN PELVIS W IV CONTRAST Additional Contrast? None   Final Result   No gross acute process.                  Assessment/Plan:    Active Hospital Problems    Diagnosis Date Noted    Back pain [M54.9] 09/04/2021       Back pain - suspect musculoskeletal pain. No evidence of fracture on radiographs.  -dilaudid PRN  -added PO Norco PRN  -added Flexeril PRN  -PT/OT eval  -may need placement at discharge    Leukocytosis - likely reactive, improved  -continue to monitor  -no indication to start Abx    GERD  -PPI    Left ankle fracture s/p ORIF  -will repeat ankle xrays per patient request  -she is set to see Dr. Tamez Staff on Tuesday for follow-up    DVT Prophylaxis: Lovenox  Diet: ADULT DIET;  Regular  Code Status: Full Code    PT/OT Eval Status: ordered    Dispo - continue care    Jose Hutson MD

## 2021-09-05 NOTE — CONSULTS
Consult Note  Physical Medicine and Rehabilitation    Patient: Balbir Rodríguez  [id number]  Date: 9/5/2021      Chief Complaint: left flank pain    History of Present Illness/Hospital Course:  Patients is a 71 yo F with pmh PACs, OA s/p left hip arthroplasty, vertigo, covid-19 infection (May 2020), recent left trimalleolar ankle fracture (8/1/2021) s/p ORIF (8/2 with Dr. Raj Bajwa) who presented 9/4/2021 with back pain s/p fall 2 days prior. No evidence of fracture on imaging. Course complicated by leukocytosis, thought to be reactive. Currently, patient reports severe but improving left flank pain. She states she was doing very well since discharge from ARU. Was functioning independently from wheelchair level and recently completed home care therapies. This fall occurred while she was doing laundry, rollator slipped out from under her. Pain is localized to area that struck bar of her walker when she fell. Described as sharp/spasm type pain. Worse with movement. Improves with rest and medication. Prior Level of Function:  Independent for mobility, ADLs, and IADLs  Was recently discharged from ARU (8/11) following ankle fracture -- overall modified independent for mobility and ADLs, primarily wheelchair level    Current Level of Function:  Stand-by to min assist for mobility    Pertinent Social History:  Support: Lives with daughter and son-in-law  Home set-up: basement apartment, 1 level    Past Medical History:   Diagnosis Date    Arthritis     Premature atrial contraction        Past Surgical History:   Procedure Laterality Date    ANKLE FRACTURE SURGERY Left 8/2/2021    LEFT ANKLE OPEN REDUCTION INTERNAL FIXATION performed by Jo-Ann May MD at [address] [place] [address]      left       No family history on file.     Social History     Socioeconomic History    Marital status: Unknown     Spouse name: Not on file    Number of children: Not on file    Years of education: Not on file    Highest education level: Not on file   Occupational History    Not on file   Tobacco Use    Smoking status: Never Smoker    Smokeless tobacco: Never Used   Vaping Use    Vaping Use: Never used   Substance and Sexual Activity    Alcohol use: Not Currently    Drug use: Never    Sexual activity: Not Currently   Other Topics Concern    Not on file   Social History Narrative    Not on file     Social Determinants of Health     Financial Resource Strain:     Difficulty of Paying Living Expenses:    Food Insecurity:     Worried About Running Out of Food in the Last Year:     [address] in the Last Year:    Transportation Needs:     Lack of Transportation (Medical):      Lack of Transportation (Non-Medical):    Physical Activity:     Days of Exercise per Week:     Minutes of Exercise per Session:    Stress:     Feeling of Stress :    Social Connections:     Frequency of Communication with Friends and Family:     Frequency of Social Gatherings with Friends and Family:     Attends [employer] Services:     Active Member of Clubs or Organizations:     Attends Club or Organization Meetings:     Marital Status:    Intimate Partner Violence:     Fear of Current or Ex-Partner:     Emotionally Abused:     Physically Abused:     Sexually Abused:            REVIEW OF SYSTEMS:   CONSTITUTIONAL: negative for fevers, chills, diaphoresis, appetite change, night sweats, unexpected weight change, fatigue  EYES: negative for blurred vision, eye discharge, visual disturbance and icterus  HEENT: negative for hearing loss, tinnitus, ear drainage, sinus pressure, nasal congestion, epistaxis and snoring  RESPIRATORY: Negative for hemoptysis, cough, sputum production  CARDIOVASCULAR: negative for chest pain, palpitations, exertional chest pressure/discomfort, syncope, edema  GASTROINTESTINAL: negative for nausea, vomiting, diarrhea, blood in stool, abdominal pain, +constipation  GENITOURINARY: negative for frequency, dysuria, urinary incontinence, decreased urine volume, and hematuria  HEMATOLOGIC/LYMPHATIC: negative for easy bruising, bleeding and lymphadenopathy  ALLERGIC/IMMUNOLOGIC: negative for recurrent infections, angioedema, anaphylaxis and drug reactions  ENDOCRINE: negative for weight changes and diabetic symptoms including polyuria, polydipsia and polyphagia  MUSCULOSKELETAL: refer to HPI  NEUROLOGICAL: negative for headaches, slurred speech, unilateral weakness  PSYCHIATRIC/BEHAVIORAL: negative for hallucinations, behavioral problems, confusion and agitation. Physical Examination:  Vitals:   Patient Vitals for the past 24 hrs:   BP Temp Temp src Pulse Resp SpO2 Height Weight   09/05/21 1119 116/72 98.2 °F (36.8 °C) Oral 69 16 94 % -- --   09/05/21 0507 -- -- -- -- -- -- -- 158 lb 15.2 oz (72.1 kg)   09/05/21 0443 120/74 98 °F (36.7 °C) Oral 69 16 94 % -- --   09/04/21 2130 (!) 165/95 97.7 °F (36.5 °C) Oral 66 18 99 % 5' 2\" (1.575 m) 155 lb 3.3 oz (70.4 kg)   09/04/21 1900 131/79 98 °F (36.7 °C) Temporal 75 16 99 % -- --   09/04/21 1717 130/79 98 °F (36.7 °C) Oral 70 16 98 % -- --     Const: Alert. WDWN. No distress  Eyes: Conjunctiva noninjected, no icterus noted; pupils equal, round, and reactive to light. HENT: Atraumatic, normocephalic; Oral mucosa moist  Neck: Trachea midline, neck supple. No thyromegaly noted. CV: Regular rate and rhythm, no murmur rub or gallop noted  Resp: Lungs clear to auscultation bilaterally, no rales wheezes or rhonchi, no retractions. Respirations unlabored. GI: Soft, nontender, nondistended. Normal bowel sounds. No palpable masses. Skin: Normal temperature and turgor. No rashes or breakdown noted. Ext: No significant edema appreciated. No varicosities. MSK: Left flank with area of ecchymosis and tenderness, no significant collection/hematoma. Left ankle ROM decreased. Otherwise no joint tenderness, erythema, warmth noted. Neuro: Alert, oriented, appropriate.  No cranial nerve deficits appreciated. Sensation intact to light touch. Motor examination reveals strength intact (did not test LLE due to NWB status). No abnormalities with finger/nose noted. Psych: Stable mood, normal judgement, normal affect     Lab Results   Component Value Date    WBC 6.4 09/05/2021    HGB 13.8 09/05/2021    HCT 40.9 09/05/2021    MCV 96.1 09/05/2021     09/05/2021     No results found for: INR, PROTIME  Lab Results   Component Value Date    CREATININE 0.8 09/05/2021    BUN 17 09/05/2021     09/05/2021    K 3.9 09/05/2021     09/05/2021    CO2 23 09/05/2021     No results found for: ALT, AST, GGT, ALKPHOS, BILITOT      Most recent imaging studies revealed:    CT chest  Chest:       Mediastinum: Calcification of the thoracic aorta.  Within the aorta, no gross   intraluminal flap.  Within the mediastinum, no pathologic lymphadenopathy by   size criteria.  Thyroid is grossly unremarkable.  Bilateral breast implants. No axillary adenopathy.       Lungs/pleura: Dependent ground-glass and reticular opacity bilaterally,   likely atelectasis.  No pneumothorax or pleural effusion.       Soft Tissues/Bones: Degenerative change of spine.  Schmorl's node favored at   the superior endplate of T5.       CT abdomen/pelvis  Organs: Focal fat along the falciform.  Minimal prominence of central hepatic   ducts.  Gallbladder is grossly unremarkable.  Spleen is within normal limits.    No collection about spleen or liver.  Stomach is grossly unremarkable.  No   pancreatic stranding.  Pancreatic duct is upper limits of normal in caliber,   approximately 3 mm.  Adrenal glands are within normal limits.  No   hydronephrosis or perinephric stranding.  Low-density lesions within the   kidneys bilaterally are too small to characterize.  Calcification of the   abdominal aorta.       GI/Bowel: Diverticulosis, without collette diverticulitis.  Moderate stool   within the colon.  Appendix is not well visualized.  Small bowel is   nondilated.       Pelvis: Streak artifact is seen from left hip arthroplasty.  Bladder is   grossly unremarkable.  Status post hysterectomy.  Pelvic phleboliths.  No   inguinal adenopathy.       Peritoneum/Retroperitoneum: No free air.  Fat containing paraumbilical hernia.       Bones/Soft Tissues: Left hip arthroplasty.  Several small sclerotic foci   within the pelvis and right femoral head, typically bone islands.  No gross   fracture is seen. Xray left ankle  Distal fibula and tibial fractures status post ORIF.  No definite acute   osseous abnormality noted. Assessment:  Fall  LEft flank pain - suspect contusion from impact during fall, no fractures or injuries noted on imaging. Recent Left trimalleolar ankle fracture with syndesmosis disruption -s/p ORIF (8/2 with Dr. Marquis Ruth). Remains NWB. H/o PACs  H/o covid-19 infection (5/2020)-Residual fatigue, intermittent cough  Intermittent vertigo   OA s/p prior left hip arthroplasty  GERD    Impairments: Pain limiting trunk movement, NWB LLE    Recommendations:    Patient's functional mobility is currently limited by pain. Suspect contusion from impact during fall. Anticipate will gradually improve with conservative management. Also anticipate function will improve and return to baseline once pain is controlled. Do not feel she would benefit from intensive therapies in ARU setting. Could consider SNF if unable to return home. For pain control: agree with lidoderm and prn Norco, encouraged use of ice as well. Continue PT/OT on acute medical floor. Thank you for this consult. Please contact me with any questions or concerns. Glenn E Emmy Asher.  Kalina Fonseca MD 9/5/2021, 2:32 PM

## 2021-09-05 NOTE — H&P
Hospital Medicine History & Physical      PCP: No primary care provider on file. Date of Admission: 9/4/2021    Chief Complaint:  Back pain    History Of Present Illness:  Patient is 71-year-old female with past medical history of GERD who presented to hospital for fall. According to the patient she had a fall 2 days ago, she was not able to walk, she was recently discharged from acute rehab facility. Patient mentioned she has severe back pain, she had sneezing episode today and she felt she broke her rib. Patient also mentions her pain is 10/10, not radiating, not associated with nausea vomiting, localized, sharp. Patient mentions she is not able to perform her usual activities of daily living, she lives by herself. Past Medical History:          Diagnosis Date    Arthritis     Premature atrial contraction        Past Surgical History:          Procedure Laterality Date    ANKLE FRACTURE SURGERY Left 8/2/2021    LEFT ANKLE OPEN REDUCTION INTERNAL FIXATION performed by Jayant Chapa MD at [address] [place] [address]       Medications Prior to Admission:      Prior to Admission medications    Medication Sig Start Date End Date Taking? Authorizing Provider   aspirin EC 81 MG EC tablet Take 1 tablet by mouth 2 times daily for 21 days Take for DVT blood clot prophylaxis. Please avoid missing doses.  8/10/21 8/31/21  Christo Estrella MD   ondansetron [facility]) 4 MG tablet Take 1 tablet by mouth 3 times daily as needed for Nausea or Vomiting 8/10/21   Christo Estrella MD   bisacodyl (DULCOLAX) 5 MG EC tablet Take 1 tablet by mouth daily 8/11/21   Christo Estrella MD   bisacodyl (DULCOLAX) 10 MG suppository Place 1 suppository rectally daily as needed for Constipation 8/10/21 9/9/21  Christo Estrella MD   polyethylene glycol [facility]) 17 g packet Take 17 g by mouth 2 times daily 8/10/21 9/9/21  Christo Estrella MD   pantoprazole (PROTONIX) 40 MG tablet Take 1 tablet by mouth every morning (before breakfast) 8/11/21   Lizy Wong MD   sucralfate (CARAFATE) 1 GM tablet Take 1 tablet by mouth 4 times daily as needed (heartburn/indigestion) 8/10/21 8/17/21  Lizy Wong MD   Multiple Vitamins-Minerals (THERAPEUTIC MULTIVITAMIN-MINERALS) tablet Take 1 tablet by mouth daily    Historical Provider, MD   doxyLAMINE succinate (SLEEP AID) 25 MG tablet Take 25 mg by mouth nightly    Historical Provider, MD   calamine lotion Apply topically as needed. 8/3/21   Jaime Smith, APRN - CNP       Allergies:  Morphine, Cortizone-10 [hydrocortisone], Phenergan [promethazine], and Sulfa antibiotics    Social History:      TOBACCO:   reports that she has never smoked. She has never used smokeless tobacco.  ETOH:   reports previous alcohol use. Family History:       Reviewed in detail and non contributory      No family history on file. REVIEW OF SYSTEMS:   Pertinent positives as noted in the HPI. All other systems reviewed and negative. PHYSICAL EXAM PERFORMED:    /79   Pulse 75   Temp 98 °F (36.7 °C) (Temporal)   Resp 16   Ht 5' 2\" (1.575 m)   Wt 163 lb (73.9 kg)   SpO2 99%   BMI 29.81 kg/m²     General appearance:  No apparent distress, cooperative. HEENT:  Normal cephalic, atraumatic without obvious deformity. Conjunctivae/corneas clear. Neck: Supple, with full range of motion. No cervical lymphadenopathy  Respiratory:  Normal respiratory effort. Clear to auscultation, bilaterally without Rales/Wheezes/Rhonchi. Cardiovascular:  Regular rate and rhythm with normal S1/S2 without murmurs, rubs or gallops. Abdomen: Soft, non-tender, non-distended, normal bowel sounds. Musculoskeletal:  No edema noted bilaterally. No tenderness on palpation   Skin: no rash visible  Neurologic:  Neurologically intact without any focal sensory/motor deficits.   grossly non-focal.  Psychiatric:  Alert and oriented, normal mood  Peripheral Pulses: +2 palpable, equal bilaterally       Labs:     Recent Labs     09/04/21  1436   WBC 11.9*   HGB 14.1   HCT 42.1        Recent Labs     09/04/21  1436      K 3.8      CO2 24   BUN 15   CREATININE 0.7   CALCIUM 10.1     No results for input(s): AST, ALT, BILIDIR, BILITOT, ALKPHOS in the last 72 hours. No results for input(s): INR in the last 72 hours. No results for input(s): Elena Comment in the last 72 hours. Urinalysis:      Lab Results   Component Value Date    NITRU Negative 08/03/2021    BLOODU Negative 08/03/2021    SPECGRAV 1.009 08/03/2021    GLUCOSEU Negative 08/03/2021       Radiology:       CT CHEST W CONTRAST   Final Result   No gross acute process. CT ABDOMEN PELVIS W IV CONTRAST Additional Contrast? None   Final Result   No gross acute process. Active Hospital Problems    Diagnosis Date Noted    Back pain [M54.9] 09/04/2021     Patient is 28-year-old female with past medical history of GERD who presented to hospital for fall. According to the patient she had a fall 2 days ago, she was not able to walk, she was recently discharged from acute rehab facility. Patient mentioned she has severe back pain, she had sneezing episode today and she felt she broke her rib. Patient also mentions her pain is 10/10, not radiating, not associated with nausea vomiting, localized, sharp. Patient mentions she is not able to perform her usual activities of daily living, she lives by herself.     Assessment  Fall  Back pain  Leukocytosis likely reactive  GERD    Plan  Pain control, consult PT/OT, consult ARU-patient was recently at ARU, patient has trouble walking again  Trauma work-up performed in ED-negative for acute process, patient adamantly denied hitting her head, CT head was not performed in ED  Resume home medications  DVT prophylaxis Lovenox  Diet: No diet orders on file  Code Status: Prior    PT/OT Eval Status: ordered    Dispo - pending clinical improvement       Joceline MD Shon    The note was completed using EMR and Dragon dictation system. Every effort was made to ensure accuracy; however, inadvertent computerized transcription errors may be present. Thank you No primary care provider on file. for the opportunity to be involved in this patient's care. If you have any questions or concerns please feel free to contact me at [phone].     Celeste Bills MD

## 2021-09-05 NOTE — ED PROVIDER NOTES
EMERGENCY DEPARTMENT PROVIDER NOTE    Patient Identification  Pt Name: Rick Fay  MRN: [id number]  Madeleinegfanna 1950  Date of evaluation: 9/4/2021  Provider: Piotr Stone DO  PCP: No primary care provider on file. Chief Complaint  Back Pain (states pain on the left side where her kidney is, states she slipped off of a rollator on a seat and she fell back and landed on her left side 2 days ago, states the pain is worse when she sneezed today)      HPI  (History provided by patient)  This is a 70 y.o. female with pertinent past medical history of arthritis who was brought in by EMS transportation for fall which occurred 2 days ago. Patient states that she was transferring from her wheelchair when she slipped and fell, striking her left side and chest against the metal wheel of the chair before landing on the floor. She denies any head injury or loss of consciousness. Patient states has been in pain since that time, however her pain acutely worsened just prior to arrival when she sneezed and felt \"a pop\" in her chest and side. Pain is now sharp and stabbing, 10 out of 10, worsened with touch, movement and deep breathing. Nothing seems to make it any better. Patient states she has been unable to sit up or use her wheelchair since onset of the pain. No radiation to mid chest or abdomen. She denies any weakness or numbness.     ROS    Const:  No fevers, no chills, no generalized weakness  Skin:  No rash, no lesions  Eyes:  No visual changes, no blurry or double vision, no pain  ENT:  No sore throat, no difficulty swallowing, no ear pain, no sinus pain or congestion  Card:  No chest pain, no palpitations, no edema  Resp:  No shortness of breath, no cough, no wheezing  Abd:  No abdominal pain, no nausea, no vomiting, no diarrhea  Genitourinary:  No dysuria, no hematuria  MSK:  +joint pain, +myalgia  Neuro:  No focal weakness, no headache, no paresthesia    All other systems reviewed and negative tobacco. She reports previous alcohol use. She reports that she does not use drugs. Family history:  No family history on file. Exam  ED Triage Vitals [09/04/21 1230]   BP Temp Temp Source Pulse Resp SpO2 Height Weight   (!) 148/92 97.6 °F (36.4 °C) Oral 74 20 100 % 5' 2\" (1.575 m) 163 lb (73.9 kg)     Nursing note and vitals reviewed. Constitutional: Well developed, well nourished. Non-toxic in appearance. HENT:      Head: Normocephalic and atraumatic. Ears: External ears normal.      Nose: Nose normal.     Mouth: Membrane mucosa moist and pink. Eyes: Anicteric sclera. No discharge. Neck: Supple. Trachea midline. No midline tenderness, full range of motion without pain  Cardiovascular: RRR; no murmurs, rubs, or gallops. Radial and DP 2+ and symmetric  Pulmonary/Chest: Effort normal. No respiratory distress. CTAB. No stridor. No wheezes. No rales. Exquisite tenderness palpation overlying left inferior lateral chest wall in region of ecchymosis, no crepitus  Abdominal: Soft. No distension. Nontender to deep palpation all quadrants, +left CVA tenderness  Musculoskeletal: Moves all extremities. No gross deformity. Left foot and ankle in orthotic boot. No pain with palpation along midline lumbar and thoracic spine. No tenderness elicited with compression and translation of pelvis. Neurological: Alert and orientedx4. Face symmetric. Speech is clear. CN 2-12 intact. 5/5 motor and sensation grossly intact all extremities. No pronator drift. Gait unable to be tested. Skin: Warm and dry. No rash. Ecchymosis overlying left inferior lateral chest wall and left flank. Psychiatric: Normal mood and affect. Behavior is normal.    Procedures            Radiology  CT CHEST W CONTRAST   Final Result   No gross acute process. CT ABDOMEN PELVIS W IV CONTRAST Additional Contrast? None   Final Result   No gross acute process.              Labs  Results for orders placed or performed during the hospital encounter of 09/04/21   CBC Auto Differential   Result Value Ref Range    WBC 11.9 (H) 4.0 - 11.0 K/uL    RBC 4.43 4.00 - 5.20 M/uL    Hemoglobin 14.1 12.0 - 16.0 g/dL    Hematocrit 42.1 36.0 - 48.0 %    MCV 95.2 80.0 - 100.0 fL    MCH 31.9 26.0 - 34.0 pg    MCHC 33.5 31.0 - 36.0 g/dL    RDW 13.5 12.4 - 15.4 %    Platelets 462 273 - 095 K/uL    MPV 10.3 5.0 - 10.5 fL    Neutrophils % 65.9 %    Lymphocytes % 26.7 %    Monocytes % 6.4 %    Eosinophils % 0.4 %    Basophils % 0.6 %    Neutrophils Absolute 7.9 (H) 1.7 - 7.7 K/uL    Lymphocytes Absolute 3.2 1.0 - 5.1 K/uL    Monocytes Absolute 0.8 0.0 - 1.3 K/uL    Eosinophils Absolute 0.1 0.0 - 0.6 K/uL    Basophils Absolute 0.1 0.0 - 0.2 K/uL   Basic Metabolic Panel w/ Reflex to MG   Result Value Ref Range    Sodium 140 136 - 145 mmol/L    Potassium reflex Magnesium 3.8 3.5 - 5.1 mmol/L    Chloride 102 99 - 110 mmol/L    CO2 24 21 - 32 mmol/L    Anion Gap 14 3 - 16    Glucose 102 (H) 70 - 99 mg/dL    BUN 15 7 - 20 mg/dL    CREATININE 0.7 0.6 - 1.2 mg/dL    GFR Non-African American >60 >60    GFR African American >60 >60    Calcium 10.1 8.3 - 10.6 mg/dL       Screenings   [place]acy Coma Scale  Eye Opening: Spontaneous  Best Verbal Response: Oriented  Best Motor Response: Obeys commands  [place] Coma Scale Score: 15       MDM and ED Course    Patient afebrile and nontoxic. She appears uncomfortable however is in no collette distress. Neuro exam is nonfocal, nothing to suggest CVA/TIA or cord injury. Patient without external evidence of head injury, neck benign on exam, she very adamantly denies any neck pain or hitting her head. Abdomen is also benign to palpation. Exquisite tenderness over the left inferior chest wall and left flank. CT imaging of chest and abdomen reveal no acute traumatic injury, there is also no evidence of obstruction, perforation, abdominal infection, ureteric stone or other acute process.   Nothing to suggest vascular dissection or aneurysm as cause of pain. Despite multiple doses of pain medication, muscle relaxant and lidocaine patch patient remained unable to sit up forward without severe pain. She was unable to mobilize to a wheelchair. Not safe for discharge to self-care at this time. Case discussed with internal medicine team who will admit for further evaluation and care. Final Impression  1. Intractable back pain    2. Contusion of flank, initial encounter    3. Limited mobility    4. Fall from wheelchair, initial encounter        Blood pressure (!) 165/95, pulse 66, temperature 97.7 °F (36.5 °C), temperature source Oral, resp. rate 18, height 5' 2\" (1.575 m), weight 155 lb 3.3 oz (70.4 kg), SpO2 99 %. Disposition:  DISPOSITION Admitted 09/04/2021 05:55:56 PM      Patient Referrals:  No follow-up provider specified. Discharge Medications:  Current Discharge Medication List          Discontinued Medications:  Current Discharge Medication List          This chart was generated using the [address] [place] [address] dictation system. I created this record but it may contain dictation errors given the limitations of this technology.     Chaparro Kwon DO (electronically signed)  Attending Emergency Physician       Chaparro Kwon DO  09/04/21 0922

## 2021-09-06 VITALS
DIASTOLIC BLOOD PRESSURE: 76 MMHG | TEMPERATURE: 98.1 F | WEIGHT: 158.73 LBS | BODY MASS INDEX: 29.21 KG/M2 | RESPIRATION RATE: 16 BRPM | OXYGEN SATURATION: 94 % | SYSTOLIC BLOOD PRESSURE: 117 MMHG | HEART RATE: 81 BPM | HEIGHT: 62 IN

## 2021-09-06 LAB
ANION GAP SERPL CALCULATED.3IONS-SCNC: 11 MMOL/L (ref 3–16)
BUN BLDV-MCNC: 15 MG/DL (ref 7–20)
CALCIUM SERPL-MCNC: 9.7 MG/DL (ref 8.3–10.6)
CHLORIDE BLD-SCNC: 102 MMOL/L (ref 99–110)
CO2: 25 MMOL/L (ref 21–32)
CREAT SERPL-MCNC: 0.7 MG/DL (ref 0.6–1.2)
GFR AFRICAN AMERICAN: >60
GFR NON-AFRICAN AMERICAN: >60
GLUCOSE BLD-MCNC: 107 MG/DL (ref 70–99)
HCT VFR BLD CALC: 39.5 % (ref 36–48)
HEMOGLOBIN: 13.3 G/DL (ref 12–16)
MCH RBC QN AUTO: 32 PG (ref 26–34)
MCHC RBC AUTO-ENTMCNC: 33.6 G/DL (ref 31–36)
MCV RBC AUTO: 95.4 FL (ref 80–100)
PDW BLD-RTO: 13.3 % (ref 12.4–15.4)
PLATELET # BLD: 168 K/UL (ref 135–450)
PMV BLD AUTO: 10.5 FL (ref 5–10.5)
POTASSIUM REFLEX MAGNESIUM: 4.1 MMOL/L (ref 3.5–5.1)
RBC # BLD: 4.14 M/UL (ref 4–5.2)
SODIUM BLD-SCNC: 138 MMOL/L (ref 136–145)
WBC # BLD: 5.8 K/UL (ref 4–11)

## 2021-09-06 PROCEDURE — 6370000000 HC RX 637 (ALT 250 FOR IP): Performed by: INTERNAL MEDICINE

## 2021-09-06 PROCEDURE — G0378 HOSPITAL OBSERVATION PER HR: HCPCS

## 2021-09-06 PROCEDURE — 1200000000 HC SEMI PRIVATE

## 2021-09-06 PROCEDURE — 36415 COLL VENOUS BLD VENIPUNCTURE: CPT

## 2021-09-06 PROCEDURE — 2580000003 HC RX 258: Performed by: INTERNAL MEDICINE

## 2021-09-06 PROCEDURE — 94761 N-INVAS EAR/PLS OXIMETRY MLT: CPT

## 2021-09-06 PROCEDURE — 80048 BASIC METABOLIC PNL TOTAL CA: CPT

## 2021-09-06 PROCEDURE — 6370000000 HC RX 637 (ALT 250 FOR IP): Performed by: EMERGENCY MEDICINE

## 2021-09-06 PROCEDURE — 85027 COMPLETE CBC AUTOMATED: CPT

## 2021-09-06 RX ORDER — HYDROCODONE BITARTRATE AND ACETAMINOPHEN 5; 325 MG/1; MG/1
1 TABLET ORAL EVERY 6 HOURS PRN
Qty: 12 TABLET | Refills: 0 | Status: SHIPPED | OUTPATIENT
Start: 2021-09-06 | End: 2021-09-09

## 2021-09-06 RX ORDER — CYCLOBENZAPRINE HCL 10 MG
10 TABLET ORAL 3 TIMES DAILY PRN
Qty: 30 TABLET | Refills: 0 | Status: SHIPPED | OUTPATIENT
Start: 2021-09-06 | End: 2021-09-16

## 2021-09-06 RX ORDER — LIDOCAINE 4 G/G
1 PATCH TOPICAL DAILY
Qty: 1 BOX | Refills: 1 | Status: SHIPPED | OUTPATIENT
Start: 2021-09-07 | End: 2021-10-04 | Stop reason: ALTCHOICE

## 2021-09-06 RX ADMIN — SODIUM CHLORIDE, PRESERVATIVE FREE 10 ML: 5 INJECTION INTRAVENOUS at 08:59

## 2021-09-06 RX ADMIN — PANTOPRAZOLE SODIUM 40 MG: 40 TABLET, DELAYED RELEASE ORAL at 05:15

## 2021-09-06 RX ADMIN — CYCLOBENZAPRINE 10 MG: 10 TABLET, FILM COATED ORAL at 09:32

## 2021-09-06 RX ADMIN — HYDROCODONE BITARTRATE AND ACETAMINOPHEN 1 TABLET: 5; 325 TABLET ORAL at 09:32

## 2021-09-06 RX ADMIN — ASPIRIN 81 MG: 81 TABLET, COATED ORAL at 09:00

## 2021-09-06 RX ADMIN — BISACODYL 5 MG: 5 TABLET, COATED ORAL at 09:00

## 2021-09-06 ASSESSMENT — PAIN - FUNCTIONAL ASSESSMENT: PAIN_FUNCTIONAL_ASSESSMENT: PREVENTS OR INTERFERES SOME ACTIVE ACTIVITIES AND ADLS

## 2021-09-06 ASSESSMENT — PAIN DESCRIPTION - FREQUENCY: FREQUENCY: CONTINUOUS

## 2021-09-06 ASSESSMENT — PAIN DESCRIPTION - ONSET: ONSET: ON-GOING

## 2021-09-06 ASSESSMENT — PAIN DESCRIPTION - ORIENTATION: ORIENTATION: LEFT;LOWER

## 2021-09-06 ASSESSMENT — PAIN SCALES - GENERAL
PAINLEVEL_OUTOF10: 7
PAINLEVEL_OUTOF10: 5

## 2021-09-06 ASSESSMENT — PAIN DESCRIPTION - PROGRESSION: CLINICAL_PROGRESSION: NOT CHANGED

## 2021-09-06 ASSESSMENT — PAIN DESCRIPTION - DESCRIPTORS: DESCRIPTORS: DISCOMFORT;SHARP

## 2021-09-06 ASSESSMENT — PAIN DESCRIPTION - PAIN TYPE: TYPE: ACUTE PAIN

## 2021-09-06 ASSESSMENT — PAIN DESCRIPTION - LOCATION: LOCATION: BACK

## 2021-09-06 NOTE — DISCHARGE SUMMARY
Hospital Medicine Discharge Summary    Patient ID: Merrill Lazar      Patient's PCP: No primary care provider on file. Admit Date: 9/4/2021     Discharge Date:   9/6/2021    Admitting Physician: Carolyn Copeland MD     Discharge Physician: Alysia Acharya MD     Discharge Diagnoses: Active Hospital Problems    Diagnosis Date Noted    Back pain [M54.9] 09/04/2021       The patient was seen and examined on day of discharge and this discharge summary is in conjunction with any daily progress note from day of discharge. Hospital Course: Patient is 66-year-old female with past medical history of GERD who presented to hospital for fall. According to the patient she had a fall 2 days ago, she was not able to walk, she was recently discharged from acute rehab facility. Patient mentioned she has severe back pain, she had sneezing episode today and she felt she broke her rib. Patient also mentions her pain is 10/10, not radiating, not associated with nausea vomiting, localized, sharp. Patient mentions she is not able to perform her usual activities of daily living, she lives by herself. Back pain - suspect musculoskeletal pain. No evidence of fracture on radiographs. Significantly improved after Flexeril  -dilaudid PRN  -added PO Norco PRN  -added Flexeril PRN  -PT/OT eval  -ARU did not believe she would be a candidate  -patient declines placement and wants to go home     Leukocytosis - likely reactive, improved  -continue to monitor  -no indication to start Abx     GERD  -PPI     Left ankle fracture s/p ORIF  -will repeat ankle xrays per patient request  -she is set to see Dr. Marquis Ruth on Tuesday for follow-up      Exam:     /79   Pulse 77   Temp 97.9 °F (36.6 °C) (Oral)   Resp 18   Ht 5' 2\" (1.575 m)   Wt 158 lb 11.7 oz (72 kg)   SpO2 95%   BMI 29.03 kg/m²       General appearance: No apparent distress, appears stated age and cooperative.   HEENT: Pupils equal, round, and reactive to light. Conjunctivae/corneas clear. Neck: Supple, with full range of motion. No jugular venous distention. Trachea midline. Respiratory:  Normal respiratory effort. Clear to auscultation, bilaterally without Rales/Wheezes/Rhonchi. Cardiovascular: Regular rate and rhythm with normal S1/S2 without murmurs, rubs or gallops. Abdomen: Soft, non-tender, non-distended with normal bowel sounds. Musculoskeletal: Left foot in surgical dressing. No clubbing, cyanosis or edema bilaterally. Full range of motion without deformity. Skin: Skin color, texture, turgor normal.  No rashes or lesions. Neurologic:  Neurovascularly intact without any focal sensory/motor deficits. Cranial nerves: II-XII intact, grossly non-focal.  Psychiatric: Alert and oriented, thought content appropriate, normal insight  Capillary Refill: Brisk,< 3 seconds   Peripheral Pulses: +2 palpable, equal bilaterally        Labs: For convenience and continuity at follow-up the following most recent labs are provided:      CBC:    Lab Results   Component Value Date    WBC 5.8 09/06/2021    HGB 13.3 09/06/2021    HCT 39.5 09/06/2021     09/06/2021       Renal:    Lab Results   Component Value Date     09/06/2021    K 4.1 09/06/2021     09/06/2021    CO2 25 09/06/2021    BUN 15 09/06/2021    CREATININE 0.7 09/06/2021    CALCIUM 9.7 09/06/2021         Significant Diagnostic Studies    Radiology:   XR ANKLE LEFT (MIN 3 VIEWS)   Final Result   Distal fibula and tibial fractures status post ORIF. No definite acute   osseous abnormality noted. CT CHEST W CONTRAST   Final Result   No gross acute process. CT ABDOMEN PELVIS W IV CONTRAST Additional Contrast? None   Final Result   No gross acute process.                 Consults:     IP CONSULT TO HOSPITALIST  IP CONSULT TO PHARMACY  IP CONSULT TO PHYSICAL MEDICINE REHAB  IP CONSULT TO HOME CARE NEEDS    Disposition:  home     Condition at Discharge: Stable    Discharge Instructions/Follow-up:  meds as prescribed, follow-up with PCP, orthopedic surgery    Code Status:  Full Code     Activity: activity as tolerated    Diet: regular diet      Discharge Medications:     Current Discharge Medication List           Details   HYDROcodone-acetaminophen (NORCO) 5-325 MG per tablet Take 1 tablet by mouth every 6 hours as needed for Pain for up to 3 days. Qty: 12 tablet, Refills: 0    Comments: Reduce doses taken as pain becomes manageable  Associated Diagnoses: Left trimalleolar fracture, closed, initial encounter      lidocaine 4 % external patch Place 1 patch onto the skin daily  Qty: 1 box, Refills: 1      cyclobenzaprine (FLEXERIL) 10 MG tablet Take 1 tablet by mouth 3 times daily as needed for Muscle spasms  Qty: 30 tablet, Refills: 0              Details   aspirin EC 81 MG EC tablet Take 1 tablet by mouth 2 times daily for 21 days Take for DVT blood clot prophylaxis. Please avoid missing doses. Qty: 42 tablet, Refills: 0      ondansetron (ZOFRAN) 4 MG tablet Take 1 tablet by mouth 3 times daily as needed for Nausea or Vomiting  Qty: 30 tablet, Refills: 0      bisacodyl (DULCOLAX) 5 MG EC tablet Take 1 tablet by mouth daily  Qty: 30 tablet, Refills: 0      bisacodyl (DULCOLAX) 10 MG suppository Place 1 suppository rectally daily as needed for Constipation  Qty: 16 suppository, Refills: 0      polyethylene glycol (GLYCOLAX) 17 g packet Take 17 g by mouth 2 times daily  Qty: 60 each, Refills: 0      pantoprazole (PROTONIX) 40 MG tablet Take 1 tablet by mouth every morning (before breakfast)  Qty: 30 tablet, Refills: 0      sucralfate (CARAFATE) 1 GM tablet Take 1 tablet by mouth 4 times daily as needed (heartburn/indigestion)  Qty: 28 tablet, Refills: 0      Multiple Vitamins-Minerals (THERAPEUTIC MULTIVITAMIN-MINERALS) tablet Take 1 tablet by mouth daily      doxyLAMINE succinate (SLEEP AID) 25 MG tablet Take 25 mg by mouth nightly      calamine lotion Apply topically as needed.   Qty: 1 Bottle, Refills: 0             Time Spent on discharge is more than 30 minutes in the examination, evaluation, counseling and review of medications and discharge plan. Signed:    Pablo Perez MD   9/6/2021      Thank you No primary care provider on file. for the opportunity to be involved in this patient's care. If you have any questions or concerns please feel free to contact me at [phone].

## 2021-09-06 NOTE — DISCHARGE INSTR - COC
Continuity of Care Form    Patient Name: Aleyda Avina   :  1950  MRN:  [id number]    Admit date:  2021  Discharge date:  21    Code Status Order: Full Code   Advance Directives:      Admitting Physician:  Martha Sullivan MD  PCP: No primary care provider on file.     Discharging Nurse: [facility] Unit/Room#: N3O-3810/4122-01  Discharging Unit Phone Number: [phone]    Emergency Contact:   Extended Emergency Contact Information  Primary Emergency Contact: Maple Macon, [address]mita [address] [employer] [address] Phone: [phone]  Mobile Phone: [phone]  Relation: Child  Secondary Emergency Contact: [address] [facility] Phone: [phone]  Relation: Other    Past Surgical History:  Past Surgical History:   Procedure Laterality Date    ANKLE FRACTURE SURGERY Left 2021    LEFT ANKLE OPEN REDUCTION INTERNAL FIXATION performed by Lew Celestin MD at [facility]      left       Immunization History:   Immunization History   Administered Date(s) Administered    COVID-19, Valentine Peter, PF, 30mcg/0.3mL 2021, 2021       Active Problems:  Patient Active Problem List   Diagnosis Code    Closed left ankle fracture, initial encounter S82.892A    Closed trimalleolar fracture of left ankle S82.852A    Syndesmotic disruption of left ankle S93.432A    Left trimalleolar fracture, closed, initial encounter S82.852A    Back pain M54.9       Isolation/Infection:   Isolation            No Isolation          Patient Infection Status       None to display            Nurse Assessment:  Last Vital Signs: /79   Pulse 77   Temp 97.9 °F (36.6 °C) (Oral)   Resp 18   Ht 5' 2\" (1.575 m)   Wt 158 lb 11.7 oz (72 kg)   SpO2 95%   BMI 29.03 kg/m²     Last documented pain score (0-10 scale): Pain Level: 7  Last Weight:   Wt Readings from Last 1 Encounters:   21 158 lb 11.7 oz (72 kg)     Mental Status:  oriented and alert    IV Access:  - None    Nursing Mobility/ADLs:  Walking Assisted  Transfer  Assisted  Bathing  Assisted  Dressing  Assisted  Toileting  Assisted  Feeding  Independent  Med Admin  Independent  Med Delivery   whole    Wound Care Documentation and Therapy:        Elimination:  Continence:   · Bowel: Yes  · Bladder: Yes  Urinary Catheter: None   Colostomy/Ileostomy/Ileal Conduit: No       Date of Last BM: ***    Intake/Output Summary (Last 24 hours) at 9/6/2021 1042  Last data filed at 9/6/2021 0925  Gross per 24 hour   Intake 640 ml   Output 1 ml   Net 639 ml     I/O last 3 completed shifts: In: 300 [P.O.:300]  Out: -     Safety Concerns: At Risk for Falls    Impairments/Disabilities:      NWB to let ankle    Nutrition Therapy:  Current Nutrition Therapy:   - Oral Diet:  General    Routes of Feeding: Oral  Liquids: Thin Liquids  Daily Fluid Restriction: no  Last Modified Barium Swallow with Video (Video Swallowing Test): not done    Treatments at the Time of Hospital Discharge:   Respiratory Treatments: none  Oxygen Therapy:  is not on home oxygen therapy.   Ventilator:    - No ventilator support    Rehab Therapies: {THERAPEUTIC INTERVENTION:[id number]}  Weight Bearing Status/Restrictions: Non-weight bearing on left leg  Other Medical Equipment (for information only, NOT a DME order):  wheelchair and walker  Other Treatments: ***    Patient's personal belongings (please select all that are sent with patient):  None    RN SIGNATURE:  Electronically signed by Kaia Rowan RN on 9/6/21 at 12:56 PM EDT    CASE MANAGEMENT/SOCIAL WORK SECTION    Inpatient Status Date: OBS    Readmission Risk Assessment Score:  Readmission Risk              Risk of Unplanned Readmission:  0           Discharging to Facility/ Agency   · Name: [facility]   · Address:  · Phone:[phone]  · Fax:    Dialysis Facility (if applicable)   · Name:  · Address:  · Dialysis Schedule:  · Phone:  · Fax:    / signature:Electronically signed by Tammy Win on 9/6/2021 at 11:55 AM     PHYSICIAN SECTION    Prognosis: Good    Condition at Discharge: Stable    Rehab Potential (if transferring to Rehab): Good    Recommended Labs or Other Treatments After Discharge: meds as prescribed, follow-up with PCP, orthopedic surgery at discharge    Physician Certification: I certify the above information and transfer of Kristina Soni  is necessary for the continuing treatment of the diagnosis listed and that she requires [address] for less 30 days.      Update Admission H&P: No change in H&P    PHYSICIAN SIGNATURE:  Electronically signed by Maryam June MD on 9/6/21 at 10:42 AM EDT

## 2021-09-06 NOTE — PROGRESS NOTES
Pt discharged. Instructions reviewed with pt, all questions answered. IV removed, no complications, dressing in place. 2 RX sent to pharmacy and 1 paper RX given prior to D/C. Boot placed to left foot. Discussed F/U appointments. Pt transported to exit per wheelchair.

## 2021-09-06 NOTE — PROGRESS NOTES
Occupational Therapy  Status Note    Tiera Boone  9/6/2021  [id number]    OT orders noted, pt met bedside. Pt reports her pain is present but manageable. Pt is dressed in own clothes and reports she plans to discharge this afternoon. Pt reports no concerns for mobility with walker to get into wheelchair d/t NWBing status in boot. Pt reports she has great support at home. Pt states she was able to get up with PT yesterday with some pain however since she has received muscle relaxant she has gotten to the [facility] without assistance. Pt declined need for formal evaluation at this time. Will sign off.      Electronically signed by Daquan Casiano OTR/L#[id number] on 9/6/2021 at 2:10 PM

## 2021-09-06 NOTE — PLAN OF CARE
Problem: Pain:  Goal: Control of acute pain  Description: Control of acute pain  9/5/2021 2306 by Riri Laura RN  Outcome: Ongoing  9/5/2021 1150 by Kiana De La Paz RN  Outcome: Ongoing     Problem: Falls - Risk of:  Goal: Will remain free from falls  Description: Will remain free from falls  9/5/2021 2306 by Riri Laura RN  Outcome: Ongoing  9/5/2021 1150 by Kiana De La Paz RN  Outcome: Ongoing

## 2021-09-06 NOTE — PROGRESS NOTES
Department of Physical Medicine & Rehabilitation  Progress Note    Patient Identification:  Jamison Leslie  [id number]  : 1950  Admit date: 2021    Chief Complaint: left flank pain    Subjective:   No acute events overnight. Patient seen this afternoon sitting up in room. She is feeling much better. Medications have been helpful. She has been able to transfer to the commode and feels comfortable returning home. ROS: No f/c, n/v, cp     Objective:  Patient Vitals for the past 24 hrs:   BP Temp Temp src Pulse Resp SpO2 Weight   21 0808 -- -- -- -- -- 95 % --   21 0515 122/79 97.9 °F (36.6 °C) Oral 77 18 96 % 158 lb 11.7 oz (72 kg)   21 2000 122/79 98.4 °F (36.9 °C) Oral 72 18 95 % --   21 1544 121/75 98.1 °F (36.7 °C) Oral 70 16 93 % --   21 1119 116/72 98.2 °F (36.8 °C) Oral 69 16 94 % --     Const: Alert. No distress, pleasant. HEENT: Normocephalic, atraumatic. Normal sclera/conjunctiva. MMM. CV: Regular rate and rhythm. Resp: No respiratory distress. Abd: Soft, nontender, nondistended, NABS+   Ext: mild left ankle swelling  MSK: Left flank with mild ecchymosis and tenderness to palption  Neuro: Alert, oriented, appropriately interactive. Psych: Cooperative, appropriate mood and affect    Laboratory data: Available via EMR.    Last 24 hour lab  Recent Results (from the past 24 hour(s))   Basic Metabolic Panel w/ Reflex to MG    Collection Time: 21  7:36 AM   Result Value Ref Range    Sodium 138 136 - 145 mmol/L    Potassium reflex Magnesium 4.1 3.5 - 5.1 mmol/L    Chloride 102 99 - 110 mmol/L    CO2 25 21 - 32 mmol/L    Anion Gap 11 3 - 16    Glucose 107 (H) 70 - 99 mg/dL    BUN 15 7 - 20 mg/dL    CREATININE 0.7 0.6 - 1.2 mg/dL    GFR Non-African American >60 >60    GFR African American >60 >60    Calcium 9.7 8.3 - 10.6 mg/dL   CBC    Collection Time: 21  7:36 AM   Result Value Ref Range    WBC 5.8 4.0 - 11.0 K/uL    RBC 4.14 4.00 - 5.20 M/uL

## 2021-09-08 ENCOUNTER — OFFICE VISIT (OUTPATIENT)
Dept: ORTHOPEDIC SURGERY | Age: 71
End: 2021-09-08

## 2021-09-08 VITALS — WEIGHT: 158 LBS | HEIGHT: 62 IN | BODY MASS INDEX: 29.08 KG/M2

## 2021-09-08 DIAGNOSIS — S82.851A CLOSED TRIMALLEOLAR FRACTURE OF RIGHT ANKLE, INITIAL ENCOUNTER: Primary | ICD-10-CM

## 2021-09-08 DIAGNOSIS — S93.432A SYNDESMOTIC DISRUPTION OF LEFT ANKLE, INITIAL ENCOUNTER: ICD-10-CM

## 2021-09-08 PROCEDURE — APPNB15 APP NON BILLABLE TIME 0-15 MINS: Performed by: NURSE PRACTITIONER

## 2021-09-08 PROCEDURE — 99024 POSTOP FOLLOW-UP VISIT: CPT | Performed by: NURSE PRACTITIONER

## 2021-09-08 NOTE — LETTER
[facility]  [address] [place] [address] [address] [address]  Phone: [phone]  Fax: [phone]    Ayla Allen MD        September 8, 2021     Patient: Sharon Narvaez   YOB: 1950   Date of Visit: 9/8/2021       To Whom It May Concern: It is my medical opinion that Sharon Narvaez should be non weight bearing for 2 weeks then gradual weight bearing in boot for 2 weeks with walker. Then the last 2 weeks weight bearing as tolerated in boot. If you have any questions or concerns, please don't hesitate to call.     Sincerely,          Ayla Allen MD

## 2021-09-13 NOTE — PROGRESS NOTES
DIAGNOSIS:    1-Left ankle trimalleolar fracture dislocation, status post ORIF. 2-Left ankle distal tibiofibular syndesmosis disruption, s/p ORIF syndesmosis screw    DATE OF SURGERY:  8/2/2021. HISTORY OF PRESENT ILLNESS:  Ms. Army Peoples 70 y.o.  female who came in today for 2 weeks postoperative visit. The patient denies any significant pain in the left ankle. Rates pain a 4/10 VAS mild, aching, intermittent and are improving. Aggravating factors movement. Alleviating factors elevation and rest. She has been in a splint, and non WB. No numbness or tingling sensation. No fever or Chills. Denies smoking. PHYSICAL EXAMINATION:  The incision healing well. No signs of any erythema or drainage, minimal swelling. She has no pain with the active or passive range of motion of the left ankle, but decrease ROM. She has intact sensation distally, and she is neurovascularly intact. IMAGING:  Three views left ankle taken today in the office showed anatomic alignment of the fracture, plate and screws in good position, no loosening. Ankle mortise is well centered. Syndesmosis screw intact. IMPRESSION:  2 weeks out from left ankle trimalleolar fracture dislocation, ORIF with syndesmosis repair and doing very well. PLAN: She placed in a boot, and non WB for another 2 week then can start WB boot with a walker. I have told the patient to work on ROM. No heavy impact activities. The patient will come back for a follow up in 6 weeks. At that time, we will take 3 views of the left ankle standing. She will likely need a staged procedure for syndesmosis screw removal at 4 months.              Pancho Aiken, APRN - CNP

## 2021-09-22 ENCOUNTER — OFFICE VISIT (OUTPATIENT)
Dept: ORTHOPEDIC SURGERY | Age: 71
End: 2021-09-22
Payer: MEDICARE

## 2021-09-22 VITALS — WEIGHT: 162 LBS | HEIGHT: 62 IN | BODY MASS INDEX: 29.81 KG/M2

## 2021-09-22 DIAGNOSIS — M79.672 FOOT PAIN, LEFT: ICD-10-CM

## 2021-09-22 DIAGNOSIS — S90.32XA CONTUSION OF LEFT FOOT, INITIAL ENCOUNTER: Primary | ICD-10-CM

## 2021-09-22 DIAGNOSIS — S82.851A CLOSED TRIMALLEOLAR FRACTURE OF RIGHT ANKLE, INITIAL ENCOUNTER: ICD-10-CM

## 2021-09-22 PROCEDURE — G8400 PT W/DXA NO RESULTS DOC: HCPCS | Performed by: ORTHOPAEDIC SURGERY

## 2021-09-22 PROCEDURE — 1090F PRES/ABSN URINE INCON ASSESS: CPT | Performed by: ORTHOPAEDIC SURGERY

## 2021-09-22 PROCEDURE — 3017F COLORECTAL CA SCREEN DOC REV: CPT | Performed by: ORTHOPAEDIC SURGERY

## 2021-09-22 PROCEDURE — 1036F TOBACCO NON-USER: CPT | Performed by: ORTHOPAEDIC SURGERY

## 2021-09-22 PROCEDURE — G8417 CALC BMI ABV UP PARAM F/U: HCPCS | Performed by: ORTHOPAEDIC SURGERY

## 2021-09-22 PROCEDURE — 99024 POSTOP FOLLOW-UP VISIT: CPT | Performed by: ORTHOPAEDIC SURGERY

## 2021-09-22 PROCEDURE — 1123F ACP DISCUSS/DSCN MKR DOCD: CPT | Performed by: ORTHOPAEDIC SURGERY

## 2021-09-22 PROCEDURE — 99213 OFFICE O/P EST LOW 20 MIN: CPT | Performed by: ORTHOPAEDIC SURGERY

## 2021-09-22 PROCEDURE — G8427 DOCREV CUR MEDS BY ELIG CLIN: HCPCS | Performed by: ORTHOPAEDIC SURGERY

## 2021-09-22 PROCEDURE — 4040F PNEUMOC VAC/ADMIN/RCVD: CPT | Performed by: ORTHOPAEDIC SURGERY

## 2021-09-22 PROCEDURE — 1111F DSCHRG MED/CURRENT MED MERGE: CPT | Performed by: ORTHOPAEDIC SURGERY

## 2021-09-22 NOTE — PROGRESS NOTES
DIAGNOSIS:    1-Left ankle trimalleolar fracture dislocation, status post ORIF. 2-Left ankle distal tibiofibular syndesmosis disruption, s/p ORIF syndesmosis screw  3-Left foot pain/contusion 3rd, 4th and 5th toes-new fall    DATE OF SURGERY:  8/2/2021. HISTORY OF PRESENT ILLNESS:  Ms. Alfredo Nolen 70 y.o.  female who came in today for urgent postoperative visit complaints of a new fall that occurred last night. She states that in the middle of the night she got up to use her bedside commode and was putting all of her weight on her left arm to push herself up to pull her pants up and the commode fell and she landed on her left side. She states that her foot and toes were bent back behind her. She has increased pain in her left foot lateral foot and third fourth and fifth toes. The patient denies any significant pain in the left ankle. Rates pain a 7/10 VAS mild, aching, And worsen her tetanus and a 2/10 VAS in her ankle. Aggravating factors any weight applied to left foot. Alleviating factors elevation and rest. She has been in a boot, and non WB. No numbness or tingling sensation. No fever or Chills. Denies smoking.      Past Medical History:   Diagnosis Date    Arthritis     Premature atrial contraction        Past Surgical History:   Procedure Laterality Date    ANKLE FRACTURE SURGERY Left 8/2/2021    LEFT ANKLE OPEN REDUCTION INTERNAL FIXATION performed by Chely Oseguera MD at [address] [place] [address]       Social History     Socioeconomic History    Marital status: Unknown     Spouse name: Not on file    Number of children: Not on file    Years of education: Not on file    Highest education level: Not on file   Occupational History    Not on file   Tobacco Use    Smoking status: Never Smoker    Smokeless tobacco: Never Used   Vaping Use    Vaping Use: Never used   Substance and Sexual Activity    Alcohol use: Not Currently    Drug use: Never    Sexual activity: Not Currently   Other Topics Concern    Not on file   Social History Narrative    Not on file     Social Determinants of Health     Financial Resource Strain:     Difficulty of Paying Living Expenses:    Food Insecurity:     Worried About Running Out of Food in the Last Year:     [address] in the Last Year:    Transportation Needs:     Lack of Transportation (Medical):  Lack of Transportation (Non-Medical):    Physical Activity:     Days of Exercise per Week:     Minutes of Exercise per Session:    Stress:     Feeling of Stress :    Social Connections:     Frequency of Communication with Friends and Family:     Frequency of Social Gatherings with Friends and Family:     Attends Bahai Services:     Active Member of Clubs or Organizations:     Attends Club or Organization Meetings:     Marital Status:    Intimate Partner Violence:     Fear of Current or Ex-Partner:     Emotionally Abused:     Physically Abused:     Sexually Abused:        History reviewed. No pertinent family history. Current Outpatient Medications on File Prior to Visit   Medication Sig Dispense Refill    lidocaine 4 % external patch Place 1 patch onto the skin daily 1 box 1    ondansetron (ZOFRAN) 4 MG tablet Take 1 tablet by mouth 3 times daily as needed for Nausea or Vomiting 30 tablet 0    bisacodyl (DULCOLAX) 5 MG EC tablet Take 1 tablet by mouth daily 30 tablet 0    pantoprazole (PROTONIX) 40 MG tablet Take 1 tablet by mouth every morning (before breakfast) 30 tablet 0    Multiple Vitamins-Minerals (THERAPEUTIC MULTIVITAMIN-MINERALS) tablet Take 1 tablet by mouth daily      doxyLAMINE succinate (SLEEP AID) 25 MG tablet Take 25 mg by mouth nightly      calamine lotion Apply topically as needed. 1 Bottle 0    aspirin EC 81 MG EC tablet Take 1 tablet by mouth 2 times daily for 21 days Take for DVT blood clot prophylaxis. Please avoid missing doses.  42 tablet 0    sucralfate (CARAFATE) 1 GM tablet Take 1 peroneal strengthening exercise. Off the boot in 2 weeks. No heavy impact activities. NSAIDs OTC. The patient will come back for a follow up in 6 weeks. At that time, we will take 3 views of the left ankle standing. She will likely need a staged procedure for syndesmosis screw removal at 4 months.        Cecily Roberson MD

## 2021-09-23 PROBLEM — S90.32XA CONTUSION OF LEFT FOOT: Status: ACTIVE | Noted: 2021-09-23

## 2021-10-04 PROBLEM — A60.04 HERPES SIMPLEX VULVOVAGINITIS: Status: ACTIVE | Noted: 2021-10-04

## 2021-10-05 ENCOUNTER — TELEPHONE (OUTPATIENT)
Dept: ORTHOPEDIC SURGERY | Age: 71
End: 2021-10-05

## 2021-10-05 NOTE — TELEPHONE ENCOUNTER
Called and spoke with patient. She was just wondering if she continue NSAID's. Advised her she is only to d/c NSAID's prior to sx and now that she is over a month out she can resume them. She understood.

## 2021-11-09 ENCOUNTER — OFFICE VISIT (OUTPATIENT)
Dept: INTERNAL MEDICINE CLINIC | Age: 71
End: 2021-11-09
Payer: MEDICARE

## 2021-11-09 VITALS
HEART RATE: 91 BPM | SYSTOLIC BLOOD PRESSURE: 143 MMHG | HEIGHT: 62 IN | DIASTOLIC BLOOD PRESSURE: 95 MMHG | BODY MASS INDEX: 29.81 KG/M2 | WEIGHT: 162 LBS | OXYGEN SATURATION: 98 %

## 2021-11-09 DIAGNOSIS — F32.1 CURRENT MODERATE EPISODE OF MAJOR DEPRESSIVE DISORDER WITHOUT PRIOR EPISODE (HCC): ICD-10-CM

## 2021-11-09 DIAGNOSIS — F32.9 REACTIVE DEPRESSION: ICD-10-CM

## 2021-11-09 DIAGNOSIS — Z87.19 HISTORY OF DIVERTICULITIS OF COLON: ICD-10-CM

## 2021-11-09 DIAGNOSIS — Z86.16 PERSONAL HISTORY OF COVID-19: ICD-10-CM

## 2021-11-09 DIAGNOSIS — I10 ESSENTIAL (PRIMARY) HYPERTENSION: ICD-10-CM

## 2021-11-09 DIAGNOSIS — Z00.00 ROUTINE GENERAL MEDICAL EXAMINATION AT A HEALTH CARE FACILITY: Primary | ICD-10-CM

## 2021-11-09 DIAGNOSIS — Z76.89 ESTABLISHING CARE WITH NEW DOCTOR, ENCOUNTER FOR: Primary | ICD-10-CM

## 2021-11-09 PROBLEM — S82.892A CLOSED LEFT ANKLE FRACTURE, INITIAL ENCOUNTER: Status: RESOLVED | Noted: 2021-08-02 | Resolved: 2021-11-09

## 2021-11-09 PROBLEM — S82.852A LEFT TRIMALLEOLAR FRACTURE, CLOSED, INITIAL ENCOUNTER: Status: RESOLVED | Noted: 2021-08-03 | Resolved: 2021-11-09

## 2021-11-09 PROBLEM — S90.32XA CONTUSION OF LEFT FOOT: Status: RESOLVED | Noted: 2021-09-23 | Resolved: 2021-11-09

## 2021-11-09 PROCEDURE — 1123F ACP DISCUSS/DSCN MKR DOCD: CPT | Performed by: INTERNAL MEDICINE

## 2021-11-09 PROCEDURE — G8484 FLU IMMUNIZE NO ADMIN: HCPCS | Performed by: INTERNAL MEDICINE

## 2021-11-09 PROCEDURE — G8400 PT W/DXA NO RESULTS DOC: HCPCS | Performed by: INTERNAL MEDICINE

## 2021-11-09 PROCEDURE — G0438 PPPS, INITIAL VISIT: HCPCS | Performed by: INTERNAL MEDICINE

## 2021-11-09 PROCEDURE — 1036F TOBACCO NON-USER: CPT | Performed by: INTERNAL MEDICINE

## 2021-11-09 PROCEDURE — G8417 CALC BMI ABV UP PARAM F/U: HCPCS | Performed by: INTERNAL MEDICINE

## 2021-11-09 PROCEDURE — 3017F COLORECTAL CA SCREEN DOC REV: CPT | Performed by: INTERNAL MEDICINE

## 2021-11-09 PROCEDURE — 99215 OFFICE O/P EST HI 40 MIN: CPT | Performed by: INTERNAL MEDICINE

## 2021-11-09 PROCEDURE — 1090F PRES/ABSN URINE INCON ASSESS: CPT | Performed by: INTERNAL MEDICINE

## 2021-11-09 PROCEDURE — 4040F PNEUMOC VAC/ADMIN/RCVD: CPT | Performed by: INTERNAL MEDICINE

## 2021-11-09 PROCEDURE — G8427 DOCREV CUR MEDS BY ELIG CLIN: HCPCS | Performed by: INTERNAL MEDICINE

## 2021-11-09 ASSESSMENT — LIFESTYLE VARIABLES: HOW OFTEN DO YOU HAVE A DRINK CONTAINING ALCOHOL: 0

## 2021-11-09 ASSESSMENT — PATIENT HEALTH QUESTIONNAIRE - PHQ9
2. FEELING DOWN, DEPRESSED OR HOPELESS: 3
SUM OF ALL RESPONSES TO PHQ QUESTIONS 1-9: 7
SUM OF ALL RESPONSES TO PHQ QUESTIONS 1-9: 7
10. IF YOU CHECKED OFF ANY PROBLEMS, HOW DIFFICULT HAVE THESE PROBLEMS MADE IT FOR YOU TO DO YOUR WORK, TAKE CARE OF THINGS AT HOME, OR GET ALONG WITH OTHER PEOPLE: 0
4. FEELING TIRED OR HAVING LITTLE ENERGY: 1
3. TROUBLE FALLING OR STAYING ASLEEP: 1
7. TROUBLE CONCENTRATING ON THINGS, SUCH AS READING THE NEWSPAPER OR WATCHING TELEVISION: 0
5. POOR APPETITE OR OVEREATING: 0
6. FEELING BAD ABOUT YOURSELF - OR THAT YOU ARE A FAILURE OR HAVE LET YOURSELF OR YOUR FAMILY DOWN: 0
SUM OF ALL RESPONSES TO PHQ QUESTIONS 1-9: 7
9. THOUGHTS THAT YOU WOULD BE BETTER OFF DEAD, OR OF HURTING YOURSELF: 0
8. MOVING OR SPEAKING SO SLOWLY THAT OTHER PEOPLE COULD HAVE NOTICED. OR THE OPPOSITE, BEING SO FIGETY OR RESTLESS THAT YOU HAVE BEEN MOVING AROUND A LOT MORE THAN USUAL: 0
SUM OF ALL RESPONSES TO PHQ9 QUESTIONS 1 & 2: 5
1. LITTLE INTEREST OR PLEASURE IN DOING THINGS: 2

## 2021-11-09 NOTE — PROGRESS NOTES
Medicare Annual Wellness Visit  Name: Jose D Cross Date: 2021   MRN: <[id number]> Sex: Female   Age: 70 y.o. Ethnicity: Non- / Non    : 1950 Race: White (non-)      Marybeth Quijano is here for Medicare AWV    Screenings for behavioral, psychosocial and functional/safety risks, and cognitive dysfunction are all negative except as indicated below. These results, as well as other patient data from the [address] form, are documented in Flowsheets linked to this Encounter. Allergies   Allergen Reactions    Morphine      States not allergic, states it makes her nauseated    Cortizone-10 [Hydrocortisone] Rash    Phenergan [Promethazine] Rash    Sulfa Antibiotics Rash       Prior to Visit Medications    Medication Sig Taking?  Authorizing Provider   valACYclovir (VALTREX) 500 MG tablet Take 500 mg by mouth daily  Historical Provider, MD   Multiple Vitamins-Minerals (THERAPEUTIC MULTIVITAMIN-MINERALS) tablet Take 1 tablet by mouth daily  Historical Provider, MD   doxyLAMINE succinate (SLEEP AID) 25 MG tablet Take 25 mg by mouth nightly  Historical Provider, MD       Past Medical History:   Diagnosis Date    Arthritis     Genital herpes     Premature atrial contraction        Past Surgical History:   Procedure Laterality Date    ANKLE FRACTURE SURGERY Left 2021    LEFT ANKLE OPEN REDUCTION INTERNAL FIXATION performed by Trisha Allen MD at [address] Bilateral     COLONOSCOPY      PARTIAL HYSTERECTOMY      TONSILLECTOMY      TOTAL HIP ARTHROPLASTY      left    TUMOR EXCISION      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 There is no height or weight on file to calculate BMI. Based upon direct observation of the patient, evaluation of cognition reveals recent and remote memory intact. Patient's complete Health Risk Assessment and screening values have been reviewed and are found in Flowsheets. The following problems were reviewed today and where indicated follow up appointments were made and/or referrals ordered. Positive Risk Factor Screenings with Interventions:     Fall Risk:  Timed Up and Go Test > 12 seconds? (Complete if either Fall Risk answers are Yes): no  2 or more falls in past year?: no  Fall with injury in past year?: (!) yes  Fall Risk Interventions:    · Patient declines any further evaluation/treatment for this issue     Depression:  PHQ-2 Score: 5  PHQ-9 Total Score: 7    Severity:1-4 = minimal depression, 5-9 = mild depression, 10-14 = moderate depression, 15-19 = moderately severe depression, 20-27 = severe depression  Depression Interventions:  · Discussed with physician during today's office visit. General Health and ACP:  General  In general, how would you say your health is?: Excellent  In the past 7 days, have you experienced any of the following? New or Increased Pain, New or Increased Fatigue, Loneliness, Social Isolation, Stress or Anger?: (!) New or Increased Pain, New or Increased Fatigue, Stress  Do you get the social and emotional support that you need?: Yes  Do you have a Living Will?: Yes  Advance Directives     Power of  Living Will ACP-Advance Directive ACP-Power of     Not on File Not on File Not on File Not on File      General Health Risk Interventions:  · Discussed with physican at today's office visit.         Personalized Preventive Plan   Current Health Maintenance Status  Immunization History   Administered Date(s) Administered    COVID-19, Pfizer, PF, 30mcg/0.3mL 02/03/2021, 02/26/2021, 10/09/2021    Influenza, Quadv, adjuvanted, 65 yrs +, IM, PF (Fluad) 10/04/2021        Health Maintenance   Topic Date Due    Hepatitis C screen  Never done    DTaP/Tdap/Td vaccine (1 - Tdap) Never done    Lipid screen  Never done    Colon cancer screen colonoscopy  Never done    Breast cancer screen  Never done    Shingles Vaccine (1 of 2) Never done    DEXA (modify frequency per FRAX score)  Never done    Pneumococcal 65+ years Vaccine (1 of 1 - PPSV23) Never done   [employer] Visit (AWV)  Never done    Potassium monitoring  09/06/2022    Creatinine monitoring  09/06/2022    Flu vaccine  Completed    COVID-19 Vaccine  Completed    Hepatitis A vaccine  Aged Out    Hepatitis B vaccine  Aged Out    Hib vaccine  Aged Out    Meningococcal (ACWY) vaccine  Aged Out     Recommendations for Cozi Due: see orders and patient instructions/AVS.  . Recommended screening schedule for the next 5-10 years is provided to the patient in written form: see Patient Instructions/AVS.    Santo Miles LPN, 04/7/4601, performed the documented evaluation under the direct supervision of the attending physician. This encounter was performed under [facility] MDs, direct supervision, 11/9/2021.

## 2021-11-09 NOTE — PROGRESS NOTES
09/04/2021    GLUCOSE 107 (H) 09/06/2021    GLUCOSE 109 (H) 09/05/2021    GLUCOSE 102 (H) 09/04/2021    CALCIUM 9.7 09/06/2021    CALCIUM 9.6 09/05/2021    CALCIUM 10.1 09/04/2021       No results found for: CHOL, TRIG, HDL, LDLCALC, LDLDIRECT    No results found for: ALT, AST    No results found for: TSH, T4FREE    Lab Results   Component Value Date    WBC 5.8 09/06/2021    WBC 6.4 09/05/2021    WBC 11.9 (H) 09/04/2021    HGB 13.3 09/06/2021    HGB 13.8 09/05/2021    HGB 14.1 09/04/2021    HCT 39.5 09/06/2021    HCT 40.9 09/05/2021    HCT 42.1 09/04/2021    MCV 95.4 09/06/2021    MCV 96.1 09/05/2021    MCV 95.2 09/04/2021     09/06/2021     09/05/2021     09/04/2021       No results found for: INR     No results found for: PSA     No results found for: LABURIC          BP (!) 143/95 (Site: Right Upper Arm)   Pulse 91   Ht 5' 2\" (1.575 m)   Wt 162 lb (73.5 kg)   SpO2 98%   BMI 29.63 kg/m²   Body mass index is 29.63 kg/m². Physical Exam    ASSESSMENT AND PLANS:      Except as noted below, all chronic problems have been reviewed and are stable to continue medications or other therapy as previously documented in the patient's chart, with changes per orders or documentation below:        Assessment and Plan: Patient received counseling and, if relevant,printed instructions for all symptoms listed in CC and HPI, as well as for all diagnoses brought onto today's visit note below. Typical counseling includes, but is not limited to, non pharmacologic measures to manage listed symptoms and conditions; appropriate use, risks and benefits for all prescribed medications; potential interactions between medications both prescribed and OTC; diet; exercise; healthy behaviors; and goalsetting to improve health. Pt.or responsible party was involved in shared decision making and had opportunity to have all questions answered. 1. Establishing care with new doctor, encounter for    2.  Essential (primary) hypertension--controlled, continue meds    3. Current moderate episode of major depressive disorder without prior episode ([facility])--counseled patient about use of medications, but she wants to try some counseling first. Chart referred to Ms. Lady Victor. 4. Reactive depression \"    5. Personal history of COVID-19    6. History of diverticulitis of colon            Problem List     Personal history of COVID-19    Essential (primary) hypertension    History of diverticulitis of colon    Current moderate episode of major depressive disorder without prior episode ([facility] [address]    Reactive depression        No orders of the defined types were placed in this encounter. I have reconciled the medications in chart with what patient reports to be taking, andreviewed action/ sideeffects and how to take any new medications. Patient/caregiver understands purpose and side effects. A complete  list of medications was provided in their after-visit summary. Return in about 3 months (around 2/9/2022) for med followup with [facility]. Time basedbilling: I spent over 40 minutes with this patient, and as is the nature of primary care and typical for my extended visits, over 50 percent of this visit was spent on counseling and coordination ofcare.

## 2021-11-09 NOTE — Clinical Note
Hi, this patient declined medication but would like to see you about her reactive depression since a fall and breaking her ankle (she hasn't been able to be as active as she likes).

## 2021-11-09 NOTE — PATIENT INSTRUCTIONS
Personalized Preventive Plan for Gunjan Blackwood - 11/9/2021  Medicare offers a range of preventive health benefits. Some of the tests and screenings are paid in full while other may be subject to a deductible, co-insurance, and/or copay. Some of these benefits include a comprehensive review of your medical history including lifestyle, illnesses that may run in your family, and various assessments and screenings as appropriate. After reviewing your medical record and screening and assessments performed today your provider may have ordered immunizations, labs, imaging, and/or referrals for you. A list of these orders (if applicable) as well as your Preventive Care list are included within your After Visit Summary for your review. Other Preventive Recommendations:    · A preventive eye exam performed by an eye specialist is recommended every 1-2 years to screen for glaucoma; cataracts, macular degeneration, and other eye disorders. · A preventive dental visit is recommended every 6 months. · Try to get at least 150 minutes of exercise per week or 10,000 steps per day on a pedometer . · Order or download the FREE \"Exercise & Physical Activity: Your Everyday Guide\" from [employer]. Call 0-328.927.2681 or search [employer] online. · You need 3945-9427 mg of calcium and 2456-7913 IU of vitamin D per day. It is possible to meet your calcium requirement with diet alone, but a vitamin D supplement is usually necessary to meet this goal.  · When exposed to the sun, use a sunscreen that protects against both UVA and UVB radiation with an SPF of 30 or greater. Reapply every 2 to 3 hours or after sweating, drying off with a towel, or swimming. · Always wear a seat belt when traveling in a car. Always wear a helmet when riding a bicycle or motorcycle.

## 2021-11-12 ENCOUNTER — TELEPHONE (OUTPATIENT)
Dept: ORTHOPEDIC SURGERY | Age: 71
End: 2021-11-12

## 2021-11-12 ENCOUNTER — OFFICE VISIT (OUTPATIENT)
Dept: ORTHOPEDIC SURGERY | Age: 71
End: 2021-11-12
Payer: MEDICARE

## 2021-11-12 VITALS — HEIGHT: 62 IN | BODY MASS INDEX: 29.81 KG/M2 | WEIGHT: 162 LBS

## 2021-11-12 DIAGNOSIS — S82.851A CLOSED TRIMALLEOLAR FRACTURE OF RIGHT ANKLE, INITIAL ENCOUNTER: Primary | ICD-10-CM

## 2021-11-12 DIAGNOSIS — S93.432A SYNDESMOTIC DISRUPTION OF LEFT ANKLE, INITIAL ENCOUNTER: Primary | ICD-10-CM

## 2021-11-12 DIAGNOSIS — S82.855A CLOSED NONDISPLACED TRIMALLEOLAR FRACTURE OF LEFT ANKLE, INITIAL ENCOUNTER: ICD-10-CM

## 2021-11-12 DIAGNOSIS — S93.432A SYNDESMOTIC DISRUPTION OF LEFT ANKLE, INITIAL ENCOUNTER: ICD-10-CM

## 2021-11-12 DIAGNOSIS — M72.2 PLANTAR FASCIITIS OF LEFT FOOT: ICD-10-CM

## 2021-11-12 PROCEDURE — G8427 DOCREV CUR MEDS BY ELIG CLIN: HCPCS | Performed by: ORTHOPAEDIC SURGERY

## 2021-11-12 PROCEDURE — 99214 OFFICE O/P EST MOD 30 MIN: CPT | Performed by: ORTHOPAEDIC SURGERY

## 2021-11-12 PROCEDURE — 1123F ACP DISCUSS/DSCN MKR DOCD: CPT | Performed by: ORTHOPAEDIC SURGERY

## 2021-11-12 PROCEDURE — G8484 FLU IMMUNIZE NO ADMIN: HCPCS | Performed by: ORTHOPAEDIC SURGERY

## 2021-11-12 PROCEDURE — G8417 CALC BMI ABV UP PARAM F/U: HCPCS | Performed by: ORTHOPAEDIC SURGERY

## 2021-11-12 PROCEDURE — 1036F TOBACCO NON-USER: CPT | Performed by: ORTHOPAEDIC SURGERY

## 2021-11-12 PROCEDURE — 3017F COLORECTAL CA SCREEN DOC REV: CPT | Performed by: ORTHOPAEDIC SURGERY

## 2021-11-12 PROCEDURE — G8400 PT W/DXA NO RESULTS DOC: HCPCS | Performed by: ORTHOPAEDIC SURGERY

## 2021-11-12 PROCEDURE — 4040F PNEUMOC VAC/ADMIN/RCVD: CPT | Performed by: ORTHOPAEDIC SURGERY

## 2021-11-12 PROCEDURE — 1090F PRES/ABSN URINE INCON ASSESS: CPT | Performed by: ORTHOPAEDIC SURGERY

## 2021-11-12 NOTE — TELEPHONE ENCOUNTER
Other PATIENT CALLED STATES THAT THERE IS A MISTAKE ON HER THERAPY ORDER AND WAS TOLD TO CALL TO HAVE IT CORRECTED.  PLEASE CALL TO ADVISE [phone]

## 2021-11-12 NOTE — TELEPHONE ENCOUNTER
Called and spoke to patient. Referral was made for the right and left ankle. Patient only has left side involvement. Notified patient I would send this to our precert team to see if they could fix and if not I would put a new referral in. Patient understood.

## 2021-11-13 PROBLEM — M72.2 PLANTAR FASCIITIS OF LEFT FOOT: Status: ACTIVE | Noted: 2021-11-13

## 2021-11-13 NOTE — PROGRESS NOTES
Social Determinants of Health     Financial Resource Strain: Low Risk     Difficulty of Paying Living Expenses: Not hard at all   Food Insecurity: No Food Insecurity    Worried About Running Out of Food in the Last Year: Never true    [place] of Food in the Last Year: Never true   Transportation Needs:     Lack of Transportation (Medical): Not on file    Lack of Transportation (Non-Medical): Not on file   Physical Activity:     Days of Exercise per Week: Not on file    Minutes of Exercise per Session: Not on file   Stress:     Feeling of Stress : Not on file   Social Connections:     Frequency of Communication with Friends and Family: Not on file    Frequency of Social Gatherings with Friends and Family: Not on file    Attends Gnosticist Services: Not on file    Active Member of [address] [place] [address] [employer] or Organizations: Not on file    Attends Club or Organization Meetings: Not on file    Marital Status: Not on file   Intimate Partner Violence:     Fear of Current or Ex-Partner: Not on file    Emotionally Abused: Not on file    Physically Abused: Not on file    Sexually Abused: Not on file   Housing Stability:     Unable to Pay for Housing in the Last Year: Not on file    Number of [place] in the Last Year: Not on file    Unstable Housing in the Last Year: Not on file       Family History   Problem Relation Age of Onset    Heart Failure Mother     Cancer Father         lung ca       Current Outpatient Medications on File Prior to Visit   Medication Sig Dispense Refill    valACYclovir (VALTREX) 500 MG tablet Take 500 mg by mouth daily (Patient not taking: Reported on 11/12/2021)      Multiple Vitamins-Minerals (THERAPEUTIC MULTIVITAMIN-MINERALS) tablet Take 1 tablet by mouth daily      doxyLAMINE succinate (SLEEP AID) 25 MG tablet Take 25 mg by mouth nightly       No current facility-administered medications on file prior to visit.        Pertinent items are noted in HPI  Review of systems reviewed from Patient History Form dated on 9/8/2021 and available in the patient's chart under the Media tab. No change noted. PHYSICAL EXAMINATION:  Ms. Claudia Borrego is a very pleasant 70 y.o.  female who presents today in no acute distress, awake, alert, and oriented. She is well dressed, nourished and  groomed. Patient with normal affect. Height is  5' 2\" (1.575 m), weight is 162 lb (73.5 kg), Body mass index is 29.63 kg/m². Resting respiratory rate is 16. She ambulates with a walker, weightbearing in her boot. The incision healed well. No signs of any erythema or drainage, minimal swelling. She has no pain with the active or passive range of motion of the left ankle, but decrease ROM. She has intact sensation distally, and she is neurovascularly intact. There is ecchymosis and swelling in her left distal foot. She has tender to palpation over the third fourth and fifth toes. Good strength, and no instability both upper and lower extremities. IMAGING:  Three views left ankle taken today in the office showed anatomic alignment of the fracture, plate and screws in good position, no loosening. Ankle mortise is well centered. Syndesmosis screw intact. 3 views of the left foot taken 9/22/2021 in office was reviewed and showed no acute fracture. There is significant osteopenia. IMPRESSION:    1-Left ankle trimalleolar fracture dislocation, status post ORIF. 2-Left ankle distal tibiofibular syndesmosis disruption, s/p ORIF syndesmosis screw  3-Left foot pain/contusion 3rd, 4th and 5th toes-better. 4-Left heel pain/ Plantar fasciitis of left foot. PLAN: I discussed the findings with the patient and reviewed the x-ray results. I discussed with her that no acute fracture was seen in the left foot. We recommended stretching exercises of the calf and plantar fascia which was taught to the patient in the office today.   She will be WBAT out of the boot, and start aggressive ROM and peroneal strengthening exercise. I discussed with the patient that I think that she would really benefit from a course of physical therapy for further strengthening and stretching. An Rx for physical therapy was given to the patient. NSAIDs OTC. The patient will come back for a follow up in 6 weeks. At that time, we will take 3 views of the left ankle standing. She will likely need a staged procedure for syndesmosis screw removal at 4-5 months.        Leatha Pineda MD

## 2021-11-15 PROBLEM — F32.9 REACTIVE DEPRESSION: Status: ACTIVE | Noted: 2021-11-15

## 2021-11-15 PROBLEM — F32.1 CURRENT MODERATE EPISODE OF MAJOR DEPRESSIVE DISORDER WITHOUT PRIOR EPISODE (HCC): Status: ACTIVE | Noted: 2021-11-15

## 2021-11-16 NOTE — FLOWSHEET NOTE
Ankle ABCs  Ankle Pumps x3  3x10                                  Manual Intervention (95572)  Min:     Knee mobs/PROM     Tib/Fem Mobs     Patella Mobs     Ankle mobs               NMR re-education (36940)  Min: 15  CUES NEEDED   Quad Set  Weight Shift      Anterior/Posterior      Side to Side 2x10x5 sec    2x10  2x10         Therapeutic Activity (59298)  Min:     Pt educated on the benefits of ice/elevation. Modalities  Min:     IFC with      CP after exercises     MH after exercises            Other Therapeutic Activities: Pt was educated on PT POC, Diagnosis, Prognosis, pathomechanics as well as frequency and duration of scheduling future physical therapy appointments. Time was also taken on this day to answer all patient questions and participation in PT. Reviewed appointment policy in detail with patient and patient verbalized understanding. Home Exercise Program: Patient was instructed in the following for HEP:     . Patient verbalized/demonstrated understanding and was issued written handout. Ankle ABCs (x2)              Ankle Pumps/PF with t-band (3x10)              Quad set (3x10x5 sec)              Weight shift - anterior/posterior and side-to-side (3x10)       Therapeutic Exercise and NMR EXR  [x] (60884) Provided verbal/tactile cueing for activities related to strengthening, flexibility, endurance, ROM for improvements in LE, proximal hip, and core control with self care, mobility, lifting, ambulation.  [] (39017) Provided verbal/tactile cueing for activities related to improving balance, coordination, kinesthetic sense, posture, motor skill, proprioception  to assist with LE, proximal hip, and core control in self care, mobility, lifting, ambulation and eccentric single leg control.      NMR and Therapeutic Activities:    [x] (91786 or 14231) Provided verbal/tactile cueing for activities related to improving balance, coordination, kinesthetic sense, posture, motor skill, proprioception and motor activation to allow for proper function of core, proximal hip and LE with self care and ADLs and functional mobility.   [] (21540) Gait Re-education- Provided training and instruction to the patient for proper LE, core and proximal hip recruitment and positioning and eccentric body weight control with ambulation re-education including up and down stairs     Home Exercise Program:    [x] (18028) Reviewed/Progressed HEP activities related to strengthening, flexibility, endurance, ROM of core, proximal hip and LE for functional self-care, mobility, lifting and ambulation/stair navigation   [] (23209)Reviewed/Progressed HEP activities related to improving balance, coordination, kinesthetic sense, posture, motor skill, proprioception of core, proximal hip and LE for self care, mobility, lifting, and ambulation/stair navigation      Manual Treatments:  PROM / STM / Oscillations-Mobs:  G-I, II, III, IV (PA's, Inf., Post.)  [] (10376) Provided manual therapy to mobilize LE, proximal hip and/or LS spine soft tissue/joints for the purpose of modulating pain, promoting relaxation,  increasing ROM, reducing/eliminating soft tissue swelling/inflammation/restriction, improving soft tissue extensibility and allowing for proper ROM for normal function with self care, mobility, lifting and ambulation.      If [facility] Please Indicate Time In/Out  CPT Code Time in Time out                         Approval Dates:  CPT Code Units Approved Units Used  Date Updated:                     Charges:  Timed Code Treatment Minutes: 30   Total Treatment Minutes: 50      [x] EVAL (LOW) 60217 (typically 20 minutes face-to-face)  [] EVAL (MOD) 29542 (typically 30 minutes face-to-face)  [] EVAL (HIGH) 02664 (typically 45 minutes face-to-face)  [] RE-EVAL     [x] HB(71006) x 1    [] Dry needle 1 or 2 Muscles (70092)  [x] NMR (26639) x 1    [] Dry needle 3+ Muscles (25875)  [] Manual (15545) x     [] Ultrasound (51253) x  [] TA (44121) x     [] [facility] Traction (53966)  [] ES(attended) (04512)     [] ES (un) (41678):   [] Vasopump (33860) [] Ionto (28319)   [] Other:    GOALS:  Patient stated goal: \"To get back to walking. \"   []? Progressing: []? Met: []? Not Met: []? Adjusted     Therapist goals for Patient:   Short Term Goals: To be achieved in: 2 weeks  1. Independent in HEP and progression per patient tolerance, in order to prevent re-injury. []? Progressing: []? Met: []? Not Met: []? Adjusted  2. Patient will have a decrease in pain to facilitate improvement in movement, function, and ADLs as indicated by Functional Deficits. []? Progressing: []? Met: []? Not Met: []? Adjusted     Long Term Goals: To be achieved in: 6 weeks  1. Disability index score of 32 or higher on the LEFS to assist with reaching prior level of function. []? Progressing: []? Met: []? Not Met: []? Adjusted  2. Patient will demonstrate increased L ankle DF AROM to 5 deg to allow for proper joint functioning as indicated by patients Functional Deficits. []? Progressing: []? Met: []? Not Met: []? Adjusted  3. Patient will demonstrate an increase in L ankle strength to 4/5 with good proximal hip strength and control to allow for proper functional mobility as indicated by patients Functional Deficits. []? Progressing: []? Met: []? Not Met: []? Adjusted  4. Patient will return to walking 1 mile without increased symptoms or restriction. []? Progressing: []? Met: []? Not Met: []? Adjusted  5. Patient will tolerate standing for 1 hour without increased pain, swelling, and pain. []? Progressing: []? Met: []? Not Met: []?  Adjusted            ASSESSMENT:  See eval    Treatment/Activity Tolerance:  [x] Patient tolerated treatment well [] Patient limited by fatique  [] Patient limited by pain  [] Patient limited by other medical complications  [] Other:     Overall Progression Towards Functional goals/ Treatment Progress Update:  [] Patient is progressing as expected towards functional goals listed. [] Progression is slowed due to complexities/Impairments listed. [] Progression has been slowed due to co-morbidities. [x] Plan just implemented, too soon to assess goals progression <30days   [] Goals require adjustment due to lack of progress  [] Patient is not progressing as expected and requires additional follow up with physician  [] Other    Prognosis for POC: [x] Good [] Fair  [] Poor    Patient requires continued skilled intervention: [x] Yes  [] No        PLAN:   [] Continue per plan of care [] Alter current plan (see comments)  [x] Plan of care initiated [] Hold pending MD visit [] Discharge    Electronically signed by: Stefanie Magana PT    Note: If patient does not return for scheduled/recommended follow up visits, this note will serve as a discharge from care along with the most recent update on progress.

## 2021-11-16 NOTE — PLAN OF CARE
[address]  Phone: [phone]   Fax:     [phone]                                                       Physical Therapy Certification    Dear Referring Practitioner: Arabella Bond MD,    We had the pleasure of evaluating the following patient for physical therapy services at [facility]. A summary of our findings can be found in the initial assessment below. This includes our plan of care. If you have any questions or concerns regarding these findings, please do not hesitate to contact me at the office phone number checked above. Thank you for the referral.       Physician Signature:_______________________________Date:__________________  By signing above (or electronic signature), therapists plan is approved by physician        Patient: Betty Jacome   : 1950   MRN: [id number]  Referring Physician: Referring Practitioner: Arabella Bond MD      Evaluation Date: 2021      Medical Diagnosis Information:  Diagnosis: I13.262A - Closed trimalleolar fracture of right ankle, V96.080E - Syndesmotic disruption of left ankle, initial encounter   Treatment Diagnosis: Decreased functional mobility, strength and balance. Insurance information: PT Insurance Information: Medicare     Precautions/ Contra-indications: WBAT, s/p ORIF with syndesmosis screw  Latex Allergy:  [x]NO      []YES  Preferred Language for Healthcare:   [x]English       []other:    C-SSRS Triggered by Intake questionnaire (Past 2 wk assessment ):   [] No, Questionnaire did not trigger screening. [x] Yes, Patient intake triggered C-SSRS Screening      [x] C-SSRS Screening completed - Pt is in the process of getting an appointment with mental health couselor.    [] PCP notified via Epic     SUBJECTIVE: Patient reports L ankle/foot pain secondary to a tibia fracture and fibula fracture (trimalleolar fracture). RAFAEL: a fall while standing on a antique chair and her foot jammed onto the floor. Pt had surgery on August 2, 2021. Pt has been in a boot from 8/2/21 til 11/12/21. Pt was non-WB until 4 weeks ago. Pain located in bottom of the heel, lateral-dorsal aspect of foot. Pt reports occasional tingling in the foot. She states she has had 2 falls since her surgery when she was non-WB. She has one step to get into her bathroom, one step to get into the apartment door, 15 stairs inside, and she has a steep driveway. Pt reports she has PTSD/nigthmares secondary to her accident and she has a fear of weight bearing/walking on her L foot. She also is experiencing some discomfort/pain in her back, hips, shoulders, neck secondary to over utilizing her upper body when she was non-WB and attempting to decreased WB through her L LE. Pt reports prior to surgery she was extremely active - hiking, climbing mountains, traveling, etc.    Pt was in the hospital for 10 days and did inpatient rehab after surgery. Relevant Medical History:Additional Pertinent Hx: Arthritis  Functional Outcome Measure: LEFS =  21    Pain Scale: 4/10  Easing factors: ibprofeun, ice   Provocative factors:  Walking, heavy cover/pressure on the foot     Type: []Constant   [x]Intermittent  []Radiating []Localized []other:     Numbness/Tingling: Pt reports occasional tingling in the foot. Occupation/School: Retired     Living Status/Prior Level of Function: Independent with ADLs and IADLs    OBJECTIVE:     Posture: rounded shoulders.     Functional Mobility/Transfers: independent, requires unilateral UE assist.     Palpation: Gr II TTP to L lateral and medial malleoli, calcaneus, plantar fascia, 5th metatarsal    Bandages/Dressings/Incisions: NA     Gait: 2 wheeled rolling, decreased juanita, decreased stride length, decreased push off on L      ROM LEFT RIGHT   Knee ext WNL WNL   Knee Flex WNL WNL   Ankle PF 28 deg 45 deg   Ankle DF 2 deg 6 deg   Ankle In 9 deg 12 deg   Ankle Ev 9 deg 30 deg   Strength  LEFT RIGHT   HIP Flexors 4/5 4/5   HIP Abductors 4-/5 4-/5   Knee EXT (quad) 5/5 5/5   Knee Flex (HS) 4/5 4/5   Ankle DF 3+/5 5/5   Ankle PF 3+/5 5/5   Ankle Inv 3+/5 5/5   Ankle EV 3+/5 5/5   Circumference  Fig 8    52 cm   49 cm     Reflexes/Sensation:    [x]Dermatomes/Myotomes intact    [x]Reflexes equal and normal bilaterally   []Other:    Joint mobility: pt is post-surgical    []Normal    []Hypo   []Hyper    Orthopedic Special Tests: NA, pt is post-surgical                         [x] Patient history, allergies, meds reviewed. Medical chart reviewed. See intake form. Review Of Systems (ROS):  [x]Performed Review of systems (Integumentary, CardioPulmonary, Neurological) by intake and observation. Intake form has been scanned into medical record. Patient has been instructed to contact their primary care physician regarding ROS issues if not already being addressed at this time.       Co-morbidities/Complexities (which will affect course of rehabilitation):   []None           Arthritic conditions   []Rheumatoid arthritis (M05.9)  [x]Osteoarthritis (M19.91)   Cardiovascular conditions   []Hypertension (I10)  []Hyperlipidemia (E78.5)  []Angina pectoris (I20)  []Atherosclerosis (I70)  []CVA Musculoskeletal conditions   []Disc pathology   []Congenital spine pathologies   []Prior surgical intervention  []Osteoporosis (M81.8)  []Osteopenia (M85.8)   Endocrine conditions   []Hypothyroid (E03.9)  []Hyperthyroid Gastrointestinal conditions   []Constipation (W59.05)   Metabolic conditions   []Morbid obesity (E66.01)  []Diabetes type 1(E10.65) or 2 (E11.65)   []Neuropathy (G60.9)     Pulmonary conditions   []Asthma (J45)  []Coughing   []COPD (J44.9)   Psychological Disorders  [x]Anxiety (F41.9)  [x]Depression (F32.9)   []Other:   []Other:          Barriers to/and or personal factors that will affect rehab potential:              []Age  []Sex    []Smoker              []Motivation/Lack of Motivation                        []Co-Morbidities              []Cognitive Function, education/learning barriers              [x]Environmental, home barriers              []profession/work barriers  []past PT/medical experience  []other:  Justification:     Falls Risk Assessment (30 days):   [x] Falls Risk assessed and no intervention required. [] Falls Risk assessed and Patient requires intervention due to being higher risk   TUG score (>12s at risk):     [] Falls education provided, including         ASSESSMENT: Pt is a 71 yo female who presents to PT with L foot/ankle pain s/p ORIF with syndesmosis screw. Pt demonstrates good tolerance to PT initial evaluation. Upon IE, pt demonstrates edema, decreased ROM, strength deficits, balance/gait deficits, and pain/TTP. Pt would benefit from PT to address the impairments listed and return patient to PLOF.      Functional Impairments:     []Noted lumbar/proximal hip/LE hypomobility   [x]Decreased LE functional ROM   [x]Decreased core/proximal hip strength and neuromuscular control   []Decreased LE functional strength   [x]Reduced balance/proprioceptive control   []other:      Functional Activity Limitations (from functional questionnaire and intake)   []Reduced ability to tolerate prolonged functional positions   [x]Reduced ability or difficulty with changes of positions or transfers between positions   []Reduced ability to maintain good posture and demonstrate good body mechanics with sitting, bending, and lifting   []Reduced ability to sleep   [x] Reduced ability or tolerance with driving and/or computer work   [x]Reduced ability to perform lifting, carrying tasks   [x]Reduced ability to squat   []Reduced ability to forward bend   [x]Reduced ability to ambulate prolonged functional periods/distances/surfaces   [x]Reduced ability to ascend/descend stairs   [x]Reduced ability to run, hop or jump   []other:     Participation Restrictions   []Reduced participation in self care activities   [x]Reduced participation in home management activities   []Reduced participation in work activities   [x]Reduced participation in social activities. [x]Reduced participation in sport activities. Classification :    [x]Signs/symptoms consistent with post-surgical status including decreased ROM, strength and function.    []Signs/symptoms consistent with joint sprain/strain   []Signs/symptoms consistent with patella-femoral syndrome   []Signs/symptoms consistent with knee OA/hip OA   []Signs/symptoms consistent with internal derangement of knee/Hip   []Signs/symptoms consistent with functional hip weakness/NMR control      []Signs/symptoms consistent with tendinitis/tendinosis    []signs/symptoms consistent with pathology which may benefit from Dry needling      []other:      Prognosis/Rehab Potential:      []Excellent   [x]Good    []Fair   []Poor    Tolerance of evaluation/treatment:    []Excellent   [x]Good    []Fair   []Poor    Physical Therapy Evaluation Complexity Justification  [x] A history of present problem with:  [] no personal factors and/or comorbidities that impact the plan of care;  [x]1-2 personal factors and/or comorbidities that impact the plan of care  []3 personal factors and/or comorbidities that impact the plan of care  [x] An examination of body systems using standardized tests and measures addressing any of the following: body structures and functions (impairments), activity limitations, and/or participation restrictions;:  [x] a total of 1-2 or more elements   [] a total of 3 or more elements   [] a total of 4 or more elements   [x] A clinical presentation with:  [x] stable and/or uncomplicated characteristics   [] evolving clinical presentation with changing characteristics  [] unstable and unpredictable characteristics;   [x] Clinical decision making of [x] low, [] moderate, [] high complexity using standardized patient assessment instrument and/or measurable assessment of functional outcome. [x] EVAL (LOW) 53354 (typically 20 minutes face-to-face)  [] EVAL (MOD) 21341 (typically 30 minutes face-to-face)  [] EVAL (HIGH) 18489 (typically 45 minutes face-to-face)  [] RE-EVAL     PLAN:  Frequency/Duration:  2-3 days per week for 6 Weeks:  Interventions:  [x]  Therapeutic exercise including: strength training, ROM, for Lower extremity and core   [x]  NMR activation and proprioception for LE, Glutes and Core   [x]  Manual therapy as indicated for LE, Hip and spine to include: Dry Needling/IASTM, STM, PROM, Gr I-IV mobilizations, manipulation. [x] Modalities as needed that may include: thermal agents, E-stim, Biofeedback, US, iontophoresis as indicated  [x] Patient education on joint protection, postural re-education, activity modification, progression of HEP. HEP instruction:    Ankle ABCs (x2)   Ankle Pumps/PF with t-band (3x10)   Quad set (3x10x5 sec)   Weight shift - anterior/posterior and side-to-side (3x10)    GOALS:  Patient stated goal: \"To get back to walking. \"   [] Progressing: [] Met: [] Not Met: [] Adjusted    Therapist goals for Patient:   Short Term Goals: To be achieved in: 2 weeks  1. Independent in HEP and progression per patient tolerance, in order to prevent re-injury. [] Progressing: [] Met: [] Not Met: [] Adjusted  2. Patient will have a decrease in pain to facilitate improvement in movement, function, and ADLs as indicated by Functional Deficits. [] Progressing: [] Met: [] Not Met: [] Adjusted    Long Term Goals: To be achieved in: 6 weeks  1. Disability index score of 32 or higher on the LEFS to assist with reaching prior level of function. [] Progressing: [] Met: [] Not Met: [] Adjusted  2. Patient will demonstrate increased L ankle DF AROM to 5 deg to allow for proper joint functioning as indicated by patients Functional Deficits. [] Progressing: [] Met: [] Not Met: [] Adjusted  3. Patient will demonstrate an increase in L ankle strength to 4/5 with good proximal hip strength and control to allow for proper functional mobility as indicated by patients Functional Deficits. [] Progressing: [] Met: [] Not Met: [] Adjusted  4. Patient will return to walking 1 mile without increased symptoms or restriction. [] Progressing: [] Met: [] Not Met: [] Adjusted  5. Patient will tolerate standing for 1 hour without increased pain, swelling, and pain. [] Progressing: [] Met: [] Not Met: [] Adjusted     Electronically signed by:  Gildardo Hyman PT      Note: If patient does not return for scheduled/recommended follow up visits, this note will serve as a discharge from care along with the most recent update on progress.

## 2021-11-17 ENCOUNTER — HOSPITAL ENCOUNTER (OUTPATIENT)
Dept: PHYSICAL THERAPY | Age: 71
Setting detail: THERAPIES SERIES
Discharge: HOME | End: 2021-11-17
Payer: MEDICARE

## 2021-11-17 PROCEDURE — 97110 THERAPEUTIC EXERCISES: CPT

## 2021-11-17 PROCEDURE — 97112 NEUROMUSCULAR REEDUCATION: CPT

## 2021-11-17 PROCEDURE — 97161 PT EVAL LOW COMPLEX 20 MIN: CPT

## 2021-12-02 ENCOUNTER — HOSPITAL ENCOUNTER (OUTPATIENT)
Dept: PHYSICAL THERAPY | Age: 71
Setting detail: THERAPIES SERIES
Discharge: HOME | End: 2021-12-02
Payer: MEDICARE

## 2021-12-02 PROCEDURE — 97110 THERAPEUTIC EXERCISES: CPT

## 2021-12-02 PROCEDURE — 97530 THERAPEUTIC ACTIVITIES: CPT

## 2021-12-02 PROCEDURE — 97140 MANUAL THERAPY 1/> REGIONS: CPT

## 2021-12-02 NOTE — FLOWSHEET NOTE
[facility]  Outpatient Rehabilitation & Therapy  [address] [facility]. [place]  Phone: [phone]   Fax:     [phone]      Physical Therapy Treatment Note/ Progress Report:     Date:  2021    Patient Name:  Lea Anguiano    :  1950  MRN: [id number]    Pertinent Medical History:     Medical/Treatment Diagnosis Information:  · Diagnosis: S82.851A - Closed trimalleolar fracture of right ankle, S93.432A - Syndesmotic disruption of left ankle, initial encounter  · Treatment Diagnosis: Decreased functional mobility, strength and balance. Insurance/Certification information:  PT Insurance Information: Medicare  Physician Information:  Referring Practitioner: Jonna Gallegos MD  Plan of care signed (Y/N): faxed on     Date of Patient follow up with Physician:      Progress Report: []  Yes  [x]  No     Date Range for reporting period:  Beginnin2021  Ending:      Progress report due (10 Rx/or 30 days whichever is less): 79/38/58    Recertification due (POC duration/ or 90 days whichever is less):     Visit # POC/Insurance Allowable Auth Needed   2 bomn []Yes   []No     Latex Allergy:  [x]NO      []YES  Preferred Language for Healthcare:   [x]English       []Other:    Functional Scale:      Date assessed: at eval (21)  Test: LEFS  Score: 21    Pain level:  4/10     History of Injury:   1-Left ankle trimalleolar fracture dislocation, status post ORIF. 2-Left ankle distal tibiofibular syndesmosis disruption, s/p ORIF syndesmosis screw   3-Left foot pain/contusion 3rd, 4th and 5th toes-better. 4-Left heel pain/ Plantar fasciitis of left foot.     SUBJECTIVE:  See eval    OBJECTIVE:   Observation:    Test measurements:      RESTRICTIONS/PRECAUTIONS: WBAT, s/p ORIF with syndesmosis screw    Exercises/Interventions:     Therapeutic Ex (84184)   Min: 15' Reps/Resistance Notes/CUES   Ankle ABCs  Ankle Pumps  HEP Incline calf stretch L Foot only   3x30 sec Gentle, heels remain on floor   Towel Curls 2x10 Seated   Seated Heel Raises 2x10    Seated Toe Raises 2x10               Manual Intervention (77638)  Min: 15'     Knee mobs/PROM     Tib/Fem Mobs     Metatarsal Mobs L foot    STM L foot/ankle              NMR re-education (93310)  Min: 5'  CUES NEEDED   Quad Set  Weight Shift      Anterior/Posterior      Side to Side  HEP   Semi-tandem 2x20 sec In parallel bars   Therapeutic Activity (04882)  Min: 10'     CKC ankle DF  At stairs   NuStep Lv4x6 min    Modalities  Min:      IFC with      CP after exercises (Game Ready)     MH after exercises            Other Therapeutic Activities: Pt was educated on PT POC, Diagnosis, Prognosis, pathomechanics as well as frequency and duration of scheduling future physical therapy appointments. Time was also taken on this day to answer all patient questions and participation in PT. Reviewed appointment policy in detail with patient and patient verbalized understanding. Home Exercise Program: Patient was instructed in the following for HEP:     . Patient verbalized/demonstrated understanding and was issued written handout. Ankle ABCs (x2)              Ankle Pumps/PF with t-band (3x10)              Quad set (3x10x5 sec)              Weight shift - anterior/posterior and side-to-side (3x10)       Therapeutic Exercise and NMR EXR  [x] (84245) Provided verbal/tactile cueing for activities related to strengthening, flexibility, endurance, ROM for improvements in LE, proximal hip, and core control with self care, mobility, lifting, ambulation.  [] (16137) Provided verbal/tactile cueing for activities related to improving balance, coordination, kinesthetic sense, posture, motor skill, proprioception  to assist with LE, proximal hip, and core control in self care, mobility, lifting, ambulation and eccentric single leg control.      NMR and Therapeutic Activities:    [x] (86512 or 74266) Provided verbal/tactile cueing for activities related to improving balance, coordination, kinesthetic sense, posture, motor skill, proprioception and motor activation to allow for proper function of core, proximal hip and LE with self care and ADLs and functional mobility.   [] (83249) Gait Re-education- Provided training and instruction to the patient for proper LE, core and proximal hip recruitment and positioning and eccentric body weight control with ambulation re-education including up and down stairs     Home Exercise Program:    [x] (26309) Reviewed/Progressed HEP activities related to strengthening, flexibility, endurance, ROM of core, proximal hip and LE for functional self-care, mobility, lifting and ambulation/stair navigation   [] (20787)Reviewed/Progressed HEP activities related to improving balance, coordination, kinesthetic sense, posture, motor skill, proprioception of core, proximal hip and LE for self care, mobility, lifting, and ambulation/stair navigation      Manual Treatments:  PROM / STM / Oscillations-Mobs:  G-I, II, III, IV (PA's, Inf., Post.)  [x] (74539) Provided manual therapy to mobilize LE, proximal hip and/or LS spine soft tissue/joints for the purpose of modulating pain, promoting relaxation,  increasing ROM, reducing/eliminating soft tissue swelling/inflammation/restriction, improving soft tissue extensibility and allowing for proper ROM for normal function with self care, mobility, lifting and ambulation.      If [facility] Please Indicate Time In/Out  CPT Code Time in Time out                         Approval Dates:  CPT Code Units Approved Units Used  Date Updated:                     Charges:  Timed Code Treatment Minutes: 45   Total Treatment Minutes: 50      [] EVAL (LOW) 74732 (typically 20 minutes face-to-face)  [] EVAL (MOD) 65374 (typically 30 minutes face-to-face)  [] EVAL (HIGH) 48413 (typically 45 minutes face-to-face)  [] RE-EVAL     [x] OW(83643) x 1    [] Dry needle 1 or 2 Muscles (18152)  [x] NMR (29353) x 1    [] Dry needle 3+ Muscles (82537)  [x] Manual (22218) x 1    [] Ultrasound (12203) x  [] TA (33741) x     [] Mech Traction (38905)  [] ES(attended) (04469)     [] ES (un) (20880):   [] Vasopump (55511) [] Ionto (09352)   [] Other:    GOALS:  Patient stated goal: \"To get back to walking. \"   []? Progressing: []? Met: []? Not Met: []? Adjusted     Therapist goals for Patient:   Short Term Goals: To be achieved in: 2 weeks  1. Independent in HEP and progression per patient tolerance, in order to prevent re-injury. []? Progressing: []? Met: []? Not Met: []? Adjusted  2. Patient will have a decrease in pain to facilitate improvement in movement, function, and ADLs as indicated by Functional Deficits. []? Progressing: []? Met: []? Not Met: []? Adjusted     Long Term Goals: To be achieved in: 6 weeks  1. Disability index score of 32 or higher on the LEFS to assist with reaching prior level of function. []? Progressing: []? Met: []? Not Met: []? Adjusted  2. Patient will demonstrate increased L ankle DF AROM to 5 deg to allow for proper joint functioning as indicated by patients Functional Deficits. []? Progressing: []? Met: []? Not Met: []? Adjusted  3. Patient will demonstrate an increase in L ankle strength to 4/5 with good proximal hip strength and control to allow for proper functional mobility as indicated by patients Functional Deficits. []? Progressing: []? Met: []? Not Met: []? Adjusted  4. Patient will return to walking 1 mile without increased symptoms or restriction. []? Progressing: []? Met: []? Not Met: []? Adjusted  5. Patient will tolerate standing for 1 hour without increased pain, swelling, and pain. []? Progressing: []? Met: []? Not Met: []? Adjusted            ASSESSMENT:  Pt demonstrates good tolerance to PT follow up. Continues to display swelling in L ankle and gait deviations noted.  Continue to progress WB, balance, strengthening exercises as tolerated. Treatment/Activity Tolerance:  [x] Patient tolerated treatment well [] Patient limited by fatique  [] Patient limited by pain  [] Patient limited by other medical complications  [] Other:     Overall Progression Towards Functional goals/ Treatment Progress Update:  [] Patient is progressing as expected towards functional goals listed. [] Progression is slowed due to complexities/Impairments listed. [] Progression has been slowed due to co-morbidities. [x] Plan just implemented, too soon to assess goals progression <30days   [] Goals require adjustment due to lack of progress  [] Patient is not progressing as expected and requires additional follow up with physician  [] Other    Prognosis for POC: [x] Good [] Fair  [] Poor    Patient requires continued skilled intervention: [x] Yes  [] No        PLAN:   [] Continue per plan of care [] Alter current plan (see comments)  [x] Plan of care initiated [] Hold pending MD visit [] Discharge    Electronically signed by: Keely Lugo PT    Note: If patient does not return for scheduled/recommended follow up visits, this note will serve as a discharge from care along with the most recent update on progress.

## 2021-12-03 ENCOUNTER — HOSPITAL ENCOUNTER (OUTPATIENT)
Dept: PHYSICAL THERAPY | Age: 71
Setting detail: THERAPIES SERIES
Discharge: HOME | End: 2021-12-03
Payer: MEDICARE

## 2021-12-03 PROCEDURE — 97140 MANUAL THERAPY 1/> REGIONS: CPT

## 2021-12-03 PROCEDURE — 97530 THERAPEUTIC ACTIVITIES: CPT

## 2021-12-03 PROCEDURE — 97110 THERAPEUTIC EXERCISES: CPT

## 2021-12-03 NOTE — FLOWSHEET NOTE
[facility]  Outpatient Rehabilitation & Therapy  [address]. [place]  Phone: [phone]   Fax:     [phone]      Physical Therapy Treatment Note/ Progress Report:     Date:  12/3/2021    Patient Name:  Lea Anguiano    :  1950  MRN: [id number]    Pertinent Medical History:     Medical/Treatment Diagnosis Information:  · Diagnosis: S82.851A - Closed trimalleolar fracture of right ankle, S93.432A - Syndesmotic disruption of left ankle, initial encounter  · Treatment Diagnosis: Decreased functional mobility, strength and balance. Insurance/Certification information:  PT Insurance Information: Medicare  Physician Information:  Referring Practitioner: Jonna Gallegos MD  Plan of care signed (Y/N): faxed on     Date of Patient follow up with Physician:      Progress Report: []  Yes  [x]  No     Date Range for reporting period:  Beginnin/3/2021  Ending:      Progress report due (10 Rx/or 30 days whichever is less):     Recertification due (POC duration/ or 90 days whichever is less):     Visit # POC/Insurance Allowable Auth Needed   3 bomn []Yes   [x]No     Latex Allergy:  [x]NO      []YES  Preferred Language for Healthcare:   [x]English       []Other:    Functional Scale:      Date assessed: at eval (21)  Test: LEFS  Score: 21    Pain level:  4/10     History of Injury:   1-Left ankle trimalleolar fracture dislocation, status post ORIF. 2-Left ankle distal tibiofibular syndesmosis disruption, s/p ORIF syndesmosis screw   3-Left foot pain/contusion 3rd, 4th and 5th toes-better. 4-Left heel pain/ Plantar fasciitis of left foot. SUBJECTIVE:    12/3/21: Pt reports her foot/ankle is feeling good. She reports no soreness/no increase in swelling after last PT visit. She states PT yesterday seemed to help reduce the swelling. She bought an ice pack that has been helpful. OBJECTIVE:   Observation:    Test measurements:      RESTRICTIONS/PRECAUTIONS: WBAT, s/p ORIF with syndesmosis screw    Exercises/Interventions:     Therapeutic Ex (96984)   Min: 15' Reps/Resistance Notes/CUES   Ankle ABCs  Ankle Pumps  HEP   Incline calf stretch L Foot only   3x30 sec Gentle, heels remain on floor   Towel Curls 3x10 Seated   Seated Heel Raises 2x10    Seated Toe Raises 2x10               Manual Intervention (54953)  Min: 10'     Knee mobs/PROM     Tib/Fem Mobs     Metatarsal Mobs L foot    STM L foot/ankle              NMR re-education (06955)  Min: 5'  CUES NEEDED   Quad Set  Weight Shift      Anterior/Posterior      Side to Side  HEP   Semi-tandem on Airex 3x20 sec In parallel bars   Therapeutic Activity (99949)  Min: 15'     CKC ankle DF 10x10 sec At stairs   NuStep Lv4x6 min    Alt. Marching/Toe Taps x20 total 2 inch box, in parallel bars   Modalities  Min:      IFC with      CP after exercises (Game Ready)     MH after exercises            Other Therapeutic Activities: Pt was educated on PT POC, Diagnosis, Prognosis, pathomechanics as well as frequency and duration of scheduling future physical therapy appointments. Time was also taken on this day to answer all patient questions and participation in PT. Reviewed appointment policy in detail with patient and patient verbalized understanding. Home Exercise Program: Patient was instructed in the following for HEP:     . Patient verbalized/demonstrated understanding and was issued written handout.    Ankle ABCs (x2)              Ankle Pumps/PF with t-band (3x10)              Quad set (3x10x5 sec)              Weight shift - anterior/posterior and side-to-side (3x10)       Therapeutic Exercise and NMR EXR  [x] (61311) Provided verbal/tactile cueing for activities related to strengthening, flexibility, endurance, ROM for improvements in LE, proximal hip, and core control with self care, mobility, lifting, ambulation.  [] (03342) Provided verbal/tactile cueing for activities related to improving balance, coordination, kinesthetic sense, posture, motor skill, proprioception  to assist with LE, proximal hip, and core control in self care, mobility, lifting, ambulation and eccentric single leg control. NMR and Therapeutic Activities:    [x] (82154 or 84435) Provided verbal/tactile cueing for activities related to improving balance, coordination, kinesthetic sense, posture, motor skill, proprioception and motor activation to allow for proper function of core, proximal hip and LE with self care and ADLs and functional mobility.   [] (62964) Gait Re-education- Provided training and instruction to the patient for proper LE, core and proximal hip recruitment and positioning and eccentric body weight control with ambulation re-education including up and down stairs     Home Exercise Program:    [x] (27074) Reviewed/Progressed HEP activities related to strengthening, flexibility, endurance, ROM of core, proximal hip and LE for functional self-care, mobility, lifting and ambulation/stair navigation   [] (56713)Reviewed/Progressed HEP activities related to improving balance, coordination, kinesthetic sense, posture, motor skill, proprioception of core, proximal hip and LE for self care, mobility, lifting, and ambulation/stair navigation      Manual Treatments:  PROM / STM / Oscillations-Mobs:  G-I, II, III, IV (PA's, Inf., Post.)  [x] (22895) Provided manual therapy to mobilize LE, proximal hip and/or LS spine soft tissue/joints for the purpose of modulating pain, promoting relaxation,  increasing ROM, reducing/eliminating soft tissue swelling/inflammation/restriction, improving soft tissue extensibility and allowing for proper ROM for normal function with self care, mobility, lifting and ambulation.      If [facility] Please Indicate Time In/Out  CPT Code Time in Time out                         Approval Dates:  CPT Code Units Approved Units Used  Date Updated: Charges:  Timed Code Treatment Minutes: 45   Total Treatment Minutes: 50      [] EVAL (LOW) 65291 (typically 20 minutes face-to-face)  [] EVAL (MOD) 75350 (typically 30 minutes face-to-face)  [] EVAL (HIGH) 99740 (typically 45 minutes face-to-face)  [] RE-EVAL     [x] NW(54423) x 1    [] Dry needle 1 or 2 Muscles (42093)  [] NMR (23270) x   [] Dry needle 3+ Muscles (36935)  [x] Manual (80251) x 1    [] Ultrasound (17480) x  [x] TA (96783) x  1   [] Mech Traction (46746)  [] ES(attended) (09472)     [] ES (un) (59277):   [] Vasopump (27580) [] Ionto (92208)   [] Other:    GOALS:  Patient stated goal: \"To get back to walking. \"   []? Progressing: []? Met: []? Not Met: []? Adjusted     Therapist goals for Patient:   Short Term Goals: To be achieved in: 2 weeks  1. Independent in HEP and progression per patient tolerance, in order to prevent re-injury. []? Progressing: []? Met: []? Not Met: []? Adjusted  2. Patient will have a decrease in pain to facilitate improvement in movement, function, and ADLs as indicated by Functional Deficits. []? Progressing: []? Met: []? Not Met: []? Adjusted     Long Term Goals: To be achieved in: 6 weeks  1. Disability index score of 32 or higher on the LEFS to assist with reaching prior level of function. []? Progressing: []? Met: []? Not Met: []? Adjusted  2. Patient will demonstrate increased L ankle DF AROM to 5 deg to allow for proper joint functioning as indicated by patients Functional Deficits. []? Progressing: []? Met: []? Not Met: []? Adjusted  3. Patient will demonstrate an increase in L ankle strength to 4/5 with good proximal hip strength and control to allow for proper functional mobility as indicated by patients Functional Deficits. []? Progressing: []? Met: []? Not Met: []? Adjusted  4. Patient will return to walking 1 mile without increased symptoms or restriction. []? Progressing: []? Met: []? Not Met: []? Adjusted  5.  Patient will tolerate standing for 1 hour without increased pain, swelling, and pain. []? Progressing: []? Met: []? Not Met: []? Adjusted            ASSESSMENT:  Pt demonstrates good tolerance to PT follow up. Decreased edema L ankle. Pt continues to display gait deviations and decreased tolerance to WB on L LE. Continue to progress WB, balance, strengthening exercises as tolerated. No adverse reaction to today's treatment. Pt reports relief at end of PT session. Treatment/Activity Tolerance:  [x] Patient tolerated treatment well [] Patient limited by fatique  [] Patient limited by pain  [] Patient limited by other medical complications  [] Other:     Overall Progression Towards Functional goals/ Treatment Progress Update:  [] Patient is progressing as expected towards functional goals listed. [] Progression is slowed due to complexities/Impairments listed. [] Progression has been slowed due to co-morbidities. [x] Plan just implemented, too soon to assess goals progression <30days   [] Goals require adjustment due to lack of progress  [] Patient is not progressing as expected and requires additional follow up with physician  [] Other    Prognosis for POC: [x] Good [] Fair  [] Poor    Patient requires continued skilled intervention: [x] Yes  [] No        PLAN:   [] Continue per plan of care [] Alter current plan (see comments)  [x] Plan of care initiated [] Hold pending MD visit [] Discharge    Electronically signed by: Lorne Aguilar PT    Note: If patient does not return for scheduled/recommended follow up visits, this note will serve as a discharge from care along with the most recent update on progress.

## 2021-12-06 ENCOUNTER — HOSPITAL ENCOUNTER (OUTPATIENT)
Dept: PHYSICAL THERAPY | Age: 71
Setting detail: THERAPIES SERIES
Discharge: HOME | End: 2021-12-06
Payer: MEDICARE

## 2021-12-06 PROCEDURE — 97110 THERAPEUTIC EXERCISES: CPT

## 2021-12-06 PROCEDURE — 97140 MANUAL THERAPY 1/> REGIONS: CPT

## 2021-12-06 PROCEDURE — 97530 THERAPEUTIC ACTIVITIES: CPT

## 2021-12-06 NOTE — FLOWSHEET NOTE
[facility]  Outpatient Rehabilitation & Therapy  [address] [address] [facility] [place] [address]  Phone: [phone]   Fax:     [phone]      Physical Therapy Treatment Note/ Progress Report:     Date:  2021    Patient Name:  Gunjan Blackwood    :  1950  MRN: [id number]    Pertinent Medical History:     Medical/Treatment Diagnosis Information:  · Diagnosis: S82.851A - Closed trimalleolar fracture of right ankle, S93.432A - Syndesmotic disruption of left ankle, initial encounter  · Treatment Diagnosis: Decreased functional mobility, strength and balance. Insurance/Certification information:  PT Insurance Information: Medicare  Physician Information:  Referring Practitioner: Moni Blanco MD  Plan of care signed (Y/N): faxed on     Date of Patient follow up with Physician:      Progress Report: []  Yes  [x]  No     Date Range for reporting period:  Beginnin2021  Ending:      Progress report due (10 Rx/or 30 days whichever is less): 80/74/53    Recertification due (POC duration/ or 90 days whichever is less):     Visit # POC/Insurance Allowable Auth Needed   4 bomn []Yes   [x]No     Latex Allergy:  [x]NO      []YES  Preferred Language for Healthcare:   [x]English       []Other:    Functional Scale:      Date assessed: at eval (21)  Test: LEFS  Score: 21    Pain level:  4/10     History of Injury:   1-Left ankle trimalleolar fracture dislocation, status post ORIF. 2-Left ankle distal tibiofibular syndesmosis disruption, s/p ORIF syndesmosis screw   3-Left foot pain/contusion 3rd, 4th and 5th toes-better. 4-Left heel pain/ Plantar fasciitis of left foot. SUBJECTIVE:    12/3/21: Pt reports her foot/ankle is feeling good. She reports no soreness/no increase in swelling after last PT visit. She states PT yesterday seemed to help reduce the swelling. She bought an ice pack that has been helpful.    21: Pt reports, \"I have a new kind of pain in the front of the ankle and I think it's from the screw and being up on my feet more. \" She states that pain in noticeable when she is weight bearing/walking. OBJECTIVE:   Observation:    Test measurements:      RESTRICTIONS/PRECAUTIONS: WBAT, s/p ORIF with syndesmosis screw    Exercises/Interventions:     Therapeutic Ex (63040)   Min: 20' Reps/Resistance Notes/CUES   Ankle ABCs  Ankle Pumps  HEP  HEP   Incline calf stretch L Foot only   3x30 sec Gentle, heels remain on floor   Towel Curls 3x10 Seated   Seated Heel Raises 2x10    Seated Toe Raises 2x10     4-way ankle 2x15 Green t-band        Manual Intervention (65524)  Min: 15'     Knee mobs/PROM     Tib/Fem Mobs     Metatarsal Mobs L foot    STM L foot/ankle              NMR re-education (63434)  Min:   CUES NEEDED   Quad Set  Weight Shift      Anterior/Posterior      Side to Side  HEP   Semi-tandem on Airex 3x20 sec Perform NV   Therapeutic Activity (61175)  Min: 10'     CKC ankle DF  At stairs   NuStep Lv4x6 min    Stairs Ascending/descending with proper gait pattern Bilateral handrail use   Alt. Marching/Toe Taps  2 inch box, in parallel bars   Modalities  Min:      IFC with      CP after exercises (Game Ready)     MH after exercises            Other Therapeutic Activities: Pt was educated on PT POC, Diagnosis, Prognosis, pathomechanics as well as frequency and duration of scheduling future physical therapy appointments. Time was also taken on this day to answer all patient questions and participation in PT. Reviewed appointment policy in detail with patient and patient verbalized understanding. Home Exercise Program: Patient was instructed in the following for HEP:     . Patient verbalized/demonstrated understanding and was issued written handout.    Ankle ABCs (x2)              Ankle Pumps/PF with t-band (3x10)              Quad set (3x10x5 sec)              Weight shift - anterior/posterior and side-to-side (3x10)       Therapeutic Exercise and NMR EXR  [x] (08469) Provided verbal/tactile cueing for activities related to strengthening, flexibility, endurance, ROM for improvements in LE, proximal hip, and core control with self care, mobility, lifting, ambulation.  [] (29743) Provided verbal/tactile cueing for activities related to improving balance, coordination, kinesthetic sense, posture, motor skill, proprioception  to assist with LE, proximal hip, and core control in self care, mobility, lifting, ambulation and eccentric single leg control.      NMR and Therapeutic Activities:    [x] (09115 or 36613) Provided verbal/tactile cueing for activities related to improving balance, coordination, kinesthetic sense, posture, motor skill, proprioception and motor activation to allow for proper function of core, proximal hip and LE with self care and ADLs and functional mobility.   [] (97515) Gait Re-education- Provided training and instruction to the patient for proper LE, core and proximal hip recruitment and positioning and eccentric body weight control with ambulation re-education including up and down stairs     Home Exercise Program:    [x] (03046) Reviewed/Progressed HEP activities related to strengthening, flexibility, endurance, ROM of core, proximal hip and LE for functional self-care, mobility, lifting and ambulation/stair navigation   [] (68390)Reviewed/Progressed HEP activities related to improving balance, coordination, kinesthetic sense, posture, motor skill, proprioception of core, proximal hip and LE for self care, mobility, lifting, and ambulation/stair navigation      Manual Treatments:  PROM / STM / Oscillations-Mobs:  G-I, II, III, IV (PA's, Inf., Post.)  [x] (95068) Provided manual therapy to mobilize LE, proximal hip and/or LS spine soft tissue/joints for the purpose of modulating pain, promoting relaxation,  increasing ROM, reducing/eliminating soft tissue swelling/inflammation/restriction, improving soft tissue extensibility and allowing for proper ROM for normal function with self care, mobility, lifting and ambulation. Charges:  Timed Code Treatment Minutes: 45   Total Treatment Minutes: 50      [] EVAL (LOW) 28342 (typically 20 minutes face-to-face)  [] EVAL (MOD) 04883 (typically 30 minutes face-to-face)  [] EVAL (HIGH) 29116 (typically 45 minutes face-to-face)  [] RE-EVAL     [x] ZQ(66511) x 1    [] Dry needle 1 or 2 Muscles (08669)  [] NMR (00763) x   [] Dry needle 3+ Muscles (09646)  [x] Manual (28279) x 1    [] Ultrasound (47864) x  [x] TA (53898) x  1   [] Mech Traction (82122)  [] ES(attended) (84606)     [] ES (un) (72767):   [] Vasopump (17076) [] Ionto (78212)   [] Other:    GOALS:  Patient stated goal: \"To get back to walking. \"   []? Progressing: []? Met: []? Not Met: []? Adjusted     Therapist goals for Patient:   Short Term Goals: To be achieved in: 2 weeks  1. Independent in HEP and progression per patient tolerance, in order to prevent re-injury. []? Progressing: []? Met: []? Not Met: []? Adjusted  2. Patient will have a decrease in pain to facilitate improvement in movement, function, and ADLs as indicated by Functional Deficits. []? Progressing: []? Met: []? Not Met: []? Adjusted     Long Term Goals: To be achieved in: 6 weeks  1. Disability index score of 32 or higher on the LEFS to assist with reaching prior level of function. []? Progressing: []? Met: []? Not Met: []? Adjusted  2. Patient will demonstrate increased L ankle DF AROM to 5 deg to allow for proper joint functioning as indicated by patients Functional Deficits. []? Progressing: []? Met: []? Not Met: []? Adjusted  3. Patient will demonstrate an increase in L ankle strength to 4/5 with good proximal hip strength and control to allow for proper functional mobility as indicated by patients Functional Deficits. []? Progressing: []? Met: []? Not Met: []? Adjusted  4.  Patient will return to walking 1 mile without increased symptoms or restriction. []? Progressing: []? Met: []? Not Met: []? Adjusted  5. Patient will tolerate standing for 1 hour without increased pain, swelling, and pain. []? Progressing: []? Met: []? Not Met: []? Adjusted            ASSESSMENT:  Pt demonstrates good tolerance to PT follow up. Decreased edema L ankle. Pt continues to display gait deviations and decreased tolerance to WB on L LE. Today's session emphasized non-WB strengthening exercises and pt reported some discomfort in anterior ankle with WB. No adverse reaction to today's treatment and pt reports relief after manual therapy. Treatment/Activity Tolerance:  [x] Patient tolerated treatment well [] Patient limited by fatique  [] Patient limited by pain  [] Patient limited by other medical complications  [] Other:     Overall Progression Towards Functional goals/ Treatment Progress Update:  [] Patient is progressing as expected towards functional goals listed. [] Progression is slowed due to complexities/Impairments listed. [] Progression has been slowed due to co-morbidities. [x] Plan just implemented, too soon to assess goals progression <30days   [] Goals require adjustment due to lack of progress  [] Patient is not progressing as expected and requires additional follow up with physician  [] Other    Prognosis for POC: [x] Good [] Fair  [] Poor    Patient requires continued skilled intervention: [x] Yes  [] No        PLAN:   [] Continue per plan of care [] Alter current plan (see comments)  [x] Plan of care initiated [] Hold pending MD visit [] Discharge    Electronically signed by: Gildardo Hyman PT    Note: If patient does not return for scheduled/recommended follow up visits, this note will serve as a discharge from care along with the most recent update on progress.

## 2021-12-08 ENCOUNTER — APPOINTMENT (OUTPATIENT)
Dept: PHYSICAL THERAPY | Age: 71
End: 2021-12-08
Payer: MEDICARE

## 2021-12-13 ENCOUNTER — APPOINTMENT (OUTPATIENT)
Dept: PHYSICAL THERAPY | Age: 71
End: 2021-12-13
Payer: MEDICARE

## 2021-12-15 ENCOUNTER — HOSPITAL ENCOUNTER (OUTPATIENT)
Dept: PHYSICAL THERAPY | Age: 71
Setting detail: THERAPIES SERIES
Discharge: HOME | End: 2021-12-15
Payer: MEDICARE

## 2021-12-15 PROCEDURE — 97530 THERAPEUTIC ACTIVITIES: CPT

## 2021-12-15 PROCEDURE — 97112 NEUROMUSCULAR REEDUCATION: CPT

## 2021-12-15 PROCEDURE — 97110 THERAPEUTIC EXERCISES: CPT

## 2021-12-15 NOTE — FLOWSHEET NOTE
[facility]  Outpatient Rehabilitation & Therapy  [address] [place]  Phone: [phone]   Fax:     [phone]      Physical Therapy Treatment Note/ Progress Report:     Date:  12/15/2021    Patient Name:  Cesar Maguire    :  1950  MRN: [id number]    Pertinent Medical History:     Medical/Treatment Diagnosis Information:  · Diagnosis: S82.851A - Closed trimalleolar fracture of right ankle, S93.432A - Syndesmotic disruption of left ankle, initial encounter  · Treatment Diagnosis: Decreased functional mobility, strength and balance. Insurance/Certification information:  PT Insurance Information: Medicare  Physician Information:  Referring Practitioner: Harriet Carrillo MD  Plan of care signed (Y/N): faxed on     Date of Patient follow up with Physician:      Progress Report: []  Yes  [x]  No     Date Range for reporting period:  Beginnin/15/2021  Ending:      Progress report due (10 Rx/or 30 days whichever is less): 38    Recertification due (POC duration/ or 90 days whichever is less):     Visit # POC/Insurance Allowable Auth Needed   5 bomn []Yes   [x]No     Latex Allergy:  [x]NO      []YES  Preferred Language for Healthcare:   [x]English       []Other:    Functional Scale:      Date assessed: at [facility] (21)  Test: LEFS  Score: 21    Pain level:  4/10     History of Injury:   1-Left ankle trimalleolar fracture dislocation, status post ORIF. 2-Left ankle distal tibiofibular syndesmosis disruption, s/p ORIF syndesmosis screw   3-Left foot pain/contusion 3rd, 4th and 5th toes-better. 4-Left heel pain/ Plantar fasciitis of left foot. SUBJECTIVE:    12/3/21: Pt reports her foot/ankle is feeling good. She reports no soreness/no increase in swelling after last PT visit. She states PT yesterday seemed to help reduce the swelling. She bought an ice pack that has been helpful.    21: Pt reports, \"I have a new kind of pain in the front of the ankle and I think it's from the screw and being up on my feet more. \" She states that pain in noticeable when she is weight bearing/walking. 12/15/21: Pt reports, \"I did some yard work on Monday, without my walker, and I felt okay afterwards. No increase in swelling or pain. \"     OBJECTIVE:   Observation:    Test measurements:      RESTRICTIONS/PRECAUTIONS: WBAT, s/p ORIF with syndesmosis screw    Exercises/Interventions:     Therapeutic Ex (74004)   Min: 20' Reps/Resistance Notes/CUES   Ankle ABCs  Ankle Pumps  HEP  HEP   Incline calf stretch L Foot only   3x30 sec Gentle, heels remain on floor   Towel Curls 3x10 Seated   Seated Heel Raises    Seated Toe Raises    4-way ankle 2x15 Green t-band   Standing Calf Raises 2x10 Bilateral UE use   PF/INV Ball Squeeze 2x10 Seated at EOB   Manual Intervention (63357)  Min: 15'     Knee mobs/PROM     Tib/Fem Mobs     Metatarsal Mobs    STM              NMR re-education (28002)  Min: 5'  CUES NEEDED   Quad Set  Weight Shift      Anterior/Posterior      Side to Side  HEP   Semi-tandem on Airex 3x20 sec    Therapeutic Activity (02990)  Min: 15'     CKC ankle DF  At stairs   NuStep Lv4x6 min    Stairs 2 inch step up Bilateral handrail use - [place]. Gait Training With SPC, step to gait pattern    Alt. Marching/Toe Taps  2 inch box, in parallel bars   Modalities  Min:      IFC with      CP after exercises (Game Ready)     MH after exercises            Other Therapeutic Activities: Pt was educated on PT POC, Diagnosis, Prognosis, pathomechanics as well as frequency and duration of scheduling future physical therapy appointments. Time was also taken on this day to answer all patient questions and participation in PT. Reviewed appointment policy in detail with patient and patient verbalized understanding.      Home Exercise Program: Patient was instructed in the following for HEP:     . Patient verbalized/demonstrated understanding and was issued written handout. Ankle ABCs (x2)              Ankle Pumps/PF with t-band (3x10)              Quad set (3x10x5 sec)              Weight shift - anterior/posterior and side-to-side (3x10)       Therapeutic Exercise and NMR EXR  [x] (46587) Provided verbal/tactile cueing for activities related to strengthening, flexibility, endurance, ROM for improvements in LE, proximal hip, and core control with self care, mobility, lifting, ambulation.  [] (25977) Provided verbal/tactile cueing for activities related to improving balance, coordination, kinesthetic sense, posture, motor skill, proprioception  to assist with LE, proximal hip, and core control in self care, mobility, lifting, ambulation and eccentric single leg control.      NMR and Therapeutic Activities:    [x] (84336 or 12117) Provided verbal/tactile cueing for activities related to improving balance, coordination, kinesthetic sense, posture, motor skill, proprioception and motor activation to allow for proper function of core, proximal hip and LE with self care and ADLs and functional mobility.   [] (96165) Gait Re-education- Provided training and instruction to the patient for proper LE, core and proximal hip recruitment and positioning and eccentric body weight control with ambulation re-education including up and down stairs     Home Exercise Program:    [x] (90779) Reviewed/Progressed HEP activities related to strengthening, flexibility, endurance, ROM of core, proximal hip and LE for functional self-care, mobility, lifting and ambulation/stair navigation   [] (53573)Reviewed/Progressed HEP activities related to improving balance, coordination, kinesthetic sense, posture, motor skill, proprioception of core, proximal hip and LE for self care, mobility, lifting, and ambulation/stair navigation      Manual Treatments:  PROM / STM / Oscillations-Mobs:  G-I, II, III, IV (PA's, Inf., Post.)  [x] (33357) Provided manual therapy to mobilize LE, proximal hip and/or LS spine soft tissue/joints for the purpose of modulating pain, promoting relaxation,  increasing ROM, reducing/eliminating soft tissue swelling/inflammation/restriction, improving soft tissue extensibility and allowing for proper ROM for normal function with self care, mobility, lifting and ambulation. Charges:  Timed Code Treatment Minutes: 40   Total Treatment Minutes: 55      [] EVAL (LOW) 56496 (typically 20 minutes face-to-face)  [] EVAL (MOD) 08696 (typically 30 minutes face-to-face)  [] EVAL (HIGH) 84715 (typically 45 minutes face-to-face)  [] RE-EVAL     [x] II(02676) x 1    [] Dry needle 1 or 2 Muscles (04259)  [x] NMR (47707) x 1  [] Dry needle 3+ Muscles (29844)  [] Manual (17502) x     [] Ultrasound (35421) x  [x] TA (67550) x  1   [] Mech Traction (13607)  [] ES(attended) (92471)     [] ES (un) (65160):   [] Vasopump (85687) [] Ionto (37137)   [] Other:    GOALS:  Patient stated goal: \"To get back to walking. \"   []? Progressing: []? Met: []? Not Met: []? Adjusted     Therapist goals for Patient:   Short Term Goals: To be achieved in: 2 weeks  1. Independent in HEP and progression per patient tolerance, in order to prevent re-injury. []? Progressing: []? Met: []? Not Met: []? Adjusted  2. Patient will have a decrease in pain to facilitate improvement in movement, function, and ADLs as indicated by Functional Deficits. []? Progressing: []? Met: []? Not Met: []? Adjusted     Long Term Goals: To be achieved in: 6 weeks  1. Disability index score of 32 or higher on the LEFS to assist with reaching prior level of function. []? Progressing: []? Met: []? Not Met: []? Adjusted  2. Patient will demonstrate increased L ankle DF AROM to 5 deg to allow for proper joint functioning as indicated by patients Functional Deficits. []? Progressing: []? Met: []? Not Met: []? Adjusted  3.  Patient will demonstrate an increase in L ankle strength to 4/5 with good proximal hip strength and control to allow for proper functional mobility as indicated by patients Functional Deficits. []? Progressing: []? Met: []? Not Met: []? Adjusted  4. Patient will return to walking 1 mile without increased symptoms or restriction. []? Progressing: []? Met: []? Not Met: []? Adjusted  5. Patient will tolerate standing for 1 hour without increased pain, swelling, and pain. []? Progressing: []? Met: []? Not Met: []? Adjusted            ASSESSMENT:  Pt demonstrates good tolerance to PT follow up. Edema noted in L ankle. Pt continues to display gait deviations, however she displayed good tolerance to ambulating with SPC today. Pt does displays limitations with functional activities such as step ups/stairs secondary to pain/discomfort. No adverse reaction to today's treatment. Continue to progress patient as tolerated. Treatment/Activity Tolerance:  [x] Patient tolerated treatment well [] Patient limited by fatique  [] Patient limited by pain  [] Patient limited by other medical complications  [] Other:     Overall Progression Towards Functional goals/ Treatment Progress Update:  [] Patient is progressing as expected towards functional goals listed. [] Progression is slowed due to complexities/Impairments listed. [] Progression has been slowed due to co-morbidities.   [x] Plan just implemented, too soon to assess goals progression <30days   [] Goals require adjustment due to lack of progress  [] Patient is not progressing as expected and requires additional follow up with physician  [] Other    Prognosis for POC: [x] Good [] Fair  [] Poor    Patient requires continued skilled intervention: [x] Yes  [] No        PLAN:   [] Continue per plan of care [] Alter current plan (see comments)  [x] Plan of care initiated [] Hold pending MD visit [] Discharge    Electronically signed by: Bradley Hansen, PT    Note: If patient does not return for scheduled/recommended follow up visits, this note will serve as a discharge from care along with the most recent update on progress.

## 2021-12-16 NOTE — CARE COORDINATION
12-    Call rec'd from Patient who states she no longer needs the wheelchair she rec'd when discharged from our unit. She wants to know what to do. Reviewed chart this morning to see where I had ordered it. Message left for Silas Wade to inform. She can be reached at:   [phone].   [place], [place]     Case Management   [phone]    12/16/2021  9:45 AM

## 2021-12-20 ENCOUNTER — OFFICE VISIT (OUTPATIENT)
Dept: INTERNAL MEDICINE CLINIC | Age: 71
End: 2021-12-20
Payer: MEDICARE

## 2021-12-20 DIAGNOSIS — M25.512 ACUTE PAIN OF LEFT SHOULDER: ICD-10-CM

## 2021-12-20 DIAGNOSIS — Z91.81 AT HIGH RISK FOR FALLS: Primary | ICD-10-CM

## 2021-12-20 PROCEDURE — 1123F ACP DISCUSS/DSCN MKR DOCD: CPT | Performed by: INTERNAL MEDICINE

## 2021-12-20 PROCEDURE — 1036F TOBACCO NON-USER: CPT | Performed by: INTERNAL MEDICINE

## 2021-12-20 PROCEDURE — G8400 PT W/DXA NO RESULTS DOC: HCPCS | Performed by: INTERNAL MEDICINE

## 2021-12-20 PROCEDURE — 4040F PNEUMOC VAC/ADMIN/RCVD: CPT | Performed by: INTERNAL MEDICINE

## 2021-12-20 PROCEDURE — 1090F PRES/ABSN URINE INCON ASSESS: CPT | Performed by: INTERNAL MEDICINE

## 2021-12-20 PROCEDURE — 3017F COLORECTAL CA SCREEN DOC REV: CPT | Performed by: INTERNAL MEDICINE

## 2021-12-20 PROCEDURE — G8484 FLU IMMUNIZE NO ADMIN: HCPCS | Performed by: INTERNAL MEDICINE

## 2021-12-20 PROCEDURE — 99214 OFFICE O/P EST MOD 30 MIN: CPT | Performed by: INTERNAL MEDICINE

## 2021-12-20 PROCEDURE — G8427 DOCREV CUR MEDS BY ELIG CLIN: HCPCS | Performed by: INTERNAL MEDICINE

## 2021-12-20 PROCEDURE — G8417 CALC BMI ABV UP PARAM F/U: HCPCS | Performed by: INTERNAL MEDICINE

## 2021-12-20 RX ORDER — HYDROCODONE BITARTRATE AND ACETAMINOPHEN 5; 325 MG/1; MG/1
1 TABLET ORAL EVERY 6 HOURS PRN
Qty: 12 TABLET | Refills: 0 | Status: SHIPPED | OUTPATIENT
Start: 2021-12-20 | End: 2021-12-23

## 2021-12-20 NOTE — PATIENT INSTRUCTIONS
Patient Education        Shoulder Pain: Care Instructions  Your Care Instructions     You can hurt your shoulder by using it too much during an activity, such as fishing or baseball. It can also happen as part of the everyday wear and tear of getting older. Shoulder injuries can be slow to heal, but your shoulder should get better with time. Your doctor may recommend a sling to rest your shoulder. If you have injured your shoulder, you may need testing and treatment. Follow-up care is a key part of your treatment and safety. Be sure to make and go to all appointments, and call your doctor if you are having problems. It's also a good idea to know your test results and keep a list of the medicines you take. How can you care for yourself at home? · Take pain medicines exactly as directed. ? If the doctor gave you a prescription medicine for pain, take it as prescribed. ? If you are not taking a prescription pain medicine, ask your doctor if you can take an over-the-counter medicine. ? Do not take two or more pain medicines at the same time unless the doctor told you to. Many pain medicines contain acetaminophen, which is Tylenol. Too much acetaminophen (Tylenol) can be harmful. · If your doctor recommends that you wear a sling, use it as directed. Do not take it off before your doctor tells you to. · Put ice or a cold pack on the sore area for 10 to 20 minutes at a time. Put a thin cloth between the ice and your skin. · If there is no swelling, you can put moist heat, a heating pad, or a warm cloth on your shoulder. Some doctors suggest alternating between hot and cold. · Rest your shoulder for a few days. If your doctor recommends it, you can then begin gentle exercise of the shoulder, but do not lift anything heavy. When should you call for help? Call 911 anytime you think you may need emergency care. For example, call if:    · You have chest pain or pressure.  This may occur with:  ? Sweating. ? Shortness of breath. ? Nausea or vomiting. ? Pain that spreads from the chest to the neck, jaw, or one or both shoulders or arms. ? Dizziness or lightheadedness. ? A fast or uneven pulse. After calling 911, chew 1 adult-strength aspirin. Wait for an ambulance. Do not try to drive yourself.     · Your arm or hand is cool or pale or changes color. Call your doctor now or seek immediate medical care if:    · You have signs of infection, such as:  ? Increased pain, swelling, warmth, or redness in your shoulder. ? Red streaks leading from a place on your shoulder. ? Pus draining from an area of your shoulder. ? Swollen lymph nodes in your neck, armpits, or groin. ? A fever. Watch closely for changes in your health, and be sure to contact your doctor if:    · You cannot use your shoulder.     · Your shoulder does not get better as expected. Where can you learn more? Go to https://Eat Local.Geoloqi. org and sign in to your [employer] account. Enter X807 in the [employer] box to learn more about \"Shoulder Pain: Care Instructions. \"     If you do not have an account, please click on the \"Sign Up Now\" link. Current as of: July 1, 2021               Content Version: 13.0  © 2006-2021 Healthwise, Incorporated. Care instructions adapted under license by [facility] ([facility]). If you have questions about a medical condition or this instruction, always ask your healthcare professional. [place] [address] any warranty or liability for your use of this information. Patient Education        Shoulder Stretches: Exercises  Introduction  Here are some examples of exercises for you to try. The exercises may be suggested for a condition or for rehabilitation. Start each exercise slowly. Ease off the exercises if you start to have pain. You will be told when to start these exercises and which ones will work best for you.   How to do the exercises  Shoulder stretch    1.  a doorway and place one arm against the door frame. Your elbow should be a little higher than your shoulder. 2. Relax your shoulders as you lean forward, allowing your chest and shoulder muscles to stretch. You can also turn your body slightly away from your arm to stretch the muscles even more. 3. Hold for 15 to 30 seconds. 4. Repeat 2 to 4 times with each arm. Shoulder and chest stretch    1. Shoulder and chest stretch  2. While sitting, relax your upper body so you slump slightly in your chair. 3. As you breathe in, straighten your back and open your arms out to the sides. 4. Gently pull your shoulder blades back and downward. 5. Hold for 15 to 30 seconds as your breathe normally. 6. Repeat 2 to 4 times. Overhead stretch    1. Reach up over your head with both arms. 2. Hold for 15 to 30 seconds. 3. Repeat 2 to 4 times. Follow-up care is a key part of your treatment and safety. Be sure to make and go to all appointments, and call your doctor if you are having problems. It's also a good idea to know your test results and keep a list of the medicines you take. Where can you learn more? Go to https://[employer].Crowd Play. org and sign in to your [employer] account. Enter S254 in the [facility] box to learn more about \"Shoulder Stretches: Exercises. \"     If you do not have an account, please click on the \"Sign Up Now\" link. Current as of: July 1, 2021               Content Version: 13.0  © 2006-2021 Healthwise, Incorporated. Care instructions adapted under license by [facility] ([facility]). If you have questions about a medical condition or this instruction, always ask your healthcare professional. [place] [address] any warranty or liability for your use of this information.

## 2021-12-20 NOTE — PROGRESS NOTES
Chief Complaint   Patient presents with    Other     left shoulder pain       HPI: c/o fell backwards last week (couldn't give exact date) when sat down on rollator and it scooted backwards, put left arm out to catch herself and since then has pain along anterior portion of left upper arm. Denies wrist pain and no distal neurovascular deficits noted. Medications reviewed and reconciled with what patient reports to be taking. /88   Pulse 101   Ht 5' 2\" (1.575 m)   Wt 162 lb (73.5 kg)   SpO2 99%   BMI 29.63 kg/m²     Physical Exam  Constitutional:       General: She is not in acute distress. HENT:      Head: Normocephalic and atraumatic. Nose: Nose normal.      Mouth/Throat:      Pharynx: No oropharyngeal exudate. Eyes:      General: No scleral icterus. Right eye: No discharge. Left eye: No discharge. Conjunctiva/sclera: Conjunctivae normal.      Pupils: Pupils are equal, round, and reactive to light. Neck:      Thyroid: No thyromegaly. Vascular: No JVD. Trachea: No tracheal deviation. Cardiovascular:      Rate and Rhythm: Normal rate and regular rhythm. Heart sounds: Normal heart sounds. No murmur heard. No friction rub. No gallop. Pulmonary:      Effort: Pulmonary effort is normal. No respiratory distress. Breath sounds: Normal breath sounds. No wheezing or rales. Chest:      Chest wall: No tenderness. Abdominal:      General: Bowel sounds are normal. There is no distension. Palpations: Abdomen is soft. There is no mass. Tenderness: There is no abdominal tenderness. There is no guarding or rebound. Musculoskeletal:         General: No tenderness. Cervical back: Neck supple. Comments: Holds left should adducted, c/o pain with > 45 degrees of elevation and has tenderness along anterior biceps belly   Lymphadenopathy:      Cervical: No cervical adenopathy. Skin:     General: Skin is warm and dry.       Coloration: Skin is not pale. Findings: No erythema or rash. Neurological:      Mental Status: She is alert and oriented to person, place, and time. Cranial Nerves: No cranial nerve deficit. Motor: No abnormal muscle tone. Coordination: Coordination normal.      Deep Tendon Reflexes: Reflexes are normal and symmetric. Reflexes normal.   Psychiatric:         Mood and Affect: Mood normal.         Behavior: Behavior normal.         Thought Content: Thought content normal.         Judgment: Judgment normal.          ASSESSMENT/PLAN: Pt received counseling and, if relevant, printed instructions for all symptoms listed in CC and HPI, as well as for all diagnoses listed below. 1. Acute pain of left shoulder--strain with possible rotator cuff tear after fall  - P[place]o [place]anon [place] [facility] [place] HYDROcodone-acetaminophen [facility]) 5-325 MG per tablet; Take 1 tablet by mouth every 6 hours as needed for Pain for up to 3 days. Intended supply: 3 days. Take lowest dose possible to manage pain  Dispense: 12 tablet; Refill: 0      Problem List Items Addressed This Visit     Left shoulder pain    Relevant Orders    Pancho Castanon MD, Orthopedic Surgery, [facility]            Return if symptoms worsen or fail to improve. On the basis of positive falls risk screening, assessment and plan is as follows: home safety tips provided.

## 2021-12-21 ENCOUNTER — APPOINTMENT (OUTPATIENT)
Dept: PHYSICAL THERAPY | Age: 71
End: 2021-12-21
Payer: MEDICARE

## 2021-12-21 ENCOUNTER — OFFICE VISIT (OUTPATIENT)
Dept: ORTHOPEDIC SURGERY | Age: 71
End: 2021-12-21
Payer: MEDICARE

## 2021-12-21 VITALS — WEIGHT: 162 LBS | HEIGHT: 62 IN | BODY MASS INDEX: 29.81 KG/M2 | RESPIRATION RATE: 16 BRPM

## 2021-12-21 DIAGNOSIS — M25.512 LEFT SHOULDER PAIN, UNSPECIFIED CHRONICITY: Primary | ICD-10-CM

## 2021-12-21 PROCEDURE — 99213 OFFICE O/P EST LOW 20 MIN: CPT | Performed by: ORTHOPAEDIC SURGERY

## 2021-12-21 PROCEDURE — 1123F ACP DISCUSS/DSCN MKR DOCD: CPT | Performed by: ORTHOPAEDIC SURGERY

## 2021-12-21 PROCEDURE — G8427 DOCREV CUR MEDS BY ELIG CLIN: HCPCS | Performed by: ORTHOPAEDIC SURGERY

## 2021-12-21 PROCEDURE — G8484 FLU IMMUNIZE NO ADMIN: HCPCS | Performed by: ORTHOPAEDIC SURGERY

## 2021-12-21 PROCEDURE — 4040F PNEUMOC VAC/ADMIN/RCVD: CPT | Performed by: ORTHOPAEDIC SURGERY

## 2021-12-21 PROCEDURE — G8417 CALC BMI ABV UP PARAM F/U: HCPCS | Performed by: ORTHOPAEDIC SURGERY

## 2021-12-21 PROCEDURE — L3660 SO 8 AB RSTR CAN/WEB PRE OTS: HCPCS | Performed by: ORTHOPAEDIC SURGERY

## 2021-12-21 PROCEDURE — 3017F COLORECTAL CA SCREEN DOC REV: CPT | Performed by: ORTHOPAEDIC SURGERY

## 2021-12-21 PROCEDURE — G8400 PT W/DXA NO RESULTS DOC: HCPCS | Performed by: ORTHOPAEDIC SURGERY

## 2021-12-21 PROCEDURE — 1036F TOBACCO NON-USER: CPT | Performed by: ORTHOPAEDIC SURGERY

## 2021-12-21 PROCEDURE — 1090F PRES/ABSN URINE INCON ASSESS: CPT | Performed by: ORTHOPAEDIC SURGERY

## 2021-12-21 NOTE — PROGRESS NOTES
CHIEF COMPLAINT: Left shoulder pain    History:    Chad Gay is a 70 y.o. right-hand-dominant female referred by Chloe Ortez MD for evaluation and treatment of Left shoulder pain. This is evaluated as a personal injury. The pain began 4 months ago. Pain is rated as a 10/10. Of note she is status post left ankle trimalleolar fracture dislocation ORIF with syndesmosis fixation by Dr. Stevie Hartley on 8/2/2021. She notes that several years ago she was diagnosed with a rotator cuff tear at [facility] and treated nonoperatively. She states since her ankle ORIF she has had increased pain. She has fallen 2 times since then. Pain significantly increased last week. Pain is located laterally. Pain is worse with any movement. The patient has not had PT for the shoulder. The patient has not had an injection. The patient was prescribed Voltaren by Dr. [place] Scheuermann yesterday. She was also given a prescription for Norco. Patient's occupation is retired    Outside reports reviewed:  none. 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 Intended supply: 3 days.  Take lowest dose possible to manage pain 12 tablet 0    diclofenac (VOLTAREN) 50 MG EC tablet Take 1 tablet by mouth 2 times daily 20 tablet 0    valACYclovir (VALTREX) 500 MG tablet Take 500 mg by mouth daily (Patient not taking: Reported on 2021)      Multiple Vitamins-Minerals (THERAPEUTIC MULTIVITAMIN-MINERALS) tablet Take 1 tablet by mouth daily      doxyLAMINE succinate (SLEEP AID) 25 MG tablet Take 25 mg by mouth nightly       No current facility-administered medications on file prior to visit. Allergies   Allergen Reactions    Corticosteroids Hives     Both injectable steroids as well as by mouth steroids caused her to break out in hives.  Morphine      States not allergic, states it makes her nauseated    Cortizone-10 [Hydrocortisone] Rash    Phenergan [Promethazine] Rash    Sulfa Antibiotics Rash       Social History     Socioeconomic History    Marital status: Unknown     Spouse name: Not on file    Number of children: Not on file    Years of education: Not on file    Highest education level: Not on file   Occupational History    Not on file   Tobacco Use    Smoking status: Former Smoker     Packs/day: 0.25     Quit date:      Years since quittin.    Smokeless tobacco: Never Used    Tobacco comment: previous social smoker - quit in    Vaping Use    Vaping Use: Some days   Substance and Sexual Activity    Alcohol use: Not Currently    Drug use: Never    Sexual activity: Not Currently   Other Topics Concern    Not on file   Social History Narrative    Not on file     Social Determinants of Health     Financial Resource Strain: Low Risk     Difficulty of Paying Living Expenses: Not hard at all   Food Insecurity: No Food Insecurity    Worried About [address] in the Last Year: Never true    [address] [facility] in the Last Year: Never true   Transportation Needs:     Lack of Transportation (Medical): Not on file    Lack of Transportation (Non-Medical):  Not on file   Physical Activity:     Days of Exercise per Week: Not on file    Minutes of Exercise per Session: Not on file   Stress:     Feeling of Stress : Not on file   Social Connections:  Frequency of Communication with Friends and Family: Not on file    Frequency of Social Gatherings with Friends and Family: Not on file    Attends Scientologist Services: Not on file    Active Member of Clubs or Organizations: Not on file    Attends Club or Organization Meetings: Not on file    Marital Status: Not on file   Intimate Partner Violence:     Fear of Current or Ex-Partner: Not on file    Emotionally Abused: Not on file    Physically Abused: Not on file    Sexually Abused: Not on file   Housing Stability:     Unable to Pay for Housing in the Last Year: Not on file    Number of [place] in the Last Year: Not on file    Unstable Housing in the Last Year: Not on file       Family History   Problem Relation Age of Onset    Heart Failure Mother     Cancer Father         lung ca       Review of Systems:   I have reviewed the clinically relevant past medical history, medications, allergies, family history, social history, and 13 point Review of Systems from the patient's recent history form & documented any details relevant to today's presenting complaints in the history above. The patient's self-reported past medical history, medications, allergies, family history, social history, and Review of Systems form from 9/8/21 have been scanned into the chart under the \"Media\" tab. Physical Examination:      Vital signs:  Resp 16   Ht 5' 2\" (1.575 m)   Wt 162 lb (73.5 kg)   BMI 29.63 kg/m²     General:   alert, appears stated age, cooperative and no distress. She is crying. Left Shoulder   Active ROM:   forward flexion 30, external rotation 0, internal rotation back pocket. Right shoulder: forward flexion 180, external rotation 80, internal rotation L4.       Passive ROM:  forward flexion 180 with severe pain     Joint Tenderness:   globally   Neer:   positive   Sarmiento:   not tested   Strength:   not tested   Drop-arm test:   not tested   Belly-press test:   not tested   Bear-hug test: not tested   Speed's test:   not tested   Bicipital groove tenderness:  not tested   Jean's test:   not tested   Cross-body adduction test:   not tested    [facility] joint tenderness:   not tested   Essentially most of the shoulder exam was deferred because she could not tolerate it secondary to pain. There are no skin lesions, cellulitis, or extreme edema in the upper extremities. Sensation is grossly intact to light touch bilaterally upper extremity. The patient has warm and well-perfused Bilateral upper extremities with brisk capillary refill. Imaging   Left Shoulder X-Ray: 3 view x-rays of the shoulder including AP, scapular Y, and axillary    obtained and reviewed  AC Joint: narrowing moderate  Glenohumeral joint: On the scapular Y view there does appear to be some slight pseudosubluxation. Elevation humeral head: absent    Left shoulder MRI: ordered, but not yet obtained      Assessment:      Left shoulder pain, possible acute on chronic rotator cuff tear  Status post left ankle ORIF      Plan:      Natural history and expected course discussed. Questions answered. MRI of left shoulder to evaluate her rotator cuff. She did state that she had prior rotator cuff tear that was treated nonoperatively years ago. This may be an acute on chronic tear. We want to evaluate for muscle atrophy and fatty infiltration if it is a chronic tear. Ice as needed. NSAIDs as needed. Procedures    Breg DLX Shoulder Immobilizer     Patient was prescribed a DLX Shoulder Immobilizer. The left shoulder will require stabilization / immobilization from this orthosis. The orthosis will assist in protecting the affected area, provide functional support and facilitate healing. The patient was educated and fit by a healthcare professional with expert knowledge and specialization in brace application while under the direct supervision of the treating physician.   Verbal and written instructions for the use of and

## 2021-12-23 ENCOUNTER — TELEPHONE (OUTPATIENT)
Dept: ORTHOPEDIC SURGERY | Age: 71
End: 2021-12-23

## 2021-12-23 ENCOUNTER — APPOINTMENT (OUTPATIENT)
Dept: PHYSICAL THERAPY | Age: 71
End: 2021-12-23
Payer: MEDICARE

## 2021-12-23 NOTE — TELEPHONE ENCOUNTER
TONA/M FOR TIM regarding MRI LT SHOULDER approval and authorization being valid until 03/21/202. Patient was instructed that their MRI needs to be scheduled at [facility]. The patient was instructed to contact the facility to schedule  at [phone]. A follow up appointment will need to be scheduled to review the results and treatment plan with Dr. Michaelene Scheuermann.

## 2021-12-27 VITALS
OXYGEN SATURATION: 99 % | HEIGHT: 62 IN | SYSTOLIC BLOOD PRESSURE: 138 MMHG | BODY MASS INDEX: 29.81 KG/M2 | WEIGHT: 162 LBS | DIASTOLIC BLOOD PRESSURE: 88 MMHG | HEART RATE: 101 BPM

## 2022-01-03 ENCOUNTER — OFFICE VISIT (OUTPATIENT)
Dept: ORTHOPEDIC SURGERY | Age: 72
End: 2022-01-03
Payer: MEDICARE

## 2022-01-03 VITALS — HEIGHT: 62 IN | BODY MASS INDEX: 29.81 KG/M2 | WEIGHT: 162 LBS | RESPIRATION RATE: 16 BRPM

## 2022-01-03 DIAGNOSIS — M75.81 TENDINITIS OF RIGHT ROTATOR CUFF: Primary | ICD-10-CM

## 2022-01-03 PROCEDURE — 99213 OFFICE O/P EST LOW 20 MIN: CPT | Performed by: ORTHOPAEDIC SURGERY

## 2022-01-03 PROCEDURE — 3017F COLORECTAL CA SCREEN DOC REV: CPT | Performed by: ORTHOPAEDIC SURGERY

## 2022-01-03 PROCEDURE — 1123F ACP DISCUSS/DSCN MKR DOCD: CPT | Performed by: ORTHOPAEDIC SURGERY

## 2022-01-03 PROCEDURE — G8484 FLU IMMUNIZE NO ADMIN: HCPCS | Performed by: ORTHOPAEDIC SURGERY

## 2022-01-03 PROCEDURE — 1090F PRES/ABSN URINE INCON ASSESS: CPT | Performed by: ORTHOPAEDIC SURGERY

## 2022-01-03 PROCEDURE — 1036F TOBACCO NON-USER: CPT | Performed by: ORTHOPAEDIC SURGERY

## 2022-01-03 PROCEDURE — G8427 DOCREV CUR MEDS BY ELIG CLIN: HCPCS | Performed by: ORTHOPAEDIC SURGERY

## 2022-01-03 PROCEDURE — G8417 CALC BMI ABV UP PARAM F/U: HCPCS | Performed by: ORTHOPAEDIC SURGERY

## 2022-01-03 PROCEDURE — 4040F PNEUMOC VAC/ADMIN/RCVD: CPT | Performed by: ORTHOPAEDIC SURGERY

## 2022-01-03 PROCEDURE — G8400 PT W/DXA NO RESULTS DOC: HCPCS | Performed by: ORTHOPAEDIC SURGERY

## 2022-01-03 NOTE — PROGRESS NOTES
CHIEF COMPLAINT: Left shoulder pain    History:    Bertha Ugalde is a 70 y.o. right-hand-dominant female here for left shoulder follow-up    Initial history: referred by Lito Ceja MD for evaluation and treatment of Left shoulder pain. This is evaluated as a personal injury. The pain began 4 months ago. Pain is rated as a 10/10. Of note she is status post left ankle trimalleolar fracture dislocation ORIF with syndesmosis fixation by Dr. Nelli Combs on 8/2/2021. She notes that several years ago she was diagnosed with a rotator cuff tear at [facility] and treated nonoperatively. She states since her ankle ORIF she has had increased pain. She has fallen 2 times since then. Pain significantly increased last week. Pain is located laterally. Pain is worse with any movement. The patient has not had PT for the shoulder. The patient has not had an injection. The patient was prescribed Voltaren by Dr. Irvin Schwab yesterday. She was also given a prescription for Norco. Patient's occupation is retired    Interval History: States the shoulder does feel better. She rates pain 5/10. She states she took some oral Voltaren for 3 days, but this led to diarrhea and bleeding.           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 Allergies   Allergen Reactions    Corticosteroids Hives     Both injectable steroids as well as by mouth steroids caused her to break out in hives.  Morphine      States not allergic, states it makes her nauseated    Cortizone-10 [Hydrocortisone] Rash    Phenergan [Promethazine] Rash    Sulfa Antibiotics Rash       Social History     Socioeconomic History    Marital status: Unknown     Spouse name: Not on file    Number of children: Not on file    Years of education: Not on file    Highest education level: Not on file   Occupational History    Not on file   Tobacco Use    Smoking status: Former Smoker     Packs/day: 0.25     Quit date:      Years since quittin.0    Smokeless tobacco: Never Used    Tobacco comment: previous social smoker - quit in    Vaping Use    Vaping Use: Some days   Substance and Sexual Activity    Alcohol use: Not Currently    Drug use: Never    Sexual activity: Not Currently   Other Topics Concern    Not on file   Social History Narrative    Not on file     Social Determinants of Health     Financial Resource Strain: Low Risk     Difficulty of Paying Living Expenses: Not hard at all   Food Insecurity: No Food Insecurity    Worried About [address] in the Last Year: Never true    [facility] in the Last Year: Never true   Transportation Needs:     Lack of Transportation (Medical): Not on file    Lack of Transportation (Non-Medical):  Not on file   Physical Activity:     Days of Exercise per Week: Not on file    Minutes of Exercise per Session: Not on file   Stress:     Feeling of Stress : Not on file   Social Connections:     Frequency of Communication with Friends and Family: Not on file    Frequency of Social Gatherings with Friends and Family: Not on file    Attends Jain Services: Not on file    Active Member of Clubs or Organizations: Not on file    Attends Club or Organization Meetings: Not on file    Marital Status: Not on file   Intimate Partner Violence:     Fear of Current or Ex-Partner: Not on file    Emotionally Abused: Not on file    Physically Abused: Not on file    Sexually Abused: Not on file   Housing Stability:     Unable to Pay for Housing in the Last Year: Not on file    Number of [place] in the Last Year: Not on file    Unstable Housing in the Last Year: Not on file       Family History   Problem Relation Age of Onset    Heart Failure Mother     Cancer Father         lung ca       Review of Systems:   I have reviewed the clinically relevant past medical history, medications, allergies, family history, social history, and 13 point Review of Systems from the patient's recent history form & documented any details relevant to today's presenting complaints in the history above. The patient's self-reported past medical history, medications, allergies, family history, social history, and Review of Systems form from 9/8/21 have been scanned into the chart under the \"Media\" tab. Physical Examination:      Vital signs:  Resp 16   Ht 5' 2\" (1.575 m)   Wt 162 lb (73.5 kg)   BMI 29.63 kg/m²      General:   alert, appears stated age, cooperative and no distress. Left Shoulder   Active ROM:   forward flexion 100, external rotation 10, internal rotation not attempted. Right shoulder: forward flexion 180, external rotation 80, internal rotation L4.       Passive ROM:  forward flexion 180 with pain     Neer:   positive   Sarmiento:   positive   Strength:   patient would not resist.   Drop-arm test:   not tested   Belly-press test:   not tested   Bear-hug test:   not tested   Speed's test:   not tested Bicipital groove tenderness:  not tested   Jean's test:   not tested   Cross-body adduction test:   not tested    [facility] joint tenderness:   not tested   She still cannot tolerate a full physical exam.    Imaging   Left Shoulder X-Ray 12/21/21:   AC Joint: narrowing moderate  Glenohumeral joint: On the scapular Y view there does appear to be some slight pseudosubluxation. Elevation humeral head: absent    Left shoulder MRI 12/29/21 [facility]: I independently reviewed the images, as well as the radiology report. 1. Moderate supraspinatus tendinosis.  Mild anterior distal infraspinatus, superior    subscapularis tendinosis with small intrasubstance fissure at the subscapularis attachment. 2. Marked tendinosis of the proximal aspect long head biceps tendon. 3. Trace bursal fluid/edema. 4. Superior and posterosuperior labral degeneration/fraying.  Subtle anterior inferior labral    heterogeneity/degeneration. 5. Mild to moderate glenohumeral chondromalacia.  Mild posterior glenohumeral subluxation.     Small to moderate effusion with debris in axillary and subcoracoid recess. 6. Mild moderate AC joint degeneration with mild downward mass effect/supraspinatus    impingement. Assessment:      Left shoulder pain  Left shoulder rotator cuff tendonitis  Status post left ankle ORIF      Plan:      Natural history and expected course discussed. Questions answered. We reviewed her MRI images and discussed the findings. Ice as needed. NSAIDs as needed. Consider Voltaren gel given her side effects from oral NSAID use. PT referral provided. We discussed corticosteroid injection. She states she has had side effects from this in the past also. Follow-up in 2 months. Sharyn Tolbert. Latrice Malik MD  Orthopaedic Surgery and Sports Medicine     Disclaimer: This note was generated with use of a verbal recognition program and an attempt was made to check for errors.   It is possible that there are still dictated errors within this office note. If so, please bring any significant errors to my attention for an addendum. All efforts were made to ensure that this office note is accurate.

## 2022-01-05 ENCOUNTER — OFFICE VISIT (OUTPATIENT)
Dept: ORTHOPEDIC SURGERY | Age: 72
End: 2022-01-05
Payer: MEDICARE

## 2022-01-05 VITALS — WEIGHT: 162 LBS | HEIGHT: 62 IN | BODY MASS INDEX: 29.81 KG/M2

## 2022-01-05 DIAGNOSIS — S93.432A SYNDESMOTIC DISRUPTION OF LEFT ANKLE, INITIAL ENCOUNTER: Primary | ICD-10-CM

## 2022-01-05 DIAGNOSIS — S82.855A CLOSED NONDISPLACED TRIMALLEOLAR FRACTURE OF LEFT ANKLE, INITIAL ENCOUNTER: ICD-10-CM

## 2022-01-05 DIAGNOSIS — Z01.818 PRE-OP TESTING: ICD-10-CM

## 2022-01-05 PROCEDURE — 99214 OFFICE O/P EST MOD 30 MIN: CPT | Performed by: ORTHOPAEDIC SURGERY

## 2022-01-05 PROCEDURE — 3017F COLORECTAL CA SCREEN DOC REV: CPT | Performed by: ORTHOPAEDIC SURGERY

## 2022-01-05 PROCEDURE — G8484 FLU IMMUNIZE NO ADMIN: HCPCS | Performed by: ORTHOPAEDIC SURGERY

## 2022-01-05 PROCEDURE — 4040F PNEUMOC VAC/ADMIN/RCVD: CPT | Performed by: ORTHOPAEDIC SURGERY

## 2022-01-05 PROCEDURE — G8417 CALC BMI ABV UP PARAM F/U: HCPCS | Performed by: ORTHOPAEDIC SURGERY

## 2022-01-05 PROCEDURE — G8400 PT W/DXA NO RESULTS DOC: HCPCS | Performed by: ORTHOPAEDIC SURGERY

## 2022-01-05 PROCEDURE — 1090F PRES/ABSN URINE INCON ASSESS: CPT | Performed by: ORTHOPAEDIC SURGERY

## 2022-01-05 PROCEDURE — G8427 DOCREV CUR MEDS BY ELIG CLIN: HCPCS | Performed by: ORTHOPAEDIC SURGERY

## 2022-01-05 PROCEDURE — 1123F ACP DISCUSS/DSCN MKR DOCD: CPT | Performed by: ORTHOPAEDIC SURGERY

## 2022-01-05 PROCEDURE — 1036F TOBACCO NON-USER: CPT | Performed by: ORTHOPAEDIC SURGERY

## 2022-01-05 NOTE — PROGRESS NOTES
C-Difficile admission screening and protocol:     * Admitted with diarrhea? n     *Prior history of C-Diff. In last 3 months?n     *Antibiotic use in the past 6-8 weeks?n     *Prior hospitalization or nursing home in the last month?   n  [facility]. .call before you fall  [address] time________      Surgery time_______745    Take the following medications with a sip of water:    Do not eat or drink anything after 12:00 midnight prior to your surgery. This includes water chewing gum, mints and ice chips. You may brush your teeth and gargle the morning of your surgery, but do not swallow the water     Please see your family doctor/pediatrician for a history and physical and/or concerning medications. Bring any test results/reports from your physicians office. If you are under the care of a heart doctor or specialist doctor, please be aware that you may be asked to them for clearance    You may be asked to stop blood thinners such as Coumadin, Plavix, Fragmin, Lovenox, etc., or any anti-inflammatories such as:  Aspirin, Ibuprofen, Advil, Naproxen prior to your surgery. We also ask that you stop any OTC medications such as fish oil, vitamin E, glucosamine, garlic, Multivitamins, COQ 10, etc.    We ask that you do not smoke 24 hours prior to surgery  We ask that you do not  drink any alcoholic beverages 24 hours prior to surgery     You must make arrangements for a responsible adult to take you home after your surgery. For your safety you will not be allowed to leave alone or drive yourself home. Your surgery will be cancelled if you do not have a ride home. Also for your safety, it is strongly suggested that someone stay with you the first 24 hours after your surgery. A parent or legal guardian must accompany a child scheduled for surgery and plan to stay at the hospital until the child is discharged.     Please do not bring other children with you.    For your comfort, please wear simple loose fitting clothing to the hospital.  Please do not bring valuables. Do not wear any make-up or nail polish on your fingers or toes      For your safety, please do not wear any jewelry or body piercing's on the day of surgery. All jewelry must be removed. If you have dentures, they will be removed before going to operating room. For your convenience, we will provide you with a container. If you wear contact lenses or glasses, they will be removed, please bring a case for them. If you have a living will and a durable power of  for healthcare, please bring in a copy. As part of our patient safety program to minimize surgical site infections, we ask you to do the following:    · Please notify your surgeon if you develop any illness between         now and the  day of your surgery. · This includes a cough, cold, fever, sore throat, nausea,         or vomiting, and diarrhea, etc.  ·  Please notify your surgeon if you experience dizziness, shortness         of breath or blurred vision between now and the time of your surgery. Do not shave your operative site 96 hours prior to surgery. For face and neck surgery, men may use an electric razor 48 hours   prior to surgery. You may shower the night before surgery or the morning of   your surgery with an antibacterial soap. You will need to bring a photo ID and insurance card    [facility] has an onsite pharmacy, would you like to utilize our pharmacy     If you will be staying overnight and use a C-pap machine, please bring   your C-pap to hospital     Our goal is to provide you with excellent care, therefore, visitors will be limited to two(2) in the room at a time so that we may focus on providing this care for you. Please contact pre-admission testing if you have any further questions.                  [facility] phone number:  [phone] PAT fax number: [phone]  Please note these are generalized instructions for all surgical cases, you may be provided with more specific instructions according to your surgery.

## 2022-01-05 NOTE — PROGRESS NOTES
DIAGNOSIS:    1-Left ankle trimalleolar fracture dislocation, status post ORIF. 2-Left ankle distal tibiofibular syndesmosis disruption, s/p ORIF syndesmosis screw  3-Left foot pain/contusion 3rd, 4th and 5th toes-better. 4-Left heel pain/ Plantar fasciitis of left foot. DATE OF SURGERY:  2021. HISTORY OF PRESENT ILLNESS:  Ms. Tr Gonsalves 70 y.o.  female who came in today for 5 months postoperative visit . The patient reports no pain in the left ankle. She has been WB but states she is very stiff. She has been working with physical therapy. No numbness or tingling sensation. No fever or Chills. Denies smoking.      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       Social History     Socioeconomic History    Marital status: Unknown     Spouse name: Not on file    Number of children: Not on file    Years of education: Not on file    Highest education level: Not on file   Occupational History    Not on file   Tobacco Use    Smoking status: Former Smoker     Packs/day: 0.25     Types: Cigarettes     Quit date: 2000     Years since quittin.0    Smokeless tobacco: Never Used    Tobacco comment: PT STATES ON AND OFF SINCE SHE WAS 25YEARS OF AGE   Vaping Use    Vaping Use: Some days    Substances: Nicotine, Flavoring    Devices: Disposable   Substance and Sexual Activity    Alcohol use: Not Currently    Drug use: Never    Sexual activity: Not Currently   Other Topics Concern    Not on file   Social History Narrative    Not on file     Social Determinants of Health     Financial Resource Strain: Low Risk     Difficulty of Paying Living Expenses: Not hard at all   Food Insecurity: No Food Insecurity    Worried About Running Out of Food in the Last Year: Never true    [address] in the Last Year: Never true   Transportation Needs:     Lack of Transportation (Medical): Not on file    Lack of Transportation (Non-Medical): Not on file   Physical Activity:     Days of Exercise per Week: Not on file    Minutes of Exercise per Session: Not on file   Stress:     Feeling of Stress : Not on file   Social Connections:     Frequency of Communication with Friends and Family: Not on file    Frequency of Social Gatherings with Friends and Family: Not on file    Attends Judaism Services: Not on file    Active Member of [address] [place] [address] [employer] or Organizations: Not on file    Attends Club or Organization Meetings: Not on file    Marital Status: Not on file   Intimate Partner Violence:     Fear of Current or Ex-Partner: Not on file    Emotionally Abused: Not on file    Physically Abused: Not on file    Sexually Abused: Not on file   Housing Stability:     Unable to Pay for Housing in the Last Year: Not on file    Number of [place] in the Last Year: Not on file    Unstable Housing in the Last Year: Not on file       Family History   Problem Relation Age of Onset    Heart Failure Mother     Cancer Father         lung ca       Current Outpatient Medications on File Prior to Visit   Medication Sig Dispense Refill    valACYclovir (VALTREX) 500 MG tablet Take 500 mg by mouth as needed       Multiple Vitamins-Minerals (THERAPEUTIC MULTIVITAMIN-MINERALS) tablet Take 1 tablet by mouth daily      doxyLAMINE succinate (SLEEP AID) 25 MG tablet Take 50 mg by mouth nightly        No current facility-administered medications on file prior to visit.        Pertinent items are noted in HPI  Review of systems reviewed from Patient History Form dated on 9/8/2021 and available in the patient's chart under the Media tab. No change noted. PHYSICAL EXAMINATION:  Ms. Herminia Mccoy is a very pleasant 70 y.o.  female who presents today in no acute distress, awake, alert, and oriented. She is well dressed, nourished and  groomed. Patient with normal affect. Height is  5' 2\" (1.575 m), weight is 162 lb (73.5 kg), Body mass index is 29.63 kg/m². Resting respiratory rate is 16. She ambulates with a walker, weightbearing. The incision healed well. No signs of any erythema or drainage, minimal swelling. She has no pain with the active or passive range of motion of the left ankle, but decrease ROM. She has intact sensation distally, and she is neurovascularly intact. There is ecchymosis and swelling in her left distal foot. She has tender to palpation over the third fourth and fifth toes. Good strength, and no instability both upper and lower extremities. IMAGING:  Three views left ankle taken today in the office showed anatomic alignment of the fracture, plate and screws in good position, no loosening. Ankle mortise is well centered. Syndesmosis screw intact. 3 views of the left foot taken 9/22/2021 in office was reviewed and showed no acute fracture. There is significant osteopenia. IMPRESSION:    1-Left ankle trimalleolar fracture dislocation, status post ORIF. 2-Left ankle distal tibiofibular syndesmosis disruption, s/p ORIF syndesmosis screw  3-Left foot pain/contusion 3rd, 4th and 5th toes-better. 4-Left heel pain/ Plantar fasciitis of left foot. PLAN: I discussed the findings with the patient and reviewed the x-ray results. I discussed with her that no acute fracture was seen in the left foot. We recommended stretching exercises of the calf and plantar fascia which was taught to the patient in the office today.   She will be WBAT and continue to work on ROM and peroneal strengthening exercise that were given to her by physical therapy. I discussed with her that she would likely need a staged procedure for syndesmosis screw removal at 4-5 months. I discussed the risks and benefits of surgery with the patient, including but not limited to infection, bleeding, pain, injury to nerves or blood vessels failure of the surgery and need for additional surgery. All the patient's questions were answered. We discussed an expected post-operative course.   she  is understanding of this and wishes to proceed with syndesmosis screw removal.    Plan on surgery Monday at [address] [address] Mike Asher MD

## 2022-01-05 NOTE — PROGRESS NOTES
Pt was requested to have Rapid Covid test to be done prior to surgery/procedure. Understands that surgery/procedure maybe subjected to cancel if not completed prior to DOS and to bring copy of rapid testing in DOS. If positive, pt must contact surgeon immediately or with any other questions. Preoperative Screening for Elective Surgery/Invasive Procedures While COVID-19 present in the community     Have you tested positive or have been told to self-isolate for COVID-19 like symptoms within the past 28 days? n   Do you currently have any of the following symptoms? n  o Fever >100.0 F or 99.9 F in immunocompromised patients?n  o New onset cough, shortness of breath or difficulty breathing?n  o New onset sore throat, myalgia (muscle aches and pains), headache, loss of taste/smell or diarrhea?n   Have you had a potential exposure to COVID-19 within the past 14 days by:  o Close contact with a confirmed case?n  o Close contact with a healthcare worker,  or essential infrastructure worker (grocery store, [employer], gas station, public utilities or transportation)? y  o Do you reside in a congregate setting such as; skilled nursing facility, adult home, correctional facility, homeless shelter or other institutional setting?n  o Have you had recent travel to a known COVID-19 hotspot?n    Indicate if the patient has a positive screen by answering yes to one or more of the above questions. Patients who test positive or screen positive prior to surgery or on the day of surgery should be evaluated in conjunction with the surgeon/proceduralist/anesthesiologist to determine the urgency of the procedure.

## 2022-01-06 ENCOUNTER — TELEPHONE (OUTPATIENT)
Dept: ORTHOPEDIC SURGERY | Age: 72
End: 2022-01-06

## 2022-01-06 LAB — SARS-COV-2: NOT DETECTED

## 2022-01-06 NOTE — TELEPHONE ENCOUNTER
Auth: NPR  Date: 01/10/22  Type of SX: OUTPATIENT  Location: [facility]  CPT: [id number]   DX: [phone]  SX area: [place]  Insurance: MEDICARE A&B

## 2022-01-06 NOTE — PLAN OF CARE
[facility]  Phone: [phone]   Fax:     [phone]                                                       Physical Therapy Certification    Dear Referring Practitioner: Colt Muñoz MD,    We had the pleasure of evaluating the following patient for physical therapy services at [facility]. A summary of our findings can be found in the initial assessment below. This includes our plan of care. If you have any questions or concerns regarding these findings, please do not hesitate to contact me at the office phone number checked above. Thank you for the referral.       Physician Signature:_______________________________Date:__________________  By signing above (or electronic signature), therapists plan is approved by physician          Patient: Bob May   : 1950   MRN: [id number]  Referring Physician: Referring Practitioner: Colt Muñoz MD      Evaluation Date: 2022      Medical Diagnosis Information:  Diagnosis: M75.82 (ICD-10-CM) - Tendinitis of leftrotator cuff   Treatment Diagnosis: Decreased strength and mobility. Insurance information: PT Insurance Information: Medicare    Precautions/ Contra-indications: s/p L syndesmosis screw removal on 1/10/22  Latex Allergy:   [x]  NO      []YES  Preferred Language for Healthcare:   [x]English       []other:    C-SSRS Triggered by Intake questionnaire (Past 2 wk assessment ):   [x] No, Questionnaire did not trigger screening.   [] Yes, Patient intake triggered C-SSRS Screening      [] C-SSRS Screening completed  [] PCP notified via Epic     SUBJECTIVE: Patient reports L shoulder pain that began right after [place]. No specific RAFAEL. May have started with increased WB through UE with walker after ankle surgery.  Pain located anteriorly and travels down to deltoid insertion. Describes the pain as a rubber band popping. Denies clicking/grinding of the shoulder. Pt previously had NT in hand, however that has gone away. Pain/difficulty with sleeping. No injection or oral pain medication for the shoulder. Pt is R handed. MRI Results:  CONCLUSION:   1. Moderate supraspinatus tendinosis.  Mild anterior distal infraspinatus, superior    subscapularis tendinosis with small intrasubstance fissure at the subscapularis attachment. 2. Marked tendinosis of the proximal aspect long head biceps tendon. 3. Trace bursal fluid/edema. 4. Superior and posterosuperior labral degeneration/fraying.  Subtle anterior inferior labral    heterogeneity/degeneration. 5. Mild to moderate glenohumeral chondromalacia.  Mild posterior glenohumeral subluxation.     Small to moderate effusion with debris in axillary and subcoracoid recess. 6. Mild moderate AC joint degeneration with mild downward mass effect/supraspinatus    impingement. Relevant Medical History:Additional Pertinent Hx: Arthritis  Functional Outcomes Measure: Q-DASH = 39, disability/symptom score = 63.64    Pain Scale: 3/10 current, 7/10 at worst  Easing factors: ice pack, topical cream, advil   Provocative factors: reaching forward, reaching OH, reaching behind back    Type: [x]Constant   []Intermittent  []Radiating []Localized []other:     Numbness/Tingling: Pt is currently not experiencing NT. Occupation/School: retired. Living Status/Prior Level of Function: Independent with ADLs and IADLs    OBJECTIVE:   Posture: normal.     Functional Mobility/Transfers: requires increased time, decreased WB through L LE. Palpation: Gr II/III TTP to L biceps tendon, L RTC musculature.      Bandages/Dressings/Incisions: NA    Gait: WNL     CERV ROM     Cervical Flexion WNL    Cervical Extension WNL    Cervical SB WNL, tightness on L    Cervical rotation WNL         ROM Left Right   Shoulder Flex 102 deg, pain 180 deg   Shoulder Abd 122 deg, pain 180 deg   Shoulder ER 48 deg, pain 77 deg   Shoulder IR 55 deg, pain 77 deg             Strength  Left Right   Shoulder Flex 3+/5, pain 5/5   Shoulder Abd 3+/5, pain 5/5   Shoulder ER 4/5 5/5   Shoulder IR 4/5, pain 5/5           Reflexes Normal Abnormal Comments   [x]ALL NORMAL            S1-2 Seated achilles [] []    S1-2 Prone knee bend [] []    L3-4 Patellar tendon [] []    C5-6 Biceps [] []    C6 Brachioradialis [] []    C7-8 Triceps [] []    Clonus [] []    Babinski [] []    Richards's [] []      Reflexes/Sensation:    [x]Dermatomes/Myotomes intact    [x]Reflexes equal and normal bilaterally   []Other:    Joint mobility:    [x]Normal - R glenohumeral joint    [x]Hypo - L glenohumeral joint (posteriorly and inferiorly)   []Hyper    Orthopedic Special Tests:    Neers: + L    Almon Sera: + L    Full Can: pain on L   Empty Can: pain on L                        [x] Patient history, allergies, meds reviewed. Medical chart reviewed. See intake form. Review Of Systems (ROS):  [x]Performed Review of systems (Integumentary, CardioPulmonary, Neurological) by intake and observation. Intake form has been scanned into medical record. Patient has been instructed to contact their primary care physician regarding ROS issues if not already being addressed at this time.       Co-morbidities/Complexities (which will affect course of rehabilitation):   []None           Arthritic conditions   []Rheumatoid arthritis (M05.9)  [x]Osteoarthritis (M19.91)   Cardiovascular conditions   []Hypertension (I10)  []Hyperlipidemia (E78.5)  []Angina pectoris (I20)  []Atherosclerosis (I70)  []CVA Musculoskeletal conditions   []Disc pathology   []Congenital spine pathologies   []Prior surgical intervention  []Osteoporosis (M81.8)  []Osteopenia (M85.8)   Endocrine conditions   []Hypothyroid (E03.9)  []Hyperthyroid Gastrointestinal conditions   []Constipation (S53.66)   Metabolic conditions   []Morbid obesity (E66.01)  []Diabetes type 1(E10.65) or 2 (E11.65)   []Neuropathy (G60.9)     Pulmonary conditions   []Asthma (J45)  []Coughing   []COPD (J44.9)   Psychological Disorders  []Anxiety (F41.9)  []Depression (F32.9)   []Other:   []Other:          Barriers to/and or personal factors that will affect rehab potential:              []Age  []Sex   []Smoker              []Motivation/Lack of Motivation                        []Co-Morbidities              []Cognitive Function, education/learning barriers              []Environmental, home barriers              []profession/work barriers  []past PT/medical experience  []other:  Justification:     Falls Risk Assessment (30 days):   [x] Falls Risk assessed and no intervention required. [] Falls Risk assessed and Patient requires intervention due to being higher risk   TUG score (>12s at risk):     [] Falls education provided, including         ASSESSMENT: Pt is a 69 yo female who presents to PT with L shoulder pain. Signs and symptoms consistent with a RTC tendonosis. Pt demonstrates good tolerance to PT initial eval. Upon IE, pt demonstrates pain, TTP, decreased/painful ROM, strength deficits, scapular dyskensia, and limitations with functional activities. Pt would benefit from PT 2x/week for 4-6 weeks in order to address the impairments listed.    Functional Impairments:     [x]Noted spinal or UE joint hypomobility   []Noted spinal or UE joint hypermobility   [x]Decreased spinal/UE functional ROM   []Abnormal reflexes/sensation/myotomal/dermatomal deficits   [x]Decreased RC/scapular/core strength and neuromuscular control    [x]Decreased UE functional strength   []other:      Functional Activity Limitations (from functional questionnaire and intake)   [x]Reduced ability to tolerate prolonged functional positions   [x]Reduced ability or difficulty with changes of positions or transfers between positions   [x]Reduced ability to maintain good posture and demonstrate good body mechanics with sitting, bending, and lifting   [] Reduced ability or tolerance with driving and/or computer work   [x]Reduced ability to perform lifting, reaching, carrying tasks   [x]Reduced ability to reach behind back   [x]Reduced ability to sleep    []Reduced ability to tolerate any impact through UE or spine   []Reduced ability to  or hold objects   [x]Reduced ability to throw or toss an object   []other:    Participation Restrictions   [x]Reduced participation in self care activities   [x]Reduced participation in home management activities   [x]Reduced participation in work activities   [x]Reduced participation in social activities. [x]Reduced participation in sport/recreational activities. Classification/Subgrouping:   []signs/symptoms consistent with post-surgical status including decreased ROM, strength and function.     []signs/symptoms consistent with joint sprain/strain    []signs/symptoms consistent with shoulder impingement (internal, external, primary or secondary)   [x]signs/symptoms consistent with shoulder/elbow/wrist tendinopathy   []Signs/symptoms consistent with Rotator cuff tear   []sign/symptoms consistent with labral tear   []signs/symptoms consistent with rib dysfunction   []signs/symptoms consistent with postural dysfunction   []signs/symptoms consistent with Glenohumeral IR Deficit - <45 degrees   []signs/symptoms consistent with facet dysfunction of cervical/thoracic spine   []signs/symptoms consistent with pathology which may benefit from Dry Needling   []signs/symptoms which may limit the use of advanced manual therapy techniques: (Elevated CV risk profile, recent trauma, intolerance to end range positions, prior TIA, visual issues, UE neurological compromise )     Prognosis/Rehab Potential:      []Excellent   [x]Good    []Fair   []Poor    Tolerance of evaluation/treatment:    []Excellent   [x]Good    []Fair   []Poor    Physical Therapy Evaluation Complexity Justification  [x] A history of present problem with:  [] no personal factors and/or comorbidities that impact the plan of care;  [x]1-2 personal factors and/or comorbidities that impact the plan of care  []3 personal factors and/or comorbidities that impact the plan of care  [x] An examination of body systems using standardized tests and measures addressing any of the following: body structures and functions (impairments), activity limitations, and/or participation restrictions;:  [x] a total of 1-2 or more elements   [] a total of 3 or more elements   [] a total of 4 or more elements   [x] A clinical presentation with:  [x] stable and/or uncomplicated characteristics   [] evolving clinical presentation with changing characteristics  [] unstable and unpredictable characteristics;   [x] Clinical decision making of [x] low, [] moderate, [] high complexity using standardized patient assessment instrument and/or measurable assessment of functional outcome. [x] EVAL (LOW) 56481 (typically 20 minutes face-to-face)  [] EVAL (MOD) 55933 (typically 30 minutes face-to-face)  [] EVAL (HIGH) 44227 (typically 45 minutes face-to-face)  [] RE-EVAL     PLAN:   Frequency/Duration:  2 days per week for 4-6 Weeks:  Interventions:  [x]  Therapeutic exercise including: strength training, ROM, for scapula, core and Upper extremity, including postural re-education. [x]  NMR activation and proprioception for UE, periscapular and RC muscles and Core, including postural re-education. [x]  Manual therapy as indicated for shoulder, scapula, spine and associated soft tissue including: Dry Needling/IASTM, STM, PROM, Gr I-IV mobilizations, manipulation. [x] Modalities as needed that may include: thermal agents, E-stim, Biofeedback, US, iontophoresis as indicated  [x] Patient education on joint protection, postural re-education, activity modification, progression of HEP.      HEP instruction:   Access Code: [email or web address]  URL: progress.

## 2022-01-07 ENCOUNTER — ANESTHESIA EVENT (OUTPATIENT)
Dept: OPERATING ROOM | Age: 72
End: 2022-01-07
Payer: MEDICARE

## 2022-01-07 ENCOUNTER — TELEPHONE (OUTPATIENT)
Dept: ORTHOPEDIC SURGERY | Age: 72
End: 2022-01-07

## 2022-01-07 NOTE — TELEPHONE ENCOUNTER
General Question     Subject: QUESTION REGARDING SURGERY  Patient and /or Facility Request: Heron Rivera  Contact Number: [phone]    PATIENT CALLING REGARDING HER SURGERY ON Monday, January 10, 2022. PATIENT HAVE QUESTIONS REGARDING HER ANESTHESIA AND OTHER SURGERY CONCERNS. PLEASE CALL BACK AT THE ABOVE NUMBER.

## 2022-01-10 ENCOUNTER — HOSPITAL ENCOUNTER (OUTPATIENT)
Age: 72
Setting detail: OUTPATIENT SURGERY
Discharge: HOME | End: 2022-01-10
Attending: ORTHOPAEDIC SURGERY | Admitting: ORTHOPAEDIC SURGERY
Payer: MEDICARE

## 2022-01-10 ENCOUNTER — ANESTHESIA (OUTPATIENT)
Dept: OPERATING ROOM | Age: 72
End: 2022-01-10
Payer: MEDICARE

## 2022-01-10 VITALS
HEIGHT: 62 IN | DIASTOLIC BLOOD PRESSURE: 74 MMHG | HEART RATE: 79 BPM | SYSTOLIC BLOOD PRESSURE: 121 MMHG | BODY MASS INDEX: 29.81 KG/M2 | WEIGHT: 162 LBS | RESPIRATION RATE: 16 BRPM | TEMPERATURE: 96.9 F | OXYGEN SATURATION: 95 %

## 2022-01-10 VITALS
DIASTOLIC BLOOD PRESSURE: 58 MMHG | RESPIRATION RATE: 12 BRPM | SYSTOLIC BLOOD PRESSURE: 94 MMHG | OXYGEN SATURATION: 96 %

## 2022-01-10 DIAGNOSIS — T84.84XA PAIN DUE TO INTERNAL ORTHOPEDIC PROSTHETIC DEVICE, INITIAL ENCOUNTER (HCC): ICD-10-CM

## 2022-01-10 PROCEDURE — 2500000003 HC RX 250 WO HCPCS: Performed by: ORTHOPAEDIC SURGERY

## 2022-01-10 PROCEDURE — 2500000003 HC RX 250 WO HCPCS: Performed by: NURSE ANESTHETIST, CERTIFIED REGISTERED

## 2022-01-10 PROCEDURE — 20680 REMOVAL OF IMPLANT DEEP: CPT | Performed by: ORTHOPAEDIC SURGERY

## 2022-01-10 PROCEDURE — 7100000011 HC PHASE II RECOVERY - ADDTL 15 MIN: Performed by: ORTHOPAEDIC SURGERY

## 2022-01-10 PROCEDURE — 2580000003 HC RX 258: Performed by: NURSE ANESTHETIST, CERTIFIED REGISTERED

## 2022-01-10 PROCEDURE — 7100000000 HC PACU RECOVERY - FIRST 15 MIN: Performed by: ORTHOPAEDIC SURGERY

## 2022-01-10 PROCEDURE — 3700000000 HC ANESTHESIA ATTENDED CARE: Performed by: ORTHOPAEDIC SURGERY

## 2022-01-10 PROCEDURE — 3700000001 HC ADD 15 MINUTES (ANESTHESIA): Performed by: ORTHOPAEDIC SURGERY

## 2022-01-10 PROCEDURE — 3600000004 HC SURGERY LEVEL 4 BASE: Performed by: ORTHOPAEDIC SURGERY

## 2022-01-10 PROCEDURE — 88300 SURGICAL PATH GROSS: CPT

## 2022-01-10 PROCEDURE — 3600000014 HC SURGERY LEVEL 4 ADDTL 15MIN: Performed by: ORTHOPAEDIC SURGERY

## 2022-01-10 PROCEDURE — 7100000010 HC PHASE II RECOVERY - FIRST 15 MIN: Performed by: ORTHOPAEDIC SURGERY

## 2022-01-10 PROCEDURE — 2709999900 HC NON-CHARGEABLE SUPPLY: Performed by: ORTHOPAEDIC SURGERY

## 2022-01-10 PROCEDURE — 6360000002 HC RX W HCPCS: Performed by: NURSE ANESTHETIST, CERTIFIED REGISTERED

## 2022-01-10 PROCEDURE — 7100000001 HC PACU RECOVERY - ADDTL 15 MIN: Performed by: ORTHOPAEDIC SURGERY

## 2022-01-10 PROCEDURE — 2580000003 HC RX 258: Performed by: ORTHOPAEDIC SURGERY

## 2022-01-10 PROCEDURE — 6360000002 HC RX W HCPCS: Performed by: ORTHOPAEDIC SURGERY

## 2022-01-10 RX ORDER — SODIUM CHLORIDE 0.9 % (FLUSH) 0.9 %
5-40 SYRINGE (ML) INJECTION EVERY 12 HOURS SCHEDULED
Status: DISCONTINUED | OUTPATIENT
Start: 2022-01-10 | End: 2022-01-10 | Stop reason: HOSPADM

## 2022-01-10 RX ORDER — ONDANSETRON 2 MG/ML
INJECTION INTRAMUSCULAR; INTRAVENOUS PRN
Status: DISCONTINUED | OUTPATIENT
Start: 2022-01-10 | End: 2022-01-10 | Stop reason: SDUPTHER

## 2022-01-10 RX ORDER — LIDOCAINE HYDROCHLORIDE 20 MG/ML
INJECTION, SOLUTION EPIDURAL; INFILTRATION; INTRACAUDAL; PERINEURAL PRN
Status: DISCONTINUED | OUTPATIENT
Start: 2022-01-10 | End: 2022-01-10 | Stop reason: SDUPTHER

## 2022-01-10 RX ORDER — MIDAZOLAM HYDROCHLORIDE 1 MG/ML
INJECTION INTRAMUSCULAR; INTRAVENOUS PRN
Status: DISCONTINUED | OUTPATIENT
Start: 2022-01-10 | End: 2022-01-10 | Stop reason: SDUPTHER

## 2022-01-10 RX ORDER — FENTANYL CITRATE 50 UG/ML
25 INJECTION, SOLUTION INTRAMUSCULAR; INTRAVENOUS EVERY 5 MIN PRN
Status: DISCONTINUED | OUTPATIENT
Start: 2022-01-10 | End: 2022-01-10 | Stop reason: HOSPADM

## 2022-01-10 RX ORDER — PROPOFOL 10 MG/ML
INJECTION, EMULSION INTRAVENOUS CONTINUOUS PRN
Status: DISCONTINUED | OUTPATIENT
Start: 2022-01-10 | End: 2022-01-10 | Stop reason: SDUPTHER

## 2022-01-10 RX ORDER — BUPIVACAINE HYDROCHLORIDE 5 MG/ML
INJECTION, SOLUTION EPIDURAL; INTRACAUDAL
Status: COMPLETED | OUTPATIENT
Start: 2022-01-10 | End: 2022-01-10

## 2022-01-10 RX ORDER — SODIUM CHLORIDE 0.9 % (FLUSH) 0.9 %
5-40 SYRINGE (ML) INJECTION PRN
Status: DISCONTINUED | OUTPATIENT
Start: 2022-01-10 | End: 2022-01-10 | Stop reason: HOSPADM

## 2022-01-10 RX ORDER — CEPHALEXIN 500 MG/1
500 CAPSULE ORAL 4 TIMES DAILY
Qty: 12 CAPSULE | Refills: 0 | Status: SHIPPED | OUTPATIENT
Start: 2022-01-10 | End: 2022-01-13

## 2022-01-10 RX ORDER — PROPOFOL 10 MG/ML
INJECTION, EMULSION INTRAVENOUS PRN
Status: DISCONTINUED | OUTPATIENT
Start: 2022-01-10 | End: 2022-01-10 | Stop reason: SDUPTHER

## 2022-01-10 RX ORDER — MIDAZOLAM HYDROCHLORIDE 1 MG/ML
INJECTION INTRAMUSCULAR; INTRAVENOUS
Status: COMPLETED
Start: 2022-01-10 | End: 2022-01-10

## 2022-01-10 RX ORDER — SODIUM CHLORIDE 9 MG/ML
INJECTION, SOLUTION INTRAVENOUS CONTINUOUS PRN
Status: DISCONTINUED | OUTPATIENT
Start: 2022-01-10 | End: 2022-01-10 | Stop reason: SDUPTHER

## 2022-01-10 RX ORDER — FENTANYL CITRATE 50 UG/ML
INJECTION, SOLUTION INTRAMUSCULAR; INTRAVENOUS PRN
Status: DISCONTINUED | OUTPATIENT
Start: 2022-01-10 | End: 2022-01-10 | Stop reason: SDUPTHER

## 2022-01-10 RX ORDER — MAGNESIUM HYDROXIDE 1200 MG/15ML
LIQUID ORAL CONTINUOUS PRN
Status: COMPLETED | OUTPATIENT
Start: 2022-01-10 | End: 2022-01-10

## 2022-01-10 RX ORDER — LABETALOL HYDROCHLORIDE 5 MG/ML
5 INJECTION, SOLUTION INTRAVENOUS EVERY 10 MIN PRN
Status: DISCONTINUED | OUTPATIENT
Start: 2022-01-10 | End: 2022-01-10 | Stop reason: HOSPADM

## 2022-01-10 RX ORDER — SODIUM CHLORIDE 9 MG/ML
25 INJECTION, SOLUTION INTRAVENOUS PRN
Status: DISCONTINUED | OUTPATIENT
Start: 2022-01-10 | End: 2022-01-10 | Stop reason: HOSPADM

## 2022-01-10 RX ADMIN — ONDANSETRON 4 MG: 2 INJECTION INTRAMUSCULAR; INTRAVENOUS at 07:47

## 2022-01-10 RX ADMIN — CEFAZOLIN 2000 MG: 10 INJECTION, POWDER, FOR SOLUTION INTRAVENOUS at 07:31

## 2022-01-10 RX ADMIN — FENTANYL CITRATE 50 MCG: 50 INJECTION INTRAMUSCULAR; INTRAVENOUS at 07:35

## 2022-01-10 RX ADMIN — PROPOFOL 30 MG: 10 INJECTION, EMULSION INTRAVENOUS at 07:46

## 2022-01-10 RX ADMIN — FENTANYL CITRATE 25 MCG: 50 INJECTION INTRAMUSCULAR; INTRAVENOUS at 07:46

## 2022-01-10 RX ADMIN — PROPOFOL 70 MG: 10 INJECTION, EMULSION INTRAVENOUS at 07:37

## 2022-01-10 RX ADMIN — MIDAZOLAM 2 MG: 1 INJECTION INTRAMUSCULAR; INTRAVENOUS at 07:31

## 2022-01-10 RX ADMIN — LIDOCAINE HYDROCHLORIDE 60 MG: 20 INJECTION, SOLUTION EPIDURAL; INFILTRATION; INTRACAUDAL; PERINEURAL at 07:37

## 2022-01-10 RX ADMIN — PROPOFOL 160 MCG/KG/MIN: 10 INJECTION, EMULSION INTRAVENOUS at 07:37

## 2022-01-10 RX ADMIN — SODIUM CHLORIDE: 9 INJECTION, SOLUTION INTRAVENOUS at 07:24

## 2022-01-10 ASSESSMENT — PULMONARY FUNCTION TESTS
PIF_VALUE: 0
PIF_VALUE: 1
PIF_VALUE: 0

## 2022-01-10 ASSESSMENT — PAIN - FUNCTIONAL ASSESSMENT
PAIN_FUNCTIONAL_ASSESSMENT: 0-10
PAIN_FUNCTIONAL_ASSESSMENT: PREVENTS OR INTERFERES SOME ACTIVE ACTIVITIES AND ADLS

## 2022-01-10 ASSESSMENT — PAIN SCALES - GENERAL
PAINLEVEL_OUTOF10: 0

## 2022-01-10 ASSESSMENT — PAIN DESCRIPTION - DESCRIPTORS: DESCRIPTORS: ACHING

## 2022-01-10 NOTE — PROGRESS NOTES
Gian Tracey from [facility] in with prescription. Wheeled pt to discharge at daughter's car.

## 2022-01-10 NOTE — OP NOTE
[facility]           [address] [place] [address]                                OPERATIVE REPORT    PATIENT NAME: Fay Benavidez                    :        1950  MED REC NO:   [id number]                          ROOM:  ACCOUNT NO:   [de-identified]                           ADMIT DATE: 01/10/2022  PROVIDER:     Gill Parikh MD    DATE OF PROCEDURE:  01/10/2022    PRIMARY CARE:  Germania Brady MD    PREOPERATIVE DIAGNOSIS:  Left ankle retained deep implant/syndesmosis  screw. POSTOPERATIVE DIAGNOSIS:  Left ankle retained deep implant/syndesmosis  screw. OPERATION PERFORMED:  Removal of left ankle deep implant/syndesmosis  screw. SURGEON:  Gill Parikh MD    ASSISTANTS:  [employer] [facility] [employer]    ANESTHESIA:  Local MAC.    TOURNIQUET:  Left upper calf, 250 mmHg. ESTIMATED BLOOD LOSS:  Minimal.    COMPLICATIONS:  None. INDICATIONS:  This is a 71-year-old white female, still fairly active  for her age, who had a left ankle trimalleolar fracture dislocation with  syndesmosis disruption. She had surgery on 2021. She was  scheduled for a staged procedure for left ankle syndesmosis screw  removal.  All risks, benefits, and alternatives were discussed with the  patient. She agreed to proceed with surgical removal of the syndesmosis  screw. OPERATIVE PROCEDURE:  The patient's left ankle was marked. She received  2 gm of Ancef IV preoperatively. She was then brought to the operating  room, underwent local MAC. A well-padded tourniquet was placed, left  upper calf. The left lower extremity was then prepped and draped in  regular sterile routine fashion. A time-out was called confirming the  patient's name, site, and procedure. The site of the screw was confirmed with a mini C-arm, and then Esmarch  was used for exsanguination and tourniquet was inflated to 250 mmHg.   A  small stab incision was made over the screw head. Careful dissection  was performed. Using the appropriate screwdriver, we were able to  engage the screw and we were able to remove it without significant  difficulty. At this point, we let the tourniquet down and hemostasis was secured. We irrigated the incision copiously with normal saline. It was then  closed with a 4-0 Monocryl. Steri-Strips were then applied. Dressing  was then applied in the form of Xeroform, 4x4, sterile Webril, and Ace  wrap. The patient tolerated the procedure well and was taken to the Recovery  in stable condition. POSTOPERATIVE PLAN:  The patient will be discharged home. She can be  weightbearing as tolerated, no heavy-impact activities for four to six  weeks.         Sue Cox MD    D: 01/10/2022 8:14:38       T: 01/10/2022 9:56:58     /[email or web address]  Job#: [id number]     Doc#: [id number]    CC:  John Feng MD

## 2022-01-10 NOTE — PROGRESS NOTES
Admitted to PACU 12 from OR, pt sedate with oral airway intact, VSS on 2L O2 NC. Report recd from anesthesia.

## 2022-01-10 NOTE — ANESTHESIA POSTPROCEDURE EVALUATION
[facility] Department of Anesthesiology  Post-Anesthesia Note       Name:  Carlos Britt                                  Age:  70 y.o. MRN:  [id number]     Last Vitals & Oxygen Saturation: /74   Pulse 79   Temp 96.9 °F (36.1 °C) (Temporal)   Resp 16   Ht 5' 2\" (1.575 m)   Wt 162 lb (73.5 kg)   SpO2 95%   BMI 29.63 kg/m²   Patient Vitals for the past 4 hrs:   BP Temp Temp src Pulse Resp SpO2 Height Weight   01/10/22 0901 121/74   79 16 95 %     01/10/22 0835 (!) 137/90 96.9 °F (36.1 °C) Temporal 79 16 97 %     01/10/22 0825 (!) 137/96 97.4 °F (36.3 °C) Temporal 80 15 96 %     01/10/22 0820 117/84   80 16 95 %     01/10/22 0815 106/84   73 8 96 %     01/10/22 0810 94/65   75 8 95 %     01/10/22 0805 91/63   77 9 95 %     01/10/22 0800 84/61 97.4 °F (36.3 °C) Temporal 79 16 95 %     01/10/22 0624 124/89 96.9 °F (36.1 °C) Temporal 83 18 95 %     01/10/22 0605       5' 2\" (1.575 m) 162 lb (73.5 kg)       Level of consciousness:  Awake, alert    Respiratory: Respirations easy, no distress. Stable. Cardiovascular: Hemodynamically stable. Hydration: Adequate. PONV: Adequately managed. Post-op pain: Adequately controlled. Post-op assessment: Tolerated anesthetic well without complication. Complications:  None.     Beronica Espinoza MD  January 10, 2022   9:15 AM

## 2022-01-10 NOTE — BRIEF OP NOTE
Brief Postoperative Note      Patient: Meena Del Cid  YOB: 1950  MRN: [id number]    Date of Procedure: 1/10/2022    Pre-Op Diagnosis: PAINFUL HARDWARE, ANKLE    Post-Op Diagnosis: Same       Procedure(s):  LEFT SYNDESMOSIS SCREW REMOVAL    Surgeon(s):  Orin Melendez MD    Assistant:  Surgical Assistant: Martha Paz    Anesthesia: Monitor Anesthesia Care    Estimated Blood Loss (mL): Minimal    Complications: None    Specimens:   ID Type Source Tests Collected by Time Destination   A : Yandel Cortez MD 1/10/2022 8497        Implants:  Implant Name Type Inv. Item Serial No.  Lot No. LRB No. Used Action   SCREW BNE L44MM DIA3.5MM MILLY S STL ST NONCANNULATED IM  SCREW BNE L44MM DIA3.5MM MILLY S STL ST NONCANNULATED IM  [employer]-WD  Left 1 Explanted         Drains:   [REMOVED] External Urinary Catheter (Removed)       Findings: Same.     Electronically signed by Orin Melendez MD on 1/10/2022 at 8:11 AM

## 2022-01-10 NOTE — H&P
Preoperative H&P Update    The patient's History and Physical in the medical record from [facility] 1/5/2022 was reviewed by me today. 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 Current Outpatient Medications on File Prior to Encounter   Medication Sig Dispense Refill    valACYclovir (VALTREX) 500 MG tablet Take 500 mg by mouth as needed       Multiple Vitamins-Minerals (THERAPEUTIC MULTIVITAMIN-MINERALS) tablet Take 1 tablet by mouth daily      doxyLAMINE succinate (SLEEP AID) 25 MG tablet Take 50 mg by mouth nightly          Allergies   Allergen Reactions    Corticosteroids Hives     Both injectable steroids as well as by mouth steroids caused her to break out in hives.  Morphine      States not allergic, states it makes her nauseated    Cortizone-10 [Hydrocortisone] Rash    Phenergan [Promethazine] Rash    Sulfa Antibiotics Rash      I reviewed the HPI, medications, allergies, reason for surgery, diagnosis and treatment plan and there has been no change. The patient was evaluated by me today.  Physical exam findings for this update include:    Vitals:    01/10/22 0624   BP: 124/89   Pulse: 83   Resp: 18   Temp: 96.9 °F (36.1 °C)   SpO2: 95%     Airway is intact  Chest: chest clear, no wheezing, rales, normal symmetric air entry, no tachypnea, retractions or cyanosis  Heart: regular rate and rhythm ; heart sounds normal  Findings on exam of the body region where surgery is to be performed include:  Left ankle retained syndesmosis screw.     Electronically signed by Malathi Anton MD on 1/10/2022 at 6:38 AM

## 2022-01-10 NOTE — FLOWSHEET NOTE
[address] [facility] [address]. [address] Gomez [address] [address]  Phone: [phone]   Fax:     [phone]        Physical Therapy Treatment Note/ Progress Report:       Date:  1/10/2022    Patient Name:  Miles Del Cid    :  1950  MRN: [id number]    Pertinent Medical History:Additional Pertinent Hx: Arthritis    Medical/Treatment Diagnosis Information:  · Diagnosis: M75.82 (ICD-10-CM) - Tendinitis of leftrotator cuff  · Treatment Diagnosis: Decreased strength and mobility.     Insurance/Certification information:  PT Insurance Information: Medicare  Physician Information:  Referring Practitioner: Ayla Bellamy MD  Plan of care signed (Y/N): sent on 22    Date of Patient follow up with Physician:      Progress Report: []  Yes  [x]  No     Date Range for reporting period:  Beginnin2022  Ending:      Progress report due (10 Rx/or 30 days whichever is less): 56    Recertification due (POC duration/ or 90 days whichever is less):      Visit # POC/Insurance Allowable Auth Needed   1 bomn []Yes    []No     Functional Outcomes Measure:   Date Assessed: at [facility] (22)  Test: QDASH  Score: 39, disability score = 63.64    Pain level:  3/10 at rest/current, 7/10 at worst      History of Injury:     SUBJECTIVE:  See eval    OBJECTIVE:    Observation:    Test measurements:      RESTRICTIONS/PRECAUTIONS: s/p L ankle syndesmosis screw removal on 1/10/22    Exercises/Interventions:   Therapeutic Ex (04156)  Min: 12' Resistance/Reps Cues/Notes   Table Slides     Flex     Scaption  Scapular retractions   HEP                   Manual Intervention  ([id number])  Min: 12'     STM  L shoulder complex          NMR re-education (79891)  Min:               Therapeutic Activity (69933)  Min:               Modalities:  Min                 Other Therapeutic Activities:  Pt was educated on PT POC, Diagnosis, Prognosis, pathomechanics as well as frequency and duration of scheduling future physical therapy appointments. Time was also taken on this day to answer all patient questions and participation in PT. Reviewed appointment policy in detail with patient and patient verbalized understanding. Home Exercise Program:  Access Code: [email or web address]  URL: [email or web address]  Date: 01/11/2022  Prepared by: Tej Anuj     Exercises  Seated Scapular Retraction - 2 x daily - 7 x weekly - 10 reps - 3 hold  Seated Shoulder Scaption Slide at Table Top with Forearm in Neutral - 2 x daily - 7 x weekly - 10 reps - 3 hold  Seated Shoulder Flexion Towel Slide at Table Top - 2 x daily - 7 x weekly - 10 reps - 3 hold       Therapeutic Exercise and NMR EXR  [x] (92315) Provided verbal/tactile cueing for activities related to strengthening, flexibility, endurance, ROM  for improvements in scapular, scapulothoracic and UE control with self care, reaching, carrying, lifting, house/yardwork, driving/computer work.    [] (69140) Provided verbal/tactile cueing for activities related to improving balance, coordination, kinesthetic sense, posture, motor skill, proprioception  to assist with  scapular, scapulothoracic and UE control with self care, reaching, carrying, lifting, house/yardwork, driving/computer work. Therapeutic Activities:    [] (25504 or 51071) Provided verbal/tactile cueing for activities related to improving balance, coordination, kinesthetic sense, posture, motor skill, proprioception and motor activation to allow for proper function of scapular, scapulothoracic and UE control with self care, carrying, lifting, driving/computer work.      Home Exercise Program:    [x] (76107) Reviewed/Progressed HEP activities related to strengthening, flexibility, endurance, ROM of scapular, scapulothoracic and UE control with self care, reaching, carrying, lifting, house/yardwork, driving/computer work  [] (95442) Reviewed/Progressed HEP activities related to improving balance, coordination, kinesthetic sense, posture, motor skill, proprioception of scapular, scapulothoracic and UE control with self care, reaching, carrying, lifting, house/yardwork, driving/computer work      Manual Treatments:  PROM / STM / Oscillations-Mobs:  G-I, II, III, IV (PA's, Inf., Post.)  [x] (78532) Provided manual therapy to mobilize soft tissue/joints of cervical/CT, scapular GHJ and UE for the purpose of modulating pain, promoting relaxation,  increasing ROM, reducing/eliminating soft tissue swelling/inflammation/restriction, improving soft tissue extensibility and allowing for proper ROM for normal function with self care, reaching, carrying, lifting, house/yardwork, driving/computer work    Charges:  Timed Code Treatment Minutes: 24   Total Treatment Minutes: 50       [x] EVAL (LOW) 49185 (typically 20 minutes face-to-face)  [] EVAL (MOD) 59477 (typically 30 minutes face-to-face)  [] EVAL (HIGH) 89879 (typically 45 minutes face-to-face)  [] RE-EVAL     [x] KG(48503) x     [] Dry needle 1 or 2 Muscles (75866)  [] NMR (98180) x     [] Dry needle 3+ Muscles (44190)  [x] Manual (49722) x     [] Ultrasound (42697) x  [] TA (20073) x     [] Mech Traction (98751)  [] ES(attended) (59133)     [] ES (un) (94712):   [] Vasopump (09332) [] Ionto (63821)   [] Other:    If [facility] Please Indicate Time In/Out  CPT Code Time in Time out                                   Approval Dates:  CPT Code Units Approved Units Used  Date Updated:                     GOALS:  Patient stated goal: \"To have more movement and reach behind my back. \"   []? Progressing: []? Met: []? Not Met: []? Adjusted     Therapist goals for Patient:   Short Term Goals: To be achieved in: 3 weeks  1. Independent in HEP and progression per patient tolerance, in order to prevent re-injury. []? Progressing: []? Met: []? Not Met: []? Adjusted  2.  Patient will have a decrease in pain to facilitate improvement in movement, function, and ADLs as indicated by Functional Deficits. []? Progressing: []? Met: []? Not Met: []? Adjusted     Long Term Goals: To be achieved in: 6 weeks  1. Disability index score of 53% or less for the Quick DASH to assist with reaching prior level of function. []? Progressing: []? Met: []? Not Met: []? Adjusted  2. Patient will demonstrate increased L glenohumeral flex/abd AROM to 170 deg to allow for proper joint functioning as indicated by Functional Deficits. []? Progressing: []? Met: []? Not Met: []? Adjusted  3. Patient will demonstrate an increase in NM recruitment/activation and overall GH and scapular strength to within n5lbs HHD or WNL for proper functional mobility as indicated by patients Functional Deficits. []? Progressing: []? Met: []? Not Met: []? Adjusted  4. Patient will return to reaching behind her back without increased symptoms or restriction. []? Progressing: []? Met: []? Not Met: []? Adjusted    ASSESSMENT:  See eval    Treatment/Activity Tolerance:  [x] Patient tolerated treatment well [] Patient limited by fatique  [] Patient limited by pain  [] Patient limited by other medical complications  [] Other:     Overall Progression Towards Functional goals/ Treatment Progress Update:  [] Patient is progressing as expected towards functional goals listed. [] Progression is slowed due to complexities/Impairments listed. [] Progression has been slowed due to co-morbidities.   [x] Plan just implemented, too soon to assess goals progression <30days   [] Goals require adjustment due to lack of progress  [] Patient is not progressing as expected and requires additional follow up with physician  [] Other    Prognosis for POC: [x] Good [] Fair  [] Poor    Patient requires continued skilled intervention: [x] Yes  [] No      PLAN: See eval  [] Continue per plan of care [] Alter current plan (see comments)  [x] Plan of care initiated [] Hold pending MD visit [] Discharge    Electronically signed by: Chelsey Rea PT     Note: If patient does not return for scheduled/recommended follow up visits, this note will serve as a discharge from care along with the most recent update on progress.

## 2022-01-10 NOTE — ANESTHESIA PRE PROCEDURE
Substances: Nicotine, Flavoring    Devices: Disposable   Substance Use Topics    Alcohol use: Not Currently    Drug use: Never     Medications  No current facility-administered medications on file prior to encounter. Current Outpatient Medications on File Prior to Encounter   Medication Sig Dispense Refill    valACYclovir (VALTREX) 500 MG tablet Take 500 mg by mouth as needed       Multiple Vitamins-Minerals (THERAPEUTIC MULTIVITAMIN-MINERALS) tablet Take 1 tablet by mouth daily      doxyLAMINE succinate (SLEEP AID) 25 MG tablet Take 50 mg by mouth nightly        Current Facility-Administered Medications   Medication Dose Route Frequency Provider Last Rate Last Admin    ceFAZolin (ANCEF) 2000 mg in dextrose 5 % 100 mL IVPB  2,000 mg IntraVENous Once Jyotsna Newton MD        sodium chloride flush 0.9 % injection 5-40 mL  5-40 mL IntraVENous 2 times per day Trang Bowen MD        sodium chloride flush 0.9 % injection 5-40 mL  5-40 mL IntraVENous PRN Trang Bowen MD        0.9 % sodium chloride infusion  25 mL IntraVENous PRN Trang Bowen MD         Vital Signs (Current)   Vitals:    01/10/22 0624   BP: 124/89   Pulse: 83   Resp: 18   Temp: 96.9 °F (36.1 °C)   SpO2: 95%     Vital Signs Statistics (for past 48 hrs)     Temp  Av.9 °F (36.1 °C)  Min: 96.9 °F (36.1 °C)   Min taken time: 01/10/22 0624  Max: 96.9 °F (36.1 °C)   Max taken time: 01/10/22 0624  Pulse  Av  Min: 80   Min taken time: 01/10/22 0624  Max: 80   Max taken time: 01/10/22 0624  Resp  Av  Min: 25   Min taken time: 01/10/22 0624  Max: 18   Max taken time: 01/10/22 0624  BP  Min: 124/89   Min taken time: 01/10/22 0624  Max: 124/89   Max taken time: 01/10/22 0624  SpO2  Av %  Min: 95 %   Min taken time: 01/10/22 0624  Max: 95 %   Max taken time: 01/10/22 0624    BP Readings from Last 3 Encounters:   01/10/22 124/89   21 138/88   21 (!) 143/95     BMI  Body mass index is 29.63 kg/m².   Estimated body mass index is 29.63 kg/m² as calculated from the following:    Height as of this encounter: 5' 2\" (1.575 m). Weight as of this encounter: 162 lb (73.5 kg). CBC   Lab Results   Component Value Date    WBC 5.8 09/06/2021    RBC 4.14 09/06/2021    HGB 13.3 09/06/2021    HCT 39.5 09/06/2021    MCV 95.4 09/06/2021    RDW 13.3 09/06/2021     09/06/2021     CMP    Lab Results   Component Value Date     09/06/2021    K 4.1 09/06/2021     09/06/2021    CO2 25 09/06/2021    BUN 15 09/06/2021    CREATININE 0.7 09/06/2021    GFRAA >60 09/06/2021    LABGLOM >60 09/06/2021    GLUCOSE 107 09/06/2021    CALCIUM 9.7 09/06/2021     BMP    Lab Results   Component Value Date     09/06/2021    K 4.1 09/06/2021     09/06/2021    CO2 25 09/06/2021    BUN 15 09/06/2021    CREATININE 0.7 09/06/2021    CALCIUM 9.7 09/06/2021    GFRAA >60 09/06/2021    LABGLOM >60 09/06/2021    GLUCOSE 107 09/06/2021     POCGlucose  No results for input(s): GLUCOSE in the last 72 hours.    Coags  No results found for: PROTIME, INR, APTT  HCG (If Applicable) No results found for: PREGTESTUR, PREGSERUM, HCG, HCGQUANT   ABGs No results found for: PHART, PO2ART, GJM2PRK, YZX2VGK, BEART, T3EILUFS   Type & Screen (If Applicable)  No results found for: LABABO, LABRH                         BMI: Wt Readings from Last 3 Encounters:       NPO Status:   Date of last liquid consumption: 01/09/22   Time of last liquid consumption: 2000   Date of last solid food consumption: 01/09/22      Time of last solid consumption: 2000       Anesthesia Evaluation  Patient summary reviewed no history of anesthetic complications:   Airway: Mallampati: III  TM distance: >3 FB   Neck ROM: full   Dental: normal exam         Pulmonary:Negative Pulmonary ROS and normal exam                               Cardiovascular:  Exercise tolerance: good (>4 METS),   (+) hypertension:,         Rhythm: regular  Rate: normal           Beta Blocker:  Not on Beta Blocker Neuro/Psych:   (+) psychiatric history:depression/anxiety             GI/Hepatic/Renal: Neg GI/Hepatic/Renal ROS       (-) GERD       Endo/Other: Negative Endo/Other ROS                    Abdominal:             Vascular: negative vascular ROS. Other Findings:             Anesthesia Plan      MAC     ASA 3       Induction: intravenous. MIPS: Postoperative opioids intended and Prophylactic antiemetics administered. Anesthetic plan and risks discussed with patient. Plan discussed with CRNA. This pre-anesthesia assessment may be used as a history and physical.    DOS STAFF ADDENDUM:    Pt seen and examined, chart reviewed (including anesthesia, drug and allergy history). No interval changes to history and physical examination. Anesthetic plan, risks, benefits, alternatives, and personnel involved discussed with patient. Questions and concerns addressed. Patient(family) verbalized an understanding and agrees to proceed.       Vincent Swanson MD  January 10, 2022  6:52 AM

## 2022-01-10 NOTE — PROGRESS NOTES
Pt alert. Tolerates PO. VSS. Discharge instructions reviewed with pt per pt request. She verbalizes an understanding of instructions. Walked pt to bathroom and voided. Back to room and pt dressing in recliner. Daughter to arrive at 0930 for .

## 2022-01-11 ENCOUNTER — HOSPITAL ENCOUNTER (OUTPATIENT)
Dept: PHYSICAL THERAPY | Age: 72
Setting detail: THERAPIES SERIES
Discharge: HOME | End: 2022-01-11
Payer: MEDICARE

## 2022-01-11 PROCEDURE — 97110 THERAPEUTIC EXERCISES: CPT

## 2022-01-11 PROCEDURE — 97161 PT EVAL LOW COMPLEX 20 MIN: CPT

## 2022-01-11 PROCEDURE — 97140 MANUAL THERAPY 1/> REGIONS: CPT

## 2022-01-13 ENCOUNTER — HOSPITAL ENCOUNTER (OUTPATIENT)
Dept: PHYSICAL THERAPY | Age: 72
Setting detail: THERAPIES SERIES
Discharge: HOME | End: 2022-01-13
Payer: MEDICARE

## 2022-01-13 ENCOUNTER — APPOINTMENT (OUTPATIENT)
Dept: PHYSICAL THERAPY | Age: 72
End: 2022-01-13
Payer: MEDICARE

## 2022-01-17 ENCOUNTER — APPOINTMENT (OUTPATIENT)
Dept: PHYSICAL THERAPY | Age: 72
End: 2022-01-17
Payer: MEDICARE

## 2022-01-17 ENCOUNTER — HOSPITAL ENCOUNTER (OUTPATIENT)
Dept: PHYSICAL THERAPY | Age: 72
Setting detail: THERAPIES SERIES
Discharge: HOME | End: 2022-01-17
Payer: MEDICARE

## 2022-01-17 NOTE — FLOWSHEET NOTE
[address] [facility] [place]. [address]  Phone: [phone]   Fax:     [phone]    Physical Therapy  Cancellation/No-show Note  Patient Name:  Adore Small  :  1950   Date:  2022  Cancelled visits to date: 2  No-shows to date: 0    Patient status for today's appointment patient:  [x]  Cancelled  []  Rescheduled appointment  []  No-show     Reason given by patient:  []  Patient ill  []  Conflicting appointment  [x]  No transportation    []  Conflict with work  []  No reason given  [x]  Other:     Comments:  Pt is unable to get out of her driveway due to snow and her injury. Phone call information:   []  Phone call made today to patient at _ time at number provided:      []  Patient answered, conversation as follows:    []  Patient did not answer, message left as follows:  [x]  Phone call not made today. - Pt called the clinic to cancel her appointment.      Electronically signed by:  Rohith Srivastava PT

## 2022-01-19 ENCOUNTER — HOSPITAL ENCOUNTER (OUTPATIENT)
Dept: PHYSICAL THERAPY | Age: 72
Setting detail: THERAPIES SERIES
End: 2022-01-19
Payer: MEDICARE

## 2022-01-25 ENCOUNTER — HOSPITAL ENCOUNTER (OUTPATIENT)
Dept: PHYSICAL THERAPY | Age: 72
Setting detail: THERAPIES SERIES
Discharge: HOME | End: 2022-01-25
Payer: MEDICARE

## 2022-01-25 PROCEDURE — 97140 MANUAL THERAPY 1/> REGIONS: CPT

## 2022-01-25 PROCEDURE — 97530 THERAPEUTIC ACTIVITIES: CPT

## 2022-01-25 PROCEDURE — 97110 THERAPEUTIC EXERCISES: CPT

## 2022-01-25 NOTE — PROGRESS NOTES
[facility]  Outpatient Rehabilitation & Therapy  [address] [facility]. Ramez Gomez  Phone: [phone]   Fax:     [phone]      Physical Therapy Treatment Note/ Progress Report:     Date:  2022    Patient Name:  Lainey Watson    :  1950  MRN: [id number]    Pertinent Medical History: Arthritis     Medical/Treatment Diagnosis Information:  · Diagnosis: S82.851A - Closed trimalleolar fracture of right ankle, S93.432A - Syndesmotic disruption of left ankle, initial encounter  · Treatment Diagnosis: Decreased functional mobility, strength and balance. Insurance/Certification information:  PT Insurance Information: Medicare  Physician Information:  Referring Practitioner: Ariaden Owen MD  Plan of care signed (Y/N): faxed on     Date of Patient follow up with Physician: 22     Progress Report: [x]  Yes  []  No     Date Range for reporting period:  Beginnin2022  Ending:      Progress report due (10 Rx/or 30 days whichever is less):     Recertification due (POC duration/ or 90 days whichever is less):     Visit # POC/Insurance Allowable Auth Needed   6 bomn []Yes   [x]No     Latex Allergy:  [x]NO      []YES  Preferred Language for Healthcare:   [x]English       []Other:    Functional Scale:      Date assessed: at eval (21)  Test: LEFS  Score: 21    Pain level:  4/10     History of Injury:   1-Left ankle trimalleolar fracture dislocation, status post ORIF. 2-Left ankle distal tibiofibular syndesmosis disruption, s/p ORIF syndesmosis screw   3-Left foot pain/contusion 3rd, 4th and 5th toes-better. 4-Left heel pain/ Plantar fasciitis of left foot. SUBJECTIVE:    12/3/21: Pt reports her foot/ankle is feeling good. She reports no soreness/no increase in swelling after last PT visit. She states PT yesterday seemed to help reduce the swelling. She bought an ice pack that has been helpful.    21: Pt reports, \"I have a new kind of pain in the front of the ankle and I think it's from the screw and being up on my feet more. \" She states that pain in noticeable when she is weight bearing/walking. 12/15/21: Pt reports, \"I did some yard work on Monday, without my walker, and I felt okay afterwards. No increase in swelling or pain. \"   1/25/22: Pt reports her shoulder feels completely normal and has no pain. She states her ankle is still sore along the medial aspect. She presents today without her walker. Pt reports continued swelling in her L ankle.      OBJECTIVE:   Observation:    Test measurements:        ROM LEFT RIGHT   Knee ext WNL WNL   Knee Flex WNL WNL   Ankle PF 34 deg 45 deg   Ankle DF 4 deg 6 deg   Ankle In 12 deg 12 deg   Ankle Ev 9 deg 30 deg   Strength  LEFT RIGHT   HIP Flexors 4/5 4/5   HIP Abductors 4-/5 4-/5   Knee EXT (quad) 5/5 5/5   Knee Flex (HS) 4/5 4/5   Ankle DF 4-/5 5/5   Ankle PF 4-/5 5/5   Ankle Inv 4-/5 5/5   Ankle EV 4-/5 5/5   Circumference  Fig 8     52 cm    49 cm           RESTRICTIONS/PRECAUTIONS: WBAT, s/p ORIF with syndesmosis screw removal    Exercises/Interventions:     Therapeutic Ex (91048)   Min: 20' Reps/Resistance Notes/CUES   Ankle CW/CCW circles  Ankle Pumps 2x10  2x10    Incline calf stretch L Foot only   3x30 sec Gentle, heels remain on floor   Towel Curls 3x10 Seated   Seated Heel Raises 2x10    Seated Toe Raises 2x10     4-way ankle 2x15 Green t-band   Standing Calf Raises  Perform as tolerated   Standing Hip Abd 2x10 In parallel bars        Manual Intervention (85995)  Min: 15'     Ankle PROM All directions    Tib/Fem Mobs     Metatarsal Mobs + Subtalar Mobs L foot only   STM              NMR re-education (55566)  Min: 5'  CUES NEEDED   Semi-tandem on Airex 3x20 sec With ball/trunk rotations   Therapeutic Activity (85056)  Min: 15'     CKC ankle DF 10x10 sec At stairs   NuStep Lv2x6 min c handles    Stairs Ascend reciprocal, descend one step at a time Modalities  Min:      IFC with      CP after exercises (Game Ready)     MH after exercises            Other Therapeutic Activities: Pt was educated on PT POC, Diagnosis, Prognosis, pathomechanics as well as frequency and duration of scheduling future physical therapy appointments. Time was also taken on this day to answer all patient questions and participation in PT. Reviewed appointment policy in detail with patient and patient verbalized understanding. Home Exercise Program: Patient was instructed in the following for HEP:     . Patient verbalized/demonstrated understanding and was issued written handout. Access Code: [email or web address]  URL: [email or web address]  Date: 01/25/2022  Prepared by: Tawny Cuff    Exercises  Towel Scrunches - 1 x daily - 7 x weekly - 3 sets - 10 reps  Seated Heel Raise - 1 x daily - 7 x weekly - 2 sets - 15 reps  Seated Heel Toe Raises - 1 x daily - 7 x weekly - 2 sets - 15 reps  Seated Plantar Fascia Stretch - 1 x daily - 7 x weekly - 3 sets - 30 hold  Seated Ankle Pumps - 1 x daily - 7 x weekly - 2 sets - 10 reps  Seated Ankle Circles - 1 x daily - 7 x weekly - 2 sets - 10 reps       Therapeutic Exercise and NMR EXR  [x] (88650) Provided verbal/tactile cueing for activities related to strengthening, flexibility, endurance, ROM for improvements in LE, proximal hip, and core control with self care, mobility, lifting, ambulation.  [] (01758) Provided verbal/tactile cueing for activities related to improving balance, coordination, kinesthetic sense, posture, motor skill, proprioception  to assist with LE, proximal hip, and core control in self care, mobility, lifting, ambulation and eccentric single leg control.      NMR and Therapeutic Activities:    [x] (10843 or 60236) Provided verbal/tactile cueing for activities related to improving balance, coordination, kinesthetic sense, posture, motor skill, proprioception and motor activation to allow for proper function of core, proximal hip and LE with self care and ADLs and functional mobility.   [] (44670) Gait Re-education- Provided training and instruction to the patient for proper LE, core and proximal hip recruitment and positioning and eccentric body weight control with ambulation re-education including up and down stairs     Home Exercise Program:    [x] (78508) Reviewed/Progressed HEP activities related to strengthening, flexibility, endurance, ROM of core, proximal hip and LE for functional self-care, mobility, lifting and ambulation/stair navigation   [] (55776)Reviewed/Progressed HEP activities related to improving balance, coordination, kinesthetic sense, posture, motor skill, proprioception of core, proximal hip and LE for self care, mobility, lifting, and ambulation/stair navigation      Manual Treatments:  PROM / STM / Oscillations-Mobs:  G-I, II, III, IV (PA's, Inf., Post.)  [x] (49672) Provided manual therapy to mobilize LE, proximal hip and/or LS spine soft tissue/joints for the purpose of modulating pain, promoting relaxation,  increasing ROM, reducing/eliminating soft tissue swelling/inflammation/restriction, improving soft tissue extensibility and allowing for proper ROM for normal function with self care, mobility, lifting and ambulation. Charges:  Timed Code Treatment Minutes: 45   Total Treatment Minutes: 50      [] EVAL (LOW) 97483 (typically 20 minutes face-to-face)  [] EVAL (MOD) 84380 (typically 30 minutes face-to-face)  [] EVAL (HIGH) 75311 (typically 45 minutes face-to-face)  [] RE-EVAL     [x] BN(92033) x 1    [] Dry needle 1 or 2 Muscles (92297)  [] NMR (16704) x   [] Dry needle 3+ Muscles (79939)  [x] Manual (23459) x 1     [] Ultrasound (12034) x  [x] TA (31246) x  1   [] Mech Traction (71826)  [] ES(attended) (37693)     [] ES (un) (69418):   [] Vasopump (76794) [] Ionto (76026)   [] Other:    GOALS:  Patient stated goal: \"To get back to walking. \"   [x]? Progressing: []? Met: []?  Not Met: []? Adjusted     Therapist goals for Patient:   Short Term Goals: To be achieved in: 2 weeks  1. Independent in HEP and progression per patient tolerance, in order to prevent re-injury. []? Progressing: [x]? Met: []? Not Met: []? Adjusted  2. Patient will have a decrease in pain to facilitate improvement in movement, function, and ADLs as indicated by Functional Deficits. []? Progressing: [x]? Met: []? Not Met: []? Adjusted     Long Term Goals: To be achieved in: 6 weeks  1. Disability index score of 32 or higher on the LEFS to assist with reaching prior level of function. [x]? Progressing: []? Met: []? Not Met: []? Adjusted  2. Patient will demonstrate increased L ankle DF AROM to 5 deg to allow for proper joint functioning as indicated by patients Functional Deficits. [x]? Progressing: []? Met: []? Not Met: []? Adjusted  3. Patient will demonstrate an increase in L ankle strength to 4/5 with good proximal hip strength and control to allow for proper functional mobility as indicated by patients Functional Deficits. [x]? Progressing: []? Met: []? Not Met: []? Adjusted  4. Patient will return to walking 1 mile without increased symptoms or restriction. [x]? Progressing: []? Met: []? Not Met: []? Adjusted  5. Patient will tolerate standing for 1 hour without increased pain, swelling, and pain. [x]? Progressing: []? Met: []? Not Met: []? Adjusted            ASSESSMENT:  Upon RE, pt demonstrates improvements in L ankle ROM and mild strength improvements. Pt displays a significant decrease in pain with weight bearing activities. Pt demonstrates good tolerance to PT follow up. Edema noted in L ankle. Pt continues to display gait deviations without an AD and balance deficits noted. Pt does displays limitations with functional activities such as stairs secondary to decreased and decreased CKC ankle DF ROM upon descending stairs.  Pt would continue to benefit from PT 1-2x/week for 3-4 weeks to facilitate return to PLOF, increase strength, and improve balance/gait strategies. Treatment/Activity Tolerance:  [x] Patient tolerated treatment well [] Patient limited by fatique  [] Patient limited by pain  [] Patient limited by other medical complications  [] Other:     Overall Progression Towards Functional goals/ Treatment Progress Update:  [x] Patient is progressing as expected towards functional goals listed. [] Progression is slowed due to complexities/Impairments listed. [] Progression has been slowed due to co-morbidities. [] Plan just implemented, too soon to assess goals progression <30days   [] Goals require adjustment due to lack of progress  [] Patient is not progressing as expected and requires additional follow up with physician  [] Other    Prognosis for POC: [x] Good [] Fair  [] Poor    Patient requires continued skilled intervention: [x] Yes  [] No        PLAN: 1-2x/week for 3-4 weeks. [x] Continue per plan of care [] Alter current plan (see comments)  [] Plan of care initiated [] Hold pending MD visit [] Discharge    Electronically signed by: Chavez Hirsch PT    Note: If patient does not return for scheduled/recommended follow up visits, this note will serve as a discharge from care along with the most recent update on progress.

## 2022-01-26 ENCOUNTER — OFFICE VISIT (OUTPATIENT)
Dept: ORTHOPEDIC SURGERY | Age: 72
End: 2022-01-26

## 2022-01-26 VITALS — BODY MASS INDEX: 29.81 KG/M2 | HEIGHT: 62 IN | WEIGHT: 162 LBS

## 2022-01-26 DIAGNOSIS — S93.432A SYNDESMOTIC DISRUPTION OF LEFT ANKLE, INITIAL ENCOUNTER: Primary | ICD-10-CM

## 2022-01-26 PROCEDURE — 99024 POSTOP FOLLOW-UP VISIT: CPT | Performed by: NURSE PRACTITIONER

## 2022-01-26 PROCEDURE — APPNB15 APP NON BILLABLE TIME 0-15 MINS: Performed by: NURSE PRACTITIONER

## 2022-01-27 ENCOUNTER — HOSPITAL ENCOUNTER (OUTPATIENT)
Dept: PHYSICAL THERAPY | Age: 72
Setting detail: THERAPIES SERIES
Discharge: HOME | End: 2022-01-27
Payer: MEDICARE

## 2022-01-27 PROCEDURE — 97140 MANUAL THERAPY 1/> REGIONS: CPT

## 2022-01-27 PROCEDURE — 97530 THERAPEUTIC ACTIVITIES: CPT

## 2022-01-27 PROCEDURE — 97110 THERAPEUTIC EXERCISES: CPT

## 2022-01-27 NOTE — PROGRESS NOTES
DIAGNOSIS:  Left ankle hardware removal, syndesmosis screw. DATE OF SURGERY:  1/10/2022. HISTORY OF PRESENT ILLNESS:  Ms. Cj Drummond 70 y.o.  female who came in today for 2 weeks postoperative visit. The patient denies any significant pain in the left ankle. She has been WBAT. No numbness or tingling sensation. No fever or Chills. PHYSICAL EXAMINATION:  The incision healing well . No signs of any erythema or drainage, minimal swelling. She has no pain with the active or passive range of motion of the left ankle, good ROM. She has intact sensation distally, and she is neurovascularly intact. IMAGING:  Three views left ankle taken today in the office showed hardware syndesmosis screw removal, no fracture. Remaining hardware intact with no signs failure. IMPRESSION:  2 weeks out from left ankle syndesmosis screw removal and doing very well. PLAN: She can go back to normal activity with no heavy impact activities for 6 weeks. The patient will come back for a follow up in 3 months. At that time, we will take 3 views of the left ankle.       Anabelle Young, KINA - CNP

## 2022-01-27 NOTE — PROGRESS NOTES
[facility]  Outpatient Rehabilitation & Therapy  [address] [place] [address]. Ramez Gomez  Phone: [phone]   Fax:     [phone]      Physical Therapy Treatment Note/ Progress Report:     Date:  2022    Patient Name:  Richelle Dejesus    :  1950  MRN: [id number]    Pertinent Medical History: Arthritis     Medical/Treatment Diagnosis Information:  · Diagnosis: S82.851A - Closed trimalleolar fracture of right ankle, S93.432A - Syndesmotic disruption of left ankle, initial encounter  · Treatment Diagnosis: Decreased functional mobility, strength and balance. Insurance/Certification information:  PT Insurance Information: Medicare  Physician Information:  Referring Practitioner: Skip Mcghee MD  Plan of care signed (Y/N): faxed on     Date of Patient follow up with Physician: 22     Progress Report: [x]  Yes  []  No     Date Range for reporting period:  Beginnin2022  Ending:      Progress report due (10 Rx/or 30 days whichever is less):     Recertification due (POC duration/ or 90 days whichever is less):     Visit # POC/Insurance Allowable Auth Needed   7 bomn []Yes   [x]No     Latex Allergy:  [x]NO      []YES  Preferred Language for Healthcare:   [x]English       []Other:    Functional Scale:      Date assessed: at eval (21)  Test: LEFS  Score: 21    Pain level:  4/10     History of Injury:   1-Left ankle trimalleolar fracture dislocation, status post ORIF. 2-Left ankle distal tibiofibular syndesmosis disruption, s/p ORIF syndesmosis screw   3-Left foot pain/contusion 3rd, 4th and 5th toes-better. 4-Left heel pain/ Plantar fasciitis of left foot. SUBJECTIVE:    12/3/21: Pt reports her foot/ankle is feeling good. She reports no soreness/no increase in swelling after last PT visit. She states PT yesterday seemed to help reduce the swelling. She bought an ice pack that has been helpful.    21: Pt reports, \"I have a new kind of pain in the front of the ankle and I think it's from the screw and being up on my feet more. \" She states that pain in noticeable when she is weight bearing/walking. 12/15/21: Pt reports, \"I did some yard work on Monday, without my walker, and I felt okay afterwards. No increase in swelling or pain. \"   1/25/22: Pt reports her shoulder feels completely normal and has no pain. She states her ankle is still sore along the medial aspect. She presents today without her walker. Pt reports continued swelling in her L ankle. 1/27/22: Pt reports she saw her doctor yesterday and she was told the bones in her L foot/ankle are getting weak due to the lack of impact through the foot. She also reports no heavy impact for 6 weeks. She reports some mild soreness after last PT visit.       OBJECTIVE:   Observation:    Test measurements:        ROM LEFT RIGHT   Knee ext WNL WNL   Knee Flex WNL WNL   Ankle PF 34 deg 45 deg   Ankle DF 4 deg 6 deg   Ankle In 12 deg 12 deg   Ankle Ev 9 deg 30 deg   Strength  LEFT RIGHT   HIP Flexors 4/5 4/5   HIP Abductors 4-/5 4-/5   Knee EXT (quad) 5/5 5/5   Knee Flex (HS) 4/5 4/5   Ankle DF 4-/5 5/5   Ankle PF 4-/5 5/5   Ankle Inv 4-/5 5/5   Ankle EV 4-/5 5/5   Circumference  Fig 8     52 cm    49 cm           RESTRICTIONS/PRECAUTIONS: WBAT, s/p ORIF with syndesmosis screw removal    Exercises/Interventions:     Therapeutic Ex (46681)   Min: 20' Reps/Resistance Notes/CUES   Ankle CW/CCW circles  Ankle Pumps 2x10  2x10    Incline calf stretch L Foot only   3x30 sec Gentle, heels remain on floor   Towel Curls 3x10 Seated   Seated Heel Raises 2x10    Seated Toe Raises 2x10     4-way ankle 2x15 Green t-band   Standing Calf Raises  Perform as tolerated   Standing Hip Abd 2x10 In parallel bars        Manual Intervention (40172)  Min: 15'     Ankle PROM All directions    Tib/Fem Mobs     Metatarsal Mobs + Subtalar Mobs L foot only   STM              NMR re-education (99385)  Min: 5'  CUES NEEDED   Semi-tandem on Airex 3x20 sec With ball/trunk rotations        Therapeutic Activity (28275)  Min: 15'     CKC ankle DF 10x10 sec At stairs   NuStep Lv2x6 min c handles    Stairs Ascend reciprocal, descend one step at a time    Mini Squat 2x10    Modalities  Min:      IFC with      CP after exercises (Game Ready)     MH after exercises            Other Therapeutic Activities: Pt was educated on PT POC, Diagnosis, Prognosis, pathomechanics as well as frequency and duration of scheduling future physical therapy appointments. Time was also taken on this day to answer all patient questions and participation in PT. Reviewed appointment policy in detail with patient and patient verbalized understanding. Home Exercise Program: Patient was instructed in the following for HEP:     . Patient verbalized/demonstrated understanding and was issued written handout. Access Code: [email or web address]  URL: [email or web address]/  Date: 01/25/2022  Prepared by: Peg Moses    Exercises  Towel Scrunches - 1 x daily - 7 x weekly - 3 sets - 10 reps  Seated Heel Raise - 1 x daily - 7 x weekly - 2 sets - 15 reps  Seated Heel Toe Raises - 1 x daily - 7 x weekly - 2 sets - 15 reps  Seated Plantar Fascia Stretch - 1 x daily - 7 x weekly - 3 sets - 30 hold  Seated Ankle Pumps - 1 x daily - 7 x weekly - 2 sets - 10 reps  Seated Ankle Circles - 1 x daily - 7 x weekly - 2 sets - 10 reps       Therapeutic Exercise and NMR EXR  [x] (39041) Provided verbal/tactile cueing for activities related to strengthening, flexibility, endurance, ROM for improvements in LE, proximal hip, and core control with self care, mobility, lifting, ambulation.  [] (03025) Provided verbal/tactile cueing for activities related to improving balance, coordination, kinesthetic sense, posture, motor skill, proprioception  to assist with LE, proximal hip, and core control in self care, mobility, lifting, ambulation and eccentric single leg control. NMR and Therapeutic Activities:    [x] (02760 or 45155) Provided verbal/tactile cueing for activities related to improving balance, coordination, kinesthetic sense, posture, motor skill, proprioception and motor activation to allow for proper function of core, proximal hip and LE with self care and ADLs and functional mobility.   [] (36539) Gait Re-education- Provided training and instruction to the patient for proper LE, core and proximal hip recruitment and positioning and eccentric body weight control with ambulation re-education including up and down stairs     Home Exercise Program:    [x] (92688) Reviewed/Progressed HEP activities related to strengthening, flexibility, endurance, ROM of core, proximal hip and LE for functional self-care, mobility, lifting and ambulation/stair navigation   [] (55614)Reviewed/Progressed HEP activities related to improving balance, coordination, kinesthetic sense, posture, motor skill, proprioception of core, proximal hip and LE for self care, mobility, lifting, and ambulation/stair navigation      Manual Treatments:  PROM / STM / Oscillations-Mobs:  G-I, II, III, IV (PA's, Inf., Post.)  [x] (67357) Provided manual therapy to mobilize LE, proximal hip and/or LS spine soft tissue/joints for the purpose of modulating pain, promoting relaxation,  increasing ROM, reducing/eliminating soft tissue swelling/inflammation/restriction, improving soft tissue extensibility and allowing for proper ROM for normal function with self care, mobility, lifting and ambulation.        Charges:  Timed Code Treatment Minutes: 45   Total Treatment Minutes: 50      [] EVAL (LOW) 65774 (typically 20 minutes face-to-face)  [] EVAL (MOD) 79806 (typically 30 minutes face-to-face)  [] EVAL (HIGH) 45335 (typically 45 minutes face-to-face)  [] RE-EVAL     [x] TN(04547) x 1    [] Dry needle 1 or 2 Muscles (86023)  [] NMR (68803) x   [] Dry needle 3+ Muscles (68128)  [x] Manual (93556) x 1     [] Ultrasound (35601) x  [x] TA (52616) x  1   [] [facility] (79193)  [] ES(attended) (23111)     [] ES (un) (53096):   [] Vasopump (61564) [] Ionto (77427)   [] Other:    GOALS:  Patient stated goal: \"To get back to walking. \"   [x]? Progressing: []? Met: []? Not Met: []? Adjusted     Therapist goals for Patient:   Short Term Goals: To be achieved in: 2 weeks  1. Independent in HEP and progression per patient tolerance, in order to prevent re-injury. []? Progressing: [x]? Met: []? Not Met: []? Adjusted  2. Patient will have a decrease in pain to facilitate improvement in movement, function, and ADLs as indicated by Functional Deficits. []? Progressing: [x]? Met: []? Not Met: []? Adjusted     Long Term Goals: To be achieved in: 6 weeks  1. Disability index score of 32 or higher on the LEFS to assist with reaching prior level of function. [x]? Progressing: []? Met: []? Not Met: []? Adjusted  2. Patient will demonstrate increased L ankle DF AROM to 5 deg to allow for proper joint functioning as indicated by patients Functional Deficits. [x]? Progressing: []? Met: []? Not Met: []? Adjusted  3. Patient will demonstrate an increase in L ankle strength to 4/5 with good proximal hip strength and control to allow for proper functional mobility as indicated by patients Functional Deficits. [x]? Progressing: []? Met: []? Not Met: []? Adjusted  4. Patient will return to walking 1 mile without increased symptoms or restriction. [x]? Progressing: []? Met: []? Not Met: []? Adjusted  5. Patient will tolerate standing for 1 hour without increased pain, swelling, and pain. [x]? Progressing: []? Met: []? Not Met: []? Adjusted            ASSESSMENT:  Pt displays improvements in L ankle ROM and mild strength improvements. Pt displays a significant decrease in pain with weight bearing activities. Pt demonstrates good tolerance to PT follow up. Mild edema noted in L ankle.  Pt continues to display gait deviations without an AD and balance deficits noted. Pt does displays limitations with functional activities such as stairs secondary to decreased and decreased CKC ankle DF ROM upon descending stairs. Continue to progress strengthening and balance/gait exercises. Treatment/Activity Tolerance:  [x] Patient tolerated treatment well [] Patient limited by fatique  [] Patient limited by pain  [] Patient limited by other medical complications  [] Other:     Overall Progression Towards Functional goals/ Treatment Progress Update:  [x] Patient is progressing as expected towards functional goals listed. [] Progression is slowed due to complexities/Impairments listed. [] Progression has been slowed due to co-morbidities. [] Plan just implemented, too soon to assess goals progression <30days   [] Goals require adjustment due to lack of progress  [] Patient is not progressing as expected and requires additional follow up with physician  [] Other    Prognosis for POC: [x] Good [] Fair  [] Poor    Patient requires continued skilled intervention: [x] Yes  [] No        PLAN: 1-2x/week for 3-4 weeks. [x] Continue per plan of care [] Alter current plan (see comments)  [] Plan of care initiated [] Hold pending MD visit [] Discharge    Electronically signed by: Shukri Mccain PT    Note: If patient does not return for scheduled/recommended follow up visits, this note will serve as a discharge from care along with the most recent update on progress.

## 2022-02-02 ENCOUNTER — HOSPITAL ENCOUNTER (OUTPATIENT)
Dept: PHYSICAL THERAPY | Age: 72
Setting detail: THERAPIES SERIES
End: 2022-02-02
Payer: MEDICARE

## 2022-02-15 ENCOUNTER — HOSPITAL ENCOUNTER (OUTPATIENT)
Dept: PHYSICAL THERAPY | Age: 72
Setting detail: THERAPIES SERIES
Discharge: HOME | End: 2022-02-15
Payer: MEDICARE

## 2022-02-15 PROCEDURE — 97140 MANUAL THERAPY 1/> REGIONS: CPT

## 2022-02-15 PROCEDURE — 97530 THERAPEUTIC ACTIVITIES: CPT

## 2022-02-15 PROCEDURE — 97110 THERAPEUTIC EXERCISES: CPT

## 2022-02-15 NOTE — FLOWSHEET NOTE
[facility]  Outpatient Rehabilitation & Therapy  [address]. [place]ame[place] Gomez  Phone: [phone]   Fax:     [phone]      Physical Therapy Treatment Note/ Progress Report:     Date:  2/15/2022    Patient Name:  Michaela Lester    :  1950  MRN: [id number]    Pertinent Medical History: Arthritis     Medical/Treatment Diagnosis Information:  · Diagnosis: S82.851A - Closed trimalleolar fracture of right ankle, S93.432A - Syndesmotic disruption of left ankle, initial encounter  · Treatment Diagnosis: Decreased functional mobility, strength and balance. Insurance/Certification information:  PT Insurance Information: Medicare  Physician Information:  Referring Practitioner: Hayley Willis MD  Plan of care signed (Y/N): faxed on     Date of Patient follow up with Physician: 22     Progress Report: [x]  Yes  []  No     Date Range for reporting period:  Beginnin2022  Ending:      Progress report due (10 Rx/or 30 days whichever is less):     Recertification due (POC duration/ or 90 days whichever is less):     Visit # POC/Insurance Allowable Auth Needed   8 bomn []Yes   [x]No     Latex Allergy:  [x]NO      []YES  Preferred Language for Healthcare:   [x]English       []Other:    Functional Scale:      Date assessed: at eval (21)  Test: LEFS  Score: 21    Pain level:  Not assessed today/10     History of Injury:   1-Left ankle trimalleolar fracture dislocation, status post ORIF. 2-Left ankle distal tibiofibular syndesmosis disruption, s/p ORIF syndesmosis screw   3-Left foot pain/contusion 3rd, 4th and 5th toes-better. 4-Left heel pain/ Plantar fasciitis of left foot. SUBJECTIVE:    12/3/21: Pt reports her foot/ankle is feeling good. She reports no soreness/no increase in swelling after last PT visit. She states PT yesterday seemed to help reduce the swelling.  She bought an ice pack that has been helpful. 12/6/21: Pt reports, \"I have a new kind of pain in the front of the ankle and I think it's from the screw and being up on my feet more. \" She states that pain in noticeable when she is weight bearing/walking. 12/15/21: Pt reports, \"I did some yard work on Monday, without my walker, and I felt okay afterwards. No increase in swelling or pain. \"   1/25/22: Pt reports her shoulder feels completely normal and has no pain. She states her ankle is still sore along the medial aspect. She presents today without her walker. Pt reports continued swelling in her L ankle. 1/27/22: Pt reports she saw her doctor yesterday and she was told the bones in her L foot/ankle are getting weak due to the lack of impact through the foot. She also reports no heavy impact for 6 weeks. She reports some mild soreness after last PT visit. 2/15/22: Pt reports, \"I am not progressing like I thought I would be. \" She reports some swelling in posterior aspect of her foot/ankle. She reports some pain with walking along the medial aspect of her foot/ankle when walking. She states she has not been using an AD while walking.      OBJECTIVE:   Observation:    Test measurements:        ROM LEFT RIGHT   Knee ext WNL WNL   Knee Flex WNL WNL   Ankle PF 34 deg 45 deg   Ankle DF 4 deg 6 deg   Ankle In 12 deg 12 deg   Ankle Ev 9 deg 30 deg   Strength  LEFT RIGHT   HIP Flexors 4/5 4/5   HIP Abductors 4-/5 4-/5   Knee EXT (quad) 5/5 5/5   Knee Flex (HS) 4/5 4/5   Ankle DF 4-/5 5/5   Ankle PF 4-/5 5/5   Ankle Inv 4-/5 5/5   Ankle EV 4-/5 5/5   Circumference  Fig 8     52 cm    49 cm           RESTRICTIONS/PRECAUTIONS: WBAT, s/p ORIF with syndesmosis screw removal    Exercises/Interventions:     Therapeutic Ex (84603)   Min: 20' Reps/Resistance Notes/CUES   Ankle CW/CCW circles  Ankle Pumps  HEP  HEP   Incline calf stretch L Foot only   3x30 sec Gentle, heels remain on floor   Towel Curls 3x10 Seated   Seated Heel Raises    Seated Toe Raises    4-way ankle 2x15 Green t-band   Standing Calf Raises 2x10 Perform as tolerated   Standing Hip Abd 3x10 In parallel bars   Leg Press - DL     Single leg 60#, x20  30#, x20    Side Steps Perform NV    Clamshells Perform NV    Manual Intervention (92430)  Min: 15'     Ankle PROM All directions    Tib/Fem Mobs     Metatarsal Mobs + Subtalar Mobs L foot only   STM              NMR re-education (39427)  Min: 5'  CUES NEEDED   Semi-tandem on Airex 3x20 sec With ball/trunk rotations        Therapeutic Activity (09114)  Min: 15'     CKC ankle DF 10x10 sec At stairs   NuStep Lv2x6 min c handles    Stairs     Mini Squat 2x10    Modalities  Min:      IFC with      CP after exercises (Game Ready)     MH after exercises            Other Therapeutic Activities: Pt was educated on PT POC, Diagnosis, Prognosis, pathomechanics as well as frequency and duration of scheduling future physical therapy appointments. Time was also taken on this day to answer all patient questions and participation in PT. Reviewed appointment policy in detail with patient and patient verbalized understanding. Home Exercise Program: Patient was instructed in the following for HEP:     . Patient verbalized/demonstrated understanding and was issued written handout. Access Code: [email or web address]  URL: [email or web address]  Date: 01/25/2022  Prepared by: Mesfin Hughes    Exercises  Towel Scrunches - 1 x daily - 7 x weekly - 3 sets - 10 reps  Seated Heel Raise - 1 x daily - 7 x weekly - 2 sets - 15 reps  Seated Heel Toe Raises - 1 x daily - 7 x weekly - 2 sets - 15 reps  Seated Plantar Fascia Stretch - 1 x daily - 7 x weekly - 3 sets - 30 hold  Seated Ankle Pumps - 1 x daily - 7 x weekly - 2 sets - 10 reps  Seated Ankle Circles - 1 x daily - 7 x weekly - 2 sets - 10 reps    Access Code: [email or web address]  URL: [email or web address]  Date: 02/15/2022  Prepared by: Mesfin Hughes    Exercises  Mini Squat with Counter Support - 1 x daily - 7 x weekly - 3 sets - 10 reps  Heel rises with counter support - 1 x daily - 7 x weekly - 3 sets - 10 reps  Standing Hip Abduction with Counter Support - 1 x daily - 7 x weekly - 3 sets - 10 reps  Sidelying Hip Abduction - 1 x daily - 7 x weekly - 3 sets - 10 reps  Straight Leg Raise - 1 x daily - 7 x weekly - 3 sets - 10 reps         Therapeutic Exercise and NMR EXR  [x] (95663) Provided verbal/tactile cueing for activities related to strengthening, flexibility, endurance, ROM for improvements in LE, proximal hip, and core control with self care, mobility, lifting, ambulation.  [] (97608) Provided verbal/tactile cueing for activities related to improving balance, coordination, kinesthetic sense, posture, motor skill, proprioception  to assist with LE, proximal hip, and core control in self care, mobility, lifting, ambulation and eccentric single leg control.      NMR and Therapeutic Activities:    [x] (57335 or 27066) Provided verbal/tactile cueing for activities related to improving balance, coordination, kinesthetic sense, posture, motor skill, proprioception and motor activation to allow for proper function of core, proximal hip and LE with self care and ADLs and functional mobility.   [] (26794) Gait Re-education- Provided training and instruction to the patient for proper LE, core and proximal hip recruitment and positioning and eccentric body weight control with ambulation re-education including up and down stairs     Home Exercise Program:    [x] (43453) Reviewed/Progressed HEP activities related to strengthening, flexibility, endurance, ROM of core, proximal hip and LE for functional self-care, mobility, lifting and ambulation/stair navigation   [] (40716)Reviewed/Progressed HEP activities related to improving balance, coordination, kinesthetic sense, posture, motor skill, proprioception of core, proximal hip and LE for self care, mobility, lifting, and ambulation/stair navigation      Manual Treatments:  PROM / STM / Oscillations-Mobs:  G-I, II, III, IV (PA's, Inf., Post.)  [x] (79710) Provided manual therapy to mobilize LE, proximal hip and/or LS spine soft tissue/joints for the purpose of modulating pain, promoting relaxation,  increasing ROM, reducing/eliminating soft tissue swelling/inflammation/restriction, improving soft tissue extensibility and allowing for proper ROM for normal function with self care, mobility, lifting and ambulation. Charges:  Timed Code Treatment Minutes: 45   Total Treatment Minutes: 50      [] EVAL (LOW) 30088 (typically 20 minutes face-to-face)  [] EVAL (MOD) 06617 (typically 30 minutes face-to-face)  [] EVAL (HIGH) 05582 (typically 45 minutes face-to-face)  [] RE-EVAL     [x] TO(25356) x 1    [] Dry needle 1 or 2 Muscles (47950)  [] NMR (27150) x   [] Dry needle 3+ Muscles (89834)  [x] Manual (19525) x 1     [] Ultrasound (34951) x  [x] TA (05301) x  1   [] Mech Traction (31783)  [] ES(attended) (20540)     [] ES (un) (12401):   [] Vasopump (67485) [] Ionto (12004)   [] Other:    GOALS:  Patient stated goal: \"To get back to walking. \"   [x]? Progressing: []? Met: []? Not Met: []? Adjusted     Therapist goals for Patient:   Short Term Goals: To be achieved in: 2 weeks  1. Independent in HEP and progression per patient tolerance, in order to prevent re-injury. []? Progressing: [x]? Met: []? Not Met: []? Adjusted  2. Patient will have a decrease in pain to facilitate improvement in movement, function, and ADLs as indicated by Functional Deficits. []? Progressing: [x]? Met: []? Not Met: []? Adjusted     Long Term Goals: To be achieved in: 6 weeks  1. Disability index score of 32 or higher on the LEFS to assist with reaching prior level of function. [x]? Progressing: []? Met: []? Not Met: []? Adjusted  2. Patient will demonstrate increased L ankle DF AROM to 5 deg to allow for proper joint functioning as indicated by patients Functional Deficits. [x]? Progressing: []? Met: []?  Not Met: []? Adjusted  3. Patient will demonstrate an increase in L ankle strength to 4/5 with good proximal hip strength and control to allow for proper functional mobility as indicated by patients Functional Deficits. [x]? Progressing: []? Met: []? Not Met: []? Adjusted  4. Patient will return to walking 1 mile without increased symptoms or restriction. [x]? Progressing: []? Met: []? Not Met: []? Adjusted  5. Patient will tolerate standing for 1 hour without increased pain, swelling, and pain. [x]? Progressing: []? Met: []? Not Met: []? Adjusted            ASSESSMENT:  Pt displays improvements in L ankle ROM and mild strength improvements. Pt displays a significant decrease in pain with weight bearing activities. Pt demonstrates good tolerance to PT follow up. Mild edema noted in L ankle along medial aspect of achilles tendon. Pt continues to display gait deviations without an AD and balance deficits noted. Pt does displays limitations with functional activities such as stairs secondary to decreased and decreased CKC ankle DF ROM upon descending stairs. Continue to progress strengthening and balance/gait exercises. Treatment/Activity Tolerance:  [x] Patient tolerated treatment well [] Patient limited by fatique  [] Patient limited by pain  [] Patient limited by other medical complications  [] Other:     Overall Progression Towards Functional goals/ Treatment Progress Update:  [x] Patient is progressing as expected towards functional goals listed. [] Progression is slowed due to complexities/Impairments listed. [] Progression has been slowed due to co-morbidities.   [] Plan just implemented, too soon to assess goals progression <30days   [] Goals require adjustment due to lack of progress  [] Patient is not progressing as expected and requires additional follow up with physician  [] Other    Prognosis for POC: [x] Good [] Fair  [] Poor    Patient requires continued skilled intervention: [x] Yes  [] No PLAN: 1-2x/week for 3-4 weeks. [x] Continue per plan of care [] Alter current plan (see comments)  [] Plan of care initiated [] Hold pending MD visit [] Discharge    Electronically signed by: Mahsa Flores PT    Note: If patient does not return for scheduled/recommended follow up visits, this note will serve as a discharge from care along with the most recent update on progress.

## 2022-02-18 ENCOUNTER — HOSPITAL ENCOUNTER (OUTPATIENT)
Dept: PHYSICAL THERAPY | Age: 72
Setting detail: THERAPIES SERIES
Discharge: HOME | End: 2022-02-18
Payer: MEDICARE

## 2022-02-18 PROCEDURE — 97530 THERAPEUTIC ACTIVITIES: CPT

## 2022-02-18 PROCEDURE — 97110 THERAPEUTIC EXERCISES: CPT

## 2022-02-18 PROCEDURE — 97140 MANUAL THERAPY 1/> REGIONS: CPT

## 2022-02-18 NOTE — FLOWSHEET NOTE
[facility]  Outpatient Rehabilitation & Therapy  [address] [facility] [address] [facility]. [address] [address]  Phone: [phone]   Fax:     [phone]      Physical Therapy Treatment Note/ Progress Report:     Date:  2022    Patient Name:  Carlos Britt    :  1950  MRN: [id number]    Pertinent Medical History: Arthritis     Medical/Treatment Diagnosis Information:  · Diagnosis: S82.851A - Closed trimalleolar fracture of right ankle, S93.432A - Syndesmotic disruption of left ankle, initial encounter  · Treatment Diagnosis: Decreased functional mobility, strength and balance. Insurance/Certification information:  PT Insurance Information: Medicare  Physician Information:  Referring Practitioner: Carey Robles MD  Plan of care signed (Y/N): faxed on     Date of Patient follow up with Physician: 22     Progress Report: [x]  Yes  []  No     Date Range for reporting period:  Beginnin2022  Ending:      Progress report due (10 Rx/or 30 days whichever is less): 3/01/97    Recertification due (POC duration/ or 90 days whichever is less):     Visit # POC/Insurance Allowable Auth Needed   9 bomn []Yes   [x]No     Latex Allergy:  [x]NO      []YES  Preferred Language for Healthcare:   [x]English       []Other:    Functional Scale:      Date assessed: at [facility] (21)  Test: LEFS  Score: 21    Pain level:  Not assessed today/10     History of Injury:   1-Left ankle trimalleolar fracture dislocation, status post ORIF. 2-Left ankle distal tibiofibular syndesmosis disruption, s/p ORIF syndesmosis screw   3-Left foot pain/contusion 3rd, 4th and 5th toes-better. 4-Left heel pain/ Plantar fasciitis of left foot. SUBJECTIVE:    12/3/21: Pt reports her foot/ankle is feeling good. She reports no soreness/no increase in swelling after last PT visit. She states PT yesterday seemed to help reduce the swelling.  She bought an ice pack that has been helpful. 12/6/21: Pt reports, \"I have a new kind of pain in the front of the ankle and I think it's from the screw and being up on my feet more. \" She states that pain in noticeable when she is weight bearing/walking. 12/15/21: Pt reports, \"I did some yard work on Monday, without my walker, and I felt okay afterwards. No increase in swelling or pain. \"   1/25/22: Pt reports her shoulder feels completely normal and has no pain. She states her ankle is still sore along the medial aspect. She presents today without her walker. Pt reports continued swelling in her L ankle. 1/27/22: Pt reports she saw her doctor yesterday and she was told the bones in her L foot/ankle are getting weak due to the lack of impact through the foot. She also reports no heavy impact for 6 weeks. She reports some mild soreness after last PT visit. 2/15/22: Pt reports, \"I am not progressing like I thought I would be. \" She reports some swelling in posterior aspect of her foot/ankle. She reports some pain with walking along the medial aspect of her foot/ankle when walking. She states she has not been using an AD while walking. 2/18/22: Pt reports felt good after last PT visit. She is continuing to have pain/discomfort along the medial aspect of the ankle.      OBJECTIVE:   Observation:    Test measurements:        ROM LEFT RIGHT   Knee ext WNL WNL   Knee Flex WNL WNL   Ankle PF 34 deg 45 deg   Ankle DF 4 deg 6 deg   Ankle In 12 deg 12 deg   Ankle Ev 9 deg 30 deg   Strength  LEFT RIGHT   HIP Flexors 4/5 4/5   HIP Abductors 4-/5 4-/5   Knee EXT (quad) 5/5 5/5   Knee Flex (HS) 4/5 4/5   Ankle DF 4-/5 5/5   Ankle PF 4-/5 5/5   Ankle Inv 4-/5 5/5   Ankle EV 4-/5 5/5   Circumference  Fig 8     52 cm    49 cm           RESTRICTIONS/PRECAUTIONS: WBAT, s/p ORIF with syndesmosis screw removal    Exercises/Interventions:     Therapeutic Ex (62866)   Min: 20' Reps/Resistance Notes/CUES   Ankle CW/CCW circles  Ankle Pumps  HEP  HEP   Incline calf stretch L Foot only   3x30 sec Gentle, heels remain on floor   Towel Curls 3x10 Seated   Seated Heel Raises    Seated Toe Raises    4-way ankle 2x15 Green t-band   Standing Calf Raises 2x10 Perform as tolerated   Standing Hip Abd 3x10 In parallel bars   Leg Press - DL     Single leg 75#, x20  30#, x20    Side Steps Perform NV    Clamshells 3x10    Manual Intervention (65876)  Min: 15'     Ankle PROM All directions    Tib/Fem Mobs     Metatarsal Mobs + Subtalar Mobs L foot only   STM              NMR re-education (07245)  Min: 5'  CUES NEEDED   Semi-tandem on Airex  With ball/trunk rotations        Therapeutic Activity (04842)  Min: 15'     CKC ankle DF 10x10 sec At stairs   NuStep Lv4x6 min c handles    Mini Squat 2x10    Modalities  Min:      IFC with      CP after exercises (Game Ready)     MH after exercises            Other Therapeutic Activities: Pt was educated on PT POC, Diagnosis, Prognosis, pathomechanics as well as frequency and duration of scheduling future physical therapy appointments. Time was also taken on this day to answer all patient questions and participation in PT. Reviewed appointment policy in detail with patient and patient verbalized understanding. Home Exercise Program: Patient was instructed in the following for HEP:     . Patient verbalized/demonstrated understanding and was issued written handout. Access Code: [email or web address]  URL: [employer]. com/  Date: 01/25/2022  Prepared by: Gaye Bonilla    Exercises  Towel Scrunches - 1 x daily - 7 x weekly - 3 sets - 10 reps  Seated Heel Raise - 1 x daily - 7 x weekly - 2 sets - 15 reps  Seated Heel Toe Raises - 1 x daily - 7 x weekly - 2 sets - 15 reps  Seated Plantar Fascia Stretch - 1 x daily - 7 x weekly - 3 sets - 30 hold  Seated Ankle Pumps - 1 x daily - 7 x weekly - 2 sets - 10 reps  Seated Ankle Circles - 1 x daily - 7 x weekly - 2 sets - 10 reps    Access Code: [email or web address]  URL: [employer]/  Date: 02/15/2022  Prepared by: Rima Floor    Exercises  Mini Squat with Counter Support - 1 x daily - 7 x weekly - 3 sets - 10 reps  Heel rises with counter support - 1 x daily - 7 x weekly - 3 sets - 10 reps  Standing Hip Abduction with Counter Support - 1 x daily - 7 x weekly - 3 sets - 10 reps  Sidelying Hip Abduction - 1 x daily - 7 x weekly - 3 sets - 10 reps  Straight Leg Raise - 1 x daily - 7 x weekly - 3 sets - 10 reps         Therapeutic Exercise and NMR EXR  [x] (23361) Provided verbal/tactile cueing for activities related to strengthening, flexibility, endurance, ROM for improvements in LE, proximal hip, and core control with self care, mobility, lifting, ambulation.  [] (17484) Provided verbal/tactile cueing for activities related to improving balance, coordination, kinesthetic sense, posture, motor skill, proprioception  to assist with LE, proximal hip, and core control in self care, mobility, lifting, ambulation and eccentric single leg control.      NMR and Therapeutic Activities:    [x] (50518 or 59547) Provided verbal/tactile cueing for activities related to improving balance, coordination, kinesthetic sense, posture, motor skill, proprioception and motor activation to allow for proper function of core, proximal hip and LE with self care and ADLs and functional mobility.   [] (75063) Gait Re-education- Provided training and instruction to the patient for proper LE, core and proximal hip recruitment and positioning and eccentric body weight control with ambulation re-education including up and down stairs     Home Exercise Program:    [x] (30154) Reviewed/Progressed HEP activities related to strengthening, flexibility, endurance, ROM of core, proximal hip and LE for functional self-care, mobility, lifting and ambulation/stair navigation   [] (77540)Reviewed/Progressed HEP activities related to improving balance, coordination, kinesthetic sense, posture, motor skill, proprioception of core, proximal hip and LE for self care, mobility, lifting, and ambulation/stair navigation      Manual Treatments:  PROM / STM / Oscillations-Mobs:  G-I, II, III, IV (PA's, Inf., Post.)  [x] (66679) Provided manual therapy to mobilize LE, proximal hip and/or LS spine soft tissue/joints for the purpose of modulating pain, promoting relaxation,  increasing ROM, reducing/eliminating soft tissue swelling/inflammation/restriction, improving soft tissue extensibility and allowing for proper ROM for normal function with self care, mobility, lifting and ambulation. Charges:  Timed Code Treatment Minutes: 45   Total Treatment Minutes: 50      [] EVAL (LOW) 72022 (typically 20 minutes face-to-face)  [] EVAL (MOD) 28799 (typically 30 minutes face-to-face)  [] EVAL (HIGH) 12652 (typically 45 minutes face-to-face)  [] RE-EVAL     [x] MZ(23089) x 1    [] Dry needle 1 or 2 Muscles (28248)  [] NMR (93395) x   [] Dry needle 3+ Muscles (85390)  [x] Manual (23255) x 1     [] Ultrasound (56535) x  [x] TA (81006) x  1   [] Mech Traction (73459)  [] ES(attended) (87914)     [] ES (un) (08093):   [] Vasopump (00542) [] Ionto (30972)   [] Other:    GOALS:  Patient stated goal: \"To get back to walking. \"   [x]? Progressing: []? Met: []? Not Met: []? Adjusted     Therapist goals for Patient:   Short Term Goals: To be achieved in: 2 weeks  1. Independent in HEP and progression per patient tolerance, in order to prevent re-injury. []? Progressing: [x]? Met: []? Not Met: []? Adjusted  2. Patient will have a decrease in pain to facilitate improvement in movement, function, and ADLs as indicated by Functional Deficits. []? Progressing: [x]? Met: []? Not Met: []? Adjusted     Long Term Goals: To be achieved in: 6 weeks  1. Disability index score of 32 or higher on the LEFS to assist with reaching prior level of function. [x]? Progressing: []? Met: []? Not Met: []? Adjusted  2.  Patient will demonstrate increased L ankle DF AROM to 5 deg to allow for proper joint functioning as indicated by patients Functional Deficits. [x]? Progressing: []? Met: []? Not Met: []? Adjusted  3. Patient will demonstrate an increase in L ankle strength to 4/5 with good proximal hip strength and control to allow for proper functional mobility as indicated by patients Functional Deficits. [x]? Progressing: []? Met: []? Not Met: []? Adjusted  4. Patient will return to walking 1 mile without increased symptoms or restriction. [x]? Progressing: []? Met: []? Not Met: []? Adjusted  5. Patient will tolerate standing for 1 hour without increased pain, swelling, and pain. [x]? Progressing: []? Met: []? Not Met: []? Adjusted            ASSESSMENT:  Pt displays improvements in L ankle ROM and mild strength improvements. Today, pt presented with pain along medial aspect of ankle with weight bearing in a DF position. Pt demonstrates good tolerance to PT follow up. Mild edema noted in L ankle along medial aspect of achilles tendon. Pt continues to display gait deviations without an AD and balance deficits noted. Pt does displays limitations with functional activities such as stairs secondary to decreased and decreased CKC ankle DF ROM upon descending stairs. Continue to progress strengthening and balance/gait exercises. Treatment/Activity Tolerance:  [x] Patient tolerated treatment well [] Patient limited by fatique  [] Patient limited by pain  [] Patient limited by other medical complications  [] Other:     Overall Progression Towards Functional goals/ Treatment Progress Update:  [x] Patient is progressing as expected towards functional goals listed. [] Progression is slowed due to complexities/Impairments listed. [] Progression has been slowed due to co-morbidities.   [] Plan just implemented, too soon to assess goals progression <30days   [] Goals require adjustment due to lack of progress  [] Patient is not progressing as expected and requires additional follow up with physician  [] Other    Prognosis for POC: [x] Good [] Fair  [] Poor    Patient requires continued skilled intervention: [x] Yes  [] No        PLAN: 1-2x/week for 3-4 weeks. [x] Continue per plan of care [] Alter current plan (see comments)  [] Plan of care initiated [] Hold pending MD visit [] Discharge    Electronically signed by: Chelsey Rea, PT    Note: If patient does not return for scheduled/recommended follow up visits, this note will serve as a discharge from care along with the most recent update on progress.

## 2022-02-22 ENCOUNTER — APPOINTMENT (OUTPATIENT)
Dept: PHYSICAL THERAPY | Age: 72
End: 2022-02-22
Payer: MEDICARE

## 2022-02-22 ENCOUNTER — OFFICE VISIT (OUTPATIENT)
Dept: INTERNAL MEDICINE CLINIC | Age: 72
End: 2022-02-22
Payer: MEDICARE

## 2022-02-22 ENCOUNTER — NURSE TRIAGE (OUTPATIENT)
Dept: OTHER | Facility: CLINIC | Age: 72
End: 2022-02-22

## 2022-02-22 VITALS
BODY MASS INDEX: 30.73 KG/M2 | WEIGHT: 167 LBS | SYSTOLIC BLOOD PRESSURE: 161 MMHG | HEIGHT: 62 IN | OXYGEN SATURATION: 98 % | HEART RATE: 86 BPM | DIASTOLIC BLOOD PRESSURE: 105 MMHG

## 2022-02-22 DIAGNOSIS — I49.3 PVC (PREMATURE VENTRICULAR CONTRACTION): ICD-10-CM

## 2022-02-22 DIAGNOSIS — Z12.31 ENCOUNTER FOR SCREENING MAMMOGRAM FOR MALIGNANT NEOPLASM OF BREAST: ICD-10-CM

## 2022-02-22 DIAGNOSIS — Z12.11 SCREEN FOR COLON CANCER: ICD-10-CM

## 2022-02-22 DIAGNOSIS — F32.1 CURRENT MODERATE EPISODE OF MAJOR DEPRESSIVE DISORDER WITHOUT PRIOR EPISODE (HCC): ICD-10-CM

## 2022-02-22 DIAGNOSIS — R00.2 PALPITATIONS: Primary | ICD-10-CM

## 2022-02-22 DIAGNOSIS — Z13.220 SCREENING, LIPID: ICD-10-CM

## 2022-02-22 DIAGNOSIS — Z11.59 ENCOUNTER FOR HEPATITIS C SCREENING TEST FOR LOW RISK PATIENT: ICD-10-CM

## 2022-02-22 DIAGNOSIS — Z78.0 POSTMENOPAUSAL: ICD-10-CM

## 2022-02-22 PROCEDURE — G8417 CALC BMI ABV UP PARAM F/U: HCPCS | Performed by: INTERNAL MEDICINE

## 2022-02-22 PROCEDURE — G8484 FLU IMMUNIZE NO ADMIN: HCPCS | Performed by: INTERNAL MEDICINE

## 2022-02-22 PROCEDURE — 1090F PRES/ABSN URINE INCON ASSESS: CPT | Performed by: INTERNAL MEDICINE

## 2022-02-22 PROCEDURE — 1123F ACP DISCUSS/DSCN MKR DOCD: CPT | Performed by: INTERNAL MEDICINE

## 2022-02-22 PROCEDURE — 3017F COLORECTAL CA SCREEN DOC REV: CPT | Performed by: INTERNAL MEDICINE

## 2022-02-22 PROCEDURE — G8400 PT W/DXA NO RESULTS DOC: HCPCS | Performed by: INTERNAL MEDICINE

## 2022-02-22 PROCEDURE — 93000 ELECTROCARDIOGRAM COMPLETE: CPT | Performed by: INTERNAL MEDICINE

## 2022-02-22 PROCEDURE — 99215 OFFICE O/P EST HI 40 MIN: CPT | Performed by: INTERNAL MEDICINE

## 2022-02-22 PROCEDURE — G8427 DOCREV CUR MEDS BY ELIG CLIN: HCPCS | Performed by: INTERNAL MEDICINE

## 2022-02-22 PROCEDURE — 4040F PNEUMOC VAC/ADMIN/RCVD: CPT | Performed by: INTERNAL MEDICINE

## 2022-02-22 PROCEDURE — 1036F TOBACCO NON-USER: CPT | Performed by: INTERNAL MEDICINE

## 2022-02-22 RX ORDER — VALACYCLOVIR HYDROCHLORIDE 500 MG/1
500 TABLET, FILM COATED ORAL PRN
Qty: 30 TABLET | Refills: 0 | Status: SHIPPED | OUTPATIENT
Start: 2022-02-22 | End: 2022-08-03 | Stop reason: SDUPTHER

## 2022-02-22 NOTE — PROGRESS NOTES
Chief Complaint   Patient presents with    Tachycardia       HPI: Same day add on after triage call re: episodes of heart \"quivering\" or trilling just for a moment. Has not had chest pain, or dizziness, but feels like she needs to take a deep breath when it happens, though not short of breath. No change in exertion tolerance. Happening often today, including a few times during history portion of visit and during EKG but not while performing exam. No prior episodes. nO changes in her usual diet or intake, no heavy caffeine intake, no otc decongestants. Note elevated blood pressure today, unusual for her. Medications reviewed and reconciled with what patient reports to be taking. BP (!) 161/105   Pulse 86   Ht 5' 2\" (1.575 m)   Wt 167 lb (75.8 kg)   SpO2 98%   BMI 30.54 kg/m²     Physical Exam    ASSESSMENT/PLAN: Pt received counseling and, if relevant, printed instructions for all symptoms listed in CC and HPI, as well as for all diagnoses listed below. 1. Palpitations--reassured patient based on observed events in office that she is having isolated PVCs which are normal up to 6x/minute. She requests cardiology referral nonetheless. I do think her blood pressure may be accentuating her symptom awareness and this will need followup as well. - EKG 12 lead; Future  - Comprehensive Metabolic Panel; Future  - TSH with Reflex to FT4; Future  - CBC with Auto Differential; Future  - EKG 12 lead  - Echocardiogram complete; Future  - Dionicio Zuleta MD, [facility], [facility]    2. PVC (premature ventricular contraction)  - Dionicio Zuleta MD, [facility], [facility]    3. Current moderate episode of major depressive disorder without prior episode (HCC)--stable    4. Postmenopausal  - DEXA BONE DENSITY 2 SITES; Future    5. Encounter for screening mammogram for malignant neoplasm of breast  - MONI DIGITAL SCREEN W OR WO CAD BILATERAL; Future    6.  Screen for colon cancer  - Fecal DNA Colorectal cancer screening (Cologuard)    7. Encounter for hepatitis C screening test for low risk patient  - Hepatitis C Antibody; Future    8. Screening, lipid  - LIPID PANEL; Future      Problem List Items Addressed This Visit     Current moderate episode of major depressive disorder without prior episode (HCC)    Palpitations - Primary    Relevant Orders    EKG 12 lead    Comprehensive Metabolic Panel    TSH with Reflex to FT4    CBC with Auto Differential    Echocardiogram complete    [facility]      Other Visit Diagnoses     PVC (premature ventricular contraction)        Relevant Orders    [facility]    Postmenopausal        Relevant Orders    DEXA BONE DENSITY 2 SITES    Encounter for screening mammogram for malignant neoplasm of breast        Relevant Orders    MONI DIGITAL SCREEN W OR WO CAD BILATERAL    Screen for colon cancer        Relevant Orders    Fecal DNA Colorectal cancer screening (Cologuard)    Encounter for hepatitis C screening test for low risk patient        Relevant Orders    Hepatitis C Antibody    Screening, lipid        Relevant Orders    LIPID PANEL        I have spent over 40 minutes with this patient and/or guardian. This included time spent on reviewing records, counseling and care coordination. Return in about 1 week (around 3/1/2022). REcheck bp and review results, decide if addtiional testing is warranted.

## 2022-02-22 NOTE — PATIENT INSTRUCTIONS
Patient Education        Premature Heartbeat: Care Instructions  Your Care Instructions     A premature heartbeat happens when the heart beats earlier than it should. This briefly interrupts the heart's rhythm. You do not usually feel the early heartbeat, and the next beat is stronger. To many people, this feels like a skipped heartbeat or a flutter. This heartbeat is also called a premature ventricular contraction (PVC). If you have no heart problems, premature heartbeats that happen once in a while are not a cause for concern. Most people have them at some time. They may happen more often if you drink a lot of caffeine or alcohol or are under stress. Usually, no cause for a premature heartbeat is found, and no treatment is needed. Some people may take medicine to prevent these heartbeats and to relieve symptoms. Follow-up care is a key part of your treatment and safety. Be sure to make and go to all appointments, and call your doctor if you are having problems. It's also a good idea to know your test results and keep a list of the medicines you take. How can you care for yourself at home? · Limit caffeine and other stimulants if they trigger premature heartbeats. · Reduce stress. Avoid people and places that make you feel anxious, if you can. Learn ways to reduce stress, such as biofeedback, guided imagery, and meditation. · Do not smoke or allow others to smoke around you. If you need help quitting, talk to your doctor about stop-smoking programs and medicines. These can increase your chances of quitting for good. · Get at least 30 minutes of exercise on most days of the week. Walking is a good choice. You also may want to do other activities, such as running, swimming, cycling, or playing tennis or team sports. · Eat heart-healthy foods. · Stay at a healthy weight. Lose weight if you need to. · Get enough sleep.  Keep your room dark and quiet, and try to go to bed at the same time every night.  · Limit alcohol to 2 drinks a day for men and 1 drink a day for women. Too much alcohol can cause health problems. If drinking alcohol causes more premature heartbeats, do not drink it. · If your doctor prescribes medicine, take it exactly as prescribed. Call your doctor if you think you are having a problem with your medicine. When should you call for help? Call 911 anytime you think you may need emergency care. For example, call if:    · You passed out (lost consciousness). Call your doctor now or seek immediate medical care if:    · You are dizzy or lightheaded, or you feel like you may faint.     · You are short of breath. Watch closely for changes in your health, and be sure to contact your doctor if you have any problems. Where can you learn more? Go to https://[employer]. org and sign in to your [employer] account. Enter N239 in the [employer] box to learn more about \"Premature Heartbeat: Care Instructions. \"     If you do not have an account, please click on the \"Sign Up Now\" link. Current as of: April 29, 2021               Content Version: 13.1  © 2006-2021 Healthwise, Incorporated. Care instructions adapted under license by [facility] ([facility]). If you have questions about a medical condition or this instruction, always ask your healthcare professional. [address] any warranty or liability for your use of this information.

## 2022-02-25 ENCOUNTER — APPOINTMENT (OUTPATIENT)
Dept: PHYSICAL THERAPY | Age: 72
End: 2022-02-25
Payer: MEDICARE

## 2022-03-07 DIAGNOSIS — R19.5 POSITIVE COLORECTAL CANCER SCREENING USING COLOGUARD TEST: Primary | ICD-10-CM

## 2022-03-07 LAB — NONINV COLON CA DNA+OCC BLD SCRN STL QL: POSITIVE

## 2022-03-07 NOTE — PROGRESS NOTES
Medication Reconciliation    List of medications patient is currently taking is complete. Source of information: 1.  Conversation with patient at bedside                                      2. EPIC records No distress, will continue to monitor.

## 2022-03-08 ENCOUNTER — TELEPHONE (OUTPATIENT)
Dept: INTERNAL MEDICINE CLINIC | Age: 72
End: 2022-03-08

## 2022-03-08 NOTE — TELEPHONE ENCOUNTER
Pt. Is requesting new GI Referral, states that the other DRNicole  Is not at [facility], stated that he moved to [address] GI on 4th floor and she is willing to go there

## 2022-03-09 ENCOUNTER — HOSPITAL ENCOUNTER (OUTPATIENT)
Dept: NON INVASIVE DIAGNOSTICS | Age: 72
Discharge: HOME | End: 2022-03-09
Payer: MEDICARE

## 2022-03-09 DIAGNOSIS — Z11.59 ENCOUNTER FOR HEPATITIS C SCREENING TEST FOR LOW RISK PATIENT: ICD-10-CM

## 2022-03-09 DIAGNOSIS — Z13.220 SCREENING, LIPID: ICD-10-CM

## 2022-03-09 DIAGNOSIS — R00.2 PALPITATIONS: ICD-10-CM

## 2022-03-09 LAB
A/G RATIO: 2.1 (ref 1.1–2.2)
ALBUMIN SERPL-MCNC: 4.8 G/DL (ref 3.4–5)
ALP BLD-CCNC: 94 U/L (ref 40–129)
ALT SERPL-CCNC: 18 U/L (ref 10–40)
ANION GAP SERPL CALCULATED.3IONS-SCNC: 11 MMOL/L (ref 3–16)
AST SERPL-CCNC: 20 U/L (ref 15–37)
BASOPHILS ABSOLUTE: 0 K/UL (ref 0–0.2)
BASOPHILS RELATIVE PERCENT: 0.6 %
BILIRUB SERPL-MCNC: <0.2 MG/DL (ref 0–1)
BUN BLDV-MCNC: 23 MG/DL (ref 7–20)
CALCIUM SERPL-MCNC: 10.2 MG/DL (ref 8.3–10.6)
CHLORIDE BLD-SCNC: 104 MMOL/L (ref 99–110)
CHOLESTEROL, TOTAL: 258 MG/DL (ref 0–199)
CO2: 25 MMOL/L (ref 21–32)
CREAT SERPL-MCNC: 0.8 MG/DL (ref 0.6–1.2)
EOSINOPHILS ABSOLUTE: 0.1 K/UL (ref 0–0.6)
EOSINOPHILS RELATIVE PERCENT: 1.5 %
GFR AFRICAN AMERICAN: >60
GFR NON-AFRICAN AMERICAN: >60
GLUCOSE BLD-MCNC: 111 MG/DL (ref 70–99)
HCT VFR BLD CALC: 43.1 % (ref 36–48)
HDLC SERPL-MCNC: 68 MG/DL (ref 40–60)
HEMOGLOBIN: 14.2 G/DL (ref 12–16)
HEPATITIS C ANTIBODY INTERPRETATION: NORMAL
LDL CHOLESTEROL CALCULATED: 150 MG/DL
LV EF: 58 %
LVEF MODALITY: NORMAL
LYMPHOCYTES ABSOLUTE: 2.6 K/UL (ref 1–5.1)
LYMPHOCYTES RELATIVE PERCENT: 41.4 %
MCH RBC QN AUTO: 31.6 PG (ref 26–34)
MCHC RBC AUTO-ENTMCNC: 32.8 G/DL (ref 31–36)
MCV RBC AUTO: 96.2 FL (ref 80–100)
MONOCYTES ABSOLUTE: 0.5 K/UL (ref 0–1.3)
MONOCYTES RELATIVE PERCENT: 7.1 %
NEUTROPHILS ABSOLUTE: 3.1 K/UL (ref 1.7–7.7)
NEUTROPHILS RELATIVE PERCENT: 49.4 %
PDW BLD-RTO: 13.4 % (ref 12.4–15.4)
PLATELET # BLD: 206 K/UL (ref 135–450)
PMV BLD AUTO: 10.6 FL (ref 5–10.5)
POTASSIUM SERPL-SCNC: 4.7 MMOL/L (ref 3.5–5.1)
RBC # BLD: 4.48 M/UL (ref 4–5.2)
SODIUM BLD-SCNC: 140 MMOL/L (ref 136–145)
TOTAL PROTEIN: 7.1 G/DL (ref 6.4–8.2)
TRIGL SERPL-MCNC: 199 MG/DL (ref 0–150)
TSH REFLEX FT4: 3.63 UIU/ML (ref 0.27–4.2)
VLDLC SERPL CALC-MCNC: 40 MG/DL
WBC # BLD: 6.3 K/UL (ref 4–11)

## 2022-03-09 PROCEDURE — 93306 TTE W/DOPPLER COMPLETE: CPT

## 2022-03-10 ENCOUNTER — TELEPHONE (OUTPATIENT)
Dept: SURGERY | Age: 72
End: 2022-03-10

## 2022-03-11 ENCOUNTER — OFFICE VISIT (OUTPATIENT)
Dept: CARDIOLOGY CLINIC | Age: 72
End: 2022-03-11
Payer: MEDICARE

## 2022-03-11 ENCOUNTER — TELEPHONE (OUTPATIENT)
Dept: INTERNAL MEDICINE CLINIC | Age: 72
End: 2022-03-11

## 2022-03-11 ENCOUNTER — TELEPHONE (OUTPATIENT)
Dept: SURGERY | Age: 72
End: 2022-03-11

## 2022-03-11 VITALS
HEART RATE: 86 BPM | DIASTOLIC BLOOD PRESSURE: 80 MMHG | WEIGHT: 168 LBS | OXYGEN SATURATION: 98 % | BODY MASS INDEX: 30.91 KG/M2 | SYSTOLIC BLOOD PRESSURE: 126 MMHG | HEIGHT: 62 IN

## 2022-03-11 DIAGNOSIS — I25.10 ASCVD (ARTERIOSCLEROTIC CARDIOVASCULAR DISEASE): ICD-10-CM

## 2022-03-11 DIAGNOSIS — E78.2 MIXED HYPERLIPIDEMIA: Primary | ICD-10-CM

## 2022-03-11 DIAGNOSIS — E78.2 MIXED HYPERLIPIDEMIA: ICD-10-CM

## 2022-03-11 DIAGNOSIS — I49.3 PVC'S (PREMATURE VENTRICULAR CONTRACTIONS): ICD-10-CM

## 2022-03-11 DIAGNOSIS — R19.5 POSITIVE COLORECTAL CANCER SCREENING USING COLOGUARD TEST: Primary | ICD-10-CM

## 2022-03-11 DIAGNOSIS — I99.8 VASCULAR CALCIFICATION: ICD-10-CM

## 2022-03-11 DIAGNOSIS — R00.2 PALPITATIONS: Primary | ICD-10-CM

## 2022-03-11 PROCEDURE — 93000 ELECTROCARDIOGRAM COMPLETE: CPT | Performed by: INTERNAL MEDICINE

## 2022-03-11 PROCEDURE — G8417 CALC BMI ABV UP PARAM F/U: HCPCS | Performed by: INTERNAL MEDICINE

## 2022-03-11 PROCEDURE — 1090F PRES/ABSN URINE INCON ASSESS: CPT | Performed by: INTERNAL MEDICINE

## 2022-03-11 PROCEDURE — 1123F ACP DISCUSS/DSCN MKR DOCD: CPT | Performed by: INTERNAL MEDICINE

## 2022-03-11 PROCEDURE — G8427 DOCREV CUR MEDS BY ELIG CLIN: HCPCS | Performed by: INTERNAL MEDICINE

## 2022-03-11 PROCEDURE — 3017F COLORECTAL CA SCREEN DOC REV: CPT | Performed by: INTERNAL MEDICINE

## 2022-03-11 PROCEDURE — G8484 FLU IMMUNIZE NO ADMIN: HCPCS | Performed by: INTERNAL MEDICINE

## 2022-03-11 PROCEDURE — 4040F PNEUMOC VAC/ADMIN/RCVD: CPT | Performed by: INTERNAL MEDICINE

## 2022-03-11 PROCEDURE — 1036F TOBACCO NON-USER: CPT | Performed by: INTERNAL MEDICINE

## 2022-03-11 PROCEDURE — G8400 PT W/DXA NO RESULTS DOC: HCPCS | Performed by: INTERNAL MEDICINE

## 2022-03-11 PROCEDURE — 99204 OFFICE O/P NEW MOD 45 MIN: CPT | Performed by: INTERNAL MEDICINE

## 2022-03-11 RX ORDER — ATORVASTATIN CALCIUM 20 MG/1
20 TABLET, FILM COATED ORAL DAILY
Qty: 90 TABLET | Refills: 1 | Status: SHIPPED | OUTPATIENT
Start: 2022-03-11 | End: 2022-08-03

## 2022-03-11 NOTE — PATIENT INSTRUCTIONS
Patient Education        DASH Diet: Care Instructions  Your Care Instructions     The DASH diet is an eating plan that can help lower your blood pressure. DASH stands for Dietary Approaches to Stop Hypertension. Hypertension is high blood pressure. The DASH diet focuses on eating foods that are high in calcium, potassium, and magnesium. These nutrients can lower blood pressure. The foods that are highest in these nutrients are fruits, vegetables, low-fat dairy products, nuts, seeds, and legumes. But taking calcium, potassium, and magnesium supplements instead of eating foods that are high in those nutrients does not have the same effect. The DASH diet also includes whole grains, fish, and poultry. The DASH diet is one of several lifestyle changes your doctor may recommend to lower your high blood pressure. Your doctor may also want you to decrease the amount of sodium in your diet. Lowering sodium while following the DASH diet can lower blood pressure even further than just the DASH diet alone. Follow-up care is a key part of your treatment and safety. Be sure to make and go to all appointments, and call your doctor if you are having problems. It's also a good idea to know your test results and keep a list of the medicines you take. How can you care for yourself at home? Following the DASH diet  · Eat 4 to 5 servings of fruit each day. A serving is 1 medium-sized piece of fruit, ½ cup chopped or canned fruit, 1/4 cup dried fruit, or 4 ounces (½ cup) of fruit juice. Choose fruit more often than fruit juice. · Eat 4 to 5 servings of vegetables each day. A serving is 1 cup of lettuce or raw leafy vegetables, ½ cup of chopped or cooked vegetables, or 4 ounces (½ cup) of vegetable juice. Choose vegetables more often than vegetable juice. · Get 2 to 3 servings of low-fat and fat-free dairy each day. A serving is 8 ounces of milk, 1 cup of yogurt, or 1 ½ ounces of cheese. · Eat 6 to 8 servings of grains each day. A serving is 1 slice of bread, 1 ounce of dry cereal, or ½ cup of cooked rice, pasta, or cooked cereal. Try to choose whole-grain products as much as possible. · Limit lean meat, poultry, and fish to 2 servings each day. A serving is 3 ounces, about the size of a deck of cards. · Eat 4 to 5 servings of nuts, seeds, and legumes (cooked dried beans, lentils, and split peas) each week. A serving is 1/3 cup of nuts, 2 tablespoons of seeds, or ½ cup of cooked beans or peas. · Limit fats and oils to 2 to 3 servings each day. A serving is 1 teaspoon of vegetable oil or 2 tablespoons of salad dressing. · Limit sweets and added sugars to 5 servings or less a week. A serving is 1 tablespoon jelly or jam, ½ cup sorbet, or 1 cup of lemonade. · Eat less than 2,300 milligrams (mg) of sodium a day. If you limit your sodium to 1,500 mg a day, you can lower your blood pressure even more. · Be aware that all of these are the suggested number of servings for people who eat 1,800 to 2,000 calories a day. Your recommended number of servings may be different if you need more or fewer calories. Tips for success  · Start small. Do not try to make dramatic changes to your diet all at once. You might feel that you are missing out on your favorite foods and then be more likely to not follow the plan. Make small changes, and stick with them. Once those changes become habit, add a few more changes. · Try some of the following:  ? Make it a goal to eat a fruit or vegetable at every meal and at snacks. This will make it easy to get the recommended amount of fruits and vegetables each day. ? Try yogurt topped with fruit and nuts for a snack or healthy dessert. ? Add lettuce, tomato, cucumber, and onion to sandwiches. ? Combine a ready-made pizza crust with low-fat mozzarella cheese and lots of vegetable toppings. Try using tomatoes, squash, spinach, broccoli, carrots, cauliflower, and onions. ?  Have a variety of cut-up vegetables with a low-fat dip as an appetizer instead of chips and dip. ? Sprinkle sunflower seeds or chopped almonds over salads. Or try adding chopped walnuts or almonds to cooked vegetables. ? Try some vegetarian meals using beans and peas. Add garbanzo or kidney beans to salads. Make burritos and tacos with mashed john beans or black beans. Where can you learn more? Go to https://[employer].TheFriendMail. org and sign in to your [facility] account. Enter X612 in the Networked Organisms box to learn more about \"DASH Diet: Care Instructions. \"     If you do not have an account, please click on the \"Sign Up Now\" link. Current as of: April 29, 2021               Content Version: 13.1  © 2046-3234 Healthwise, Incorporated. Care instructions adapted under license by [facility] ([facility]). If you have questions about a medical condition or this instruction, always ask your healthcare professional. [address] any warranty or liability for your use of this information.

## 2022-03-11 NOTE — TELEPHONE ENCOUNTER
Patient stated she is returning Merna's call and would like a call back to schedule a colonoscopy [phone]

## 2022-03-11 NOTE — TELEPHONE ENCOUNTER
Patient called because she received a referral for a colonoscopy and the Dr is at [facility] she wanted a Dr at [facility], she is requesting a new referral, or that the current one be changed to allow her to go to one at [facility], I advised that someone from the clinical staff would call her to let her know what the next steps are, the best number to reach the Patient is [phone]

## 2022-03-11 NOTE — PROGRESS NOTES
[facility]      Cardiology Consult    Mikal Ponce  [birth year] March 11, 2022    Referring Physician: Artemio Cheek MD  Reason for Referral: Palpitations     CC: \"I have PVCs\"     HPI:  The patient is 70 y.o. female with a past medical history significant for hyperlipidemia who presents today for evaluation of palpitations. She reports recurrent palpitations the past 2-3 weeks. She was evaluated by her primary care physician and was told it was related to PVCs which prompted the referral today. She states the sensation is occurring more often and notes associated shortness of breath if she has several in a short time frame. She feels confident that the PVCs are the source of her symptoms because the sensations occurred while at her PCP and correlated to PVCs on exam. She denies any lightheadedness or sensation of her heart racing. She completed an echocardiogram that showed a normal LVEF. She states overall, she thinks she is doing well and would like to avoid new medications if possible. In general, she denies any shortness of breath or chest pains. She reports compliance with her medications and tolerating. Patient denies exertional chest pain/pressure, dyspnea at rest, DEL RIO, PND, orthopnea, lightheadedness, weight changes, changes in LE edema, and syncope.     Past Medical History:   Diagnosis Date    Ankle injury     Arthritis     COVID-19     5/2020    Genital herpes     Premature atrial contraction      Past Surgical History:   Procedure Laterality Date    ANKLE FRACTURE SURGERY Left 08/02/2021    LEFT ANKLE OPEN REDUCTION INTERNAL FIXATION performed by Faith Barnes MD at [address] [facility] Left 1/10/2022    LEFT SYNDESMOSIS SCREW REMOVAL performed by Faith Barnes MD at 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     Family History   Problem Relation Age of Onset    Heart Failure Mother     Cancer Father         lung ca     Social History     Tobacco Use    Smoking status: Former Smoker     Packs/day: 0.25     Types: Cigarettes     Quit date:      Years since quittin.2    Smokeless tobacco: Never Used    Tobacco comment: PT STATES ON AND OFF SINCE SHE WAS 25YEARS OF AGE   Vaping Use    Vaping Use: Some days    Substances: Nicotine, Flavoring    Devices: Disposable   Substance Use Topics    Alcohol use: Not Currently    Drug use: Never       Allergies   Allergen Reactions    Corticosteroids Hives     Both injectable steroids as well as by mouth steroids caused her to break out in hives.  Morphine      States not allergic, states it makes her nauseated    Cortizone-10 [Hydrocortisone] Rash    Phenergan [Promethazine] Rash    Sulfa Antibiotics Rash     Current Outpatient Medications   Medication Sig Dispense Refill    valACYclovir (VALTREX) 500 MG tablet Take 1 tablet by mouth as needed (as needed) 30 tablet 0    Multiple Vitamins-Minerals (THERAPEUTIC MULTIVITAMIN-MINERALS) tablet Take 1 tablet by mouth daily      doxyLAMINE succinate (SLEEP AID) 25 MG tablet Take 50 mg by mouth nightly       atorvastatin (LIPITOR) 20 MG tablet Take 1 tablet by mouth daily (Patient not taking: Reported on 3/11/2022) 90 tablet 1     No current facility-administered medications for this visit. Review of Systems:  · Constitutional: No unanticipated weight loss. There's been no change in energy level, sleep pattern, or activity level. No fevers, chills. · Eyes: No visual changes or diplopia. No scleral icterus. · ENT: No Headaches, hearing loss or vertigo. No mouth sores or sore throat. · Cardiovascular: as reviewed in HPI  · Respiratory: No cough or wheezing, no sputum production. No hemoptysis. · Gastrointestinal: No abdominal pain, appetite loss, blood in stools.  No change in bowel or bladder habits. · Genitourinary: No dysuria, trouble voiding, or hematuria. · Musculoskeletal:  No gait disturbance, no joint complaints. · Integumentary: No rash or pruritis. · Neurological: No headache, diplopia, change in muscle strength, numbness or tingling. · Psychiatric: No anxiety or depression. · Endocrine: No temperature intolerance. No excessive thirst, fluid intake, or urination. No tremor. · Hematologic/Lymphatic: No abnormal bruising or bleeding, blood clots or swollen lymph nodes. · Allergic/Immunologic: No nasal congestion or hives. Physical Exam:   /80   Pulse 86   Ht 5' 2\" (1.575 m)   Wt 168 lb (76.2 kg)   SpO2 98%   BMI 30.73 kg/m²   Wt Readings from Last 3 Encounters:   03/11/22 168 lb (76.2 kg)   02/22/22 167 lb (75.8 kg)   01/26/22 162 lb (73.5 kg)     Constitutional: She is oriented to person, place, and time. She appears well-developed and well-nourished. In no acute distress. Head: Normocephalic and atraumatic. Pupils equal and round. Neck: Neck supple. No JVP or carotid bruit appreciated. No mass and no thyromegaly present. No lymphadenopathy present. Cardiovascular: Normal rate. Normal heart sounds. Exam reveals no gallop and no friction rub. No murmur heard. Pulmonary/Chest: Effort normal and breath sounds normal. No respiratory distress. She has no wheezes, rhonchi or rales. Abdominal: Soft, non-tender. Bowel sounds are normal. She exhibits no organomegaly, mass or bruit. Extremities: No edema. No cyanosis or clubbing. Pulses are 2+ radial/carotid bilaterally. Neurological: No gross cranial nerve deficit. Coordination normal.   Skin: Skin is warm and dry. There is no rash or diaphoresis. Psychiatric: She has a normal mood and affect.  Her speech is normal and behavior is normal.     Lab Review:   FLP:    Lab Results   Component Value Date    TRIG 199 03/09/2022    HDL 68 03/09/2022    LDLCALC 150 03/09/2022    LABVLDL 40 03/09/2022     BUN/Creatinine: Lab Results   Component Value Date    BUN 23 03/09/2022    CREATININE 0.8 03/09/2022     EKG Interpretation: 3/11/22 Sinus rhythm. RSR(V1) -nondiagnostic. Image Review:     Chest CT 9/2021  Calcification of the thoracic aorta    Echo 3/9/22 (personally reviewed)   Normal left ventricle size, wall thickness, and systolic function with an estimated ejection fraction of 55-60%. No regional wall motion abnormalities are seen. Grade 1 diastolic dysfunction. The right ventricle is normal in size and function. Moderate tricuspid regurgitation. Assessment/Plan:   1) Palpitations/PVCs. Echocardiogram showed normal LVEF. Discussed treatment options including medical therapy, wearing a monitor for PVC burden, or continued observation. Provided reassurance and patient is comfortable with continued observation. Instructed to call if she would like to try medical therapy or a monitor. 2) Tricuspid regurgitation. Personally reviewed echocardiogram. No acute intervention required. Will continue to monitor clinically and likely repeat the echocardiogram in 1-2 years. 3) Hyperlipidemia/Vascular calcifications. Documented on chest CT and cholesterol is not well controlled. PCP prescribed Lipitor 20 mg daily but patient has not yet started. Encouraged to start statin therapy and titrate as tolerated. Follow up as needed. Thank you very much for allowing me to participate in the care of your patient. Please do not hesitate to contact me if you have any questions. Sincerely,  Lili Lyles. Katharina Ledesma, [address] [place] [address]  Ph: [phone]  Fax: [phone]    This note was scribed in the presence of Dr Katharina Ledesma MD by Noe Leach RN. Physician Attestation: The scribes documentation has been prepared under my direction and personally reviewed by me in its entirety.    I confirm that the note above accurately reflects all work, treatment, procedures, and medical decision making performed by me. All portions of the note including but not limited to the chief complaint, history of present illness, physical exam, assessment and plan/medical decision making were personally reviewed, edited, and updated on the day of the visit.

## 2022-03-21 DIAGNOSIS — I07.1 SEVERE TRICUSPID REGURGITATION: ICD-10-CM

## 2022-03-21 DIAGNOSIS — I07.1 TRICUSPID VALVE INSUFFICIENCY, UNSPECIFIED ETIOLOGY: Primary | ICD-10-CM

## 2022-03-21 RX ORDER — ASPIRIN 81 MG/1
81 TABLET ORAL DAILY
COMMUNITY

## 2022-03-21 RX ORDER — ACETAMINOPHEN 500 MG
500 TABLET ORAL EVERY 6 HOURS PRN
COMMUNITY
End: 2022-05-06

## 2022-03-21 NOTE — PROGRESS NOTES
Preoperative Screening for Elective Surgery/Invasive Procedures While COVID-19 present in the community     1. Have you tested positive or have been told to self-isolate for COVID-19 like symptoms within the past 28 days? 2. Do you currently have any of the following symptoms? ? Fever >100.0 F or 99.9 F in immunocompromised patients? ? New onset cough, shortness of breath or difficulty breathing? ? New onset sore throat, myalgia (muscle aches and pains), headache, loss of taste/smell or diarrhea? 3. Have you had a potential exposure to COVID-19 within the past 14 days by:  ? Close contact with a confirmed case? ? Close contact with a healthcare worker,  or essential infrastructure worker (grocery store, [employer], gas station, public utilities or transportation)? ? Do you reside in a congregate setting such as; skilled nursing facility, adult home, correctional facility, homeless shelter or other institutional setting? ? Have you had recent travel to a known COVID-19 hotspot? Indicate if the patient has a positive screen by answering yes to one or more of the above questions. If patient is unable to obtain a COVID test prior to DOS/DOP will need RAPID DOS. C-Difficile admission screening and protocol:       * Admitted with diarrhea? [] YES    [x]  NO     *Prior history of C-Diff. In last 3 months? [] YES    [x]  NO     *Antibiotic use in the past 6-8 weeks? [x]  NO    []  YES      If yes, which: REASON_________________     *Prior hospitalization or nursing home in the last month? []  YES    [x]  NO     SAFETY FIRST. .call before you fall    [facility] time_0930______        Surgery time_1100______    Do not eat or drink anything after 12:00 midnight prior to your surgery. This includes water chewing gum, mints and ice chips- the Day of Surgery.   You may brush your teeth and gargle the morning of your surgery, but do not swallow the water     Please see your family doctor/pediatrician for a history and physical and/or questions concerning medications. Bring any test results/reports from your physicians office. If you are under the care of a heart doctor or specialist doctor, please be aware that you may be asked to them for clearance    You may be asked to stop blood thinners such as Coumadin, Plavix, Fragmin, Lovenox, etc., or any anti-inflammatories such as:  Aspirin, Ibuprofen, Advil, Naproxen prior to your surgery. We also ask that you stop any OTC medications such as fish oil, vitamin E, glucosamine, garlic, Multivitamins, COQ 10, etc.    We ask that you do not smoke 24 hours prior to surgery  We ask that you do not  drink any alcoholic beverages 24 hours prior to surgery     You must make arrangements for a responsible adult to take you home after your surgery. For your safety you will not be allowed to leave alone or drive yourself home. Your surgery will be cancelled if you do not have a ride home. Also for your safety, it is strongly suggested that someone stay with you the first 24 hours after your surgery. A parent or legal guardian must accompany a child scheduled for surgery and plan to stay at the hospital until the child is discharged. Please do not bring other children with you. For your comfort, please wear simple loose fitting clothing to the hospital.  Please do not bring valuables. Do not wear any make-up or nail polish on your fingers or toes. For your safety, please do not wear any jewelry or body piercing's on the day of surgery. All jewelry must be removed. If you have dentures, they will be removed before going to operating room. For your convenience, we will provide you with a container. If you wear contact lenses or glasses, they will be removed, please bring a case for them.      If you have a living will and a durable power of  for healthcare, please bring in a copy. As part of our patient safety program to minimize surgical site infections, we ask you to do the following:    · Please notify your surgeon if you develop any illness between         now and the day of your surgery. · This includes a cough, cold, fever, sore throat, nausea,         or vomiting, and diarrhea, etc.  ·  Please notify your surgeon if you experience dizziness, shortness         of breath or blurred vision between now and the time of your surgery. Do not shave your operative site 96 hours prior to surgery. For face and neck surgery, men may use an electric razor 48 hours   prior to surgery. You may shower the night before surgery or the morning of   your surgery with an antibacterial soap. You will need to bring a photo ID and insurance card     If you use a C-pap or Bi-pap machine, please bring your machine with you to the hospital     Our goal is to provide you with excellent care, therefore, visitors will be limited to so that we may focus on providing this care for you. Please contact your surgeon office, if you have any further questions. [facility] phone number:  [address] [facility] fax number:  [phone]    Please note these are generalized instructions for all surgical cases, you may be provided with more specific instructions according to your surgery.

## 2022-03-24 ENCOUNTER — ANESTHESIA EVENT (OUTPATIENT)
Dept: ENDOSCOPY | Age: 72
End: 2022-03-24
Payer: MEDICARE

## 2022-03-25 ENCOUNTER — ANESTHESIA (OUTPATIENT)
Dept: ENDOSCOPY | Age: 72
End: 2022-03-25
Payer: MEDICARE

## 2022-03-25 ENCOUNTER — HOSPITAL ENCOUNTER (OUTPATIENT)
Age: 72
Setting detail: OUTPATIENT SURGERY
Discharge: HOME | End: 2022-03-25
Attending: INTERNAL MEDICINE | Admitting: INTERNAL MEDICINE
Payer: MEDICARE

## 2022-03-25 VITALS
OXYGEN SATURATION: 98 % | RESPIRATION RATE: 18 BRPM | HEIGHT: 62 IN | SYSTOLIC BLOOD PRESSURE: 126 MMHG | HEART RATE: 78 BPM | DIASTOLIC BLOOD PRESSURE: 88 MMHG | BODY MASS INDEX: 30.36 KG/M2 | WEIGHT: 165 LBS | TEMPERATURE: 97.8 F

## 2022-03-25 VITALS
DIASTOLIC BLOOD PRESSURE: 73 MMHG | OXYGEN SATURATION: 98 % | RESPIRATION RATE: 1 BRPM | SYSTOLIC BLOOD PRESSURE: 114 MMHG

## 2022-03-25 DIAGNOSIS — Z12.11 COLON CANCER SCREENING: ICD-10-CM

## 2022-03-25 PROCEDURE — 7100000000 HC PACU RECOVERY - FIRST 15 MIN: Performed by: INTERNAL MEDICINE

## 2022-03-25 PROCEDURE — 7100000001 HC PACU RECOVERY - ADDTL 15 MIN: Performed by: INTERNAL MEDICINE

## 2022-03-25 PROCEDURE — 88305 TISSUE EXAM BY PATHOLOGIST: CPT

## 2022-03-25 PROCEDURE — 6370000000 HC RX 637 (ALT 250 FOR IP): Performed by: INTERNAL MEDICINE

## 2022-03-25 PROCEDURE — 3609010600 HC COLONOSCOPY POLYPECTOMY SNARE/COLD BIOPSY: Performed by: INTERNAL MEDICINE

## 2022-03-25 PROCEDURE — 3700000000 HC ANESTHESIA ATTENDED CARE: Performed by: INTERNAL MEDICINE

## 2022-03-25 PROCEDURE — 3700000001 HC ADD 15 MINUTES (ANESTHESIA): Performed by: INTERNAL MEDICINE

## 2022-03-25 PROCEDURE — 2500000003 HC RX 250 WO HCPCS: Performed by: NURSE ANESTHETIST, CERTIFIED REGISTERED

## 2022-03-25 PROCEDURE — 6360000002 HC RX W HCPCS: Performed by: NURSE ANESTHETIST, CERTIFIED REGISTERED

## 2022-03-25 PROCEDURE — 2709999900 HC NON-CHARGEABLE SUPPLY: Performed by: INTERNAL MEDICINE

## 2022-03-25 PROCEDURE — 2580000003 HC RX 258: Performed by: ANESTHESIOLOGY

## 2022-03-25 PROCEDURE — 7100000010 HC PHASE II RECOVERY - FIRST 15 MIN: Performed by: INTERNAL MEDICINE

## 2022-03-25 PROCEDURE — 7100000011 HC PHASE II RECOVERY - ADDTL 15 MIN: Performed by: INTERNAL MEDICINE

## 2022-03-25 RX ORDER — SODIUM CHLORIDE 0.9 % (FLUSH) 0.9 %
10 SYRINGE (ML) INJECTION EVERY 12 HOURS SCHEDULED
Status: DISCONTINUED | OUTPATIENT
Start: 2022-03-25 | End: 2022-03-25 | Stop reason: HOSPADM

## 2022-03-25 RX ORDER — PROPOFOL 10 MG/ML
INJECTION, EMULSION INTRAVENOUS CONTINUOUS PRN
Status: DISCONTINUED | OUTPATIENT
Start: 2022-03-25 | End: 2022-03-25 | Stop reason: SDUPTHER

## 2022-03-25 RX ORDER — SODIUM CHLORIDE 0.9 % (FLUSH) 0.9 %
10 SYRINGE (ML) INJECTION PRN
Status: DISCONTINUED | OUTPATIENT
Start: 2022-03-25 | End: 2022-03-25 | Stop reason: HOSPADM

## 2022-03-25 RX ORDER — LIDOCAINE HYDROCHLORIDE 20 MG/ML
INJECTION, SOLUTION EPIDURAL; INFILTRATION; INTRACAUDAL; PERINEURAL PRN
Status: DISCONTINUED | OUTPATIENT
Start: 2022-03-25 | End: 2022-03-25 | Stop reason: SDUPTHER

## 2022-03-25 RX ORDER — PROPOFOL 10 MG/ML
INJECTION, EMULSION INTRAVENOUS PRN
Status: DISCONTINUED | OUTPATIENT
Start: 2022-03-25 | End: 2022-03-25 | Stop reason: SDUPTHER

## 2022-03-25 RX ORDER — SODIUM CHLORIDE 9 MG/ML
25 INJECTION, SOLUTION INTRAVENOUS PRN
Status: DISCONTINUED | OUTPATIENT
Start: 2022-03-25 | End: 2022-03-25 | Stop reason: HOSPADM

## 2022-03-25 RX ORDER — SIMETHICONE 20 MG/.3ML
EMULSION ORAL PRN
Status: DISCONTINUED | OUTPATIENT
Start: 2022-03-25 | End: 2022-03-25 | Stop reason: ALTCHOICE

## 2022-03-25 RX ORDER — SODIUM CHLORIDE 9 MG/ML
INJECTION, SOLUTION INTRAVENOUS CONTINUOUS
Status: DISCONTINUED | OUTPATIENT
Start: 2022-03-25 | End: 2022-03-25 | Stop reason: HOSPADM

## 2022-03-25 RX ADMIN — LIDOCAINE HYDROCHLORIDE 50 MG: 20 INJECTION, SOLUTION EPIDURAL; INFILTRATION; INTRACAUDAL; PERINEURAL at 10:33

## 2022-03-25 RX ADMIN — SODIUM CHLORIDE: 9 INJECTION, SOLUTION INTRAVENOUS at 10:28

## 2022-03-25 RX ADMIN — PROPOFOL 160 MCG/KG/MIN: 10 INJECTION, EMULSION INTRAVENOUS at 10:35

## 2022-03-25 RX ADMIN — PROPOFOL 100 MG: 10 INJECTION, EMULSION INTRAVENOUS at 10:33

## 2022-03-25 ASSESSMENT — PULMONARY FUNCTION TESTS
PIF_VALUE: 0

## 2022-03-25 ASSESSMENT — PAIN - FUNCTIONAL ASSESSMENT: PAIN_FUNCTIONAL_ASSESSMENT: 0-10

## 2022-03-25 ASSESSMENT — PAIN SCALES - GENERAL
PAINLEVEL_OUTOF10: 0

## 2022-03-25 ASSESSMENT — ENCOUNTER SYMPTOMS: SHORTNESS OF BREATH: 0

## 2022-03-25 NOTE — H&P
Cortizone-10 [hydrocortisone], Phenergan [promethazine], and Sulfa antibiotics    Social History:   Social History     Socioeconomic History    Marital status:      Spouse name: Not on file    Number of children: Not on file    Years of education: Not on file    Highest education level: Not on file   Occupational History    Not on file   Tobacco Use    Smoking status: Former Smoker     Packs/day: 0.25     Types: Cigarettes     Quit date: 2000     Years since quittin.2    Smokeless tobacco: Never Used    Tobacco comment: PT STATES ON AND OFF SINCE SHE WAS 25YEARS OF AGE   Vaping Use    Vaping Use: Former    Devices: Disposable   Substance and Sexual Activity    Alcohol use: Not Currently    Drug use: Never    Sexual activity: Not Currently   Other Topics Concern    Not on file   Social History Narrative    Not on file     Social Determinants of Health     Financial Resource Strain: Low Risk     Difficulty of Paying Living Expenses: Not hard at all   Food Insecurity: No Food Insecurity    Worried About [address] in the Last Year: Never true    [address] in the Last Year: Never true   Transportation Needs:     Lack of Transportation (Medical): Not on file    Lack of Transportation (Non-Medical):  Not on file   Physical Activity:     Days of Exercise per Week: Not on file    Minutes of Exercise per Session: Not on file   Stress:     Feeling of Stress : Not on file   Social Connections:     Frequency of Communication with Friends and Family: Not on file    Frequency of Social Gatherings with Friends and Family: Not on file    Attends Restoration Services: Not on file    Active Member of Clubs or Organizations: Not on file    Attends Club or Organization Meetings: Not on file    Marital Status: Not on file   Intimate Partner Violence:     Fear of Current or Ex-Partner: Not on file    Emotionally Abused: Not on file    Physically Abused: Not on file   Joseph Bateman Sexually Abused: Not on file   Housing Stability:     Unable to Pay for Housing in the Last Year: Not on file    Number of [place] in the Last Year: Not on file    Unstable Housing in the Last Year: Not on file      Family History:       Problem Relation Age of Onset    Heart Failure Mother     Cancer Father         lung ca        PHYSICAL EXAM:      BP (!) 147/85   Pulse 72   Temp 97.5 °F (36.4 °C) (Temporal)   Resp 16   Ht 5' 2\" (1.575 m)   Wt 165 lb (74.8 kg)   SpO2 94%   BMI 30.18 kg/m²  I        Heart:  RRR    Lungs:  CTA b    Abdomen:  S/NT/ND/+BS      ASSESSMENT AND PLAN:  ASA: per anesthesia  Mallampati: per anesthesia  1. Patient is a 70 y.o. female here for colonoscopy   2. Procedure options, risks and benefits reviewed with the patient. The patient expresses understanding.     Enio Miner

## 2022-03-25 NOTE — ANESTHESIA PRE PROCEDURE
[facility] Department of Anesthesiology  Pre-Anesthesia Evaluation/Consultation       Name:  Eva Edouard  : 1950  Age:  70 y.o. MRN:  [id number]  Date: 3/25/2022           Surgeon: Surgeon(s):  Rao Mcwilliams MD    Procedure: Procedure(s):  COLONOSCOPY     Allergies   Allergen Reactions    Corticosteroids Hives     Both injectable steroids as well as by mouth steroids caused her to break out in hives.     Morphine      States not allergic, states it makes her nauseated    Cortizone-10 [Hydrocortisone] Rash    Phenergan [Promethazine] Rash    Sulfa Antibiotics Rash     Patient Active Problem List   Diagnosis    Closed trimalleolar fracture of left ankle    Syndesmotic disruption of left ankle    Back pain    Herpes simplex vulvovaginitis    Personal history of COVID-19    Essential (primary) hypertension    History of diverticulitis of colon    Plantar fasciitis of left foot    Current moderate episode of major depressive disorder without prior episode (HCC)    Reactive depression    Left shoulder pain    Pain due to internal orthopedic prosthetic device (HCC)    Palpitations    Severe tricuspid regurgitation     Past Medical History:   Diagnosis Date    Ankle injury     Arthritis     COVID-19     2020    Diverticulitis     Genital herpes     Hyperlipidemia     Premature atrial contraction      Past Surgical History:   Procedure Laterality Date    ANKLE FRACTURE SURGERY Left 2021    LEFT ANKLE OPEN REDUCTION INTERNAL FIXATION performed by Stan Partida MD at [address] [place] [address] Left 1/10/2022    LEFT SYNDESMOSIS SCREW REMOVAL performed by Stan [facility]ida MD at [facility] Bilateral     implants    CATARACT REMOVAL Bilateral     COLONOSCOPY      DILATATION, ESOPHAGUS      ENDOSCOPY, COLON, DIAGNOSTIC      EYE SURGERY Bilateral     cataract removal    FRACTURE SURGERY Left SpO2:  94%   Weight: 165 lb (74.8 kg) 165 lb (74.8 kg)   Height: 5' 2\" (1.575 m) 5' 2\" (1.575 m)                                            Vital Signs Statistics (for past 48 hrs)     Temp  Av.5 °F (36.4 °C)  Min: 97.5 °F (36.4 °C)   Min taken time: 22  Max: 97.5 °F (36.4 °C)   Max taken time: 22  Pulse  Av  Min: 67   Min taken time: 22  Max: 67   Max taken time: 22  Resp  Av  Min: 12   Min taken time: 22  Max: 12   Max taken time: 22  BP  Min: 147/85   Min taken time: 22  Max: 147/85   Max taken time: 22  SpO2  Av %  Min: 94 %   Min taken time: 22  Max: 94 %   Max taken time: 22  BP Readings from Last 3 Encounters:   22 (!) 147/85   22 126/80   22 (!) 161/105       BMI  Body mass index is 30.18 kg/m². Estimated body mass index is 30.18 kg/m² as calculated from the following:    Height as of this encounter: 5' 2\" (1.575 m). Weight as of this encounter: 165 lb (74.8 kg).     CBC   Lab Results   Component Value Date    WBC 6.3 2022    RBC 4.48 2022    HGB 14.2 2022    HCT 43.1 2022    MCV 96.2 2022    RDW 13.4 2022     2022     CMP    Lab Results   Component Value Date     2022    K 4.7 2022    K 4.1 2021     2022    CO2 25 2022    BUN 23 2022    CREATININE 0.8 2022    GFRAA >60 2022    AGRATIO 2.1 2022    LABGLOM >60 2022    GLUCOSE 111 2022    PROT 7.1 2022    CALCIUM 10.2 2022    BILITOT <0.2 2022    ALKPHOS 94 2022    AST 20 2022    ALT 18 2022     BMP    Lab Results   Component Value Date     2022    K 4.7 2022    K 4.1 2021     2022    CO2 25 2022    BUN 23 2022    CREATININE 0.8 2022    CALCIUM 10.2 2022    GFRAA >60 2022    LABGLOM >60 03/09/2022    GLUCOSE 111 03/09/2022     POCGlucose  No results for input(s): GLUCOSE in the last 72 hours. Coags  No results found for: PROTIME, INR, APTT  HCG (If Applicable) No results found for: PREGTESTUR, PREGSERUM, HCG, HCGQUANT   ABGs No results found for: PHART, PO2ART, OAA6LWH, UUT4TRS, BEART, C0IMAHZL   Type & Screen (If Applicable)  No results found for: LABABO, LABRH                         BMI: Wt Readings from Last 3 Encounters:       NPO Status:   Date of last liquid consumption: 03/25/22   Time of last liquid consumption: 0500   Date of last solid food consumption: 03/23/22      Time of last solid consumption: 1800       Anesthesia Evaluation  Patient summary reviewed and Nursing notes reviewed  Airway: Mallampati: III        Dental:          Pulmonary:       (-) COPD, asthma and shortness of breath                           Cardiovascular:    (+) hypertension:, hyperlipidemia    (-) valvular problems/murmurs, past MI, CAD, CABG/stent, dysrhythmias and  angina                Neuro/Psych:   (+) psychiatric history:   (-) seizures, TIA and CVA           GI/Hepatic/Renal:        (-) GERD, PUD, liver disease and no renal disease       Endo/Other:    (+) : arthritis:., .    (-) diabetes mellitus               Abdominal:             Vascular: negative vascular ROS. Other Findings:             Anesthesia Plan      MAC     ASA 3     (I discussed intravenous sedation to the patient's satisfaction including risks and alternatives. The patient agreed with the plan and has no further questions. Chaya Engel MD )  Induction: intravenous. Anesthetic plan and risks discussed with patient. Plan discussed with CRNA. This pre-anesthesia assessment may be used as a history and physical.    DOS STAFF ADDENDUM:    Pt seen and examined, chart reviewed (including anesthesia, drug and allergy history). No interval changes to history and physical examination. Anesthetic plan, risks, benefits, alternatives, and personnel involved discussed with patient. Patient verbalized an understanding and agrees to proceed.       Karissa Patel MD  March 25, 2022  10:11 AM

## 2022-03-25 NOTE — ANESTHESIA POSTPROCEDURE EVALUATION
Department of Anesthesiology  Postprocedure Note    Patient: Lainey Watson  MRN: [id number]  YOB: 1950  Date of evaluation: 3/25/2022  Time:  3:35 PM     Procedure Summary     Date: 03/25/22 Room / Location: [address] [place] [address]    Anesthesia Start: 1027 Anesthesia Stop: 1059    Procedure: COLONOSCOPY POLYPECTOMY SNARE/COLD BIOPSY (N/A ) Diagnosis:       Colon cancer screening      (COLON CANCER SCREENING)    Surgeons: Norm Ortiz MD Responsible Provider: Daniel Wilson MD    Anesthesia Type: MAC ASA Status: 3          Anesthesia Type: MAC    Jere Phase I: Jere Score: 10    Jere Phase II: Jere Score: 10    Last vitals: Reviewed and per EMR flowsheets.        Anesthesia Post Evaluation    Patient location during evaluation: bedside  Patient participation: complete - patient participated  Level of consciousness: awake and alert  Pain score: 0  Nausea & Vomiting: no nausea  Complications: no  Cardiovascular status: hemodynamically stable  Respiratory status: acceptable  Hydration status: stable

## 2022-03-25 NOTE — OP NOTE
Endoscopy Note    Patient: Bertha Ugalde  : 1950  Acct#:     Procedure: Colonoscopy with intubation of the terminal ileum  Colonoscopy with biopsy  Colonoscopy with snare    Date:  3/25/2022    Surgeon:  Andrea Kemp MD,     Referring Physician:  Dr. Irvin Schwab    Anesthesia:  TIVA    Indications: This is a 70y.o. year old female who presents today with  + cologuard for colonoscopy    Previous Colonoscopy: YES  Date:   Greater than 3 years: YES      Procedure: An informed consent was obtained from the patient after explanation of indications, benefits, possible risks and complications of the procedure. The patient was then taken to the endoscopy suite, placed in the left lateral decubitus position, and the above IV anesthesia was administered. A digital rectal examination was performed and revealed negative without mass, lesions or tenderness. The Olympus pediatric video colonoscope was placed in the patient's rectum under digital direction and advanced to the cecum. The cecum was identified by characteristic anatomy and ballottment. The prep was good. The ileocecal valve was identified and intubated. The ascending colon was examined twice to assure no sessile polyps missed. The scope was then withdrawn back through the cecum, ascending, transverse, descending and sigmoid colons. Carefull circumferential examination of the mucosa in these areas was performed. The scope was then withdrawn into the rectum and retroflexed. The scope was straightened, the colon was decompressed and the scope was withdrawn from the patient. Findings:  1. Normal Ileum  2. There was mild to moderate sigmoid diverticulosis. 3.  An 11mm polyp was identified in the ascending colon and removed completely with cold snare polypectomy down to a clean base confirmed by post-polypectomy photograph. All polyp tissue sent off for pathologic evaluation.   A 2mm polyp was identified in the proximal transverse colon and removed completely with cold biopsy polypectomy down to a clean base confirmed by post-polypectomy photograph. All polyp tissue sent off for pathologic evaluation. The patient tolerated the procedure well and was taken to Recovery in good condition. No complications. EBL: minimal  Specimens taken: yes      Impression:   2 polyps removed  Mild to moderate sigmoid diverticulosis  Small grade 1 internal hemorrhoids    Recommendations:   1. Clear liquid diet, advance as tolerated. 2.  Repeat colonoscopy in 3 years based on polyps today  3. Call for biopsy results in 1 week if she has not heard from me.     Norm Ortiz MD,   [address] [place] [address]  3/25/2022

## 2022-03-25 NOTE — PROGRESS NOTES
Tolerating oral intake and being up in chair. No complaints. Discharge instructions given to patient and daughter. Verbalize understanding.

## 2022-04-01 ENCOUNTER — HOSPITAL ENCOUNTER (OUTPATIENT)
Dept: WOMENS IMAGING | Age: 72
Discharge: HOME | End: 2022-04-01
Payer: MEDICARE

## 2022-04-01 DIAGNOSIS — Z78.0 POSTMENOPAUSAL: ICD-10-CM

## 2022-04-01 PROCEDURE — 77080 DXA BONE DENSITY AXIAL: CPT

## 2022-04-07 PROBLEM — M81.0 AGE-RELATED OSTEOPOROSIS WITHOUT CURRENT PATHOLOGICAL FRACTURE: Status: ACTIVE | Noted: 2022-04-07

## 2022-04-27 ENCOUNTER — OFFICE VISIT (OUTPATIENT)
Dept: ORTHOPEDIC SURGERY | Age: 72
End: 2022-04-27
Payer: MEDICARE

## 2022-04-27 VITALS — HEIGHT: 62 IN | BODY MASS INDEX: 30.36 KG/M2 | WEIGHT: 165 LBS

## 2022-04-27 DIAGNOSIS — S82.852D CLOSED TRIMALLEOLAR FRACTURE OF LEFT ANKLE WITH ROUTINE HEALING, SUBSEQUENT ENCOUNTER: ICD-10-CM

## 2022-04-27 DIAGNOSIS — S93.432D SYNDESMOTIC DISRUPTION OF LEFT ANKLE, SUBSEQUENT ENCOUNTER: Primary | ICD-10-CM

## 2022-04-27 PROCEDURE — 1036F TOBACCO NON-USER: CPT | Performed by: NURSE PRACTITIONER

## 2022-04-27 PROCEDURE — 1123F ACP DISCUSS/DSCN MKR DOCD: CPT | Performed by: NURSE PRACTITIONER

## 2022-04-27 PROCEDURE — G8399 PT W/DXA RESULTS DOCUMENT: HCPCS | Performed by: NURSE PRACTITIONER

## 2022-04-27 PROCEDURE — G8417 CALC BMI ABV UP PARAM F/U: HCPCS | Performed by: NURSE PRACTITIONER

## 2022-04-27 PROCEDURE — 3017F COLORECTAL CA SCREEN DOC REV: CPT | Performed by: NURSE PRACTITIONER

## 2022-04-27 PROCEDURE — 1090F PRES/ABSN URINE INCON ASSESS: CPT | Performed by: NURSE PRACTITIONER

## 2022-04-27 PROCEDURE — 99213 OFFICE O/P EST LOW 20 MIN: CPT | Performed by: NURSE PRACTITIONER

## 2022-04-27 PROCEDURE — G8427 DOCREV CUR MEDS BY ELIG CLIN: HCPCS | Performed by: NURSE PRACTITIONER

## 2022-04-27 PROCEDURE — 4040F PNEUMOC VAC/ADMIN/RCVD: CPT | Performed by: NURSE PRACTITIONER

## 2022-04-29 NOTE — PROGRESS NOTES
DIAGNOSIS:    1-Left ankle trimalleolar fracture dislocation, status post ORIF. 2-Left ankle distal tibiofibular syndesmosis disruption, s/p ORIF syndesmosis screw with screw removal on 1/10/2022    DATE OF SURGERY:  8/2/2021. HISTORY OF PRESENT ILLNESS:  Ms. Alice Scott 67 y.o.  female who came in today for postoperative visit. The patient denies any significant pain in the left ankle. Rates pain a 6/10 VAS moderate, aching, intermittent and are about the same. Aggravating factors increased activities. Alleviating factors elevation and rest. She reports improved ROM after screw was removed. No numbness or tingling sensation. No fever or Chills. Denies smoking.      Past Medical History:   Diagnosis Date    Ankle injury     Arthritis     COVID-19     5/2020    Diverticulitis     Genital herpes     Hyperlipidemia     Premature atrial contraction      Past Surgical History:   Procedure Laterality Date    ANKLE FRACTURE SURGERY Left 08/02/2021    LEFT ANKLE OPEN REDUCTION INTERNAL FIXATION performed by Dionicia Frankel, MD at [address] [facility] Left 1/10/2022    LEFT SYNDESMOSIS SCREW REMOVAL performed by Dionicia Frankel, MD at [address] [facility] Bilateral     implants    CATARACT REMOVAL Bilateral     COLONOSCOPY      COLONOSCOPY N/A 3/25/2022    COLONOSCOPY POLYPECTOMY SNARE/COLD BIOPSY performed by Gamal Epps MD at [facility], ESOPHAGUS      ENDOSCOPY, COLON, DIAGNOSTIC  1978    EYE SURGERY Bilateral     cataract removal    FRACTURE SURGERY Left     ankle    HYSTERECTOMY      JOINT REPLACEMENT Left     hip    PARTIAL HYSTERECTOMY      TONSILLECTOMY      TOTAL HIP ARTHROPLASTY      left    TUMOR EXCISION      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     Family History   Problem Relation Age of Onset    Heart Failure Mother     Cancer Father         lung ca     Social History     Socioeconomic History    Marital status:      Spouse name: Not on file    Number of children: Not on file    Years of education: Not on file    Highest education level: Not on file   Occupational History    Not on file   Tobacco Use    Smoking status: Former Smoker     Packs/day: 0.25     Types: Cigarettes     Quit date: 2000     Years since quittin.3    Smokeless tobacco: Never Used    Tobacco comment: PT STATES ON AND OFF SINCE SHE WAS 25YEARS OF AGE   Vaping Use    Vaping Use: Former    Devices: Disposable   Substance and Sexual Activity    Alcohol use: Not Currently    Drug use: Never    Sexual activity: Not Currently   Other Topics Concern    Not on file   Social History Narrative    Not on file     Social Determinants of Health     Financial Resource Strain: Low Risk     Difficulty of Paying Living Expenses: Not hard at all   Food Insecurity: No Food Insecurity    Worried About [address] [employer] in the Last Year: Never true    [address]  [employer] in the Last Year: Never true   Transportation Needs:     Lack of Transportation (Medical): Not on file    Lack of Transportation (Non-Medical):  Not on file   Physical Activity:     Days of Exercise per Week: Not on file    Minutes of Exercise per Session: Not on file   Stress:     Feeling of Stress : Not on file   Social Connections:     Frequency of Communication with Friends and Family: Not on file    Frequency of Social Gatherings with Friends and Family: Not on file    Attends Roman [employer] Services: Not on file    Active Member of Clubs or Organizations: Not on file    Attends Club or Organization Meetings: Not on file    Marital Status: Not on file   Intimate Partner Violence:     Fear of Current or Ex-Partner: Not on file    Emotionally Abused: Not on file    Physically Abused: Not on file    Sexually Abused: Not on file   Housing Stability:     Unable to Pay for Housing in the Last Year: Not on file    Number of [place] in the Last Year: Not on file    Unstable Housing in the Last Year: Not on file     Current Outpatient Medications   Medication Sig Dispense Refill    aspirin 81 MG EC tablet Take 81 mg by mouth daily      acetaminophen (TYLENOL) 500 MG tablet Take 500 mg by mouth every 6 hours as needed for Pain      atorvastatin (LIPITOR) 20 MG tablet Take 1 tablet by mouth daily 90 tablet 1    valACYclovir (VALTREX) 500 MG tablet Take 1 tablet by mouth as needed (as needed) 30 tablet 0    Multiple Vitamins-Minerals (THERAPEUTIC MULTIVITAMIN-MINERALS) tablet Take 1 tablet by mouth daily      doxyLAMINE succinate (SLEEP AID) 25 MG tablet Take 50 mg by mouth nightly        No current facility-administered medications for this visit. Pertinent items are noted in HPI  Review of systems reviewed from Patient History Form and available in the patient's chart under the Media tab. No change noted. PHYSICAL EXAMINATION:  Ms. Erickson Fritz is a very pleasant 67 y.o.  female who presents today in no acute distress, awake, alert, and oriented. She is well dressed, nourished and  groomed. Patient with normal affect. Height is  5' 2\" (1.575 m), weight is 165 lb (74.8 kg), Body mass index is 30.18 kg/m². Resting respiratory rate is 16. The patient walks with no limp. The incision healing well. No signs of any erythema or drainage, minimal swelling. She has no pain with the active or passive range of motion of the left ankle, but decrease ROM. She has intact sensation distally, and she is neurovascularly intact. Ankle reflex 1+ bilaterally. Good strength, and no instability both upper and lower extremities. She is  nontender to palpation over the syndesmosis. IMAGING:  Three views left ankle taken today in the office showed anatomic alignment of the fracture, plate and screws in good position, no loosening. Ankle mortise is well centered. Syndesmosis screw has been removed.     IMPRESSION:      PLAN: She can go back to normal activity with no restrictions. She was instructed to continue to work on ROM and strengthening exercises. I discussed with the patient that I think that she would really benefit from a course of physical therapy for further strengthening and stretching, but she would like to do this on her own. She will be seen PRN. I told the patient that it is not unusual to have some achy pain and swelling for up to a year after a fracture.         Mirta Lopez, APRN - CNP

## 2022-05-06 ENCOUNTER — TELEMEDICINE (OUTPATIENT)
Dept: INTERNAL MEDICINE CLINIC | Age: 72
End: 2022-05-06
Payer: MEDICARE

## 2022-05-06 DIAGNOSIS — F51.01 PRIMARY INSOMNIA: Primary | ICD-10-CM

## 2022-05-06 PROBLEM — M25.512 LEFT SHOULDER PAIN: Status: RESOLVED | Noted: 2021-12-20 | Resolved: 2022-05-06

## 2022-05-06 PROBLEM — F32.9 REACTIVE DEPRESSION: Status: RESOLVED | Noted: 2021-11-15 | Resolved: 2022-05-06

## 2022-05-06 PROCEDURE — 4040F PNEUMOC VAC/ADMIN/RCVD: CPT | Performed by: INTERNAL MEDICINE

## 2022-05-06 PROCEDURE — G8427 DOCREV CUR MEDS BY ELIG CLIN: HCPCS | Performed by: INTERNAL MEDICINE

## 2022-05-06 PROCEDURE — 1090F PRES/ABSN URINE INCON ASSESS: CPT | Performed by: INTERNAL MEDICINE

## 2022-05-06 PROCEDURE — G8399 PT W/DXA RESULTS DOCUMENT: HCPCS | Performed by: INTERNAL MEDICINE

## 2022-05-06 PROCEDURE — 1123F ACP DISCUSS/DSCN MKR DOCD: CPT | Performed by: INTERNAL MEDICINE

## 2022-05-06 PROCEDURE — 3017F COLORECTAL CA SCREEN DOC REV: CPT | Performed by: INTERNAL MEDICINE

## 2022-05-06 PROCEDURE — 99213 OFFICE O/P EST LOW 20 MIN: CPT | Performed by: INTERNAL MEDICINE

## 2022-05-06 RX ORDER — TRAZODONE HYDROCHLORIDE 50 MG/1
50 TABLET ORAL NIGHTLY PRN
Qty: 30 TABLET | Refills: 5 | Status: SHIPPED | OUTPATIENT
Start: 2022-05-06

## 2022-05-06 NOTE — PROGRESS NOTES
Chief Complaint   Patient presents with    Insomnia       HPI: Virtual visit via [employer] during covid-19 pandemic for insomnia. States has had it for ever, and has tried all OTC agents, and only diphenhydramine has worked for her. However, she became concerned about hearing that it could cause dementia, and wants to explore another option. States melatonin high dose didn't help at all. Son in law takes trazodone and recommended that to her. Otherwise, states she is doing well. Lives in a studio apartment, and watches TV in bed until she falls asleep about 11pm.    Medications reviewed and reconciled with what patient reports to be taking. There were no vitals taken for this visit. Physical Exam well appearing, insightful    ASSESSMENT/PLAN: Pt received counseling and, if relevant, printed instructions for all symptoms listed in CC and HPI, as well as for all diagnoses listed below. Counseled patient on sleep hygiene, particularly reserving her bed for sleep only, which she states is difficult as she needs to elevate her leg and does not own a recliner. Will prescribe the trazodone as she requests. I have advised her she can split it in half to see if that will work for her before taking the full tablet. 1. Primary insomnia  - traZODone (DESYREL) 50 MG tablet; Take 1 tablet by mouth nightly as needed for Sleep  Dispense: 30 tablet; Refill: 5      Problem List Items Addressed This Visit     None      Visit Diagnoses     Primary insomnia    -  Primary    Relevant Medications    traZODone (DESYREL) 50 MG tablet            No follow-ups on file. Elly Freedman, was evaluated through a synchronous (real-time) audio-video encounter. The patient (or guardian if applicable) is aware that this is a billable service, which includes applicable co-pays. This Virtual Visit was conducted with patient's (and/or legal guardian's) consent.  The visit was conducted pursuant to the emergency declaration under the [facility], [facility] waiver authority and the [employer] General Act. Patient identification was verified, and a caregiver was present when appropriate. The patient was located at home in a state where the provider was licensed to provide care. Total time spent for this encounter: Not billed by time    --Sammi Dick MD on 5/6/2022 at 2:26 PM    An electronic signature was used to authenticate this note.

## 2022-06-03 ENCOUNTER — TELEPHONE (OUTPATIENT)
Dept: OTHER | Facility: CLINIC | Age: 72
End: 2022-06-03

## 2022-06-03 NOTE — TELEPHONE ENCOUNTER
Omer Ryan, Was contacted today as part of [facility] to schedule a mammogram.         Left VM for pt to return call to this nurse regarding routine health screening. [employer] message also sent.        [facility], LPN

## 2022-08-03 ENCOUNTER — TELEMEDICINE (OUTPATIENT)
Dept: INTERNAL MEDICINE CLINIC | Age: 72
End: 2022-08-03
Payer: MEDICARE

## 2022-08-03 DIAGNOSIS — R53.83 FATIGUE, UNSPECIFIED TYPE: ICD-10-CM

## 2022-08-03 DIAGNOSIS — A60.04 HERPES SIMPLEX VULVOVAGINITIS: ICD-10-CM

## 2022-08-03 DIAGNOSIS — I10 ESSENTIAL (PRIMARY) HYPERTENSION: ICD-10-CM

## 2022-08-03 DIAGNOSIS — Z80.1 FAMILY HISTORY OF LUNG CANCER: ICD-10-CM

## 2022-08-03 DIAGNOSIS — R05.3 CHRONIC COUGH: Primary | ICD-10-CM

## 2022-08-03 DIAGNOSIS — E78.2 MIXED HYPERLIPIDEMIA: ICD-10-CM

## 2022-08-03 DIAGNOSIS — Z87.891 AGGRESSIVE FORMER SMOKER: ICD-10-CM

## 2022-08-03 PROCEDURE — 1090F PRES/ABSN URINE INCON ASSESS: CPT | Performed by: INTERNAL MEDICINE

## 2022-08-03 PROCEDURE — G8427 DOCREV CUR MEDS BY ELIG CLIN: HCPCS | Performed by: INTERNAL MEDICINE

## 2022-08-03 PROCEDURE — 99214 OFFICE O/P EST MOD 30 MIN: CPT | Performed by: INTERNAL MEDICINE

## 2022-08-03 PROCEDURE — 3017F COLORECTAL CA SCREEN DOC REV: CPT | Performed by: INTERNAL MEDICINE

## 2022-08-03 PROCEDURE — G8399 PT W/DXA RESULTS DOCUMENT: HCPCS | Performed by: INTERNAL MEDICINE

## 2022-08-03 PROCEDURE — 1123F ACP DISCUSS/DSCN MKR DOCD: CPT | Performed by: INTERNAL MEDICINE

## 2022-08-03 RX ORDER — VALACYCLOVIR HYDROCHLORIDE 500 MG/1
500 TABLET, FILM COATED ORAL PRN
Qty: 30 TABLET | Refills: 3 | Status: SHIPPED | OUTPATIENT
Start: 2022-08-03

## 2022-08-03 NOTE — PROGRESS NOTES
Chief Complaint   Patient presents with    Cough    Fatigue       HPI: Virtual visit via [employer] during covid-19 pandemic for evaluation of cough, and fatigue. Asking for lung cancer screening. States has very longstanding, mostly non productive cough, but feels this has gotten worse past few weeks. Negative covid test. No hemoptysis, chest pain ,shortness of breathe, but also continues to feel very fatigued. Although she has quit smoking, she has an extensive > 56  pack year history, as well as family history of lung cancer. Also requests valtrex refill. I have reviewed the chart notes available from myself and other providers. I have reviewed and addressed all active problems and created or updated the problems list in detail, as needed. No problem-specific Assessment & Plan notes found for this encounter.       I have extensively reviewed and reconciled the medication list, discontinued medications not taking or no longer appropriate, and updated the active meds list      Lab Results   Component Value Date    LABMICR Not Indicated 08/03/2021    LABMICR Not Indicated 08/03/2021       Lab Results   Component Value Date     08/04/2022     03/09/2022     09/06/2021    K 4.6 08/04/2022    K 4.7 03/09/2022    K 4.1 09/06/2021     08/04/2022     03/09/2022     09/06/2021    CO2 25 08/04/2022    CO2 25 03/09/2022    CO2 25 09/06/2021    BUN 20 08/04/2022    BUN 23 (H) 03/09/2022    BUN 15 09/06/2021    CREATININE 0.8 08/04/2022    CREATININE 0.8 03/09/2022    CREATININE 0.7 09/06/2021    GLUCOSE 113 (H) 08/04/2022    GLUCOSE 111 (H) 03/09/2022    GLUCOSE 107 (H) 09/06/2021    CALCIUM 9.4 08/04/2022    CALCIUM 10.2 03/09/2022    CALCIUM 9.7 09/06/2021       Lab Results   Component Value Date    CHOL 213 (H) 08/04/2022    CHOL 258 (H) 03/09/2022    TRIG 100 08/04/2022    TRIG 199 (H) 03/09/2022    HDL 60 08/04/2022    HDL 68 (H) 03/09/2022    LDLCALC 133 (H) 08/04/2022    LDLCALC 150 (H) 03/09/2022       Lab Results   Component Value Date    ALT 23 08/04/2022    ALT 18 03/09/2022    AST 23 08/04/2022    AST 20 03/09/2022       No results found for: TSH, T4FREE, T3FREE    Lab Results   Component Value Date    WBC 5.0 08/04/2022    WBC 6.3 03/09/2022    WBC 5.8 09/06/2021    HGB 13.9 08/04/2022    HGB 14.2 03/09/2022    HGB 13.3 09/06/2021    HCT 41.4 08/04/2022    HCT 43.1 03/09/2022    HCT 39.5 09/06/2021    MCV 95.4 08/04/2022    MCV 96.2 03/09/2022    MCV 95.4 09/06/2021     08/04/2022     03/09/2022     09/06/2021       No results found for: INR     No results found for: PSA     No results found for: LABURIC          There were no vitals taken for this visit. There is no height or weight on file to calculate BMI. Physical Exam well appearing, speaking in full sentences, no cough observed    ASSESSMENT AND PLANS:      Except as noted below, all chronic problems have been reviewed and are stable to continue medications or other therapy as previously documented in the patient's chart, with changes per orders or documentation below:        Assessment and Plan: Patient received counseling and, if relevant,printed instructions for all symptoms listed in CC and HPI, as well as for all diagnoses brought onto today's visit note below. Typical counseling includes, but is not limited to, non pharmacologic measures to manage listed symptoms and conditions; appropriate use, risks and benefits for all prescribed medications; potential interactions between medications both prescribed and OTC; diet; exercise; healthy behaviors; and goalsetting to improve health. Pt.or responsible party was involved in shared decision making and had opportunity to have all questions answered. 1. Chronic cough--advised patient that since she has symptoms, low dose screening cT will not be advised. Plan to check CXR first and if negative, will order CT.  Discussed reports to be taking, andreviewed action/ sideeffects and how to take any new medications. Patient/caregiver understands purpose and side effects. A complete  list of medications was provided in their after-visit summary. Return in about 1 month (around 9/3/2022) for f/u mult med extended. Rachel Henry, was evaluated through a synchronous (real-time) audio-video encounter. The patient (or guardian if applicable) is aware that this is a billable service, which includes applicable co-pays. This Virtual Visit was conducted with patient's (and/or legal guardian's) consent. The visit was conducted pursuant to the emergency declaration under the [address] [place] [address], [address] [place] [address] authority and the [employer] General Act. Patient identification was verified, and a caregiver was present when appropriate. The patient was located at Home: [place] [id number]. Provider was located at [facility] (Appt Dept): [address] [facility] Laura Lamas [address]. Total time spent for this encounter: Not billed by time    --Mansi Khan MD on 8/5/2022 at 12:47 PM    An electronic signature was used to authenticate this note.

## 2022-08-04 ENCOUNTER — HOSPITAL ENCOUNTER (OUTPATIENT)
Dept: GENERAL RADIOLOGY | Age: 72
Discharge: HOME | End: 2022-08-04
Payer: MEDICARE

## 2022-08-04 ENCOUNTER — HOSPITAL ENCOUNTER (OUTPATIENT)
Age: 72
Discharge: HOME | End: 2022-08-04
Payer: MEDICARE

## 2022-08-04 ENCOUNTER — TELEPHONE (OUTPATIENT)
Dept: INTERNAL MEDICINE CLINIC | Age: 72
End: 2022-08-04

## 2022-08-04 DIAGNOSIS — Z87.891 AGGRESSIVE FORMER SMOKER: ICD-10-CM

## 2022-08-04 DIAGNOSIS — R05.3 CHRONIC COUGH: ICD-10-CM

## 2022-08-04 DIAGNOSIS — E78.2 MIXED HYPERLIPIDEMIA: ICD-10-CM

## 2022-08-04 DIAGNOSIS — R53.83 FATIGUE, UNSPECIFIED TYPE: ICD-10-CM

## 2022-08-04 DIAGNOSIS — I10 ESSENTIAL (PRIMARY) HYPERTENSION: ICD-10-CM

## 2022-08-04 LAB
A/G RATIO: 2 (ref 1.1–2.2)
ALBUMIN SERPL-MCNC: 4.3 G/DL (ref 3.4–5)
ALP BLD-CCNC: 77 U/L (ref 40–129)
ALT SERPL-CCNC: 23 U/L (ref 10–40)
ANION GAP SERPL CALCULATED.3IONS-SCNC: 11 MMOL/L (ref 3–16)
AST SERPL-CCNC: 23 U/L (ref 15–37)
BASOPHILS ABSOLUTE: 0 K/UL (ref 0–0.2)
BASOPHILS RELATIVE PERCENT: 0.6 %
BILIRUB SERPL-MCNC: 0.6 MG/DL (ref 0–1)
BUN BLDV-MCNC: 20 MG/DL (ref 7–20)
CALCIUM SERPL-MCNC: 9.4 MG/DL (ref 8.3–10.6)
CHLORIDE BLD-SCNC: 108 MMOL/L (ref 99–110)
CHOLESTEROL, TOTAL: 213 MG/DL (ref 0–199)
CO2: 25 MMOL/L (ref 21–32)
CREAT SERPL-MCNC: 0.8 MG/DL (ref 0.6–1.2)
EOSINOPHILS ABSOLUTE: 0.1 K/UL (ref 0–0.6)
EOSINOPHILS RELATIVE PERCENT: 2 %
GFR AFRICAN AMERICAN: >60
GFR NON-AFRICAN AMERICAN: >60
GLUCOSE BLD-MCNC: 113 MG/DL (ref 70–99)
HCT VFR BLD CALC: 41.4 % (ref 36–48)
HDLC SERPL-MCNC: 60 MG/DL (ref 40–60)
HEMOGLOBIN: 13.9 G/DL (ref 12–16)
LDL CHOLESTEROL CALCULATED: 133 MG/DL
LYMPHOCYTES ABSOLUTE: 1.8 K/UL (ref 1–5.1)
LYMPHOCYTES RELATIVE PERCENT: 36.7 %
MCH RBC QN AUTO: 31.9 PG (ref 26–34)
MCHC RBC AUTO-ENTMCNC: 33.5 G/DL (ref 31–36)
MCV RBC AUTO: 95.4 FL (ref 80–100)
MONOCYTES ABSOLUTE: 0.4 K/UL (ref 0–1.3)
MONOCYTES RELATIVE PERCENT: 8 %
NEUTROPHILS ABSOLUTE: 2.6 K/UL (ref 1.7–7.7)
NEUTROPHILS RELATIVE PERCENT: 52.7 %
PDW BLD-RTO: 13 % (ref 12.4–15.4)
PLATELET # BLD: 179 K/UL (ref 135–450)
PLATELET SLIDE REVIEW: ADEQUATE
PMV BLD AUTO: 10.4 FL (ref 5–10.5)
POTASSIUM SERPL-SCNC: 4.6 MMOL/L (ref 3.5–5.1)
RBC # BLD: 4.35 M/UL (ref 4–5.2)
RBC # BLD: NORMAL 10*6/UL
SODIUM BLD-SCNC: 144 MMOL/L (ref 136–145)
TOTAL PROTEIN: 6.5 G/DL (ref 6.4–8.2)
TRIGL SERPL-MCNC: 100 MG/DL (ref 0–150)
TSH REFLEX FT4: 3.27 UIU/ML (ref 0.27–4.2)
VLDLC SERPL CALC-MCNC: 20 MG/DL
WBC # BLD: 5 K/UL (ref 4–11)

## 2022-08-04 PROCEDURE — 71046 X-RAY EXAM CHEST 2 VIEWS: CPT

## 2022-08-05 DIAGNOSIS — R05.3 CHRONIC COUGH: Primary | ICD-10-CM

## 2022-08-05 PROBLEM — E78.2 MIXED HYPERLIPIDEMIA: Status: ACTIVE | Noted: 2022-08-05

## 2022-08-09 ENCOUNTER — HOSPITAL ENCOUNTER (OUTPATIENT)
Dept: CT IMAGING | Age: 72
Discharge: HOME | End: 2022-08-09
Payer: MEDICARE

## 2022-08-09 DIAGNOSIS — R05.3 CHRONIC COUGH: ICD-10-CM

## 2022-08-09 PROCEDURE — 71250 CT THORAX DX C-: CPT

## 2022-09-26 ENCOUNTER — OFFICE VISIT (OUTPATIENT)
Dept: INTERNAL MEDICINE CLINIC | Age: 72
End: 2022-09-26
Payer: MEDICARE

## 2022-09-26 VITALS
OXYGEN SATURATION: 97 % | HEART RATE: 70 BPM | SYSTOLIC BLOOD PRESSURE: 138 MMHG | DIASTOLIC BLOOD PRESSURE: 88 MMHG | WEIGHT: 156.5 LBS | HEIGHT: 62 IN | BODY MASS INDEX: 28.8 KG/M2

## 2022-09-26 DIAGNOSIS — M81.0 AGE-RELATED OSTEOPOROSIS WITHOUT CURRENT PATHOLOGICAL FRACTURE: ICD-10-CM

## 2022-09-26 DIAGNOSIS — I10 ESSENTIAL (PRIMARY) HYPERTENSION: Primary | ICD-10-CM

## 2022-09-26 DIAGNOSIS — E78.2 MIXED HYPERLIPIDEMIA: ICD-10-CM

## 2022-09-26 DIAGNOSIS — F32.1 CURRENT MODERATE EPISODE OF MAJOR DEPRESSIVE DISORDER WITHOUT PRIOR EPISODE (HCC): ICD-10-CM

## 2022-09-26 DIAGNOSIS — I07.1 TRICUSPID VALVE INSUFFICIENCY, UNSPECIFIED ETIOLOGY: ICD-10-CM

## 2022-09-26 PROCEDURE — G8417 CALC BMI ABV UP PARAM F/U: HCPCS | Performed by: INTERNAL MEDICINE

## 2022-09-26 PROCEDURE — G8399 PT W/DXA RESULTS DOCUMENT: HCPCS | Performed by: INTERNAL MEDICINE

## 2022-09-26 PROCEDURE — 3017F COLORECTAL CA SCREEN DOC REV: CPT | Performed by: INTERNAL MEDICINE

## 2022-09-26 PROCEDURE — G8427 DOCREV CUR MEDS BY ELIG CLIN: HCPCS | Performed by: INTERNAL MEDICINE

## 2022-09-26 PROCEDURE — 99214 OFFICE O/P EST MOD 30 MIN: CPT | Performed by: INTERNAL MEDICINE

## 2022-09-26 PROCEDURE — 1123F ACP DISCUSS/DSCN MKR DOCD: CPT | Performed by: INTERNAL MEDICINE

## 2022-09-26 PROCEDURE — 1036F TOBACCO NON-USER: CPT | Performed by: INTERNAL MEDICINE

## 2022-09-26 PROCEDURE — 1090F PRES/ABSN URINE INCON ASSESS: CPT | Performed by: INTERNAL MEDICINE

## 2022-09-26 ASSESSMENT — PATIENT HEALTH QUESTIONNAIRE - PHQ9
SUM OF ALL RESPONSES TO PHQ QUESTIONS 1-9: 0
2. FEELING DOWN, DEPRESSED OR HOPELESS: 0
SUM OF ALL RESPONSES TO PHQ QUESTIONS 1-9: 0
1. LITTLE INTEREST OR PLEASURE IN DOING THINGS: 0
SUM OF ALL RESPONSES TO PHQ QUESTIONS 1-9: 0
SUM OF ALL RESPONSES TO PHQ QUESTIONS 1-9: 0
SUM OF ALL RESPONSES TO PHQ9 QUESTIONS 1 & 2: 0

## 2022-09-26 NOTE — PROGRESS NOTES
Chief Complaint   Patient presents with    Hypertension       HPI: Here for htn followup (though patient requested removal of this diagnosis) and management of multiple chronic conditions as per the active problems list on chart, which I reviewed and updated with the patient today. States doing well with no new concerns except if noted below. Also requested removal of depression dx, states no problems. She is asking how to manage osteoporosis  without rx meds, which she has read about and decided \"they are all terrible\". She will be traveling to [place] to care for her sister for next 4 months. I have reviewed the chart notes available from myself and other providers. I have reviewed and addressed all active problems and created or updated the problems list in detail, as needed. No problem-specific Assessment & Plan notes found for this encounter.       I have extensively reviewed and reconciled the medication list, discontinued medications not taking or no longer appropriate, and updated the active meds list      Lab Results   Component Value Date    LABMICR Not Indicated 08/03/2021    LABMICR Not Indicated 08/03/2021       Lab Results   Component Value Date     08/04/2022     03/09/2022     09/06/2021    K 4.6 08/04/2022    K 4.7 03/09/2022    K 4.1 09/06/2021     08/04/2022     03/09/2022     09/06/2021    CO2 25 08/04/2022    CO2 25 03/09/2022    CO2 25 09/06/2021    BUN 20 08/04/2022    BUN 23 (H) 03/09/2022    BUN 15 09/06/2021    CREATININE 0.8 08/04/2022    CREATININE 0.8 03/09/2022    CREATININE 0.7 09/06/2021    GLUCOSE 113 (H) 08/04/2022    GLUCOSE 111 (H) 03/09/2022    GLUCOSE 107 (H) 09/06/2021    CALCIUM 9.4 08/04/2022    CALCIUM 10.2 03/09/2022    CALCIUM 9.7 09/06/2021       Lab Results   Component Value Date    CHOL 213 (H) 08/04/2022    CHOL 258 (H) 03/09/2022    TRIG 100 08/04/2022    TRIG 199 (H) 03/09/2022    HDL 60 08/04/2022    HDL 68 (H) 03/09/2022    LDLCALC 133 (H) 08/04/2022    LDLCALC 150 (H) 03/09/2022       Lab Results   Component Value Date    ALT 23 08/04/2022    ALT 18 03/09/2022    AST 23 08/04/2022    AST 20 03/09/2022       No results found for: TSH, T4FREE, T3FREE    Lab Results   Component Value Date    WBC 5.0 08/04/2022    WBC 6.3 03/09/2022    WBC 5.8 09/06/2021    HGB 13.9 08/04/2022    HGB 14.2 03/09/2022    HGB 13.3 09/06/2021    HCT 41.4 08/04/2022    HCT 43.1 03/09/2022    HCT 39.5 09/06/2021    MCV 95.4 08/04/2022    MCV 96.2 03/09/2022    MCV 95.4 09/06/2021     08/04/2022     03/09/2022     09/06/2021       No results found for: INR     No results found for: PSA     No results found for: LABURIC          /88   Pulse 70   Ht 5' 2\" (1.575 m)   Wt 156 lb 8 oz (71 kg)   SpO2 97%   BMI 28.62 kg/m²   Body mass index is 28.62 kg/m². Physical Exam  Constitutional:       General: She is not in acute distress. HENT:      Head: Normocephalic and atraumatic. Nose:      Comments: masked  Eyes:      General: No scleral icterus. Right eye: No discharge. Left eye: No discharge. Extraocular Movements: Extraocular movements intact. Conjunctiva/sclera: Conjunctivae normal.      Pupils: Pupils are equal, round, and reactive to light. Neck:      Thyroid: No thyromegaly. Vascular: No JVD. Trachea: No tracheal deviation. Cardiovascular:      Rate and Rhythm: Normal rate and regular rhythm. Heart sounds: Normal heart sounds. No murmur heard. No friction rub. No gallop. Pulmonary:      Effort: Pulmonary effort is normal. No respiratory distress. Breath sounds: Normal breath sounds. No wheezing or rales. Chest:      Chest wall: No tenderness. Abdominal:      General: Bowel sounds are normal. There is no distension. Palpations: Abdomen is soft. There is no mass. Tenderness: There is no abdominal tenderness. There is no guarding or rebound. Musculoskeletal:         General: No tenderness. Normal range of motion. Cervical back: Neck supple. Right lower leg: No edema. Left lower leg: No edema. Lymphadenopathy:      Cervical: No cervical adenopathy. Skin:     General: Skin is warm and dry. Coloration: Skin is not pale. Findings: No erythema or rash. Neurological:      Mental Status: She is alert and oriented to person, place, and time. Mental status is at baseline. Cranial Nerves: No cranial nerve deficit. Motor: No abnormal muscle tone. Coordination: Coordination normal.      Deep Tendon Reflexes: Reflexes are normal and symmetric. Reflexes normal.   Psychiatric:         Mood and Affect: Mood normal.         Behavior: Behavior normal.         Thought Content: Thought content normal.         Judgment: Judgment normal.       ASSESSMENT AND PLANS:      Except as noted below, all chronic problems have been reviewed and are stable to continue medications or other therapy as previously documented in the patient's chart, with changes per orders or documentation below:        Assessment and Plan: Patient received counseling and, if relevant,printed instructions for all symptoms listed in CC and HPI, as well as for all diagnoses brought onto today's visit note below. Typical counseling includes, but is not limited to, non pharmacologic measures to manage listed symptoms and conditions; appropriate use, risks and benefits for all prescribed medications; potential interactions between medications both prescribed and OTC; diet; exercise; healthy behaviors; and goalsetting to improve health. Pt.or responsible party was involved in shared decision making and had opportunity to have all questions answered.         1. Essential (primary) hypertension--remains elevated but patient  requested dx removal; pt states adherent to low salt diet, gave DASH insturctions, will maintain on active problems list    2. Age-related osteoporosis without current pathological fracture--recommend rx meds (pt declines) + dietary calcium + vit D 1000 IU daily. Can check levels if patient requests. 3. Mixed hyperlipidemia--didn't tolerate statin, will continue low fat diet, plan recheck next year    4. Tricuspid valve insufficiency, unspecified etiology--pt reports cardiology will follow with annual echo    5. Current moderate episode of major depressive disorder without prior episode ([facility])--resolved, removed from problems list          Problem List       Mixed hyperlipidemia    Relevant Medications    aspirin 81 MG EC tablet    Essential (primary) hypertension - Primary    Severe tricuspid regurgitation    Relevant Medications    aspirin 81 MG EC tablet    Age-related osteoporosis without current pathological fracture    RESOLVED: Current moderate episode of major depressive disorder without prior episode ([facility] [address]    Relevant Medications    traZODone (DESYREL) 50 MG tablet     No orders of the defined types were placed in this encounter. I have reconciled the medications in chart with what patient reports to be taking, andreviewed action/ sideeffects and how to take any new medications. Patient/caregiver understands purpose and side effects. A complete  list of medications was provided in their after-visit summary. Return in about 2 months (around 11/26/2022) for AWV, bp recheck. Time basedbilling: I spent over 30 minutes with this patient, and as is the nature of primary care and typical for my extended visits, over 50 percent of this visit was spent on counseling and coordination ofcare.

## 2022-10-18 ENCOUNTER — TELEPHONE (OUTPATIENT)
Dept: INTERNAL MEDICINE CLINIC | Age: 72
End: 2022-10-18

## 2022-11-17 DIAGNOSIS — F51.01 PRIMARY INSOMNIA: ICD-10-CM

## 2022-11-17 RX ORDER — TRAZODONE HYDROCHLORIDE 50 MG/1
50 TABLET ORAL NIGHTLY PRN
Qty: 30 TABLET | Refills: 5 | Status: SHIPPED | OUTPATIENT
Start: 2022-11-17

## 2022-11-17 NOTE — TELEPHONE ENCOUNTER
Requested Prescriptions     Pending Prescriptions Disp Refills    traZODone (DESYREL) 50 MG tablet 30 tablet 5     Sig: Take 1 tablet by mouth nightly as needed for Sleep   Patient requesting a medication refill.   [facility]  Next office visit: Visit date not found  Last regular office visit: 9/26/2022

## 2022-12-09 DIAGNOSIS — F51.01 PRIMARY INSOMNIA: ICD-10-CM

## 2022-12-09 RX ORDER — TRAZODONE HYDROCHLORIDE 50 MG/1
50 TABLET ORAL NIGHTLY PRN
Qty: 30 TABLET | Refills: 5 | Status: SHIPPED | OUTPATIENT
Start: 2022-12-09

## 2022-12-09 NOTE — TELEPHONE ENCOUNTER
Requested Prescriptions     Pending Prescriptions Disp Refills    traZODone (DESYREL) 50 MG tablet 30 tablet 5     Sig: Take 1 tablet by mouth nightly as needed for Sleep   Patient requesting a medication refill.   [facility]  Next office visit: Visit date not found  Last regular office visit: 9/26/2022

## 2023-01-09 DIAGNOSIS — F51.01 PRIMARY INSOMNIA: ICD-10-CM

## 2023-01-10 RX ORDER — TRAZODONE HYDROCHLORIDE 50 MG/1
50 TABLET ORAL NIGHTLY PRN
Qty: 90 TABLET | Refills: 3 | Status: SHIPPED | OUTPATIENT
Start: 2023-01-10

## 2023-01-10 NOTE — TELEPHONE ENCOUNTER
Requested Prescriptions     Pending Prescriptions Disp Refills    traZODone (DESYREL) 50 MG tablet 30 tablet 5     Sig: Take 1 tablet by mouth nightly as needed for Sleep     Patient requesting a medication refill.   Requesting more than a 1 month supply    Next office visit: Visit date not found    Last regular office visit: 9/26/2022

## 2023-03-08 ENCOUNTER — TELEPHONE (OUTPATIENT)
Dept: INTERNAL MEDICINE CLINIC | Age: 73
End: 2023-03-08

## 2023-03-08 ENCOUNTER — OFFICE VISIT (OUTPATIENT)
Dept: INTERNAL MEDICINE CLINIC | Age: 73
End: 2023-03-08
Payer: MEDICARE

## 2023-03-08 VITALS
WEIGHT: 152 LBS | BODY MASS INDEX: 27.97 KG/M2 | DIASTOLIC BLOOD PRESSURE: 83 MMHG | HEART RATE: 86 BPM | HEIGHT: 62 IN | SYSTOLIC BLOOD PRESSURE: 136 MMHG | OXYGEN SATURATION: 98 %

## 2023-03-08 DIAGNOSIS — H02.403 DROOPY EYELID, BILATERAL: ICD-10-CM

## 2023-03-08 DIAGNOSIS — M25.561 ANTERIOR KNEE PAIN, RIGHT: Primary | ICD-10-CM

## 2023-03-08 DIAGNOSIS — Z00.00 MEDICARE ANNUAL WELLNESS VISIT, SUBSEQUENT: Primary | ICD-10-CM

## 2023-03-08 DIAGNOSIS — Z12.31 ENCOUNTER FOR SCREENING MAMMOGRAM FOR MALIGNANT NEOPLASM OF BREAST: ICD-10-CM

## 2023-03-08 DIAGNOSIS — M79.604 RIGHT LEG PAIN: ICD-10-CM

## 2023-03-08 DIAGNOSIS — M25.469 KNEE SWELLING: ICD-10-CM

## 2023-03-08 DIAGNOSIS — I10 ESSENTIAL (PRIMARY) HYPERTENSION: ICD-10-CM

## 2023-03-08 LAB
BASOPHILS ABSOLUTE: 0 K/UL (ref 0–0.2)
BASOPHILS RELATIVE PERCENT: 0.3 %
D DIMER: 0.46 UG/ML FEU (ref 0–0.6)
EOSINOPHILS ABSOLUTE: 0.1 K/UL (ref 0–0.6)
EOSINOPHILS RELATIVE PERCENT: 1 %
HCT VFR BLD CALC: 40.6 % (ref 36–48)
HEMOGLOBIN: 13.4 G/DL (ref 12–16)
LYMPHOCYTES ABSOLUTE: 1.8 K/UL (ref 1–5.1)
LYMPHOCYTES RELATIVE PERCENT: 21.1 %
MCH RBC QN AUTO: 31.3 PG (ref 26–34)
MCHC RBC AUTO-ENTMCNC: 33 G/DL (ref 31–36)
MCV RBC AUTO: 94.7 FL (ref 80–100)
MONOCYTES ABSOLUTE: 0.8 K/UL (ref 0–1.3)
MONOCYTES RELATIVE PERCENT: 8.6 %
NEUTROPHILS ABSOLUTE: 6.1 K/UL (ref 1.7–7.7)
NEUTROPHILS RELATIVE PERCENT: 69 %
PDW BLD-RTO: 13.1 % (ref 12.4–15.4)
PLATELET # BLD: 170 K/UL (ref 135–450)
PMV BLD AUTO: 10.9 FL (ref 5–10.5)
RBC # BLD: 4.29 M/UL (ref 4–5.2)
WBC # BLD: 8.8 K/UL (ref 4–11)

## 2023-03-08 PROCEDURE — 1123F ACP DISCUSS/DSCN MKR DOCD: CPT | Performed by: INTERNAL MEDICINE

## 2023-03-08 PROCEDURE — 3017F COLORECTAL CA SCREEN DOC REV: CPT | Performed by: INTERNAL MEDICINE

## 2023-03-08 PROCEDURE — G8484 FLU IMMUNIZE NO ADMIN: HCPCS | Performed by: INTERNAL MEDICINE

## 2023-03-08 PROCEDURE — G0439 PPPS, SUBSEQ VISIT: HCPCS | Performed by: INTERNAL MEDICINE

## 2023-03-08 RX ORDER — PYRAZINAMIDE 500 MG/1
1 TABLET ORAL NIGHTLY PRN
Qty: 10 TABLET | Refills: 0 | Status: SHIPPED | OUTPATIENT
Start: 2023-03-08 | End: 2023-03-18

## 2023-03-08 SDOH — ECONOMIC STABILITY: HOUSING INSECURITY
IN THE LAST 12 MONTHS, WAS THERE A TIME WHEN YOU DID NOT HAVE A STEADY PLACE TO SLEEP OR SLEPT IN A SHELTER (INCLUDING NOW)?: NO

## 2023-03-08 SDOH — ECONOMIC STABILITY: FOOD INSECURITY: WITHIN THE PAST 12 MONTHS, YOU WORRIED THAT YOUR FOOD WOULD RUN OUT BEFORE YOU GOT MONEY TO BUY MORE.: NEVER TRUE

## 2023-03-08 SDOH — ECONOMIC STABILITY: FOOD INSECURITY: WITHIN THE PAST 12 MONTHS, THE FOOD YOU BOUGHT JUST DIDN'T LAST AND YOU DIDN'T HAVE MONEY TO GET MORE.: NEVER TRUE

## 2023-03-08 SDOH — ECONOMIC STABILITY: INCOME INSECURITY: HOW HARD IS IT FOR YOU TO PAY FOR THE VERY BASICS LIKE FOOD, HOUSING, MEDICAL CARE, AND HEATING?: NOT HARD AT ALL

## 2023-03-08 ASSESSMENT — PATIENT HEALTH QUESTIONNAIRE - PHQ9
SUM OF ALL RESPONSES TO PHQ QUESTIONS 1-9: 0
7. TROUBLE CONCENTRATING ON THINGS, SUCH AS READING THE NEWSPAPER OR WATCHING TELEVISION: 0
SUM OF ALL RESPONSES TO PHQ QUESTIONS 1-9: 0
1. LITTLE INTEREST OR PLEASURE IN DOING THINGS: 0
SUM OF ALL RESPONSES TO PHQ QUESTIONS 1-9: 0
8. MOVING OR SPEAKING SO SLOWLY THAT OTHER PEOPLE COULD HAVE NOTICED. OR THE OPPOSITE, BEING SO FIGETY OR RESTLESS THAT YOU HAVE BEEN MOVING AROUND A LOT MORE THAN USUAL: 0
6. FEELING BAD ABOUT YOURSELF - OR THAT YOU ARE A FAILURE OR HAVE LET YOURSELF OR YOUR FAMILY DOWN: 0
2. FEELING DOWN, DEPRESSED OR HOPELESS: 0
SUM OF ALL RESPONSES TO PHQ9 QUESTIONS 1 & 2: 0
5. POOR APPETITE OR OVEREATING: 0
9. THOUGHTS THAT YOU WOULD BE BETTER OFF DEAD, OR OF HURTING YOURSELF: 0
10. IF YOU CHECKED OFF ANY PROBLEMS, HOW DIFFICULT HAVE THESE PROBLEMS MADE IT FOR YOU TO DO YOUR WORK, TAKE CARE OF THINGS AT HOME, OR GET ALONG WITH OTHER PEOPLE: 0
3. TROUBLE FALLING OR STAYING ASLEEP: 0
SUM OF ALL RESPONSES TO PHQ QUESTIONS 1-9: 0
4. FEELING TIRED OR HAVING LITTLE ENERGY: 0

## 2023-03-08 ASSESSMENT — LIFESTYLE VARIABLES
HOW MANY STANDARD DRINKS CONTAINING ALCOHOL DO YOU HAVE ON A TYPICAL DAY: PATIENT DOES NOT DRINK
HOW OFTEN DO YOU HAVE A DRINK CONTAINING ALCOHOL: NEVER

## 2023-03-08 NOTE — PATIENT INSTRUCTIONS
Personalized Preventive Plan for Chad Gay - 3/8/2023  Medicare offers a range of preventive health benefits. Some of the tests and screenings are paid in full while other may be subject to a deductible, co-insurance, and/or copay. Some of these benefits include a comprehensive review of your medical history including lifestyle, illnesses that may run in your family, and various assessments and screenings as appropriate. After reviewing your medical record and screening and assessments performed today your provider may have ordered immunizations, labs, imaging, and/or referrals for you. A list of these orders (if applicable) as well as your Preventive Care list are included within your After Visit Summary for your review. Other Preventive Recommendations:    A preventive eye exam performed by an eye specialist is recommended every 1-2 years to screen for glaucoma; cataracts, macular degeneration, and other eye disorders. A preventive dental visit is recommended every 6 months. Try to get at least 150 minutes of exercise per week or 10,000 steps per day on a pedometer . Order or download the FREE \"Exercise & Physical Activity: Your Everyday Guide\" from [employer]. Call 4-256.565.3835 or search [employer] online. You need 4897-4664 mg of calcium and 0185-6490 IU of vitamin D per day. It is possible to meet your calcium requirement with diet alone, but a vitamin D supplement is usually necessary to meet this goal.  When exposed to the sun, use a sunscreen that protects against both UVA and UVB radiation with an SPF of 30 or greater. Reapply every 2 to 3 hours or after sweating, drying off with a towel, or swimming. Always wear a seat belt when traveling in a car. Always wear a helmet when riding a bicycle or motorcycle.

## 2023-03-08 NOTE — PROGRESS NOTES
[facility] Annual Wellness Visit    Rhonda George is here for  scheduled E and M visit, but noted overdue for AWV so added on with LPN. Assessment & Plan  AWV   Recommendations for Preventive Services Due: see orders and patient instructions/AVS.  Recommended screening schedule for the next 5-10 years is provided to the patient in written form: see Patient Instructions/AVS.     No follow-ups on file. Subjective       Patient's complete Health Risk Assessment and screening values have been reviewed and are found in Flowsheets. The following problems were reviewed today and where indicated follow up appointments were made and/or referrals ordered. Positive Risk Factor Screenings with Interventions:               General HRA Questions:  Select all that apply: (!) New or Increased Pain    Pain Interventions:  Patient has visit scheduled today with physician to evaluate. Objective   There were no vitals filed for this visit. There is no height or weight on file to calculate BMI. Allergies   Allergen Reactions    Corticosteroids Hives     Both injectable steroids as well as by mouth steroids caused her to break out in hives. Lipitor [Atorvastatin]      weakness    Morphine      States not allergic, states it makes her nauseated    Cortizone-10 [Hydrocortisone] Rash    Phenergan [Promethazine] Rash    Sulfa Antibiotics Rash     Prior to Visit Medications    Medication Sig Taking?  Authorizing Provider   traZODone (DESYREL) 50 MG tablet Take 1 tablet by mouth nightly as needed for Sleep  Kina Dowell MD   valACYclovir (VALTREX) 500 MG tablet Take 1 tablet by mouth as needed (as needed)  Kina Dowell MD   aspirin 81 MG EC tablet Take 81 mg by mouth daily  Historical Provider, MD   Multiple Vitamins-Minerals (THERAPEUTIC MULTIVITAMIN-MINERALS) tablet Take 1 tablet by mouth daily  Historical Provider, MD Mendez (Including outside providers/suppliers regularly involved in providing care):   Patient Care Team:  Robert Martinez MD as PCP - General (Internal Medicine)  Robert Martinez MD as PCP - Empaneled Provider     Reviewed and updated this visit:         Leah Patino LPN, [birth year], performed the documented evaluation under the direct supervision of the attending physician. Marisa Gerard LPN   This encounter was performed under my, Radha Cardoza, direct supervision, 3/8/2023.

## 2023-03-08 NOTE — PROGRESS NOTES
Chief Complaint   Patient presents with    Knee Pain       HPI: Same day visit for left knee pain worst anteriorly but also with posterior swelling and increased with knee flexion. She has been having this for several weeks and it started after a 3-day drive to [place], got worse when she did a lot of moving activities for her sister, and persisted through the 6-day drive back from [place] and now several weeks. Taking diclofenac and ibuprofen. States would like to have Tylenol 3 which has worked better for her than Norco in the past.  Denies falls or other injury. Denies calf pain or swelling. Tried wearing a knee brace but it was too uncomfortable. Ice is helpful and she states the anterior swelling has gone down with it. Also c/o eyelids drooping and interfering with vision, requests referral    Medications reviewed and reconciled with what patient reports to be taking. /83   Pulse 86   Ht 5' 2\" (1.575 m)   Wt 152 lb (68.9 kg)   SpO2 98%   BMI 27.80 kg/m²     Physical Exam walking with slight limp  No overt assymetry of legs but right knee is swollen   Palpable effusion above patella, without redness warmth,'and patient indicates shooting pain from right knee at perimeter of patella 2:00 position  No cords. Negative Hohmans. Right calf 14.75 inches circ, left 14.5 inches  Tender at right popliteal fossa diffusely    Bilateral upper eyelids redundant and droopy      ASSESSMENT/PLAN: Pt received counseling and, if relevant, printed instructions for all symptoms listed in CC and HPI, as well as for all diagnoses listed below. History high risk for DVT, but exam and sx more consistent with patellar tendinitis + Bakers cyst.    1. Anterior knee pain, right  - [place], [place], Orthopedic Surgery, [place]  - acetaminophen-codeine (TYLENOL/CODEINE #3) 300-30 MG per tablet; Take 1 tablet by mouth nightly as needed for Pain for up to 10 days. Intended supply: 3 days.  Take lowest dose possible to manage pain Max Daily Amount: 1 tablet  Dispense: 10 tablet; Refill: 0    2. Knee swelling  - Solitario Fisher PA, Orthopedic Surgery, [facility]    3. Right leg pain--OARRS checked  - D-Dimer, Quantitative; Future  - CBC with Auto Differential; Future  - VL Extremity Venous Right; Future  - Solitario Fisher PA, Orthopedic Surgery, [facility]  - acetaminophen-codeine (TYLENOL/CODEINE #3) 300-30 MG per tablet; Take 1 tablet by mouth nightly as needed for Pain for up to 10 days. Intended supply: 3 days. Take lowest dose possible to manage pain Max Daily Amount: 1 tablet  Dispense: 10 tablet; Refill: 0    4. Essential (primary) hypertension--initially elevated today    5. Droopy eyelid, bilateral  - Calixto Stratton MD, Ophthalmology, [facility]    6. Encounter for screening mammogram for malignant neoplasm of breast  - [facility] DIGITAL SCREEN W OR WO CAD BILATERAL; Future    Problem List Items Addressed This Visit       Essential (primary) hypertension     Other Visit Diagnoses       Anterior knee pain, right    -  Primary    Relevant Medications    acetaminophen-codeine (TYLENOL/CODEINE #3) 300-30 MG per tablet    Other Relevant Orders    Solitario Fisher PA, Orthopedic Surgery, [facility]    Knee swelling        Relevant Orders    Solitario Fisher PA, Orthopedic Surgery, [facility]    Right leg pain        Relevant Medications    acetaminophen-codeine (TYLENOL/CODEINE #3) 300-30 MG per tablet    Other Relevant Orders    D-Dimer, Quantitative    CBC with Auto Differential    VL Extremity Venous Right    Solitario Fisher PA, Orthopedic Surgery, [facility]    Droopy eyelid, bilateral        Relevant Orders    Calixto Stratton MD, Ophthalmology, [facility]    Encounter for screening mammogram for malignant neoplasm of breast        Relevant Orders    [facility] DIGITAL SCREEN W OR WO CAD BILATERAL              No follow-ups  on file.

## 2023-03-08 NOTE — TELEPHONE ENCOUNTER
Received the call and spoke to patient hurt her knee while in [place] visiting her sister she has left sided knee pain of her right knee. I scheduled her for a same day visit at 2:30 today.

## 2023-03-14 SDOH — HEALTH STABILITY: PHYSICAL HEALTH: ON AVERAGE, HOW MANY DAYS PER WEEK DO YOU ENGAGE IN MODERATE TO STRENUOUS EXERCISE (LIKE A BRISK WALK)?: 3 DAYS

## 2023-03-14 SDOH — HEALTH STABILITY: PHYSICAL HEALTH: ON AVERAGE, HOW MANY MINUTES DO YOU ENGAGE IN EXERCISE AT THIS LEVEL?: 20 MIN

## 2023-03-15 ENCOUNTER — OFFICE VISIT (OUTPATIENT)
Dept: ORTHOPEDIC SURGERY | Age: 73
End: 2023-03-15
Payer: MEDICARE

## 2023-03-15 VITALS — HEIGHT: 62 IN | BODY MASS INDEX: 27.42 KG/M2 | WEIGHT: 149 LBS

## 2023-03-15 DIAGNOSIS — M25.561 RIGHT KNEE PAIN, UNSPECIFIED CHRONICITY: Primary | ICD-10-CM

## 2023-03-15 PROCEDURE — 1036F TOBACCO NON-USER: CPT | Performed by: PHYSICIAN ASSISTANT

## 2023-03-15 PROCEDURE — G8427 DOCREV CUR MEDS BY ELIG CLIN: HCPCS | Performed by: PHYSICIAN ASSISTANT

## 2023-03-15 PROCEDURE — 1090F PRES/ABSN URINE INCON ASSESS: CPT | Performed by: PHYSICIAN ASSISTANT

## 2023-03-15 PROCEDURE — G8399 PT W/DXA RESULTS DOCUMENT: HCPCS | Performed by: PHYSICIAN ASSISTANT

## 2023-03-15 PROCEDURE — 3017F COLORECTAL CA SCREEN DOC REV: CPT | Performed by: PHYSICIAN ASSISTANT

## 2023-03-15 PROCEDURE — G8484 FLU IMMUNIZE NO ADMIN: HCPCS | Performed by: PHYSICIAN ASSISTANT

## 2023-03-15 PROCEDURE — 1123F ACP DISCUSS/DSCN MKR DOCD: CPT | Performed by: PHYSICIAN ASSISTANT

## 2023-03-15 PROCEDURE — G8417 CALC BMI ABV UP PARAM F/U: HCPCS | Performed by: PHYSICIAN ASSISTANT

## 2023-03-15 PROCEDURE — 99213 OFFICE O/P EST LOW 20 MIN: CPT | Performed by: PHYSICIAN ASSISTANT

## 2023-03-15 NOTE — PROGRESS NOTES
Subjective:      Patient ID: Kera Owens is a 67 y.o.  female. HPI:   She is here for an initial evaluation of right medial knee pain. Onset of symptoms several years. These symptoms have been progressive in nature. Symptoms became much more severe when she had to drive to [place] for an emergency. There is not a history of injury. Pain has become constant. Location of pain medial knee. Pain is on average 8/10. Pain is worse with bending or twisting the knee. Pain improves with rest, elevation and ice. There is not associated numbness/ tingling. Previous treatments have included: Tylenol. Review of Systems:  I have reviewed the clinically relevant past medical history, medications, allergies, family history, social history, and 13 point Review of Systems from the patient's recent history form & documented any details relevant to today's presenting complaints in the history above. The patient's self-reported past medical history, medications, allergies, family history, social history, and Review of Systems form from today's date have been scanned into the chart under the \"Media\" tab.       Past Medical History:   Diagnosis Date    Ankle injury     Arthritis     COVID-19     5/2020    Diverticulitis     Genital herpes     Hyperlipidemia     Premature atrial contraction        Family History   Problem Relation Age of Onset    Heart Failure Mother     Cancer Father         lung ca       Past Surgical History:   Procedure Laterality Date    ANKLE FRACTURE SURGERY Left 08/02/2021    LEFT ANKLE OPEN REDUCTION INTERNAL FIXATION performed by Iraida Jason MD at [facility] Left 1/10/2022    LEFT SYNDESMOSIS SCREW REMOVAL performed by Iraida Jason MD at [address] [place] [address] Bilateral     implants    CATARACT REMOVAL Bilateral     COLONOSCOPY      COLONOSCOPY N/A 3/25/2022    COLONOSCOPY POLYPECTOMY SNARE/COLD BIOPSY performed by Mike Paz MD at [facility], ESOPHAGUS      ENDOSCOPY, COLON, DIAGNOSTIC      EYE SURGERY Bilateral     cataract removal    FRACTURE SURGERY Left     ankle    HYSTERECTOMY (CERVIX STATUS UNKNOWN)      JOINT REPLACEMENT Left     hip    PARTIAL HYSTERECTOMY (CERVIX NOT REMOVED)      TONSILLECTOMY      TOTAL HIP ARTHROPLASTY      left    TUMOR EXCISION      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       Social History     Occupational History    Not on file   Tobacco Use    Smoking status: Former     Packs/day: 0.25     Types: Cigarettes     Quit date:      Years since quittin.2    Smokeless tobacco: Never    Tobacco comments:     PT STATES ON AND OFF SINCE SHE WAS 25YEARS OF AGE   Vaping Use    Vaping Use: Former    Devices: Disposable   Substance and Sexual Activity    Alcohol use: Not Currently    Drug use: Never    Sexual activity: Not Currently       Current Outpatient Medications   Medication Sig Dispense Refill    acetaminophen-codeine (TYLENOL/CODEINE #3) 300-30 MG per tablet Take 1 tablet by mouth nightly as needed for Pain for up to 10 days. Intended supply: 3 days. Take lowest dose possible to manage pain Max Daily Amount: 1 tablet 10 tablet 0    traZODone (DESYREL) 50 MG tablet Take 1 tablet by mouth nightly as needed for Sleep 90 tablet 3    valACYclovir (VALTREX) 500 MG tablet Take 1 tablet by mouth as needed (as needed) 30 tablet 3    aspirin 81 MG EC tablet Take 81 mg by mouth daily      Multiple Vitamins-Minerals (THERAPEUTIC MULTIVITAMIN-MINERALS) tablet Take 1 tablet by mouth daily       No current facility-administered medications for this visit. Objective:     She is alert, oriented x 3, pleasant, well nourished, developed and in no acute distress. Ht 5' 2\" (1.575 m)   Wt 149 lb (67.6 kg)   BMI 27.25 kg/m²      Examination of the right knee: Inspection of the skin reveals no unusual erythema. Knee alignment is neutral.  Gait- Antalgic.   There is mild intra-articular effusion. ROM: Extension- 3 . Flexion- 125. There is moderate pain associated with ROM testing. Medial joint . Lateral joint line  non-tender. Pes anserine bursa non-tender. Patellar tendon non-tender. Quadriceps tendon non-tender. Collateral ligaments non-tender. Popliteal fossa tender. Varus Stress testing negative. Valgus Stress testing negative. Lachman's test negative. Anterior Drawer test negative. Posterior Drawer test negative. Juliana's Test positive. Patellar Compression testing positive. Extensor Mechanism is intact. Lower extremities:  5/5 motor strength of bilateral lower extremities. Negative straight leg raise, bilaterally. Reflexes at knees and achilles are 2+. Sensation is intact to light touch L3 to S1 bilaterally. There is no clonus. Examination of the lower extremities shows intact perfusion to both lower extremities. No cyanosis and digigts are warm to touch, capillary refill is less than 2 seconds. There is no edema noted. Intact skin without lacerations or abrasions, no significant erythema, rashes or skin lesions. X Rays: performed in the office today:   AP and PA standing, lateral and sunrise views of right knee:   No acute fractures or dislocations noted. Knee x-rays demonstrate minimal PF osteoarthritis lateral facet. Diagnosis:       ICD-10-CM    1. Right knee pain, unspecified chronicity  M25.561 XR KNEE RIGHT (MIN 4 VIEWS)     MRI KNEE RIGHT WO CONTRAST           Assessment/ Plan:       Assessment:  Right knee pain which has been present for several years and has progressively worsened. Pain is now constant. Exam consistent with probable medial meniscus tear. She has minimal patellofemoral arthritis over the lateral patellar facet. Medial compartment does not show increased sclerosis or joint space narrowing.     I had an extensive discussion with MsNicole Adrianna De Leon and/or family regarding the natural history, etiology, and long term consequences of her condition. I have presented reasonable alternatives to the patient's proposed care, treatment, and services. Risks and benefits of the treatment options also reviewed in detail. I have outlined a treatment plan with them. She has had full opportunity to ask her questions. I have answered them all to her satisfaction. I feel that Ms. Aakash Wade understands our discussion today. Plan:  Medications-   Rest, Ice, Compression and Elevation  Activity restriction/ Modification discussed. OTC NSAIDS discussed. The most common side effects from NSAIDs are stomachaches, heartburn, and nausea. NSAIDs may irritate the stomach lining. If the medicine upsets your stomach, you can try taking it with food. But if that doesn't help, talk with your doctor to make sure it's not a more serious problem, such as a stomach ulcer or bleeding in the stomach or intestines. Using NSAIDs may:  Lead to high blood pressure. Make symptoms of heart failure worse. Raise the risk of heart attack, stroke, kidney damage, and skin reactions. Your risks are greater if you take NSAIDs at higher doses or for longer than the label says. People who are older than 72 or who have heart, stomach, or intestinal disease have a higher risk for problems. PT-   A home exercise program was instructed today including ROM exercises and strengthening exercises. The patient verbalized understanding of these exercises as well as the importance of the exercise program to promote return of normal function. If pain intensifies or other problems arise you are to notify the office. Further Imaging-MRI will be obtained of the right knee to evaluate for significant medial meniscus tear. Procedures-she is allergic to steroids therefore no intra-articular steroid provided today. Follow up-after MRI to review test results.   Call or return to clinic if these symptoms worsen or fail to improve as anticipated. Isa Miller PA-C   Senior Physician Assistant   [facility]                                         Disclaimer: This note was generated with use of a verbal recognition program (DRAGON) and an attempt was made to check for errors. It is possible that there are still dictated errors within this office note. If so, please bring any significant errors to my attention for an addendum. All efforts were made to ensure that this office note is accurate.

## 2023-03-23 ENCOUNTER — HOSPITAL ENCOUNTER (OUTPATIENT)
Dept: MRI IMAGING | Age: 73
Discharge: HOME | End: 2023-03-23
Payer: MEDICARE

## 2023-03-23 DIAGNOSIS — M25.561 RIGHT KNEE PAIN, UNSPECIFIED CHRONICITY: ICD-10-CM

## 2023-03-23 PROCEDURE — 73721 MRI JNT OF LWR EXTRE W/O DYE: CPT

## 2023-03-28 ENCOUNTER — OFFICE VISIT (OUTPATIENT)
Dept: ORTHOPEDIC SURGERY | Age: 73
End: 2023-03-28
Payer: MEDICARE

## 2023-03-28 VITALS — WEIGHT: 149 LBS | RESPIRATION RATE: 18 BRPM | BODY MASS INDEX: 27.42 KG/M2 | HEIGHT: 62 IN

## 2023-03-28 DIAGNOSIS — S83.241A ACUTE MEDIAL MENISCAL TEAR, RIGHT, INITIAL ENCOUNTER: Primary | ICD-10-CM

## 2023-03-28 PROCEDURE — G8417 CALC BMI ABV UP PARAM F/U: HCPCS | Performed by: PHYSICIAN ASSISTANT

## 2023-03-28 PROCEDURE — 99213 OFFICE O/P EST LOW 20 MIN: CPT | Performed by: PHYSICIAN ASSISTANT

## 2023-03-28 PROCEDURE — 3017F COLORECTAL CA SCREEN DOC REV: CPT | Performed by: PHYSICIAN ASSISTANT

## 2023-03-28 PROCEDURE — G8427 DOCREV CUR MEDS BY ELIG CLIN: HCPCS | Performed by: PHYSICIAN ASSISTANT

## 2023-03-28 PROCEDURE — G8484 FLU IMMUNIZE NO ADMIN: HCPCS | Performed by: PHYSICIAN ASSISTANT

## 2023-03-28 PROCEDURE — 1036F TOBACCO NON-USER: CPT | Performed by: PHYSICIAN ASSISTANT

## 2023-03-28 PROCEDURE — G8399 PT W/DXA RESULTS DOCUMENT: HCPCS | Performed by: PHYSICIAN ASSISTANT

## 2023-03-28 PROCEDURE — 1090F PRES/ABSN URINE INCON ASSESS: CPT | Performed by: PHYSICIAN ASSISTANT

## 2023-03-28 PROCEDURE — 1123F ACP DISCUSS/DSCN MKR DOCD: CPT | Performed by: PHYSICIAN ASSISTANT

## 2023-03-28 RX ORDER — PYRAZINAMIDE 500 MG/1
1 TABLET ORAL EVERY 8 HOURS PRN
Qty: 21 TABLET | Refills: 0 | Status: SHIPPED | OUTPATIENT
Start: 2023-03-28 | End: 2023-04-04

## 2023-03-28 NOTE — PROGRESS NOTES
Subjective:      Patient ID: Lois Moreno is a 67 y.o. female who is here for follow up evaluation of right medial knee pain. She recently underwent MRI to evaluate for MMT. She is here today to discuss results. Dr Viral Hoang had prescribed Tylenol #3 for pain. This is helping her at night to sleep. She states she is allergic to steroids. She had a significant reaction after a previous left shoulder steroid injection. 3/15/2022 OV:  She is here for an initial evaluation of right medial knee pain. Onset of symptoms several years. These symptoms have been progressive in nature. Symptoms became much more severe when she had to drive to [place] for an emergency. There is not a history of injury. Pain has become constant. Location of pain medial knee. Pain is on average 8/10. Pain is worse with bending or twisting the knee. Pain improves with rest, elevation and ice. There is not associated numbness/ tingling. Review Of Systems:   Negative for fever or chills. Negative for numbness or tingling in her right lower extremity.      Past Medical History:   Diagnosis Date    Ankle injury     Arthritis     COVID-19     5/2020    Diverticulitis     Genital herpes     Hyperlipidemia     Premature atrial contraction        Family History   Problem Relation Age of Onset    Heart Failure Mother     Cancer Father         lung ca       Past Surgical History:   Procedure Laterality Date    ANKLE FRACTURE SURGERY Left 08/02/2021    LEFT ANKLE OPEN REDUCTION INTERNAL FIXATION performed by Chiara Fabian MD at [facility] Left 1/10/2022    LEFT SYNDESMOSIS SCREW REMOVAL performed by Chiara Fabian MD at [address] [place] [address] Bilateral     implants    CATARACT REMOVAL Bilateral     COLONOSCOPY      COLONOSCOPY N/A 3/25/2022    COLONOSCOPY POLYPECTOMY SNARE/COLD BIOPSY performed by Kiran Flynn MD at [facility], ESOPHAGUS      ENDOSCOPY, COLON,

## 2023-03-30 ENCOUNTER — OFFICE VISIT (OUTPATIENT)
Dept: ORTHOPEDIC SURGERY | Age: 73
End: 2023-03-30
Payer: MEDICARE

## 2023-03-30 VITALS — BODY MASS INDEX: 27.42 KG/M2 | RESPIRATION RATE: 17 BRPM | WEIGHT: 149 LBS | HEIGHT: 62 IN

## 2023-03-30 DIAGNOSIS — S83.241A ACUTE MEDIAL MENISCAL TEAR, RIGHT, INITIAL ENCOUNTER: Primary | ICD-10-CM

## 2023-03-30 PROCEDURE — 3017F COLORECTAL CA SCREEN DOC REV: CPT | Performed by: ORTHOPAEDIC SURGERY

## 2023-03-30 PROCEDURE — 1090F PRES/ABSN URINE INCON ASSESS: CPT | Performed by: ORTHOPAEDIC SURGERY

## 2023-03-30 PROCEDURE — 99213 OFFICE O/P EST LOW 20 MIN: CPT | Performed by: ORTHOPAEDIC SURGERY

## 2023-03-30 PROCEDURE — G8417 CALC BMI ABV UP PARAM F/U: HCPCS | Performed by: ORTHOPAEDIC SURGERY

## 2023-03-30 PROCEDURE — G8427 DOCREV CUR MEDS BY ELIG CLIN: HCPCS | Performed by: ORTHOPAEDIC SURGERY

## 2023-03-30 PROCEDURE — 1036F TOBACCO NON-USER: CPT | Performed by: ORTHOPAEDIC SURGERY

## 2023-03-30 PROCEDURE — G8484 FLU IMMUNIZE NO ADMIN: HCPCS | Performed by: ORTHOPAEDIC SURGERY

## 2023-03-30 PROCEDURE — 1123F ACP DISCUSS/DSCN MKR DOCD: CPT | Performed by: ORTHOPAEDIC SURGERY

## 2023-03-30 PROCEDURE — G8399 PT W/DXA RESULTS DOCUMENT: HCPCS | Performed by: ORTHOPAEDIC SURGERY

## 2023-03-30 NOTE — PROGRESS NOTES
exam 4+ to 5/5 in all major motor groups. Right knee: Examination demonstrates no gross deformity. Skin is unremarkable. Range of motion 0 to 130 degrees. The knee is stable to varus and valgus stress and stable to anterior and posterior drawer sign. Pain medially with Juliana. Sensation is intact light touch. There is brisk capillary refill. There is 5/5 muscle strength in all muscle groups. Imaging:  Prior right knee radiographs were reviewed and show no fractures or dislocations. She has maintained joint spaces with no significant osteophytes. Right knee MRI was reviewed and significant for a large posterior horn medial meniscus tear as well as partial-thickness cartilage loss of the medial femoral condyle. Assessment:  Right knee medial meniscus tear    Plan:  We discussed the diagnosis and treatment options. She has a symptomatic right knee medial meniscus tear with very minimal underlying osteoarthritis. This has not improved despite 2 months of conservative management with NSAIDs and physical therapy exercises. She continues to have pain on a daily basis. We discussed right knee arthroscopy with partial medial meniscectomy. We went over the risks and complications of surgery including: bleeding, infection, decreased ROM, continued pain, instability, fracture, dislocation, neurovascular injury, post op cognitive disorder, DVT, pulmonary embolism and need for further surgical procedures. The patient understands these issues and we will see the patient in the operating room. Plan for right knee arthroscopy with partial medial meniscectomy. Total time spent on today's encounter was at least 25 minutes.  This time included reviewing prior notes, radiographs, and lab results when available, reviewing history obtained by medical assistant, performing history and physical exam, reviewing tests/radiographs with the patient, counseling the patient, ordering medications or tests,

## 2023-03-31 ENCOUNTER — OFFICE VISIT (OUTPATIENT)
Dept: INTERNAL MEDICINE CLINIC | Age: 73
End: 2023-03-31
Payer: MEDICARE

## 2023-03-31 VITALS
SYSTOLIC BLOOD PRESSURE: 129 MMHG | WEIGHT: 152.6 LBS | RESPIRATION RATE: 12 BRPM | HEART RATE: 88 BPM | BODY MASS INDEX: 27.91 KG/M2 | OXYGEN SATURATION: 96 % | DIASTOLIC BLOOD PRESSURE: 81 MMHG

## 2023-03-31 DIAGNOSIS — Z01.818 PRE-OP EVALUATION: Primary | ICD-10-CM

## 2023-03-31 DIAGNOSIS — S83.241D TEAR OF MEDIAL MENISCUS OF RIGHT KNEE, CURRENT, UNSPECIFIED TEAR TYPE, SUBSEQUENT ENCOUNTER: ICD-10-CM

## 2023-03-31 DIAGNOSIS — I10 ESSENTIAL (PRIMARY) HYPERTENSION: ICD-10-CM

## 2023-03-31 PROCEDURE — G8417 CALC BMI ABV UP PARAM F/U: HCPCS | Performed by: INTERNAL MEDICINE

## 2023-03-31 PROCEDURE — 3074F SYST BP LT 130 MM HG: CPT | Performed by: INTERNAL MEDICINE

## 2023-03-31 PROCEDURE — 99214 OFFICE O/P EST MOD 30 MIN: CPT | Performed by: INTERNAL MEDICINE

## 2023-03-31 PROCEDURE — G8484 FLU IMMUNIZE NO ADMIN: HCPCS | Performed by: INTERNAL MEDICINE

## 2023-03-31 PROCEDURE — 3078F DIAST BP <80 MM HG: CPT | Performed by: INTERNAL MEDICINE

## 2023-03-31 PROCEDURE — 93000 ELECTROCARDIOGRAM COMPLETE: CPT | Performed by: INTERNAL MEDICINE

## 2023-03-31 PROCEDURE — G8427 DOCREV CUR MEDS BY ELIG CLIN: HCPCS | Performed by: INTERNAL MEDICINE

## 2023-03-31 PROCEDURE — 1090F PRES/ABSN URINE INCON ASSESS: CPT | Performed by: INTERNAL MEDICINE

## 2023-03-31 NOTE — PROGRESS NOTES
Socioeconomic History    Marital status:      Spouse name: Not on file    Number of children: Not on file    Years of education: Not on file    Highest education level: Not on file   Occupational History    Not on file   Tobacco Use    Smoking status: Former     Packs/day: 0.25     Types: Cigarettes     Quit date: 2000     Years since quittin.2    Smokeless tobacco: Never    Tobacco comments:     PT STATES ON AND OFF SINCE SHE WAS 25YEARS OF AGE   Vaping Use    Vaping Use: Former    Devices: Disposable   Substance and Sexual Activity    Alcohol use: Not Currently    Drug use: Never    Sexual activity: Not Currently   Other Topics Concern    Not on file   Social History Narrative    Not on file     Social Determinants of Health     Financial Resource Strain: Low Risk     Difficulty of Paying Living Expenses: Not hard at all   Food Insecurity: No Food Insecurity    Worried About Running Out of Food in the Last Year: Never true    [address] in the Last Year: Never true   Transportation Needs: Unknown    Lack of Transportation (Medical): Not on file    Lack of Transportation (Non-Medical): No   Physical Activity: Insufficiently Active    Days of Exercise per Week: 3 days    Minutes of Exercise per Session: 20 min   Stress: Not on file   Social Connections: Not on file   Intimate Partner Violence: Not At Risk    Fear of Current or Ex-Partner: No    Emotionally Abused: No    Physically Abused: No    Sexually Abused: No   Housing Stability: Unknown    Unable to Pay for Housing in the Last Year: Not on file    Number of Places Lived in the Last Year: Not on file    Unstable Housing in the Last Year: No       Review of Systems  A comprehensive review of systems was negative. Physical Exam   Constitutional: She is oriented to person, place, and time. She appears well-developed and well-nourished. No distress. HENT:   Head: Normocephalic and atraumatic.    Mouth/Throat: Uvula is midline,

## 2023-04-03 ENCOUNTER — TELEPHONE (OUTPATIENT)
Dept: ORTHOPEDIC SURGERY | Age: 73
End: 2023-04-03

## 2023-04-11 DIAGNOSIS — S83.241A ACUTE MEDIAL MENISCAL TEAR, RIGHT, INITIAL ENCOUNTER: ICD-10-CM

## 2023-04-18 ENCOUNTER — ANESTHESIA EVENT (OUTPATIENT)
Dept: OPERATING ROOM | Age: 73
End: 2023-04-18
Payer: MEDICARE

## 2023-04-19 ENCOUNTER — ANESTHESIA (OUTPATIENT)
Dept: OPERATING ROOM | Age: 73
End: 2023-04-19
Payer: MEDICARE

## 2023-04-19 ENCOUNTER — HOSPITAL ENCOUNTER (OUTPATIENT)
Age: 73
Setting detail: OUTPATIENT SURGERY
Discharge: HOME | End: 2023-04-19
Attending: ORTHOPAEDIC SURGERY | Admitting: ORTHOPAEDIC SURGERY
Payer: MEDICARE

## 2023-04-19 VITALS
HEIGHT: 62 IN | DIASTOLIC BLOOD PRESSURE: 99 MMHG | RESPIRATION RATE: 16 BRPM | OXYGEN SATURATION: 98 % | TEMPERATURE: 97.5 F | BODY MASS INDEX: 27.97 KG/M2 | WEIGHT: 152 LBS | SYSTOLIC BLOOD PRESSURE: 144 MMHG | HEART RATE: 79 BPM

## 2023-04-19 DIAGNOSIS — S83.231A COMPLEX TEAR OF MEDIAL MENISCUS OF RIGHT KNEE AS CURRENT INJURY, INITIAL ENCOUNTER: Primary | ICD-10-CM

## 2023-04-19 PROCEDURE — 2709999900 HC NON-CHARGEABLE SUPPLY: Performed by: ORTHOPAEDIC SURGERY

## 2023-04-19 PROCEDURE — 3600000004 HC SURGERY LEVEL 4 BASE: Performed by: ORTHOPAEDIC SURGERY

## 2023-04-19 PROCEDURE — 3700000001 HC ADD 15 MINUTES (ANESTHESIA): Performed by: ORTHOPAEDIC SURGERY

## 2023-04-19 PROCEDURE — 2580000003 HC RX 258: Performed by: ANESTHESIOLOGY

## 2023-04-19 PROCEDURE — 2580000003 HC RX 258: Performed by: ORTHOPAEDIC SURGERY

## 2023-04-19 PROCEDURE — 2500000003 HC RX 250 WO HCPCS: Performed by: NURSE ANESTHETIST, CERTIFIED REGISTERED

## 2023-04-19 PROCEDURE — 7100000011 HC PHASE II RECOVERY - ADDTL 15 MIN: Performed by: ORTHOPAEDIC SURGERY

## 2023-04-19 PROCEDURE — 7100000010 HC PHASE II RECOVERY - FIRST 15 MIN: Performed by: ORTHOPAEDIC SURGERY

## 2023-04-19 PROCEDURE — 7100000001 HC PACU RECOVERY - ADDTL 15 MIN: Performed by: ORTHOPAEDIC SURGERY

## 2023-04-19 PROCEDURE — A4217 STERILE WATER/SALINE, 500 ML: HCPCS | Performed by: ORTHOPAEDIC SURGERY

## 2023-04-19 PROCEDURE — 2500000003 HC RX 250 WO HCPCS: Performed by: ORTHOPAEDIC SURGERY

## 2023-04-19 PROCEDURE — 6360000002 HC RX W HCPCS: Performed by: ORTHOPAEDIC SURGERY

## 2023-04-19 PROCEDURE — 6360000002 HC RX W HCPCS: Performed by: NURSE ANESTHETIST, CERTIFIED REGISTERED

## 2023-04-19 PROCEDURE — 6370000000 HC RX 637 (ALT 250 FOR IP): Performed by: ANESTHESIOLOGY

## 2023-04-19 PROCEDURE — 7100000000 HC PACU RECOVERY - FIRST 15 MIN: Performed by: ORTHOPAEDIC SURGERY

## 2023-04-19 PROCEDURE — 2580000003 HC RX 258: Performed by: NURSE ANESTHETIST, CERTIFIED REGISTERED

## 2023-04-19 PROCEDURE — 6360000002 HC RX W HCPCS: Performed by: ANESTHESIOLOGY

## 2023-04-19 PROCEDURE — 3700000000 HC ANESTHESIA ATTENDED CARE: Performed by: ORTHOPAEDIC SURGERY

## 2023-04-19 PROCEDURE — 3600000014 HC SURGERY LEVEL 4 ADDTL 15MIN: Performed by: ORTHOPAEDIC SURGERY

## 2023-04-19 RX ORDER — SODIUM CHLORIDE 0.9 % (FLUSH) 0.9 %
5-40 SYRINGE (ML) INJECTION PRN
Status: DISCONTINUED | OUTPATIENT
Start: 2023-04-19 | End: 2023-04-19 | Stop reason: HOSPADM

## 2023-04-19 RX ORDER — PROPOFOL 10 MG/ML
INJECTION, EMULSION INTRAVENOUS PRN
Status: DISCONTINUED | OUTPATIENT
Start: 2023-04-19 | End: 2023-04-19 | Stop reason: SDUPTHER

## 2023-04-19 RX ORDER — BUPIVACAINE HYDROCHLORIDE 5 MG/ML
INJECTION, SOLUTION EPIDURAL; INTRACAUDAL
Status: COMPLETED | OUTPATIENT
Start: 2023-04-19 | End: 2023-04-19

## 2023-04-19 RX ORDER — SODIUM CHLORIDE 0.9 % (FLUSH) 0.9 %
5-40 SYRINGE (ML) INJECTION EVERY 12 HOURS SCHEDULED
Status: DISCONTINUED | OUTPATIENT
Start: 2023-04-19 | End: 2023-04-19 | Stop reason: HOSPADM

## 2023-04-19 RX ORDER — HYDROCODONE BITARTRATE AND ACETAMINOPHEN 5; 325 MG/1; MG/1
1 TABLET ORAL EVERY 6 HOURS PRN
Qty: 20 TABLET | Refills: 0 | Status: SHIPPED | OUTPATIENT
Start: 2023-04-19 | End: 2023-04-24

## 2023-04-19 RX ORDER — LIDOCAINE HYDROCHLORIDE 20 MG/ML
INJECTION, SOLUTION EPIDURAL; INFILTRATION; INTRACAUDAL; PERINEURAL PRN
Status: DISCONTINUED | OUTPATIENT
Start: 2023-04-19 | End: 2023-04-19 | Stop reason: SDUPTHER

## 2023-04-19 RX ORDER — SODIUM CHLORIDE 9 MG/ML
INJECTION, SOLUTION INTRAVENOUS CONTINUOUS PRN
Status: DISCONTINUED | OUTPATIENT
Start: 2023-04-19 | End: 2023-04-19 | Stop reason: SDUPTHER

## 2023-04-19 RX ORDER — OXYCODONE HYDROCHLORIDE 10 MG/1
10 TABLET ORAL PRN
Status: COMPLETED | OUTPATIENT
Start: 2023-04-19 | End: 2023-04-19

## 2023-04-19 RX ORDER — SODIUM CHLORIDE 9 MG/ML
INJECTION, SOLUTION INTRAVENOUS PRN
Status: DISCONTINUED | OUTPATIENT
Start: 2023-04-19 | End: 2023-04-19 | Stop reason: HOSPADM

## 2023-04-19 RX ORDER — ONDANSETRON 2 MG/ML
INJECTION INTRAMUSCULAR; INTRAVENOUS PRN
Status: DISCONTINUED | OUTPATIENT
Start: 2023-04-19 | End: 2023-04-19 | Stop reason: SDUPTHER

## 2023-04-19 RX ORDER — OXYCODONE HYDROCHLORIDE 5 MG/1
5 TABLET ORAL PRN
Status: COMPLETED | OUTPATIENT
Start: 2023-04-19 | End: 2023-04-19

## 2023-04-19 RX ORDER — GLYCOPYRROLATE 0.2 MG/ML
INJECTION INTRAMUSCULAR; INTRAVENOUS PRN
Status: DISCONTINUED | OUTPATIENT
Start: 2023-04-19 | End: 2023-04-19 | Stop reason: SDUPTHER

## 2023-04-19 RX ORDER — FENTANYL CITRATE 50 UG/ML
INJECTION, SOLUTION INTRAMUSCULAR; INTRAVENOUS PRN
Status: DISCONTINUED | OUTPATIENT
Start: 2023-04-19 | End: 2023-04-19 | Stop reason: SDUPTHER

## 2023-04-19 RX ORDER — MAGNESIUM HYDROXIDE 1200 MG/15ML
LIQUID ORAL CONTINUOUS PRN
Status: DISCONTINUED | OUTPATIENT
Start: 2023-04-19 | End: 2023-04-19 | Stop reason: HOSPADM

## 2023-04-19 RX ORDER — KETOROLAC TROMETHAMINE 30 MG/ML
INJECTION, SOLUTION INTRAMUSCULAR; INTRAVENOUS PRN
Status: DISCONTINUED | OUTPATIENT
Start: 2023-04-19 | End: 2023-04-19 | Stop reason: SDUPTHER

## 2023-04-19 RX ORDER — DEXAMETHASONE SODIUM PHOSPHATE 4 MG/ML
INJECTION, SOLUTION INTRA-ARTICULAR; INTRALESIONAL; INTRAMUSCULAR; INTRAVENOUS; SOFT TISSUE PRN
Status: DISCONTINUED | OUTPATIENT
Start: 2023-04-19 | End: 2023-04-19 | Stop reason: SDUPTHER

## 2023-04-19 RX ORDER — ONDANSETRON 2 MG/ML
4 INJECTION INTRAMUSCULAR; INTRAVENOUS
Status: DISCONTINUED | OUTPATIENT
Start: 2023-04-19 | End: 2023-04-19 | Stop reason: HOSPADM

## 2023-04-19 RX ORDER — CEFAZOLIN SODIUM 1 G/3ML
2000 INJECTION, POWDER, FOR SOLUTION INTRAMUSCULAR; INTRAVENOUS ONCE
Status: DISCONTINUED | OUTPATIENT
Start: 2023-04-19 | End: 2023-04-19

## 2023-04-19 RX ADMIN — DEXAMETHASONE SODIUM PHOSPHATE 4 MG: 4 INJECTION, SOLUTION INTRAMUSCULAR; INTRAVENOUS at 13:36

## 2023-04-19 RX ADMIN — SODIUM CHLORIDE: 9 INJECTION, SOLUTION INTRAVENOUS at 13:31

## 2023-04-19 RX ADMIN — HYDROMORPHONE HYDROCHLORIDE 0.5 MG: 1 INJECTION, SOLUTION INTRAMUSCULAR; INTRAVENOUS; SUBCUTANEOUS at 14:39

## 2023-04-19 RX ADMIN — CEFAZOLIN 2000 MG: 2 INJECTION, POWDER, FOR SOLUTION INTRAMUSCULAR; INTRAVENOUS at 13:31

## 2023-04-19 RX ADMIN — KETOROLAC TROMETHAMINE 15 MG: 30 INJECTION, SOLUTION INTRAMUSCULAR at 13:42

## 2023-04-19 RX ADMIN — FENTANYL CITRATE 25 MCG: 50 INJECTION INTRAMUSCULAR; INTRAVENOUS at 13:47

## 2023-04-19 RX ADMIN — PROPOFOL 180 MG: 10 INJECTION, EMULSION INTRAVENOUS at 13:36

## 2023-04-19 RX ADMIN — FENTANYL CITRATE 25 MCG: 50 INJECTION INTRAMUSCULAR; INTRAVENOUS at 13:42

## 2023-04-19 RX ADMIN — HYDROMORPHONE HYDROCHLORIDE 0.5 MG: 1 INJECTION, SOLUTION INTRAMUSCULAR; INTRAVENOUS; SUBCUTANEOUS at 14:22

## 2023-04-19 RX ADMIN — ONDANSETRON 4 MG: 2 INJECTION INTRAMUSCULAR; INTRAVENOUS at 13:42

## 2023-04-19 RX ADMIN — GLYCOPYRROLATE 0.1 MG: 0.2 INJECTION INTRAMUSCULAR; INTRAVENOUS at 13:31

## 2023-04-19 RX ADMIN — LIDOCAINE HYDROCHLORIDE 80 MG: 20 INJECTION, SOLUTION EPIDURAL; INFILTRATION; INTRACAUDAL; PERINEURAL at 13:36

## 2023-04-19 RX ADMIN — SODIUM CHLORIDE: 9 INJECTION, SOLUTION INTRAVENOUS at 12:39

## 2023-04-19 RX ADMIN — OXYCODONE 5 MG: 5 TABLET ORAL at 15:26

## 2023-04-19 RX ADMIN — FENTANYL CITRATE 50 MCG: 50 INJECTION INTRAMUSCULAR; INTRAVENOUS at 13:31

## 2023-04-19 RX ADMIN — HYDROMORPHONE HYDROCHLORIDE 0.5 MG: 1 INJECTION, SOLUTION INTRAMUSCULAR; INTRAVENOUS; SUBCUTANEOUS at 14:51

## 2023-04-19 ASSESSMENT — PAIN DESCRIPTION - LOCATION
LOCATION: KNEE

## 2023-04-19 ASSESSMENT — PAIN DESCRIPTION - PAIN TYPE
TYPE: SURGICAL PAIN

## 2023-04-19 ASSESSMENT — ENCOUNTER SYMPTOMS: SHORTNESS OF BREATH: 0

## 2023-04-19 ASSESSMENT — PAIN DESCRIPTION - FREQUENCY
FREQUENCY: CONTINUOUS

## 2023-04-19 ASSESSMENT — PAIN DESCRIPTION - ORIENTATION
ORIENTATION: RIGHT

## 2023-04-19 ASSESSMENT — PAIN DESCRIPTION - DESCRIPTORS
DESCRIPTORS: ACHING
DESCRIPTORS: SHARP
DESCRIPTORS: PRESSURE;ACHING

## 2023-04-19 ASSESSMENT — PAIN DESCRIPTION - ONSET
ONSET: ON-GOING

## 2023-04-19 ASSESSMENT — PAIN SCALES - GENERAL
PAINLEVEL_OUTOF10: 7
PAINLEVEL_OUTOF10: 7
PAINLEVEL_OUTOF10: 4
PAINLEVEL_OUTOF10: 8
PAINLEVEL_OUTOF10: 5
PAINLEVEL_OUTOF10: 5

## 2023-04-19 NOTE — H&P
Update History & Physical    The patient's History and Physical of March 31, 2023 was reviewed with the patient and I examined the patient. There was no change. The surgical site was confirmed by the patient and me. Plan: The risks, benefits, expected outcome, and alternative to the recommended procedure have been discussed with the patient. Patient understands and wants to proceed with the procedure.      Electronically signed by Yvette Henderson MD on 4/19/2023 at 1:36 PM

## 2023-04-19 NOTE — ANESTHESIA PRE PROCEDURE
patient the risks and benefits of PIV, general anesthesia, IV Narcotics, PACU. All questions were answered the patient agrees with the plan.)  Induction: intravenous. MIPS: Postoperative opioids intended and Prophylactic antiemetics administered. Anesthetic plan and risks discussed with patient. Plan discussed with CRNA. This pre-anesthesia assessment may be used as a history and physical.    DOS STAFF ADDENDUM:    Pt seen and examined, chart reviewed (including anesthesia, drug and allergy history). No interval changes to history and physical examination. Anesthetic plan, risks, benefits, alternatives, and personnel involved discussed with patient. Patient verbalized an understanding and agrees to proceed.       Kenya Dumas MD  April 19, 2023  12:30 PM

## 2023-04-19 NOTE — ANESTHESIA POSTPROCEDURE EVALUATION
Department of Anesthesiology  Postprocedure Note    Patient: Hitesh Ron  MRN: [id number]  YOB: 1950  Date of evaluation: 4/19/2023      Procedure Summary     Date: 04/19/23 Room / Location: [address]    Anesthesia Start: 1331 Anesthesia Stop: 7911    Procedure: RIGHT KNEE ARTHROSCOPY PARTIAL MEDIAL MENISCECTOMY (Right: Knee) Diagnosis:       Complex tear of medial meniscus of right knee, unspecified whether old or current tear, initial encounter      (RIGHT KNEE MEDIAL MENISCUS TEAR AND PLICA)    Surgeons: Eleni Arias MD Responsible Provider: Daryl Durán MD    Anesthesia Type: General ASA Status: 3          Anesthesia Type: General    Jere Phase I: Jere Score: 10    Jere Phase II: Jere Score: 10      Anesthesia Post Evaluation    Patient location during evaluation: PACU  Patient participation: complete - patient participated  Level of consciousness: awake and alert  Pain score: 3  Airway patency: patent  Nausea & Vomiting: no nausea and no vomiting  Complications: no  Cardiovascular status: blood pressure returned to baseline  Respiratory status: acceptable  Hydration status: euvolemic

## 2023-04-19 NOTE — OP NOTE
Patient: Tom Dunaway  YOB: 1950  MRN: [id number]    DATE OF OPERATION: 4/19/2023       PREOPERATIVE DIAGNOSIS: Right knee medial meniscus tear     POSTOPERATIVE DIAGNOSIS: Right knee medial meniscus tear     OPERATION PERFORMED: Right knee arthroscopy, partial medial meniscectomy     SURGEON: Alexandra Beasley MD  ANESTHESIA: General.  ESTIMATED BLOOD LOSS: Minimal.  COMPLICATIONS: None. INDICATIONS FOR OPERATION: The patient is a 68 y.o. female who has been having right knee pain, which has been refractory to conservative management. The patient had an MRI, which revealed a posterior horn medial meniscal tear. We went over the risks and complications of surgery including: bleeding, infection, decreased ROM, continued pain, instability, fracture, dislocation, neurovascular injury, post op cognitive disorder, DVT, pulmonary embolism and need for further surgical procedures. Informed consent for surgery was signed by the patient. DESCRIPTION OF OPERATION:   The patient was seen in the preoperative holding area where the site of surgery was marked and informed consent was confirmed. The patient was brought back to the operating room by OR personnel and was positioned supine on the operating room table. General anesthesia was administered. The right lower extremity was then prepped and draped in a standard and sterile fashion. A final and formal timeout was then performed which confirmed the correct patient, correct position, and correct site of surgery. The right lower extremity was exsanguinated and the tourniquet was inflated. Standard anterolateral and anteromedial arthroscopic portals were then made. The suprapatellar pouch was explored initially and revealed areas of Outerbridge grade II changes to the undersurface of the patella and Outerbridge grade II change to the trochlear groove. The medial and lateral gutters were without pathology.  The notch was explored and revealed an intact

## 2023-04-19 NOTE — PROGRESS NOTES
To pacu from OR. Pt awake, denies pain. SANDRA to request.  IV infusing. Monitor in sinus tachycardia.

## 2023-04-19 NOTE — PROGRESS NOTES
Pt is alert and oriented, vital signs stable on room air. Medicated with PO pain medication, Pt satisfied. Discharge instructions given to Pt and daughter, all questions answered. Pt discharged to home with daughter to transport.

## 2023-04-21 ENCOUNTER — TELEPHONE (OUTPATIENT)
Dept: ORTHOPEDIC SURGERY | Age: 73
End: 2023-04-21

## 2023-04-21 NOTE — TELEPHONE ENCOUNTER
Pt called and said she really had no pain post op until 11:00 yesterday morning and she took Tylenol w/codeine and then 6 hrs later started the Norco.    In a lot of pain when she went to bed and today she has pain as well, foot and toes are swollen. I think she needs reassurance that this is normal after surg.   Please call her at [phone]

## 2023-04-28 ENCOUNTER — TELEPHONE (OUTPATIENT)
Dept: ORTHOPEDIC SURGERY | Age: 73
End: 2023-04-28

## 2023-04-28 ENCOUNTER — HOSPITAL ENCOUNTER (OUTPATIENT)
Dept: VASCULAR LAB | Age: 73
Discharge: HOME | End: 2023-04-28
Payer: MEDICARE

## 2023-04-28 DIAGNOSIS — M79.89 SWELLING OF CALF: ICD-10-CM

## 2023-04-28 DIAGNOSIS — M79.89 SWELLING OF CALF: Primary | ICD-10-CM

## 2023-04-28 PROCEDURE — 93971 EXTREMITY STUDY: CPT

## 2023-05-04 ENCOUNTER — OFFICE VISIT (OUTPATIENT)
Dept: ORTHOPEDIC SURGERY | Age: 73
End: 2023-05-04

## 2023-05-04 VITALS — WEIGHT: 149 LBS | HEIGHT: 62 IN | BODY MASS INDEX: 27.42 KG/M2

## 2023-05-04 DIAGNOSIS — S83.241A ACUTE MEDIAL MENISCAL TEAR, RIGHT, INITIAL ENCOUNTER: Primary | ICD-10-CM

## 2023-05-04 PROCEDURE — 99024 POSTOP FOLLOW-UP VISIT: CPT | Performed by: ORTHOPAEDIC SURGERY

## 2023-05-04 NOTE — PROGRESS NOTES
[facility]  Office Visit    Chief Complaint: Follow-up after right knee arthroscopy    HPI:  Mirlande Nolen is a 68 y.o. who is here in follow-up after right knee arthroscopy performed on April 19, 2023. She initially had bruising and swelling significantly in this leg. She had an ultrasound done that showed no DVT. She reports pain and swelling have since improved. She is now walking with no assistive device. Bruising has cleared. She rates pain as 2/10. She reports the knee is feeling better than it did before surgery. She uses a recumbent bicycle. Patient Active Problem List   Diagnosis    Closed trimalleolar fracture of left ankle    Syndesmotic disruption of left ankle    Back pain    Herpes simplex vulvovaginitis    Personal history of COVID-19    Essential (primary) hypertension    History of diverticulitis of colon    Plantar fasciitis of left foot    Pain due to internal orthopedic prosthetic device (HCC)    Palpitations    Severe tricuspid regurgitation    Age-related osteoporosis without current pathological fracture    Family history of lung cancer    Aggressive former smoker    Mixed hyperlipidemia    Tear of medial meniscus of right knee, current       ROS:  Constitutional: denies fever, chills, weight loss  MSK: denies pain in other joints, muscle aches  Neurological: denies numbness, tingling, weakness    Exam:  Height 5' 2\" (1.575 m), weight 149 lb (67.6 kg). Appearance: sitting in exam room chair, appears to be in no acute distress, awake and alert  Resp: unlabored breathing on room air  Skin: warm, dry and intact with out erythema or significant increased temperature  Neuro: grossly intact both lower extremities. Intact sensation to light touch. Motor exam 4+ to 5/5 in all major motor groups. Right knee: Incisions are healed. There is no knee effusion. She demonstrates active knee range of motion 0 to 120 degrees.   Motor function and sensation

## 2023-06-05 ENCOUNTER — OFFICE VISIT (OUTPATIENT)
Dept: ORTHOPEDIC SURGERY | Age: 73
End: 2023-06-05

## 2023-06-05 VITALS — RESPIRATION RATE: 16 BRPM | BODY MASS INDEX: 27.42 KG/M2 | HEIGHT: 62 IN | WEIGHT: 149 LBS

## 2023-06-05 DIAGNOSIS — S83.241A ACUTE MEDIAL MENISCAL TEAR, RIGHT, INITIAL ENCOUNTER: Primary | ICD-10-CM

## 2023-06-05 PROCEDURE — 99024 POSTOP FOLLOW-UP VISIT: CPT | Performed by: ORTHOPAEDIC SURGERY

## 2023-06-05 NOTE — PROGRESS NOTES
[facility]  Office Visit    Chief Complaint: Follow-up after right knee arthroscopy    HPI:  Logan Moran is a 68 y.o. who is here in follow-up after right knee arthroscopy performed on April 19, 2023. She is doing very well postoperatively. She reports no pain and is walking her normal amount again. Her left foot is holding her back more than anything else. She is happy with her results of her right knee surgery. Patient Active Problem List   Diagnosis    Closed trimalleolar fracture of left ankle    Syndesmotic disruption of left ankle    Back pain    Herpes simplex vulvovaginitis    Personal history of COVID-19    Essential (primary) hypertension    History of diverticulitis of colon    Plantar fasciitis of left foot    Pain due to internal orthopedic prosthetic device (HCC)    Palpitations    Severe tricuspid regurgitation    Age-related osteoporosis without current pathological fracture    Family history of lung cancer    Aggressive former smoker    Mixed hyperlipidemia    Tear of medial meniscus of right knee, current       ROS:  Constitutional: denies fever, chills, weight loss  MSK: denies pain in other joints, muscle aches  Neurological: denies numbness, tingling, weakness    Exam:  Resp. rate 16, height 5' 2\" (1.575 m), weight 149 lb (67.6 kg). Appearance: sitting in exam room chair, appears to be in no acute distress, awake and alert  Resp: unlabored breathing on room air  Skin: warm, dry and intact with out erythema or significant increased temperature  Neuro: grossly intact both lower extremities. Intact sensation to light touch. Motor exam 4+ to 5/5 in all major motor groups. Right knee: Incisions are healed. There is no knee effusion. She demonstrates active knee range of motion 0 to 135 degrees. Motor function and sensation intact distally.     Imaging:  None    Assessment:  Right knee arthroscopy with partial medial meniscectomy    Plan:  She is

## 2023-06-06 ENCOUNTER — OFFICE VISIT (OUTPATIENT)
Dept: INTERNAL MEDICINE CLINIC | Age: 73
End: 2023-06-06
Payer: MEDICARE

## 2023-06-06 VITALS
RESPIRATION RATE: 12 BRPM | SYSTOLIC BLOOD PRESSURE: 127 MMHG | HEART RATE: 78 BPM | OXYGEN SATURATION: 99 % | DIASTOLIC BLOOD PRESSURE: 89 MMHG | BODY MASS INDEX: 27.84 KG/M2 | WEIGHT: 152.2 LBS

## 2023-06-06 DIAGNOSIS — F33.1 MAJOR DEPRESSIVE DISORDER, RECURRENT, MODERATE (HCC): ICD-10-CM

## 2023-06-06 DIAGNOSIS — K43.9 VENTRAL HERNIA WITHOUT OBSTRUCTION OR GANGRENE: Primary | ICD-10-CM

## 2023-06-06 DIAGNOSIS — Z96.642 S/P HIP REPLACEMENT, LEFT: ICD-10-CM

## 2023-06-06 DIAGNOSIS — K05.30 PERIODONTITIS: ICD-10-CM

## 2023-06-06 PROBLEM — F33.9 MAJOR DEPRESSIVE DISORDER, RECURRENT, UNSPECIFIED (HCC): Status: RESOLVED | Noted: 2023-06-06 | Resolved: 2023-06-06

## 2023-06-06 PROBLEM — F33.9 MAJOR DEPRESSIVE DISORDER, RECURRENT, UNSPECIFIED (HCC): Status: ACTIVE | Noted: 2023-06-06

## 2023-06-06 PROBLEM — Z86.16 PERSONAL HISTORY OF COVID-19: Status: RESOLVED | Noted: 2021-08-12 | Resolved: 2023-06-06

## 2023-06-06 PROBLEM — F33.0 MAJOR DEPRESSIVE DISORDER, RECURRENT, MILD (HCC): Status: ACTIVE | Noted: 2023-06-06

## 2023-06-06 PROBLEM — F33.0 MAJOR DEPRESSIVE DISORDER, RECURRENT, MILD (HCC): Status: RESOLVED | Noted: 2023-06-06 | Resolved: 2023-06-06

## 2023-06-06 PROBLEM — M72.2 PLANTAR FASCIITIS OF LEFT FOOT: Status: RESOLVED | Noted: 2021-11-13 | Resolved: 2023-06-06

## 2023-06-06 PROBLEM — S83.241A TEAR OF MEDIAL MENISCUS OF RIGHT KNEE, CURRENT: Status: RESOLVED | Noted: 2023-03-28 | Resolved: 2023-06-06

## 2023-06-06 PROCEDURE — 1123F ACP DISCUSS/DSCN MKR DOCD: CPT | Performed by: INTERNAL MEDICINE

## 2023-06-06 PROCEDURE — 1090F PRES/ABSN URINE INCON ASSESS: CPT | Performed by: INTERNAL MEDICINE

## 2023-06-06 PROCEDURE — 3074F SYST BP LT 130 MM HG: CPT | Performed by: INTERNAL MEDICINE

## 2023-06-06 PROCEDURE — G8417 CALC BMI ABV UP PARAM F/U: HCPCS | Performed by: INTERNAL MEDICINE

## 2023-06-06 PROCEDURE — 3078F DIAST BP <80 MM HG: CPT | Performed by: INTERNAL MEDICINE

## 2023-06-06 PROCEDURE — 3017F COLORECTAL CA SCREEN DOC REV: CPT | Performed by: INTERNAL MEDICINE

## 2023-06-06 PROCEDURE — G8427 DOCREV CUR MEDS BY ELIG CLIN: HCPCS | Performed by: INTERNAL MEDICINE

## 2023-06-06 PROCEDURE — 99213 OFFICE O/P EST LOW 20 MIN: CPT | Performed by: INTERNAL MEDICINE

## 2023-06-06 PROCEDURE — 1036F TOBACCO NON-USER: CPT | Performed by: INTERNAL MEDICINE

## 2023-06-06 PROCEDURE — G8399 PT W/DXA RESULTS DOCUMENT: HCPCS | Performed by: INTERNAL MEDICINE

## 2023-06-08 ENCOUNTER — TELEPHONE (OUTPATIENT)
Dept: INTERNAL MEDICINE CLINIC | Age: 73
End: 2023-06-08

## 2023-06-17 DIAGNOSIS — A60.04 HERPES SIMPLEX VULVOVAGINITIS: ICD-10-CM

## 2023-06-19 RX ORDER — VALACYCLOVIR HYDROCHLORIDE 500 MG/1
TABLET, FILM COATED ORAL
Qty: 30 TABLET | Refills: 3 | Status: SHIPPED | OUTPATIENT
Start: 2023-06-19

## 2023-07-20 NOTE — DISCHARGE INSTRUCTIONS
Impression: Retinal detachment of right eye: H33.21. Plan: Discussed diagnosis in detail with patient.   Prompt consult for sx OD with retinal detachment repair now or seek immediate medical care if:    Your foot or toes are numb or tingling. Your foot is cool or pale, or it changes color. You have signs of a blood clot, such as:  Pain in your calf, back of the knee, thigh, or groin. Redness and swelling in your leg or groin. You are sick to your stomach or cannot keep fluids down. You have pain that does not get better after you take pain medicine. You have loose stitches, or your incision comes open. Bright red blood has soaked through the bandage over your incision. You have signs of infection, such as: Increased pain, swelling, warmth, or redness. Red streaks leading from the incisions. Pus draining from the incisions. A fever. Watch closely for any changes in your health, and be sure to contact your doctor if:    You do not have a bowel movement after taking a laxative. Where can you learn more? Go to http://www.woods.com/ and enter I338 to learn more about \"Knee Arthroscopy: What to Expect at Home. \"  Current as of: November 9, 2022               Content Version: 13.6  © 7095-5996 [employer]wise, Incorporated. Care instructions adapted under license by [facility] ([facility]). If you have questions about a medical condition or this instruction, always ask your healthcare professional. [address] any warranty or liability for your use of this information.

## 2023-10-23 ENCOUNTER — HOSPITAL ENCOUNTER (EMERGENCY)
Age: 73
Discharge: HOME | End: 2023-10-23
Payer: MEDICARE

## 2023-10-23 ENCOUNTER — TELEPHONE (OUTPATIENT)
Dept: INTERNAL MEDICINE CLINIC | Age: 73
End: 2023-10-23

## 2023-10-23 ENCOUNTER — APPOINTMENT (OUTPATIENT)
Dept: GENERAL RADIOLOGY | Age: 73
End: 2023-10-23
Payer: MEDICARE

## 2023-10-23 VITALS
TEMPERATURE: 97.9 F | DIASTOLIC BLOOD PRESSURE: 99 MMHG | RESPIRATION RATE: 18 BRPM | HEIGHT: 62 IN | WEIGHT: 157.03 LBS | SYSTOLIC BLOOD PRESSURE: 156 MMHG | OXYGEN SATURATION: 93 % | BODY MASS INDEX: 28.9 KG/M2 | HEART RATE: 84 BPM

## 2023-10-23 DIAGNOSIS — Z87.891 FORMER SMOKER: ICD-10-CM

## 2023-10-23 DIAGNOSIS — J45.21 MILD INTERMITTENT ASTHMATIC BRONCHITIS WITH ACUTE EXACERBATION: Primary | ICD-10-CM

## 2023-10-23 LAB
ALBUMIN SERPL-MCNC: 4.4 G/DL (ref 3.4–5)
ALBUMIN/GLOB SERPL: 1.6 {RATIO} (ref 1.1–2.2)
ALP SERPL-CCNC: 118 U/L (ref 40–129)
ALT SERPL-CCNC: 33 U/L (ref 10–40)
ANION GAP SERPL CALCULATED.3IONS-SCNC: 10 MMOL/L (ref 3–16)
AST SERPL-CCNC: 31 U/L (ref 15–37)
BASE EXCESS BLDV CALC-SCNC: 1.7 MMOL/L
BASOPHILS # BLD: 0.1 K/UL (ref 0–0.2)
BASOPHILS NFR BLD: 0.6 %
BILIRUB SERPL-MCNC: <0.2 MG/DL (ref 0–1)
BUN SERPL-MCNC: 19 MG/DL (ref 7–20)
CALCIUM SERPL-MCNC: 9.4 MG/DL (ref 8.3–10.6)
CHLORIDE SERPL-SCNC: 104 MMOL/L (ref 99–110)
CO2 BLDV-SCNC: 28 MMOL/L
CO2 SERPL-SCNC: 26 MMOL/L (ref 21–32)
COHGB MFR BLDV: 1.2 %
CREAT SERPL-MCNC: 0.7 MG/DL (ref 0.6–1.2)
DEPRECATED RDW RBC AUTO: 12.8 % (ref 12.4–15.4)
EOSINOPHIL # BLD: 0.2 K/UL (ref 0–0.6)
EOSINOPHIL NFR BLD: 2 %
GFR SERPLBLD CREATININE-BSD FMLA CKD-EPI: >60 ML/MIN/{1.73_M2}
GLUCOSE SERPL-MCNC: 103 MG/DL (ref 70–99)
HCO3 BLDV-SCNC: 27 MMOL/L (ref 23–29)
HCT VFR BLD AUTO: 41.4 % (ref 36–48)
HGB BLD-MCNC: 14.3 G/DL (ref 12–16)
LYMPHOCYTES # BLD: 2.4 K/UL (ref 1–5.1)
LYMPHOCYTES NFR BLD: 27 %
MCH RBC QN AUTO: 32.6 PG (ref 26–34)
MCHC RBC AUTO-ENTMCNC: 34.4 G/DL (ref 31–36)
MCV RBC AUTO: 94.7 FL (ref 80–100)
METHGB MFR BLDV: 0.3 %
MONOCYTES # BLD: 0.8 K/UL (ref 0–1.3)
MONOCYTES NFR BLD: 8.7 %
NEUTROPHILS # BLD: 5.4 K/UL (ref 1.7–7.7)
NEUTROPHILS NFR BLD: 61.7 %
NT-PROBNP SERPL-MCNC: 169 PG/ML (ref 0–124)
O2 THERAPY: NORMAL
PCO2 BLDV: 42.8 MMHG (ref 40–50)
PH BLDV: 7.41 [PH] (ref 7.35–7.45)
PLATELET # BLD AUTO: 199 K/UL (ref 135–450)
PMV BLD AUTO: 9.9 FL (ref 5–10.5)
PO2 BLDV: 45 MMHG
POTASSIUM SERPL-SCNC: 4.1 MMOL/L (ref 3.5–5.1)
PROT SERPL-MCNC: 7.2 G/DL (ref 6.4–8.2)
RBC # BLD AUTO: 4.37 M/UL (ref 4–5.2)
SAO2 % BLDV: 81 %
SARS-COV-2 RDRP RESP QL NAA+PROBE: NOT DETECTED
SODIUM SERPL-SCNC: 140 MMOL/L (ref 136–145)
TROPONIN, HIGH SENSITIVITY: 9 NG/L (ref 0–14)
WBC # BLD AUTO: 8.7 K/UL (ref 4–11)

## 2023-10-23 PROCEDURE — 82803 BLOOD GASES ANY COMBINATION: CPT

## 2023-10-23 PROCEDURE — 85025 COMPLETE CBC W/AUTO DIFF WBC: CPT

## 2023-10-23 PROCEDURE — 80053 COMPREHEN METABOLIC PANEL: CPT

## 2023-10-23 PROCEDURE — 6370000000 HC RX 637 (ALT 250 FOR IP): Performed by: PHYSICIAN ASSISTANT

## 2023-10-23 PROCEDURE — 87635 SARS-COV-2 COVID-19 AMP PRB: CPT

## 2023-10-23 PROCEDURE — 94760 N-INVAS EAR/PLS OXIMETRY 1: CPT

## 2023-10-23 PROCEDURE — 99284 EMERGENCY DEPT VISIT MOD MDM: CPT

## 2023-10-23 PROCEDURE — 83880 ASSAY OF NATRIURETIC PEPTIDE: CPT

## 2023-10-23 PROCEDURE — 94640 AIRWAY INHALATION TREATMENT: CPT

## 2023-10-23 PROCEDURE — 6360000002 HC RX W HCPCS: Performed by: PHYSICIAN ASSISTANT

## 2023-10-23 PROCEDURE — 71046 X-RAY EXAM CHEST 2 VIEWS: CPT

## 2023-10-23 PROCEDURE — 84484 ASSAY OF TROPONIN QUANT: CPT

## 2023-10-23 PROCEDURE — 2580000003 HC RX 258: Performed by: PHYSICIAN ASSISTANT

## 2023-10-23 RX ORDER — CODEINE PHOSPHATE AND GUAIFENESIN 10; 100 MG/5ML; MG/5ML
5 SOLUTION ORAL ONCE
Status: COMPLETED | OUTPATIENT
Start: 2023-10-23 | End: 2023-10-23

## 2023-10-23 RX ORDER — DEXAMETHASONE SODIUM PHOSPHATE 4 MG/ML
4 INJECTION, SOLUTION INTRA-ARTICULAR; INTRALESIONAL; INTRAMUSCULAR; INTRAVENOUS; SOFT TISSUE ONCE
Status: DISCONTINUED | OUTPATIENT
Start: 2023-10-23 | End: 2023-10-23

## 2023-10-23 RX ORDER — 0.9 % SODIUM CHLORIDE 0.9 %
1000 INTRAVENOUS SOLUTION INTRAVENOUS ONCE
Status: COMPLETED | OUTPATIENT
Start: 2023-10-23 | End: 2023-10-23

## 2023-10-23 RX ORDER — GUAIFENESIN AND CODEINE PHOSPHATE 100; 10 MG/5ML; MG/5ML
5 SOLUTION ORAL EVERY 8 HOURS PRN
Qty: 90 ML | Refills: 0 | Status: SHIPPED | OUTPATIENT
Start: 2023-10-23 | End: 2023-10-28

## 2023-10-23 RX ORDER — ALBUTEROL SULFATE 90 UG/1
1-2 AEROSOL, METERED RESPIRATORY (INHALATION) EVERY 6 HOURS PRN
Qty: 18 G | Refills: 0 | Status: SHIPPED | OUTPATIENT
Start: 2023-10-23

## 2023-10-23 RX ORDER — ALBUTEROL SULFATE 2.5 MG/3ML
2.5 SOLUTION RESPIRATORY (INHALATION) ONCE
Status: COMPLETED | OUTPATIENT
Start: 2023-10-23 | End: 2023-10-23

## 2023-10-23 RX ORDER — IPRATROPIUM BROMIDE AND ALBUTEROL SULFATE 2.5; .5 MG/3ML; MG/3ML
1 SOLUTION RESPIRATORY (INHALATION) ONCE
Status: COMPLETED | OUTPATIENT
Start: 2023-10-23 | End: 2023-10-23

## 2023-10-23 RX ORDER — DOXYCYCLINE HYCLATE 100 MG
100 TABLET ORAL 2 TIMES DAILY
Qty: 20 TABLET | Refills: 0 | Status: SHIPPED | OUTPATIENT
Start: 2023-10-23 | End: 2023-11-02

## 2023-10-23 RX ADMIN — IPRATROPIUM BROMIDE AND ALBUTEROL SULFATE 1 DOSE: .5; 3 SOLUTION RESPIRATORY (INHALATION) at 14:01

## 2023-10-23 RX ADMIN — GUAIFENESIN AND CODEINE PHOSPHATE 5 ML: 100; 10 SOLUTION ORAL at 13:54

## 2023-10-23 RX ADMIN — ALBUTEROL SULFATE 2.5 MG: 2.5 SOLUTION RESPIRATORY (INHALATION) at 14:01

## 2023-10-23 RX ADMIN — SODIUM CHLORIDE 1000 ML: 9 INJECTION, SOLUTION INTRAVENOUS at 13:55

## 2023-10-23 ASSESSMENT — PAIN - FUNCTIONAL ASSESSMENT: PAIN_FUNCTIONAL_ASSESSMENT: NONE - DENIES PAIN

## 2023-10-23 NOTE — DISCHARGE INSTRUCTIONS
Our COVID study was negative. X-ray negative for pneumonia. Breathing treatments given with improvement. I also gave you a dose of Robitussin AC/codeine which helped the cough. I have sent prescription for antibiotic, Robitussin with codeine and albuterol inhaler to your local pharmacy. I did avoid steroids at today's visit. I would recommend you consult allergy/immunology to determine true allergy or sensitivity to steroids.

## 2023-10-23 NOTE — ED TRIAGE NOTES
Pt to ER from home C/o SOB this morning. Was seen at urgent care and diagnosed with COVID. Pt states her home oximeter showed 82%. Pt 97% in triage. Pt states she has been sick for 10 days. Pt denies any pain.

## 2023-10-23 NOTE — TELEPHONE ENCOUNTER
Pr thinks she might has rsv.  Pt has a couch this is the 10th day sore throat fever 101 but keeping down with tylenol

## 2023-10-23 NOTE — ED PROVIDER NOTES
[address] [facility] [address] [address]        Pt Name: Denisa Wells  MRN: [id number]  [address] [facility] 1950  Date of evaluation: 10/23/2023  Provider: Nilam Simmons PA-C  PCP: Hali Juarez MD  Note Started: 1:23 PM EDT 10/23/23      JEROME. I have evaluated this patient. CHIEF COMPLAINT       Chief Complaint   Patient presents with    Positive For Covid-19     C/o SOB this morning. Was seen at urgent care and diagnosed with COVID. Pt states her home oximeter showed 82%. Pt 97% in triage. Pt states she has been sick for 10 days. Pt denies any pain. HISTORY OF PRESENT ILLNESS: 1 or more Elements     History From: Patient    Denisa Wells is a 68 y.o. female who presents to the emergency department at request of urgent care. I do not have their notes. Patient reports onset of symptoms 10 days ago with progressive cough, chest tightness, wheezing and yellow-green sputum productivity. Patient concerned about pneumonia. She went to urgent care to get some improved cough medication as OTC cough syrups not effective. She does report they did strep and flu which were both negative. She does state her COVID was positive. Referred to ED. The patient is a former smoker having quit 2021. Does not have COPD or CAD diagnosis. The patient's ED temp is 97.9 heart rate is 91 and oximetry room air at 99%. Respiratory rate is 22. Nursing Notes were all reviewed and agreed with or any disagreements were addressed in the HPI. REVIEW OF SYSTEMS :      Review of Systems    Positives and Pertinent negatives as per HPI.      SURGICAL HISTORY     Past Surgical History:   Procedure Laterality Date    ANKLE FRACTURE SURGERY Left 08/02/2021    LEFT ANKLE OPEN REDUCTION INTERNAL FIXATION performed by Gabriel Mason MD at [facility] Left 01/10/2022    LEFT SYNDESMOSIS SCREW REMOVAL performed by Gabriel Mason MD at [address] [place] [address]

## 2023-10-23 NOTE — ED NOTES
Discharge and education instructions reviewed. Patient verbalized understanding, teach-back successful. Patient denied questions at this time. No acute distress noted. Patient instructed to follow-up as noted - return to emergency department if symptoms worsen. Patient verbalized understanding. Discharged per EDMD with discharged instructions.       Nick Rey RN  10/23/23 0568

## 2023-10-23 NOTE — TELEPHONE ENCOUNTER
Pt called had trouble getting on for e visit and was at [facility]  she stated she is going to to to the emergency room.

## 2023-10-25 ENCOUNTER — CARE COORDINATION (OUTPATIENT)
Dept: CARE COORDINATION | Age: 73
End: 2023-10-25

## 2023-10-25 NOTE — CARE COORDINATION
Ambulatory Care Coordination  ED Follow up Call    Reason for ED visit:  Positive for Covid   Status:     improved    Did you call your PCP prior to going to the ED? No      Did you receive a discharge instructions from the Emergency Room? Yes  Review of Instructions:     Understands what to report/when to return?:  Yes   Understands discharge instructions?:  Yes   Following discharge instructions?:  Yes   If not why? Are there any new complaints of pain? No  New Pain Meds? No    Constipation prophylaxis needed? No    If you have a wound is the dressing clean, dry, and intact? N/A  Understands wound care regimen? N/A    Are there any other complaints/concerns that you wish to tell your provider? FU appts/Provider:    Future Appointments   Date Time Provider [address]   10/30/2023 11:30 AM Cort Opitz, MD [facility]&FLETCHER ROLDAN           New Medications?:   Yes      Medication Reconciliation by phone - Yes  Understands Medications? Yes  Taking Medications? Yes  Can you swallow your pills? Yes    Any further needs in the home i.e. Equipment? No    Link to services in community?:  No   Which services:      ACM spoke to pt who is independent, lives in an apartment with her daughter below her. Pt taking medications as prescribed, has no ACM needs. Pt states her cough medicine is helping loosen up what is in her chest.  Pt f/u appt scheduled for 10/30, but pt is asking for a virtual visit.   ACM will let PCP know about request.

## 2023-10-26 ENCOUNTER — TELEPHONE (OUTPATIENT)
Dept: INTERNAL MEDICINE CLINIC | Age: 73
End: 2023-10-26

## 2023-10-26 NOTE — TELEPHONE ENCOUNTER
DELMY Mahmood MD20 hours ago (3:25 PM)     TRISTON Ch Link Headings:  Pt has ed f/u appt scheduled for 10/30. She is requesting a virtual visit if possible. Would you please have staff reach out to her and schedule virtual visit or let her know that she needs to come in? Thank you.

## 2023-10-27 ENCOUNTER — TELEPHONE (OUTPATIENT)
Dept: INTERNAL MEDICINE CLINIC | Age: 73
End: 2023-10-27

## 2023-12-27 ENCOUNTER — OFFICE VISIT (OUTPATIENT)
Dept: INTERNAL MEDICINE CLINIC | Age: 73
End: 2023-12-27

## 2023-12-27 VITALS
BODY MASS INDEX: 30 KG/M2 | WEIGHT: 163 LBS | HEART RATE: 85 BPM | HEIGHT: 62 IN | OXYGEN SATURATION: 96 % | SYSTOLIC BLOOD PRESSURE: 130 MMHG | DIASTOLIC BLOOD PRESSURE: 87 MMHG

## 2023-12-27 DIAGNOSIS — Z82.49 FAMILY HISTORY OF HEART DISEASE: ICD-10-CM

## 2023-12-27 DIAGNOSIS — Z01.818 PRE-OP EVALUATION: Primary | ICD-10-CM

## 2023-12-27 DIAGNOSIS — I10 ESSENTIAL (PRIMARY) HYPERTENSION: ICD-10-CM

## 2023-12-27 NOTE — PROGRESS NOTES
Preoperative Consultation      Palak Faust  YOB: 1950    Date of Service:  12/27/2023    Vitals:    12/27/23 1317   BP: 130/87   Pulse: 85   SpO2: 96%   Weight: 73.9 kg (163 lb)   Height: 1.575 m (5' 2\")      Wt Readings from Last 2 Encounters:   12/27/23 73.9 kg (163 lb)   10/23/23 71.2 kg (157 lb 0.5 oz)     BP Readings from Last 3 Encounters:   12/27/23 130/87   10/23/23 (!) 156/99   06/06/23 127/89        Chief Complaint   Patient presents with    Pre-op Exam     [facility] eye prodedure 1/15/24     Allergies   Allergen Reactions    Corticosteroids Hives     Both injectable steroids as well as by mouth steroids caused her to break out in hives. Lipitor [Atorvastatin]      weakness    Morphine      States not allergic, states it makes her nauseated    Cortizone-10 [Hydrocortisone] Rash    Phenergan [Promethazine] Rash    Sulfa Antibiotics Rash     Outpatient Medications Marked as Taking for the 12/27/23 encounter (Office Visit) with Amy Soto MD   Medication Sig Dispense Refill    albuterol sulfate HFA (PROVENTIL;VENTOLIN;PROAIR) 108 (90 Base) MCG/ACT inhaler Inhale 1-2 puffs into the lungs every 6 hours as needed for Wheezing 18 g 0    valACYclovir (VALTREX) 500 MG tablet TAKE 1 TABLET BY MOUTH AS NEEDED 30 tablet 3    traZODone (DESYREL) 50 MG tablet Take 1 tablet by mouth nightly as needed for Sleep 90 tablet 3    aspirin 81 MG EC tablet Take 1 tablet by mouth daily      Multiple Vitamins-Minerals (THERAPEUTIC MULTIVITAMIN-MINERALS) tablet Take 1 tablet by mouth daily         This patient presents to the office today for a preoperative consultation at the request of surgeon, Dr. Jace Qureshi, who plans on performing bilateral upper lid blepharoplasty on January 15 at [address]. Pt is looking forward to having full visual field as this has been getting a lot worse over time.   Planned anesthesia: Local and MAC   Known anesthesia problems: None   Bleeding risk: No recent or

## 2023-12-28 ENCOUNTER — TELEPHONE (OUTPATIENT)
Dept: INTERNAL MEDICINE CLINIC | Age: 73
End: 2023-12-28

## 2023-12-28 NOTE — PROGRESS NOTES
[facility] PRE-OPERATIVE INSTRUCTIONS    Day of Procedure:     1/15/24           Arrival time:     0745           Surgery time: 0915    Take the following medications with a sip of water:  Follow your MD/Surgeons pre-procedure instructions regarding your medications     Do not eat or drink anything after 12:00 midnight prior to your surgery.  This includes water chewing gum, mints and ice chips.   You may brush your teeth and gargle the morning of your surgery, but do not swallow the water     Please see your family doctor/pediatrician for a history and physical and/or concerning medications. H&P 12/27/23 YURI Justice    Bring any test results/reports from your physicians office.   If you are under the care of a heart doctor or specialist doctor, please be aware that you may be asked to them for clearance    You may be asked to stop blood thinners such as Coumadin, Plavix, Fragmin, Lovenox, etc., or any anti-inflammatories such as:  Aspirin, Ibuprofen, Advil, Naproxen prior to your surgery.    We also ask that you stop any OTC medications such as fish oil, vitamin E, glucosamine, garlic, Multivitamins, COQ 10, etc.    We ask that you do not smoke 24 hours prior to surgery  We ask that you do not  drink any alcoholic beverages 24 hours prior to surgery     You must make arrangements for a responsible adult to take you home after your surgery.    For your safety you will not be allowed to leave alone or drive yourself home.  Your surgery will be cancelled if you do not have a ride home.     Also for your safety, it is strongly suggested that someone stay with you the first 24 hours after your surgery.     A parent or legal guardian must accompany a child scheduled for surgery and plan to stay at the hospital until the child is discharged.    Please do not bring other children with you.    For your comfort, please wear simple loose fitting clothing to the hospital.  Please do not bring valuables. Wear short

## 2023-12-29 PROBLEM — M54.9 BACK PAIN: Status: RESOLVED | Noted: 2021-09-04 | Resolved: 2023-12-29

## 2024-01-12 ENCOUNTER — ANESTHESIA EVENT (OUTPATIENT)
Dept: SURGERY | Age: 74
End: 2024-01-12
Payer: MEDICARE

## 2024-01-12 NOTE — PRE-PROCEDURE INSTRUCTIONS
[address] [facility] [address]  [phone]        Pre-Op Phone Call:     Patient Name: Samra Houser     Telephone Information:   Mobile [phone]     Home phone:  [phone]    Surgery Time:    9:15 AM     Arrival Time:     0745  Left extended Message:  Yes     Message left with:[phone]     Recent change in health status:  NA     Advised of transportation/ policy:  Yes     NPO policy reviewed:  Yes     Advised to take morning heart/blood pressure medications with sips of water morning of surgery?  Yes     Instructed to bring eye drops, photo identification, and insurance card day of surgery?  Yes     Advised to wear short sleeved button down shirt (no T-shirt underneath):  Yes     Advised not to wear jewelry, hairpins, or pantyhose day of surgery?  Yes     Advised not to wear make-up and to wash face day of surgery?  Yes    Remarks:        Electronically signed by:  Maddie Pena RN at 1/12/2024 10:37 AM

## 2024-01-15 ENCOUNTER — ANESTHESIA (OUTPATIENT)
Dept: SURGERY | Age: 74
End: 2024-01-15
Payer: MEDICARE

## 2024-01-15 ENCOUNTER — HOSPITAL ENCOUNTER (OUTPATIENT)
Age: 74
Setting detail: OUTPATIENT SURGERY
Discharge: HOME | End: 2024-01-15
Attending: OPHTHALMOLOGY | Admitting: OPHTHALMOLOGY
Payer: MEDICARE

## 2024-01-15 VITALS
HEIGHT: 62 IN | SYSTOLIC BLOOD PRESSURE: 115 MMHG | HEART RATE: 90 BPM | RESPIRATION RATE: 22 BRPM | WEIGHT: 163 LBS | BODY MASS INDEX: 30 KG/M2 | OXYGEN SATURATION: 94 % | TEMPERATURE: 96.5 F | DIASTOLIC BLOOD PRESSURE: 80 MMHG

## 2024-01-15 PROCEDURE — 6370000000 HC RX 637 (ALT 250 FOR IP): Performed by: OPHTHALMOLOGY

## 2024-01-15 PROCEDURE — 2500000003 HC RX 250 WO HCPCS: Performed by: OPHTHALMOLOGY

## 2024-01-15 PROCEDURE — 3700000001 HC ADD 15 MINUTES (ANESTHESIA): Performed by: OPHTHALMOLOGY

## 2024-01-15 PROCEDURE — 3700000000 HC ANESTHESIA ATTENDED CARE: Performed by: OPHTHALMOLOGY

## 2024-01-15 PROCEDURE — 3600000002 HC SURGERY LEVEL 2 BASE: Performed by: OPHTHALMOLOGY

## 2024-01-15 PROCEDURE — 2580000003 HC RX 258: Performed by: ANESTHESIOLOGY

## 2024-01-15 PROCEDURE — 2709999900 HC NON-CHARGEABLE SUPPLY: Performed by: OPHTHALMOLOGY

## 2024-01-15 PROCEDURE — 2500000003 HC RX 250 WO HCPCS: Performed by: NURSE ANESTHETIST, CERTIFIED REGISTERED

## 2024-01-15 PROCEDURE — 7100000010 HC PHASE II RECOVERY - FIRST 15 MIN: Performed by: OPHTHALMOLOGY

## 2024-01-15 PROCEDURE — 6360000002 HC RX W HCPCS: Performed by: OPHTHALMOLOGY

## 2024-01-15 PROCEDURE — 6360000002 HC RX W HCPCS: Performed by: NURSE ANESTHETIST, CERTIFIED REGISTERED

## 2024-01-15 PROCEDURE — 3600000012 HC SURGERY LEVEL 2 ADDTL 15MIN: Performed by: OPHTHALMOLOGY

## 2024-01-15 RX ORDER — FENTANYL CITRATE 50 UG/ML
INJECTION, SOLUTION INTRAMUSCULAR; INTRAVENOUS PRN
Status: DISCONTINUED | OUTPATIENT
Start: 2024-01-15 | End: 2024-01-15 | Stop reason: SDUPTHER

## 2024-01-15 RX ORDER — DIPHENHYDRAMINE HYDROCHLORIDE 50 MG/ML
12.5 INJECTION INTRAMUSCULAR; INTRAVENOUS
Status: CANCELLED | OUTPATIENT
Start: 2024-01-15 | End: 2024-01-16

## 2024-01-15 RX ORDER — ERYTHROMYCIN 5 MG/G
OINTMENT OPHTHALMIC
Status: COMPLETED | OUTPATIENT
Start: 2024-01-15 | End: 2024-01-15

## 2024-01-15 RX ORDER — PROPOFOL 10 MG/ML
INJECTION, EMULSION INTRAVENOUS CONTINUOUS PRN
Status: DISCONTINUED | OUTPATIENT
Start: 2024-01-15 | End: 2024-01-15 | Stop reason: SDUPTHER

## 2024-01-15 RX ORDER — TETRACAINE HYDROCHLORIDE 5 MG/ML
SOLUTION OPHTHALMIC
Status: COMPLETED | OUTPATIENT
Start: 2024-01-15 | End: 2024-01-15

## 2024-01-15 RX ORDER — SODIUM CHLORIDE 0.9 % (FLUSH) 0.9 %
5-40 SYRINGE (ML) INJECTION EVERY 12 HOURS SCHEDULED
Status: CANCELLED | OUTPATIENT
Start: 2024-01-15

## 2024-01-15 RX ORDER — LIDOCAINE HYDROCHLORIDE 20 MG/ML
INJECTION, SOLUTION INFILTRATION; PERINEURAL PRN
Status: DISCONTINUED | OUTPATIENT
Start: 2024-01-15 | End: 2024-01-15 | Stop reason: SDUPTHER

## 2024-01-15 RX ORDER — SODIUM CHLORIDE 9 MG/ML
INJECTION, SOLUTION INTRAVENOUS PRN
Status: CANCELLED | OUTPATIENT
Start: 2024-01-15

## 2024-01-15 RX ORDER — SODIUM CHLORIDE 9 MG/ML
INJECTION, SOLUTION INTRAVENOUS PRN
Status: DISCONTINUED | OUTPATIENT
Start: 2024-01-15 | End: 2024-01-15 | Stop reason: HOSPADM

## 2024-01-15 RX ORDER — PROPOFOL 10 MG/ML
INJECTION, EMULSION INTRAVENOUS PRN
Status: DISCONTINUED | OUTPATIENT
Start: 2024-01-15 | End: 2024-01-15 | Stop reason: SDUPTHER

## 2024-01-15 RX ORDER — SODIUM CHLORIDE 0.9 % (FLUSH) 0.9 %
5-40 SYRINGE (ML) INJECTION PRN
Status: DISCONTINUED | OUTPATIENT
Start: 2024-01-15 | End: 2024-01-15 | Stop reason: HOSPADM

## 2024-01-15 RX ORDER — ONDANSETRON 2 MG/ML
4 INJECTION INTRAMUSCULAR; INTRAVENOUS
Status: CANCELLED | OUTPATIENT
Start: 2024-01-15 | End: 2024-01-16

## 2024-01-15 RX ORDER — SODIUM CHLORIDE 0.9 % (FLUSH) 0.9 %
5-40 SYRINGE (ML) INJECTION PRN
Status: CANCELLED | OUTPATIENT
Start: 2024-01-15

## 2024-01-15 RX ORDER — SODIUM CHLORIDE 0.9 % (FLUSH) 0.9 %
5-40 SYRINGE (ML) INJECTION EVERY 12 HOURS SCHEDULED
Status: DISCONTINUED | OUTPATIENT
Start: 2024-01-15 | End: 2024-01-15 | Stop reason: HOSPADM

## 2024-01-15 RX ADMIN — LIDOCAINE HYDROCHLORIDE 50 MG: 20 INJECTION, SOLUTION INFILTRATION; PERINEURAL at 09:14

## 2024-01-15 RX ADMIN — SODIUM CHLORIDE: 9 INJECTION, SOLUTION INTRAVENOUS at 08:38

## 2024-01-15 RX ADMIN — PROPOFOL 140 MCG/KG/MIN: 10 INJECTION, EMULSION INTRAVENOUS at 09:14

## 2024-01-15 RX ADMIN — FENTANYL CITRATE 50 MCG: 50 INJECTION INTRAMUSCULAR; INTRAVENOUS at 09:08

## 2024-01-15 RX ADMIN — PROPOFOL 100 MG: 10 INJECTION, EMULSION INTRAVENOUS at 09:14

## 2024-01-15 ASSESSMENT — PAIN - FUNCTIONAL ASSESSMENT
PAIN_FUNCTIONAL_ASSESSMENT: 0-10

## 2024-01-15 ASSESSMENT — LIFESTYLE VARIABLES: SMOKING_STATUS: 0

## 2024-01-15 NOTE — DISCHARGE INSTRUCTIONS
Post-Operative Instructions for Ophthalmic Plastic Surgery      ACTIVITY:     Today, rest quietly at home. Sit upright as much as possible and sleep with your head elevated for 1 week. Do not perform strenuous activity for 1 week.  You may take a shower or bath 24 hours after surgery.  It is Ok if the sutures get wet.  A responsible person must accompany you home.  Do not drive for 24 hours.    EYE  CARE:   Place baggies of frozen peas or corn on each operated eye for 20 min on and 20 min off while awake; discontinue at bedtime.  Do this for 2 days.  Apply antibiotic ointment to the sutures twice a day for 2 weeks.  Small amounts of bloody drainage may occur after surgery and will usually stop with firm pressure applied for 5 minutes; use a clean washcloth.  If this does not stop the bleeding, then call immediately.  Some soreness, swelling, bruising, and slightly blurry vision are normal.  If you have decreased vision, excessive swelling or bruising, or bulging forward of the eyeball, then please call immediately.    DIET:    You may resume your regular diet.  No alcohol for 24 hours.  Resume your regular medications.  Refer to surgical packet for instructions on when to restart Coumadin, Plavix and aspirin.    PAIN:   You may take Tylenol (acetaminophen) for pain.  If this does not relieve your pain, then please call.    OTHER:   If you experience nausea, vomiting, or excessive pain, please contact your surgeon immediately.      Please call our main number if you have any questions or problems:  [phone]

## 2024-01-15 NOTE — ANESTHESIA PRE PROCEDURE
Department of Anesthesiology  Preprocedure Note       Name:  Samra Houser   Age:  73 y.o.  :  1950                                          MRN:  [id number]         Date:  1/15/2024      Surgeon: Surgeon(s):  Zhane Scherer MD    Procedure: Procedure(s):  BLEPHAROPLASTY - BILATERAL UPPER LIDS    Medications prior to admission:   Prior to Admission medications    Medication Sig Start Date End Date Taking? Authorizing Provider   Probiotic Product (PROBIOTIC DAILY PO) Take by mouth   Yes Jean Garcia MD   valACYclovir (VALTREX) 500 MG tablet TAKE 1 TABLET BY MOUTH AS NEEDED  Patient not taking: Reported on 2023   Pebbles Justice MD   traZODone (DESYREL) 50 MG tablet Take 1 tablet by mouth nightly as needed for Sleep 1/10/23   Pebbles Justice MD   aspirin 81 MG EC tablet Take 1 tablet by mouth daily    Jean Garcia MD   Multiple Vitamins-Minerals (THERAPEUTIC MULTIVITAMIN-MINERALS) tablet Take 1 tablet by mouth daily    ProviderJean MD       Current medications:    Current Facility-Administered Medications   Medication Dose Route Frequency Provider Last Rate Last Admin    sodium chloride flush 0.9 % injection 5-40 mL  5-40 mL IntraVENous 2 times per day Maddy Jiménez MD        sodium chloride flush 0.9 % injection 5-40 mL  5-40 mL IntraVENous PRN Maddy Jiménez MD        0.9 % sodium chloride infusion   IntraVENous PRN Maddy Jiménez  mL/hr at 01/15/24 0838 New Bag at 01/15/24 0838       Allergies:    Allergies   Allergen Reactions    Corticosteroids Hives     Both injectable steroids as well as by mouth steroids caused her to break out in hives.    Lipitor [Atorvastatin]      Weakness; refuses to try any statins    Morphine      States not allergic, states it makes her nauseated    Cortizone-10 [Hydrocortisone] Rash    Phenergan [Promethazine] Rash    Sulfa Antibiotics Rash       Problem List:    Patient Active Problem List   Diagnosis Code

## 2024-01-15 NOTE — ANESTHESIA POSTPROCEDURE EVALUATION
Department of Anesthesiology  Postprocedure Note    Patient: Samra Houser  MRN: [id number]  YOB: 1950  Date of evaluation: 1/15/2024    Procedure Summary       Date: 01/15/24 Room / Location: [address]    Anesthesia Start: 0908 Anesthesia Stop: 0937    Procedure: BLEPHAROPLASTY - BILATERAL UPPER LIDS (Bilateral: Face) Diagnosis:       Dermatochalasis of both upper eyelids      (Dermatochalasis of both upper eyelids [H02.831, H02.834])    Surgeons: Zhane Scherer MD Responsible Provider: Alvarado Randolph MD    Anesthesia Type: MAC ASA Status: 2            Anesthesia Type: No value filed.    Jere Phase I: Jere Score: 10    Jere Phase II: Jere Score: 10    Anesthesia Post Evaluation    Patient location during evaluation: bedside  Patient participation: complete - patient participated  Level of consciousness: awake and alert  Pain score: 0  Nausea & Vomiting: no nausea  Cardiovascular status: hemodynamically stable  Respiratory status: acceptable  Hydration status: stable  Pain management: adequate    No notable events documented.

## 2024-01-15 NOTE — OP NOTE
Title of Operation:  Bilateral upper lid functional blepharoplasty, CPT code 16217-74  Indications for Surgery:  73 y.o. female who presented visually significant bilateral upper lid dermatochalasis, for which medically necessary blepharoplasty was indicated. After benefits, risks and alternatives were discussed, the patient elected to proceed with surgical repair.   Preoperative Diagnosis:  Bilateral upper lid dermatochalasis  Postoperative Diagnosis:  Bilateral upper lid dermatochalasis  Anesthesia:   Monitored anesthesia care (MAC) and Local.  Specimen (Bacteriological, Pathological or other):  None  Prosthetic Device/Implant:  None  Surgeon:  Zhane Scherer MD  Assistant:  None  Estimated blood loss:  Less than 5mL  Surgeons Narrative:  The patient was greeted in the preoperative area.  The correct place for surgery was identified and marked.  The patient was taken back to the operating room and placed in supine position.  Time-out for safety was held.  The upper eyelid crease and an ellipse of upper lid skin were measured and marked on each side with a marking pen. Intravenous sedation was administered. A 50:50 mix of 2% lidocaine with 1:100,000 epinephrine and 0.75% marcaine was injected in these areas for local anesthesia. The patient was then prepped and draped in the usual sterile fashion. The right upper lid was addressed first. The skin was incised with a #15 blade. The strip of excess skin was removed with Niurka scissors. Careful hemostasis was achieved with bipolar cautery. The skin was then closed with a running 6-0 plain gut suture. The exact same procedure was performed in the left upper lid. Antibiotic ointment was applied in the eyes and on the incision sites. The patient tolerated the procedure very well. There were no complications.

## 2024-01-15 NOTE — H&P
Date of Surgery Update:  Samra Houser was seen, history and physical examination reviewed, and patient examined by me today. There have been no significant clinical changes since the completion of the previous history and physical. The surgical site was confirmed by the patient and me.     The risk, benefits, and alternatives of the proposed procedure have been explained to the patient (or appropriate guardian) and understanding verbalized. All questions answered. Patient wishes to proceed.    Electronically signed by: Zhane Scherer MD,1/15/2024,8:41 AM

## 2024-02-23 ENCOUNTER — APPOINTMENT (OUTPATIENT)
Dept: CT IMAGING | Age: 74
End: 2024-02-23
Payer: MEDICARE

## 2024-02-23 ENCOUNTER — APPOINTMENT (OUTPATIENT)
Dept: GENERAL RADIOLOGY | Age: 74
End: 2024-02-23
Payer: MEDICARE

## 2024-02-23 ENCOUNTER — HOSPITAL ENCOUNTER (EMERGENCY)
Age: 74
Discharge: HOME | End: 2024-02-23
Attending: EMERGENCY MEDICINE
Payer: MEDICARE

## 2024-02-23 ENCOUNTER — TELEPHONE (OUTPATIENT)
Dept: INTERNAL MEDICINE CLINIC | Age: 74
End: 2024-02-23

## 2024-02-23 VITALS
OXYGEN SATURATION: 98 % | RESPIRATION RATE: 16 BRPM | TEMPERATURE: 98.1 F | HEIGHT: 62 IN | DIASTOLIC BLOOD PRESSURE: 88 MMHG | SYSTOLIC BLOOD PRESSURE: 138 MMHG | WEIGHT: 163.8 LBS | BODY MASS INDEX: 30.14 KG/M2 | HEART RATE: 77 BPM

## 2024-02-23 DIAGNOSIS — U07.1 COVID-19: Primary | ICD-10-CM

## 2024-02-23 DIAGNOSIS — J98.01 ACUTE BRONCHOSPASM: ICD-10-CM

## 2024-02-23 LAB
ALBUMIN SERPL-MCNC: 3.9 G/DL (ref 3.4–5)
ALBUMIN/GLOB SERPL: 1.4 {RATIO} (ref 1.1–2.2)
ALP SERPL-CCNC: 78 U/L (ref 40–129)
ALT SERPL-CCNC: 20 U/L (ref 10–40)
ANION GAP SERPL CALCULATED.3IONS-SCNC: 12 MMOL/L (ref 3–16)
AST SERPL-CCNC: 22 U/L (ref 15–37)
BASE EXCESS BLDV CALC-SCNC: 0.8 MMOL/L
BASOPHILS # BLD: 0 K/UL (ref 0–0.2)
BASOPHILS NFR BLD: 0.5 %
BILIRUB SERPL-MCNC: 0.3 MG/DL (ref 0–1)
BILIRUB UR QL STRIP.AUTO: NEGATIVE
BUN SERPL-MCNC: 17 MG/DL (ref 7–20)
CALCIUM SERPL-MCNC: 9.1 MG/DL (ref 8.3–10.6)
CHLORIDE SERPL-SCNC: 104 MMOL/L (ref 99–110)
CLARITY UR: CLEAR
CO2 BLDV-SCNC: 24 MMOL/L
CO2 SERPL-SCNC: 21 MMOL/L (ref 21–32)
COHGB MFR BLDV: 0.8 %
COLOR UR: YELLOW
CREAT SERPL-MCNC: 0.7 MG/DL (ref 0.6–1.2)
D DIMER: 0.41 UG/ML FEU (ref 0–0.6)
DEPRECATED RDW RBC AUTO: 13.3 % (ref 12.4–15.4)
EKG ATRIAL RATE: 73 BPM
EKG DIAGNOSIS: NORMAL
EKG P AXIS: 67 DEGREES
EKG P-R INTERVAL: 114 MS
EKG Q-T INTERVAL: 368 MS
EKG QRS DURATION: 82 MS
EKG QTC CALCULATION (BAZETT): 405 MS
EKG R AXIS: 35 DEGREES
EKG T AXIS: 38 DEGREES
EKG VENTRICULAR RATE: 73 BPM
EOSINOPHIL # BLD: 0.1 K/UL (ref 0–0.6)
EOSINOPHIL NFR BLD: 1.4 %
FLUAV RNA UPPER RESP QL NAA+PROBE: NEGATIVE
FLUBV AG NPH QL: NEGATIVE
GFR SERPLBLD CREATININE-BSD FMLA CKD-EPI: >60 ML/MIN/{1.73_M2}
GLUCOSE SERPL-MCNC: 95 MG/DL (ref 70–99)
GLUCOSE UR STRIP.AUTO-MCNC: NEGATIVE MG/DL
HCO3 BLDV-SCNC: 23 MMOL/L (ref 23–29)
HCT VFR BLD AUTO: 40.2 % (ref 36–48)
HGB BLD-MCNC: 13.5 G/DL (ref 12–16)
HGB UR QL STRIP.AUTO: NEGATIVE
KETONES UR STRIP.AUTO-MCNC: NEGATIVE MG/DL
LEUKOCYTE ESTERASE UR QL STRIP.AUTO: NEGATIVE
LYMPHOCYTES # BLD: 1.9 K/UL (ref 1–5.1)
LYMPHOCYTES NFR BLD: 38.5 %
MCH RBC QN AUTO: 31.7 PG (ref 26–34)
MCHC RBC AUTO-ENTMCNC: 33.5 G/DL (ref 31–36)
MCV RBC AUTO: 94.7 FL (ref 80–100)
METHGB MFR BLDV: 0.2 %
MONOCYTES # BLD: 0.5 K/UL (ref 0–1.3)
MONOCYTES NFR BLD: 9.7 %
NEUTROPHILS # BLD: 2.5 K/UL (ref 1.7–7.7)
NEUTROPHILS NFR BLD: 49.9 %
NITRITE UR QL STRIP.AUTO: NEGATIVE
NT-PROBNP SERPL-MCNC: 68 PG/ML (ref 0–124)
O2 THERAPY: ABNORMAL
PCO2 BLDV: 29 MMHG (ref 40–50)
PH BLDV: 7.51 [PH] (ref 7.35–7.45)
PH UR STRIP.AUTO: 5 [PH] (ref 5–8)
PLATELET # BLD AUTO: 162 K/UL (ref 135–450)
PLATELET BLD QL SMEAR: ADEQUATE
PMV BLD AUTO: 10.1 FL (ref 5–10.5)
PO2 BLDV: 154 MMHG
POTASSIUM SERPL-SCNC: 4.1 MMOL/L (ref 3.5–5.1)
PROT SERPL-MCNC: 6.7 G/DL (ref 6.4–8.2)
PROT UR STRIP.AUTO-MCNC: NEGATIVE MG/DL
RBC # BLD AUTO: 4.24 M/UL (ref 4–5.2)
RBC MORPH BLD: NORMAL
SAO2 % BLDV: 100 %
SARS-COV-2 RDRP RESP QL NAA+PROBE: DETECTED
SLIDE REVIEW: NORMAL
SODIUM SERPL-SCNC: 137 MMOL/L (ref 136–145)
SP GR UR STRIP.AUTO: 1.01 (ref 1–1.03)
TROPONIN, HIGH SENSITIVITY: 10 NG/L (ref 0–14)
TROPONIN, HIGH SENSITIVITY: 6 NG/L (ref 0–14)
UA COMPLETE W REFLEX CULTURE PNL UR: NORMAL
UA DIPSTICK W REFLEX MICRO PNL UR: NORMAL
URN SPEC COLLECT METH UR: NORMAL
UROBILINOGEN UR STRIP-ACNC: 0.2 E.U./DL
WBC # BLD AUTO: 5 K/UL (ref 4–11)

## 2024-02-23 PROCEDURE — 2580000003 HC RX 258: Performed by: EMERGENCY MEDICINE

## 2024-02-23 PROCEDURE — 6360000002 HC RX W HCPCS: Performed by: EMERGENCY MEDICINE

## 2024-02-23 PROCEDURE — 85025 COMPLETE CBC W/AUTO DIFF WBC: CPT

## 2024-02-23 PROCEDURE — 85379 FIBRIN DEGRADATION QUANT: CPT

## 2024-02-23 PROCEDURE — 6360000004 HC RX CONTRAST MEDICATION: Performed by: EMERGENCY MEDICINE

## 2024-02-23 PROCEDURE — 99285 EMERGENCY DEPT VISIT HI MDM: CPT

## 2024-02-23 PROCEDURE — 94760 N-INVAS EAR/PLS OXIMETRY 1: CPT

## 2024-02-23 PROCEDURE — 83880 ASSAY OF NATRIURETIC PEPTIDE: CPT

## 2024-02-23 PROCEDURE — 87804 INFLUENZA ASSAY W/OPTIC: CPT

## 2024-02-23 PROCEDURE — 80053 COMPREHEN METABOLIC PANEL: CPT

## 2024-02-23 PROCEDURE — 71046 X-RAY EXAM CHEST 2 VIEWS: CPT

## 2024-02-23 PROCEDURE — 82803 BLOOD GASES ANY COMBINATION: CPT

## 2024-02-23 PROCEDURE — 84484 ASSAY OF TROPONIN QUANT: CPT

## 2024-02-23 PROCEDURE — 6370000000 HC RX 637 (ALT 250 FOR IP): Performed by: EMERGENCY MEDICINE

## 2024-02-23 PROCEDURE — 93005 ELECTROCARDIOGRAM TRACING: CPT | Performed by: EMERGENCY MEDICINE

## 2024-02-23 PROCEDURE — 70491 CT SOFT TISSUE NECK W/DYE: CPT

## 2024-02-23 PROCEDURE — 96374 THER/PROPH/DIAG INJ IV PUSH: CPT

## 2024-02-23 PROCEDURE — 94640 AIRWAY INHALATION TREATMENT: CPT

## 2024-02-23 PROCEDURE — 87635 SARS-COV-2 COVID-19 AMP PRB: CPT

## 2024-02-23 PROCEDURE — 81003 URINALYSIS AUTO W/O SCOPE: CPT

## 2024-02-23 PROCEDURE — 93010 ELECTROCARDIOGRAM REPORT: CPT | Performed by: INTERNAL MEDICINE

## 2024-02-23 RX ORDER — 0.9 % SODIUM CHLORIDE 0.9 %
1000 INTRAVENOUS SOLUTION INTRAVENOUS ONCE
Status: COMPLETED | OUTPATIENT
Start: 2024-02-23 | End: 2024-02-23

## 2024-02-23 RX ORDER — IPRATROPIUM BROMIDE AND ALBUTEROL SULFATE 2.5; .5 MG/3ML; MG/3ML
1 SOLUTION RESPIRATORY (INHALATION) ONCE
Status: COMPLETED | OUTPATIENT
Start: 2024-02-23 | End: 2024-02-23

## 2024-02-23 RX ORDER — BENZONATATE 100 MG/1
100 CAPSULE ORAL ONCE
Status: COMPLETED | OUTPATIENT
Start: 2024-02-23 | End: 2024-02-23

## 2024-02-23 RX ORDER — ONDANSETRON 4 MG/1
4 TABLET, ORALLY DISINTEGRATING ORAL 3 TIMES DAILY PRN
Qty: 21 TABLET | Refills: 0 | Status: SHIPPED | OUTPATIENT
Start: 2024-02-23

## 2024-02-23 RX ORDER — ONDANSETRON 2 MG/ML
4 INJECTION INTRAMUSCULAR; INTRAVENOUS ONCE
Status: COMPLETED | OUTPATIENT
Start: 2024-02-23 | End: 2024-02-23

## 2024-02-23 RX ORDER — ALBUTEROL SULFATE 90 UG/1
2 AEROSOL, METERED RESPIRATORY (INHALATION) 4 TIMES DAILY PRN
Qty: 18 G | Refills: 0 | Status: SHIPPED | OUTPATIENT
Start: 2024-02-23

## 2024-02-23 RX ORDER — BENZONATATE 100 MG/1
100 CAPSULE ORAL 3 TIMES DAILY PRN
Qty: 21 CAPSULE | Refills: 0 | Status: SHIPPED | OUTPATIENT
Start: 2024-02-23 | End: 2024-03-01

## 2024-02-23 RX ADMIN — BENZONATATE 100 MG: 100 CAPSULE ORAL at 10:40

## 2024-02-23 RX ADMIN — ONDANSETRON 4 MG: 2 INJECTION INTRAMUSCULAR; INTRAVENOUS at 10:39

## 2024-02-23 RX ADMIN — IOPAMIDOL 75 ML: 755 INJECTION, SOLUTION INTRAVENOUS at 11:06

## 2024-02-23 RX ADMIN — IPRATROPIUM BROMIDE AND ALBUTEROL SULFATE 1 DOSE: 2.5; .5 SOLUTION RESPIRATORY (INHALATION) at 10:17

## 2024-02-23 RX ADMIN — SODIUM CHLORIDE 1000 ML: 9 INJECTION, SOLUTION INTRAVENOUS at 10:37

## 2024-02-23 ASSESSMENT — PAIN DESCRIPTION - LOCATION
LOCATION: FLANK
LOCATION: FLANK

## 2024-02-23 ASSESSMENT — PAIN - FUNCTIONAL ASSESSMENT: PAIN_FUNCTIONAL_ASSESSMENT: 0-10

## 2024-02-23 ASSESSMENT — PAIN SCALES - GENERAL
PAINLEVEL_OUTOF10: 5
PAINLEVEL_OUTOF10: 4

## 2024-02-23 NOTE — ED PROVIDER NOTES
COLONOSCOPY N/A 2022    COLONOSCOPY POLYPECTOMY SNARE/COLD BIOPSY performed by Hitesh Chilel MD at [facility] ENDOSCOPY    DILATATION, ESOPHAGUS      ENDOSCOPY, COLON, DIAGNOSTIC      EYE SURGERY Bilateral     cataract removal    FRACTURE SURGERY Left     ankle    HYSTERECTOMY (CERVIX STATUS UNKNOWN)      uterus only    JOINT REPLACEMENT Left     hip    KNEE ARTHROSCOPY Right 2023    RIGHT KNEE ARTHROSCOPY PARTIAL MEDIAL MENISCECTOMY performed by Oleksandr Jiménez MD at [facility] OR    PARTIAL HYSTERECTOMY (CERVIX NOT REMOVED)      TONSILLECTOMY      TOTAL HIP ARTHROPLASTY      left    TUMOR EXCISION      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         CURRENT MEDICATIONS       Discharge Medication List as of 2024 12:49 PM        CONTINUE these medications which have NOT CHANGED    Details   Probiotic Product (PROBIOTIC DAILY PO) Take by mouthHistorical Med      valACYclovir (VALTREX) 500 MG tablet TAKE 1 TABLET BY MOUTH AS NEEDED, Disp-30 tablet, R-3Normal      traZODone (DESYREL) 50 MG tablet Take 1 tablet by mouth nightly as needed for Sleep, Disp-90 tablet, R-3Normal      aspirin 81 MG EC tablet Take 1 tablet by mouth dailyHistorical Med      Multiple Vitamins-Minerals (THERAPEUTIC MULTIVITAMIN-MINERALS) tablet Take 1 tablet by mouth dailyHistorical Med             ALLERGIES     Corticosteroids, Lipitor [atorvastatin], Morphine, Cortizone-10 [hydrocortisone], Phenergan [promethazine], and Sulfa antibiotics    FAMILY HISTORY       Family History   Problem Relation Age of Onset    Heart Failure Mother     Cancer Father         lung ca          SOCIAL HISTORY       Social History     Socioeconomic History    Marital status:      Spouse name: None    Number of children: None    Years of education: None    Highest education level: None   Tobacco Use    Smoking status: Former     Current packs/day: 0.00     Types: Cigarettes     Quit date:      Years since quittin.1    Smokeless

## 2024-02-23 NOTE — TELEPHONE ENCOUNTER
Pt has had covid since Saturday pt had swelling on the left side of pt neck under the glands and is having    [address] Comprehensive

## 2024-02-23 NOTE — TELEPHONE ENCOUNTER
I spoke to patient she tells me she tested positive for Covid on Saturday 2/14/24 and was feeling better as of yesterday. She woke up this am with difficulty breathing and a large lump on the side of her neck. I advised her to go to the emergency this am to be evaluated for difficulty breathing. Patient agrees and is going to go to [facility] emergency room.

## 2024-02-23 NOTE — ED NOTES
This RN answered call from lab for ED HUC, lab informed that chemistry line went down for a few minutes and this specific sample will be run again shortly. RN informed EDMD. No new orders at this time.

## 2024-03-12 DIAGNOSIS — F51.01 PRIMARY INSOMNIA: ICD-10-CM

## 2024-03-12 RX ORDER — TRAZODONE HYDROCHLORIDE 50 MG/1
TABLET ORAL
Qty: 90 TABLET | Refills: 3 | Status: SHIPPED | OUTPATIENT
Start: 2024-03-12

## 2024-03-12 NOTE — TELEPHONE ENCOUNTER
Requested Prescriptions     Pending Prescriptions Disp Refills    traZODone (DESYREL) 50 MG tablet [Pharmacy Med Name: TRAZODONE 50MG TABLETS] 90 tablet 3     Sig: TAKE 1 TABLET BY MOUTH ONCE NIGHTLY AS NEEDED FOR SLEEP   Patient requesting a medication refill.  Pharmacy: [facility]  Next office visit: 4/2/2024  Last regular office visit: 12/27/2023

## 2024-03-30 SDOH — ECONOMIC STABILITY: FOOD INSECURITY: WITHIN THE PAST 12 MONTHS, THE FOOD YOU BOUGHT JUST DIDN'T LAST AND YOU DIDN'T HAVE MONEY TO GET MORE.: NEVER TRUE

## 2024-03-30 SDOH — ECONOMIC STABILITY: FOOD INSECURITY: WITHIN THE PAST 12 MONTHS, YOU WORRIED THAT YOUR FOOD WOULD RUN OUT BEFORE YOU GOT MONEY TO BUY MORE.: NEVER TRUE

## 2024-03-30 SDOH — ECONOMIC STABILITY: TRANSPORTATION INSECURITY
IN THE PAST 12 MONTHS, HAS LACK OF TRANSPORTATION KEPT YOU FROM MEETINGS, WORK, OR FROM GETTING THINGS NEEDED FOR DAILY LIVING?: NO

## 2024-03-30 SDOH — HEALTH STABILITY: PHYSICAL HEALTH: ON AVERAGE, HOW MANY DAYS PER WEEK DO YOU ENGAGE IN MODERATE TO STRENUOUS EXERCISE (LIKE A BRISK WALK)?: 3 DAYS

## 2024-03-30 SDOH — HEALTH STABILITY: PHYSICAL HEALTH: ON AVERAGE, HOW MANY MINUTES DO YOU ENGAGE IN EXERCISE AT THIS LEVEL?: 60 MIN

## 2024-03-30 SDOH — ECONOMIC STABILITY: INCOME INSECURITY: HOW HARD IS IT FOR YOU TO PAY FOR THE VERY BASICS LIKE FOOD, HOUSING, MEDICAL CARE, AND HEATING?: NOT HARD AT ALL

## 2024-03-30 ASSESSMENT — LIFESTYLE VARIABLES
HOW OFTEN DO YOU HAVE SIX OR MORE DRINKS ON ONE OCCASION: 1
HOW MANY STANDARD DRINKS CONTAINING ALCOHOL DO YOU HAVE ON A TYPICAL DAY: 1 OR 2
HOW MANY STANDARD DRINKS CONTAINING ALCOHOL DO YOU HAVE ON A TYPICAL DAY: 1
HOW OFTEN DO YOU HAVE A DRINK CONTAINING ALCOHOL: 2
HOW OFTEN DO YOU HAVE A DRINK CONTAINING ALCOHOL: MONTHLY OR LESS

## 2024-03-30 ASSESSMENT — PATIENT HEALTH QUESTIONNAIRE - PHQ9
SUM OF ALL RESPONSES TO PHQ9 QUESTIONS 1 & 2: 0
2. FEELING DOWN, DEPRESSED OR HOPELESS: NOT AT ALL
SUM OF ALL RESPONSES TO PHQ QUESTIONS 1-9: 0
SUM OF ALL RESPONSES TO PHQ QUESTIONS 1-9: 0
1. LITTLE INTEREST OR PLEASURE IN DOING THINGS: NOT AT ALL
SUM OF ALL RESPONSES TO PHQ QUESTIONS 1-9: 0
SUM OF ALL RESPONSES TO PHQ QUESTIONS 1-9: 0

## 2024-04-02 ENCOUNTER — OFFICE VISIT (OUTPATIENT)
Dept: INTERNAL MEDICINE CLINIC | Age: 74
End: 2024-04-02
Payer: MEDICARE

## 2024-04-02 VITALS
WEIGHT: 166.2 LBS | OXYGEN SATURATION: 93 % | SYSTOLIC BLOOD PRESSURE: 135 MMHG | RESPIRATION RATE: 12 BRPM | DIASTOLIC BLOOD PRESSURE: 82 MMHG | BODY MASS INDEX: 30.4 KG/M2 | HEART RATE: 76 BPM

## 2024-04-02 DIAGNOSIS — R05.3 CHRONIC COUGH: Primary | ICD-10-CM

## 2024-04-02 DIAGNOSIS — F33.1 MAJOR DEPRESSIVE DISORDER, RECURRENT, MODERATE (HCC): ICD-10-CM

## 2024-04-02 DIAGNOSIS — Z00.00 MEDICARE ANNUAL WELLNESS VISIT, SUBSEQUENT: Primary | ICD-10-CM

## 2024-04-02 DIAGNOSIS — Z87.891 AGGRESSIVE FORMER SMOKER: ICD-10-CM

## 2024-04-02 PROCEDURE — G8417 CALC BMI ABV UP PARAM F/U: HCPCS | Performed by: INTERNAL MEDICINE

## 2024-04-02 PROCEDURE — 3017F COLORECTAL CA SCREEN DOC REV: CPT | Performed by: INTERNAL MEDICINE

## 2024-04-02 PROCEDURE — 1090F PRES/ABSN URINE INCON ASSESS: CPT | Performed by: INTERNAL MEDICINE

## 2024-04-02 PROCEDURE — 99213 OFFICE O/P EST LOW 20 MIN: CPT | Performed by: INTERNAL MEDICINE

## 2024-04-02 PROCEDURE — G8399 PT W/DXA RESULTS DOCUMENT: HCPCS | Performed by: INTERNAL MEDICINE

## 2024-04-02 PROCEDURE — 1036F TOBACCO NON-USER: CPT | Performed by: INTERNAL MEDICINE

## 2024-04-02 PROCEDURE — 1123F ACP DISCUSS/DSCN MKR DOCD: CPT | Performed by: INTERNAL MEDICINE

## 2024-04-02 PROCEDURE — G8427 DOCREV CUR MEDS BY ELIG CLIN: HCPCS | Performed by: INTERNAL MEDICINE

## 2024-04-02 PROCEDURE — 3079F DIAST BP 80-89 MM HG: CPT | Performed by: INTERNAL MEDICINE

## 2024-04-02 PROCEDURE — G0439 PPPS, SUBSEQ VISIT: HCPCS | Performed by: INTERNAL MEDICINE

## 2024-04-02 PROCEDURE — 3075F SYST BP GE 130 - 139MM HG: CPT | Performed by: INTERNAL MEDICINE

## 2024-04-02 RX ORDER — BENZONATATE 100 MG/1
100 CAPSULE ORAL DAILY PRN
Qty: 30 CAPSULE | Refills: 0 | Status: SHIPPED | OUTPATIENT
Start: 2024-04-02

## 2024-04-02 RX ORDER — ALBUTEROL SULFATE 90 UG/1
2 AEROSOL, METERED RESPIRATORY (INHALATION) EVERY 6 HOURS PRN
Qty: 18 G | Refills: 3 | Status: SHIPPED | OUTPATIENT
Start: 2024-04-02

## 2024-04-02 NOTE — PROGRESS NOTES
Chief Complaint   Patient presents with    Cough     X 2 years    Hernia     In upper abdomen       HPI: Still coughing x 2 years ever since covid. No shortness of breath or chest pain or hemoptysis, dry cough but irritating and won't go away.    Medications reviewed and reconciled with what patient reports to be taking.    /82   Pulse 76   Resp 12   Wt 75.4 kg (166 lb 3.2 oz)   SpO2 93%   BMI 30.40 kg/m²     Physical Exam GENERAL: alert, oriented x4, well-appearing in NAD      Vitals reviewed from intake /82   Pulse 76   Resp 12   Wt 75.4 kg (166 lb 3.2 oz)   SpO2 93%   BMI 30.40 kg/m²     HEENT: normocephalic atraumatic clear conj/nares/op     NECK: supple without lymphadenopathy or thyromegaly, no bruit    COR: RRR no murmurs rubs or gallops    LUNGS: clear to auscultation with normal work of breathing    ABDOMEN: soft, nontender, normal bowel sounds, no masses or organomegaly noted    EXTREMITIES: warm, dry, well-perfused, no edema    DERM: no suspicious lesions, no rashes    NEURO: cranial nerves intact, normal speech and gait    SPINE: straight, supple, nontender without swelling          ASSESSMENT/PLAN: Pt received counseling and, if relevant, printed instructions for all symptoms listed in CC and HPI, as well as for all diagnoses listed below.    1. Chronic cough--reviewed recent normal CXR in chart. However, still concerned given extensive smoking history, though patient believes post-covid. Will refer to pulmonology, rx steroid and albuterol inhalers as well as tessalon, anticipate will need PFTs once cough settles.   - Garth Banegas MD, Pulmonary, [facility]    2. Aggressive former smoker  - Garth Banegas MD, Pulmonary, [facility]    3. Major depressive disorder, recurrent, moderate (HCC)--mood stabilized.       Problem List Items Addressed This Visit       Aggressive former smoker    Relevant Orders    Garth Banegas MD, Pulmonary,

## 2024-04-02 NOTE — PROGRESS NOTES
Medicare Annual Wellness Visit    Samra Houser is here for scheduled E and M visit but noted overdue for AWV so added on with LPN>     Assessment & Plan  AWV    Recommendations for Preventive Services Due: see orders and patient instructions/AVS.  Recommended screening schedule for the next 5-10 years is provided to the patient in written form: see Patient Instructions/AVS.     No follow-ups on file.     Subjective       Patient's complete Health Risk Assessment and screening values have been reviewed and are found in Flowsheets. The following problems were reviewed today and where indicated follow up appointments were made and/or referrals ordered.    Positive Risk Factor Screenings with Interventions:                Activity, Diet, and Weight:               There is no height or weight on file to calculate BMI. (!) Abnormal      Obesity Interventions:  See AVS for additional education material                 Advanced Directives:       Intervention:                       Objective   There were no vitals filed for this visit.   There is no height or weight on file to calculate BMI.             Allergies   Allergen Reactions    Corticosteroids Hives     Both injectable steroids as well as by mouth steroids caused her to break out in hives. Now patient states she only reacted to an intraarticular steroid injection about 35 years ago and she has tolerated topical and oral steroids since.     Lipitor [Atorvastatin]      Weakness; refuses to try any statins    Morphine      States not allergic, states it makes her nauseated    Phenergan [Promethazine] Rash    Sulfa Antibiotics Rash     Prior to Visit Medications    Medication Sig Taking? Authorizing Provider   albuterol sulfate HFA (PROVENTIL HFA) 108 (90 Base) MCG/ACT inhaler Inhale 2 puffs into the lungs every 6 hours as needed for Wheezing  Pebbles Justice MD   Spacer/Aero-Holding Chambers SARAI 1 Device by Does not apply route daily as needed (wtih inhalers)

## 2024-04-02 NOTE — PATIENT INSTRUCTIONS
problems. It's also a good idea to know your test results and keep a list of the medicines you take.  How can you care for yourself at home?  Set realistic goals. Many people expect to lose much more weight than is likely. A weight loss of 5% to 10% of your body weight may be enough to improve your health.  Get family and friends involved to provide support. Talk to them about why you are trying to lose weight, and ask them to help. They can help by participating in exercise and having meals with you, even if they may be eating something different.  Find what works best for you. If you do not have time or do not like to cook, a program that offers meal replacement bars or shakes may be better for you. Or if you like to prepare meals, finding a plan that includes daily menus and recipes may be best.  Ask your doctor about other health professionals who can help you achieve your weight loss goals.  A dietitian can help you make healthy changes in your diet.  An exercise specialist or  can help you develop a safe and effective exercise program.  A counselor or psychiatrist can help you cope with issues such as depression, anxiety, or family problems that can make it hard to focus on weight loss.  Consider joining a support group for people who are trying to lose weight. Your doctor can suggest groups in your area.  Where can you learn more?  Go to https://www.BigRoad.net/patientEd and enter U357 to learn more about \"Starting a Weight Loss Plan: Care Instructions.\"  Current as of: September 20, 2023               Content Version: 14.0  © 2006-2024 [employer]   Care instructions adapted under license by [employer]. If you have questions about a medical condition or this instruction, always ask your healthcare professional. [employer] disclaims any warranty or liability for your use of this information.           A Healthy Heart: Care Instructions  Overview     Coronary

## 2024-04-22 ENCOUNTER — TELEPHONE (OUTPATIENT)
Dept: ADMINISTRATIVE | Age: 74
End: 2024-04-22

## 2024-04-22 NOTE — TELEPHONE ENCOUNTER
Submitted PA for Pulmicort Flexhaler 90MCG/ACT aerosol powder    [facility] BPPRTKEL STATUS: PENDING.    Follow up done daily; if no decision with in three days we will refax.  If another three days goes by with no decision will call the insurance for status.

## 2024-04-23 DIAGNOSIS — R05.3 CHRONIC COUGH: ICD-10-CM

## 2024-04-23 DIAGNOSIS — J45.30 MILD PERSISTENT ASTHMATIC BRONCHITIS WITHOUT COMPLICATION: Primary | ICD-10-CM

## 2024-04-23 NOTE — TELEPHONE ENCOUNTER
The medication was DENIED; DENIAL letter uploaded to MEDIA.    NOT COVERED UNDER MEDICARE PART D.     If you want an APPEAL; please note in this encounter what new information you would like to APPEAL with.  Once complete route back to PA POOL.    If this requires a response please respond to the pool ( P MHCX PSC MEDICATION PRE-AUTH).      Thank you please advise patient.

## 2024-04-24 NOTE — TELEPHONE ENCOUNTER
The denial letter says not a Part D eligible medication. Medicare Part D and is not covered under Medicare Part D drug plan. This may need to be submitted under Medicare Part B. I only submit under Medicare Part D.

## 2024-04-24 NOTE — TELEPHONE ENCOUNTER
Pebbles Justice MD  [facility] Clinical Staff20 hours ago (4:13 PM)     REsubmitted rx with additional associated diagnosis to see if that will go through.

## 2024-05-10 RX ORDER — FLUTICASONE PROPIONATE 100 UG/1
1 POWDER, METERED RESPIRATORY (INHALATION)
Qty: 1 EACH | Refills: 0 | Status: SHIPPED | OUTPATIENT
Start: 2024-05-10

## 2024-05-24 ENCOUNTER — TELEMEDICINE (OUTPATIENT)
Dept: INTERNAL MEDICINE CLINIC | Age: 74
End: 2024-05-24

## 2024-05-24 DIAGNOSIS — F40.243 FEAR OF FLYING: Primary | ICD-10-CM

## 2024-05-24 PROBLEM — I10 ESSENTIAL (PRIMARY) HYPERTENSION: Status: RESOLVED | Noted: 2021-08-12 | Resolved: 2024-05-24

## 2024-05-24 PROBLEM — R00.2 PALPITATIONS: Status: RESOLVED | Noted: 2022-02-22 | Resolved: 2024-05-24

## 2024-05-24 PROBLEM — F33.1 MAJOR DEPRESSIVE DISORDER, RECURRENT, MODERATE (HCC): Status: RESOLVED | Noted: 2023-06-06 | Resolved: 2024-05-24

## 2024-05-24 RX ORDER — ALPRAZOLAM 1 MG/1
1 TABLET ORAL
Qty: 8 TABLET | Refills: 0 | Status: SHIPPED | OUTPATIENT
Start: 2024-05-24 | End: 2024-05-31

## 2024-05-24 NOTE — PROGRESS NOTES
Chief Complaint   Patient presents with    Anxiety     When flying       HPI: Virtual visit via [employer] for  Upcoming trip to [place] in June, with 5 flight legs up to 14 hours per. Patient asking for alprazolam 1 mg she has previously received for flights from [place] PCP. States it is mostly turbulence that bothers her.    Medications reviewed and reconciled with what patient reports to be taking.    There were no vitals taken for this visit.    Physical Exam smiling and well appearing    ASSESSMENT/PLAN: Pt received counseling and, if relevant, printed instructions for all symptoms listed in CC and HPI, as well as for all diagnoses listed below.    1. Fear of flying--discussed this is fairly strong dose and she may split in half, and use care with sedation and fall risks. OARRS checked.   - ALPRAZolam (XANAX) 1 MG tablet; Take 1 tablet by mouth every 6-8 hours as needed for Anxiety (fear of flying) for up to 7 days. Max Daily Amount: 4 mg  Dispense: 8 tablet; Refill: 0      Problem List Items Addressed This Visit    None  Visit Diagnoses       Fear of flying    -  Primary    Relevant Medications    ALPRAZolam (XANAX) 1 MG tablet              No follow-ups on file.

## 2024-06-11 ENCOUNTER — OFFICE VISIT (OUTPATIENT)
Dept: INTERNAL MEDICINE CLINIC | Age: 74
End: 2024-06-11
Payer: MEDICARE

## 2024-06-11 VITALS
OXYGEN SATURATION: 100 % | BODY MASS INDEX: 29.34 KG/M2 | WEIGHT: 160.4 LBS | RESPIRATION RATE: 12 BRPM | DIASTOLIC BLOOD PRESSURE: 87 MMHG | HEART RATE: 80 BPM | SYSTOLIC BLOOD PRESSURE: 130 MMHG

## 2024-06-11 DIAGNOSIS — M54.50 ACUTE MIDLINE LOW BACK PAIN WITHOUT SCIATICA: Primary | ICD-10-CM

## 2024-06-11 PROCEDURE — G8417 CALC BMI ABV UP PARAM F/U: HCPCS | Performed by: INTERNAL MEDICINE

## 2024-06-11 PROCEDURE — 1036F TOBACCO NON-USER: CPT | Performed by: INTERNAL MEDICINE

## 2024-06-11 PROCEDURE — 3017F COLORECTAL CA SCREEN DOC REV: CPT | Performed by: INTERNAL MEDICINE

## 2024-06-11 PROCEDURE — 1123F ACP DISCUSS/DSCN MKR DOCD: CPT | Performed by: INTERNAL MEDICINE

## 2024-06-11 PROCEDURE — G8399 PT W/DXA RESULTS DOCUMENT: HCPCS | Performed by: INTERNAL MEDICINE

## 2024-06-11 PROCEDURE — 99213 OFFICE O/P EST LOW 20 MIN: CPT | Performed by: INTERNAL MEDICINE

## 2024-06-11 PROCEDURE — G8427 DOCREV CUR MEDS BY ELIG CLIN: HCPCS | Performed by: INTERNAL MEDICINE

## 2024-06-11 PROCEDURE — 1090F PRES/ABSN URINE INCON ASSESS: CPT | Performed by: INTERNAL MEDICINE

## 2024-06-11 RX ORDER — OXYCODONE HYDROCHLORIDE AND ACETAMINOPHEN 5; 325 MG/1; MG/1
1 TABLET ORAL EVERY 6 HOURS PRN
Qty: 12 TABLET | Refills: 0 | Status: SHIPPED | OUTPATIENT
Start: 2024-06-11 | End: 2024-06-14

## 2024-06-11 RX ORDER — TIZANIDINE 4 MG/1
4 TABLET ORAL 3 TIMES DAILY PRN
Qty: 30 TABLET | Refills: 0 | Status: SHIPPED | OUTPATIENT
Start: 2024-06-11

## 2024-06-11 RX ORDER — KETOROLAC TROMETHAMINE 10 MG/1
10 TABLET, FILM COATED ORAL EVERY 6 HOURS PRN
Qty: 56 TABLET | Refills: 0 | Status: SHIPPED | OUTPATIENT
Start: 2024-06-11 | End: 2024-06-25

## 2024-06-11 NOTE — PROGRESS NOTES
Chief Complaint   Patient presents with    Back Pain     X 2 days       HPI: c/o awoke with severe pain in lower back 2 days ago, without any known injury. Has had milder chronic back pain but never like this. Cannot get comfortable but leaning off of it helps some. Hurts to stand and walk. No loss of functions nor numbness nor radiation.    Medications reviewed and reconciled with what patient reports to be taking.    /87   Pulse 80   Resp 12   Wt 72.8 kg (160 lb 6.4 oz)   SpO2 100%   BMI 29.34 kg/m²     Physical Exam appears uncomfortable, walking slightly stooped forward    Spine straight, supple, but mild diffuse paralumbar tenderness without palpable spasm  Distal neurovascular exam intact    ASSESSMENT/PLAN: Pt received counseling and, if relevant, printed instructions for all symptoms listed in CC and HPI, as well as for all diagnoses listed below.    1. Acute midline low back pain without sciatica--pt requesting pain relief including opioid initially. OARRS checked. Discussed risks and avoiding overuse. Has done well with toradol in the past, but discussed risks of NSAIDs as well, and will not plan to renew. Declines PT referral at this time but if not improving this would be next step for her.  - oxyCODONE-acetaminophen (PERCOCET) 5-325 MG per tablet; Take 1 tablet by mouth every 6 hours as needed for Pain for up to 3 days. Intended supply: 3 days. Take lowest dose possible to manage pain Max Daily Amount: 4 tablets  Dispense: 12 tablet; Refill: 0  - tiZANidine (ZANAFLEX) 4 MG tablet; Take 1 tablet by mouth 3 times daily as needed (muscle spasm) May split in half.  Dispense: 30 tablet; Refill: 0  - ketorolac (TORADOL) 10 MG tablet; Take 1 tablet by mouth every 6 hours as needed for Pain  Dispense: 56 tablet; Refill: 0      Problem List Items Addressed This Visit    None  Visit Diagnoses       Acute midline low back pain without sciatica    -  Primary    Relevant Medications    tiZANidine

## 2024-06-12 ENCOUNTER — APPOINTMENT (OUTPATIENT)
Dept: CT IMAGING | Age: 74
End: 2024-06-12
Payer: MEDICARE

## 2024-06-12 ENCOUNTER — HOSPITAL ENCOUNTER (EMERGENCY)
Age: 74
Discharge: HOME | End: 2024-06-12
Payer: MEDICARE

## 2024-06-12 VITALS
DIASTOLIC BLOOD PRESSURE: 85 MMHG | HEART RATE: 70 BPM | TEMPERATURE: 97.5 F | WEIGHT: 160.27 LBS | RESPIRATION RATE: 18 BRPM | BODY MASS INDEX: 29.49 KG/M2 | SYSTOLIC BLOOD PRESSURE: 139 MMHG | HEIGHT: 62 IN | OXYGEN SATURATION: 98 %

## 2024-06-12 DIAGNOSIS — R93.89 ABNORMAL CAT SCAN: ICD-10-CM

## 2024-06-12 DIAGNOSIS — S39.012A BACK STRAIN, INITIAL ENCOUNTER: Primary | ICD-10-CM

## 2024-06-12 PROCEDURE — 72131 CT LUMBAR SPINE W/O DYE: CPT

## 2024-06-12 PROCEDURE — 6370000000 HC RX 637 (ALT 250 FOR IP)

## 2024-06-12 PROCEDURE — 99284 EMERGENCY DEPT VISIT MOD MDM: CPT

## 2024-06-12 RX ORDER — LIDOCAINE 4 G/G
1 PATCH TOPICAL DAILY
Qty: 30 PATCH | Refills: 0 | Status: SHIPPED | OUTPATIENT
Start: 2024-06-12 | End: 2024-06-12

## 2024-06-12 RX ORDER — LIDOCAINE 4 G/G
1 PATCH TOPICAL DAILY
Qty: 30 PATCH | Refills: 0 | Status: SHIPPED | OUTPATIENT
Start: 2024-06-12 | End: 2024-07-12

## 2024-06-12 RX ORDER — MELOXICAM 7.5 MG/1
7.5 TABLET ORAL DAILY
Qty: 90 TABLET | Refills: 1 | Status: SHIPPED | OUTPATIENT
Start: 2024-06-12

## 2024-06-12 RX ORDER — OXYCODONE HYDROCHLORIDE AND ACETAMINOPHEN 5; 325 MG/1; MG/1
1 TABLET ORAL ONCE
Status: COMPLETED | OUTPATIENT
Start: 2024-06-12 | End: 2024-06-12

## 2024-06-12 RX ORDER — LIDOCAINE 4 G/G
1 PATCH TOPICAL DAILY
Status: DISCONTINUED | OUTPATIENT
Start: 2024-06-12 | End: 2024-06-12 | Stop reason: HOSPADM

## 2024-06-12 RX ORDER — METHYLPREDNISOLONE 4 MG/1
TABLET ORAL
Qty: 21 KIT | Refills: 0 | Status: SHIPPED | OUTPATIENT
Start: 2024-06-12 | End: 2024-06-12

## 2024-06-12 RX ORDER — MELOXICAM 7.5 MG/1
7.5 TABLET ORAL DAILY
Qty: 90 TABLET | Refills: 1 | Status: SHIPPED | OUTPATIENT
Start: 2024-06-12 | End: 2024-06-12

## 2024-06-12 RX ADMIN — OXYCODONE HYDROCHLORIDE AND ACETAMINOPHEN 1 TABLET: 5; 325 TABLET ORAL at 12:42

## 2024-06-12 ASSESSMENT — PAIN DESCRIPTION - DESCRIPTORS
DESCRIPTORS: SHARP;STABBING
DESCRIPTORS: ACHING;DISCOMFORT

## 2024-06-12 ASSESSMENT — PAIN DESCRIPTION - LOCATION
LOCATION: BACK
LOCATION: BACK

## 2024-06-12 ASSESSMENT — PAIN SCALES - GENERAL
PAINLEVEL_OUTOF10: 10
PAINLEVEL_OUTOF10: 6

## 2024-06-12 ASSESSMENT — PAIN - FUNCTIONAL ASSESSMENT
PAIN_FUNCTIONAL_ASSESSMENT: 0-10
PAIN_FUNCTIONAL_ASSESSMENT: 0-10

## 2024-06-12 NOTE — DISCHARGE INSTRUCTIONS
A probable proteinaceous or hemorrhagic cyst in the left kidney was found, it was recommended that there is no follow-up imaging needed.    We saw you in the emergency department for back pain.    We recommend that you:   - Use the stretching and strengthening exercises at least twice per day, continue to complete physical activity such as walking, and use heat or ice to the area of pain as it helps you. We have given you a referral to physical therapy as well and this is really important both for pain and now and to help prevent pain later.  Referral for orthopedics spine was also provided, please call establish care.   - Take Tylenol as needed for your pain. We have also prescribed:       - Medrol dose lexie was prescribed, this is steroids.      - meloxicam (NSAID) you take it once a day up to 15mg, do not take any other NSAIDs while you are taking this      - lidocaine patches, 5%, the 4% are available over the counter as well   - Follow up with your primary care.   - Return to the Emergency Department should your symptoms worsen, or should you  develop a fever, change in bowel or bladder habits, weakness, numbness, or any concern you feel warrants further evaluation.

## 2024-06-12 NOTE — ED PROVIDER NOTES
palpation at lower back, no skin deformities or step-offs.  CARLOS and ROM 4/5 in lower extremity bilaterally.  Patient able to lift legs from the bed.  No neurovascular deficit noted, she was able to ambulate in the ED with a walker.    Patient denies drug use, no history of diabetes, no night pain, no fevers, no recent infection, patient is not immunocompromised, no recent back surgeries, no incontinence or retention, patient has a red flag score of 0. There is low suspicion of cauda equina, spinal abscess, I do not think an ESR or MRI is necessary    After reassessment, patient mentioned feeling better.    CT imaging showed No acute osseous abnormality of the lumbar spine. Multilevel mild degenerative disc disease. Probable proteinaceous or hemorrhagic cyst in the left kidney.     I discussed with the patient the findings, there was no neurologic abnormalities, she had a flag score of 0, no MRI required today.  She is aware of hemorrhagic cyst on left kidney, no follow up imaging needed.  Patient has pain medication at home, percocet, I offered steroids, but patient mentioned having an allergy that caused hives and airway compromised.  She already has voltaren gel. I prescribed lidocaine patches and meloxicam, she is aware to avoid heat when patch is on the skin since it can burn the area and to avoid taking advil and motrin since it can increase the risk of gastric ulcers.  I offered the patient a referral for [facility], patient mentioned needing something closer to home, a referral for [facility] orthopedics provided.  Physical therapy referral provided, patient was encouraged to establish care.  Return precautions provided, any new or worsening of symptoms or fever, change in bowel or bladder habits, weakness, numbness. Patient agrees to the plan     Patient was discharge home in stable condition, non-tachycardic, afebrile, no acute distress, alert and oriented, able to ambulate in the ED.    The patient

## 2024-06-13 ENCOUNTER — OFFICE VISIT (OUTPATIENT)
Dept: ORTHOPEDIC SURGERY | Age: 74
End: 2024-06-13
Payer: MEDICARE

## 2024-06-13 VITALS — HEIGHT: 62 IN | BODY MASS INDEX: 29.44 KG/M2 | WEIGHT: 160 LBS

## 2024-06-13 DIAGNOSIS — M47.27 LUMBOSACRAL SPONDYLOSIS WITH RADICULOPATHY: Primary | ICD-10-CM

## 2024-06-13 PROBLEM — M54.50 ACUTE MIDLINE LOW BACK PAIN: Status: ACTIVE | Noted: 2024-06-13

## 2024-06-13 PROCEDURE — 99214 OFFICE O/P EST MOD 30 MIN: CPT | Performed by: PHYSICIAN ASSISTANT

## 2024-06-13 PROCEDURE — 1036F TOBACCO NON-USER: CPT | Performed by: PHYSICIAN ASSISTANT

## 2024-06-13 PROCEDURE — G8399 PT W/DXA RESULTS DOCUMENT: HCPCS | Performed by: PHYSICIAN ASSISTANT

## 2024-06-13 PROCEDURE — 3017F COLORECTAL CA SCREEN DOC REV: CPT | Performed by: PHYSICIAN ASSISTANT

## 2024-06-13 PROCEDURE — G8427 DOCREV CUR MEDS BY ELIG CLIN: HCPCS | Performed by: PHYSICIAN ASSISTANT

## 2024-06-13 PROCEDURE — 1123F ACP DISCUSS/DSCN MKR DOCD: CPT | Performed by: PHYSICIAN ASSISTANT

## 2024-06-13 PROCEDURE — G8417 CALC BMI ABV UP PARAM F/U: HCPCS | Performed by: PHYSICIAN ASSISTANT

## 2024-06-13 PROCEDURE — 1090F PRES/ABSN URINE INCON ASSESS: CPT | Performed by: PHYSICIAN ASSISTANT

## 2024-06-13 RX ORDER — GABAPENTIN 100 MG/1
100 CAPSULE ORAL 3 TIMES DAILY
Qty: 90 CAPSULE | Refills: 0 | Status: SHIPPED | OUTPATIENT
Start: 2024-06-13 | End: 2024-07-13

## 2024-06-13 RX ORDER — TRAMADOL HYDROCHLORIDE 50 MG/1
50 TABLET ORAL EVERY 6 HOURS PRN
Qty: 28 TABLET | Refills: 0 | Status: SHIPPED | OUTPATIENT
Start: 2024-06-13 | End: 2024-06-20

## 2024-06-13 NOTE — PROGRESS NOTES
of the lumbar spine.     Multilevel mild degenerative disc disease.    Imaging:  X rays AP and lateral lumbar spine obtained in the office today.  X-rays show moderate lower lumbar degenerative disc disease and facet arthropathy.    Diagnosis:      ICD-10-CM    1. Lumbosacral spondylosis with radiculopathy  M47.27 traMADol (ULTRAM) 50 MG tablet     Ambulatory referral to Physical Therapy           Assessment/ Plan:    Multilevel lower lumbar spondylosis with radiculopathy most likely due to foraminal stenosis.    I had an extensive discussion with Ms. Samra Houser and/or family regarding the natural history, etiology, and long term consequences of her condition.   I have presented reasonable alternatives to the patient's proposed care, treatment, and services.  Risks and benefits of the treatment options also reviewed in detail. I have outlined a treatment plan with them. She has had full opportunity to ask her questions.  I have answered them all to her satisfaction.  I feel that Ms. Samra Houser understands our discussion today.     New Medications prescribed today:  Add tramadol and gabapentin for pain.  The current treatment regimen is needed to decrease the patient's pain symptoms, improve the quality of life and ability to function and improve sleep and mood symptoms.  Controlled substances monitoring:  I have discussed risk and benefits of the medication including potential for addiction and risk of overdose and responsibility to safely store and appropriately dispose of medications. she states the medications and treatment have helped improve the quality of life and helped in the psycho-social functioning.  There are no indicators for possible drug abuse, addiction or diversion problems. No signs of potential drug abuse or diversion identified, and OARRS report reviewed today- activity consistent with treatment plan. Risks and benefits of the medications and other alternative treatments were discussed

## 2024-06-14 ENCOUNTER — HOSPITAL ENCOUNTER (OUTPATIENT)
Dept: PHYSICAL THERAPY | Age: 74
Setting detail: THERAPIES SERIES
Discharge: HOME | End: 2024-06-14
Payer: MEDICARE

## 2024-06-14 DIAGNOSIS — R26.89 DECREASED FUNCTIONAL MOBILITY: ICD-10-CM

## 2024-06-14 DIAGNOSIS — M54.41 ACUTE BILATERAL LOW BACK PAIN WITH BILATERAL SCIATICA: Primary | ICD-10-CM

## 2024-06-14 DIAGNOSIS — M53.86 DECREASED ROM OF LUMBAR SPINE: ICD-10-CM

## 2024-06-14 DIAGNOSIS — M54.42 ACUTE BILATERAL LOW BACK PAIN WITH BILATERAL SCIATICA: Primary | ICD-10-CM

## 2024-06-14 DIAGNOSIS — R29.898 WEAKNESS OF BOTH HIPS: ICD-10-CM

## 2024-06-14 PROCEDURE — 97161 PT EVAL LOW COMPLEX 20 MIN: CPT

## 2024-06-14 PROCEDURE — 97110 THERAPEUTIC EXERCISES: CPT

## 2024-06-14 PROCEDURE — 97112 NEUROMUSCULAR REEDUCATION: CPT

## 2024-06-14 PROCEDURE — 97530 THERAPEUTIC ACTIVITIES: CPT

## 2024-06-14 NOTE — PLAN OF CARE
[facility]- Outpatient Rehabilitation and Therapy [address], [place] [address] office: [phone] fax: [phone]     Physical Therapy Initial Evaluation Certification      Dear Myranda Lopez PA-C,    We had the pleasure of evaluating the following patient for physical therapy services at [facility] Out[facility] Physical Therapy.  A summary of our findings can be found in the initial assessment below.  This includes our plan of care.  If you have any questions or concerns regarding these findings, please do not hesitate to contact me at the office phone number listed above.  Thank you for the referral.     Physician Signature:_______________________________Date:__________________  By signing above (or electronic signature), therapist’s plan is approved by physician       Physical Therapy: TREATMENT/PROGRESS NOTE   Patient: Samra Houser (74 y.o. female)   Examination Date: 2024   :  1950 MRN: [id number]   Visit #: 1   Insurance Allowable Auth Needed   [place] PC[place] []Yes    [x]No    Insurance: Payor: MEDICARE / Plan: MEDICARE PART A AND B / Product Type: *No Product type* /   Insurance ID: [id number] - (Medicare)  Secondary Insurance (if applicable): BCBS   Treatment Diagnosis:     ICD-10-CM    1. Acute bilateral low back pain with bilateral sciatica  M54.42     M54.41       2. Decreased ROM of lumbar spine  M53.86       3. Decreased functional mobility  R26.89       4. Weakness of both hips  R29.898          Medical Diagnosis:  Low back pain, unspecified [M54.50]   Referring Physician: Myranda Lopez PA-C  PCP: Pebbles Justice MD     Plan of care signed (Y/N):     Date of Patient follow up with Physician:      Progress Report/POC: EVAL today  POC update due: (10 visits /OR AUTH LIMITS, whichever is less)  2024                                             Precautions/ Contra-indications:           Latex allergy:  NO  Pacemaker:

## 2024-06-18 ENCOUNTER — HOSPITAL ENCOUNTER (OUTPATIENT)
Dept: PHYSICAL THERAPY | Age: 74
Setting detail: THERAPIES SERIES
Discharge: HOME | End: 2024-06-18
Payer: MEDICARE

## 2024-06-18 PROCEDURE — 97110 THERAPEUTIC EXERCISES: CPT

## 2024-06-18 PROCEDURE — 97140 MANUAL THERAPY 1/> REGIONS: CPT

## 2024-06-18 NOTE — FLOWSHEET NOTE
[facility]- Outpatient Rehabilitation and Therapy [address], [place] [address] office: [phone] fax: [phone]     Physical Therapy Initial Evaluation Certification      Dear Myranda Lopez PA-C,    We had the pleasure of evaluating the following patient for physical therapy services at [facility] Out[facility] Physical Therapy.  A summary of our findings can be found in the initial assessment below.  This includes our plan of care.  If you have any questions or concerns regarding these findings, please do not hesitate to contact me at the office phone number listed above.  Thank you for the referral.     Physician Signature:_______________________________Date:__________________  By signing above (or electronic signature), therapist’s plan is approved by physician       Physical Therapy: TREATMENT/PROGRESS NOTE   Patient: Samra Houser (74 y.o. female)   Examination Date: 2024   :  1950 MRN: [id number]   Visit #: 2   Insurance Allowable Auth Needed   [place]    [x]No    Insurance: Payor: MEDICARE / Plan: MEDICARE PART A AND B / Product Type: *No Product type* /   Insurance ID: [id number] - (Medicare)  Secondary Insurance (if applicable): BCBS   Treatment Diagnosis:     ICD-10-CM    1. Acute bilateral low back pain with bilateral sciatica  M54.42     M54.41       2. Decreased ROM of lumbar spine  M53.86       3. Decreased functional mobility  R26.89       4. Weakness of both hips  R29.898          Medical Diagnosis:  Low back pain, unspecified [M54.50]   Referring Physician: Myranda Lopez PA-C  PCP: Pebbles Justice MD     Plan of care signed (Y/N):     Date of Patient follow up with Physician:      Progress Report/POC: NO  POC update due: (10 visits /OR AUTH LIMITS, whichever is less)  2024                                             Precautions/ Contra-indications:           Latex allergy:  NO  Pacemaker:    NO  Contraindications for

## 2024-06-20 ENCOUNTER — HOSPITAL ENCOUNTER (OUTPATIENT)
Dept: PHYSICAL THERAPY | Age: 74
Setting detail: THERAPIES SERIES
Discharge: HOME | End: 2024-06-20
Payer: MEDICARE

## 2024-06-20 PROCEDURE — 97112 NEUROMUSCULAR REEDUCATION: CPT

## 2024-06-20 PROCEDURE — 97140 MANUAL THERAPY 1/> REGIONS: CPT

## 2024-06-20 PROCEDURE — 97110 THERAPEUTIC EXERCISES: CPT

## 2024-06-20 NOTE — FLOWSHEET NOTE
Modified Oswestry to assist with reaching prior level of function with activities such as ADLs/IADLs.  [] Progressing: [] Met: [] Not Met: [] Adjusted  2. Patient will demonstrate increased AROM of lumbar spine to WNL without pain to allow for proper joint functioning to enable patient to have improved functional mobility with house work.   [] Progressing: [] Met: [] Not Met: [] Adjusted  3. Patient will demonstrate increased Strength of global hip to at least 4+/5 throughout without pain to allow for proper functional mobility to enable patient to return to proper squatting technique without LBP.   [] Progressing: [] Met: [] Not Met: [] Adjusted  4. Patient will return to sitting for 30+ minutes without increased symptoms or restriction.   [] Progressing: [] Met: [] Not Met: [] Adjusted  5. Patient will be able to return to walking on treadmill/biking for 1 mile without increased symptoms or restriction  [] Progressing: [] Met: [] Not Met: [] Adjusted     Overall Progression Towards Functional goals/ Treatment Progress Update:  [] Patient is progressing as expected towards functional goals listed.    [] Progression is slowed due to complexities/Impairments listed.  [] Progression has been slowed due to co-morbidities.  [x] Plan just implemented, too soon (<30days) to assess goals progression   [] Goals require adjustment due to lack of progress  [] Patient is not progressing as expected and requires additional follow up with physician  [] Other:     TREATMENT PLAN     Frequency/Duration: 1-2x/week for 8 weeks for the following treatment interventions:    Interventions:  Therapeutic Exercise (45110) including: strength training, ROM, and functional mobility  Therapeutic Activities (54310) including: functional mobility training and education.  Neuromuscular Re-education (96976) activation and proprioception, including postural re-education.    Manual Therapy (49899) as indicated to include: Passive Range of Motion,

## 2024-06-25 ENCOUNTER — TELEPHONE (OUTPATIENT)
Dept: INTERNAL MEDICINE CLINIC | Age: 74
End: 2024-06-25

## 2024-06-25 ENCOUNTER — HOSPITAL ENCOUNTER (OUTPATIENT)
Dept: PHYSICAL THERAPY | Age: 74
Setting detail: THERAPIES SERIES
Discharge: HOME | End: 2024-06-25
Payer: MEDICARE

## 2024-06-25 PROCEDURE — 97140 MANUAL THERAPY 1/> REGIONS: CPT

## 2024-06-25 PROCEDURE — 97110 THERAPEUTIC EXERCISES: CPT

## 2024-06-25 PROCEDURE — 97112 NEUROMUSCULAR REEDUCATION: CPT

## 2024-06-25 NOTE — FLOWSHEET NOTE
score of 17/50 or less for the Modified Oswestry to assist with reaching prior level of function with activities such as ADLs/IADLs.  [] Progressing: [] Met: [] Not Met: [] Adjusted  2. Patient will demonstrate increased AROM of lumbar spine to WNL without pain to allow for proper joint functioning to enable patient to have improved functional mobility with house work.   [] Progressing: [] Met: [] Not Met: [] Adjusted  3. Patient will demonstrate increased Strength of global hip to at least 4+/5 throughout without pain to allow for proper functional mobility to enable patient to return to proper squatting technique without LBP.   [] Progressing: [] Met: [] Not Met: [] Adjusted  4. Patient will return to sitting for 30+ minutes without increased symptoms or restriction.   [] Progressing: [] Met: [] Not Met: [] Adjusted  5. Patient will be able to return to walking on treadmill/biking for 1 mile without increased symptoms or restriction  [] Progressing: [] Met: [] Not Met: [] Adjusted     Overall Progression Towards Functional goals/ Treatment Progress Update:  [] Patient is progressing as expected towards functional goals listed.    [] Progression is slowed due to complexities/Impairments listed.  [] Progression has been slowed due to co-morbidities.  [x] Plan just implemented, too soon (<30days) to assess goals progression   [] Goals require adjustment due to lack of progress  [] Patient is not progressing as expected and requires additional follow up with physician  [] Other:     TREATMENT PLAN     Frequency/Duration: 1-2x/week for 8 weeks for the following treatment interventions:    Interventions:  Therapeutic Exercise (45720) including: strength training, ROM, and functional mobility  Therapeutic Activities (66738) including: functional mobility training and education.  Neuromuscular Re-education (18986) activation and proprioception, including postural re-education.    Manual Therapy (04634) as indicated to

## 2024-06-27 ENCOUNTER — HOSPITAL ENCOUNTER (OUTPATIENT)
Dept: PHYSICAL THERAPY | Age: 74
Setting detail: THERAPIES SERIES
Discharge: HOME | End: 2024-06-27
Payer: MEDICARE

## 2024-06-27 PROCEDURE — 97112 NEUROMUSCULAR REEDUCATION: CPT

## 2024-06-27 PROCEDURE — 97140 MANUAL THERAPY 1/> REGIONS: CPT

## 2024-06-27 PROCEDURE — 97110 THERAPEUTIC EXERCISES: CPT

## 2024-06-27 NOTE — DISCHARGE SUMMARY
re-education (43965) resistance Sets/time Reps CUES NEEDED   PPT  5'' 20    Hip add iso w/ PPT  5'' 10    Hip abd iso   5'' 20 Band NPV   Beginner bridges  2 10    Standing hip ext/abd   Pain with hip ext- HOLD NPV                 Therapeutic Activity (86583)  Sets/time     Patient education   HEP, gentle stretching and proper pelvic tilt technique, pain intensity with exercises, elevating legs with exercises, future appointment progressions                                 Modalities:    No modalities applied this session    Education/Home Exercise Program: Patient HEP program created electronically.  Refer to [employer] access code:    Access Code: [email or web address]  URL: [email or web address]  Date: 06/14/2024  Prepared by: Kimber Ashley    Exercises  - Supine Posterior Pelvic Tilt  - 1 x daily - 7 x weekly - 5 reps - 5'' hold  - Supine Bridge with Pelvic Floor Contraction on Swiss Ball  - 1 x daily - 7 x weekly - 10 reps - 2-3'' hold  - Supine Lower Trunk Rotation  - 1 x daily - 7 x weekly - 10 reps - 5'' hold      Access Code: [email or web address]  URL: [email or web address]  Date: 06/27/2024  Prepared by: Kimber Ashley    Exercises  - Supine Posterior Pelvic Tilt  - 1 x daily - 7 x weekly - 5 reps - 5'' hold  - Supine Bridge with Pelvic Floor Contraction on Swiss Ball  - 1 x daily - 7 x weekly - 10 reps - 2-3'' hold  - Supine Lower Trunk Rotation  - 1 x daily - 7 x weekly - 10 reps - 5'' hold  - Supine Hip Adduction Isometric with Ball  - 1 x daily - 7 x weekly - 2 sets - 10 reps  - Hooklying Clamshell with Resistance  - 1 x daily - 7 x weekly - 2 sets - 10 reps  - Prone Press Up On Elbows  - 1 x daily - 7 x weekly - 10 reps - 10 hold    ASSESSMENT     Today's Assessment: TRANSITION TO HEP/DC FORMAL PT: Patient is currently independent with symptom management and home exercise program. Patient reports understanding of the importance of continued compliance with home exercise program after discharge.  Patient has reached

## 2024-07-16 ENCOUNTER — OFFICE VISIT (OUTPATIENT)
Dept: ORTHOPEDIC SURGERY | Age: 74
End: 2024-07-16
Payer: MEDICARE

## 2024-07-16 VITALS — BODY MASS INDEX: 29.26 KG/M2 | HEIGHT: 62 IN

## 2024-07-16 DIAGNOSIS — M47.27 LUMBOSACRAL SPONDYLOSIS WITH RADICULOPATHY: Primary | ICD-10-CM

## 2024-07-16 PROCEDURE — 99213 OFFICE O/P EST LOW 20 MIN: CPT | Performed by: PHYSICIAN ASSISTANT

## 2024-07-16 PROCEDURE — G8399 PT W/DXA RESULTS DOCUMENT: HCPCS | Performed by: PHYSICIAN ASSISTANT

## 2024-07-16 PROCEDURE — G8417 CALC BMI ABV UP PARAM F/U: HCPCS | Performed by: PHYSICIAN ASSISTANT

## 2024-07-16 PROCEDURE — G8427 DOCREV CUR MEDS BY ELIG CLIN: HCPCS | Performed by: PHYSICIAN ASSISTANT

## 2024-07-16 PROCEDURE — 1090F PRES/ABSN URINE INCON ASSESS: CPT | Performed by: PHYSICIAN ASSISTANT

## 2024-07-16 PROCEDURE — 1123F ACP DISCUSS/DSCN MKR DOCD: CPT | Performed by: PHYSICIAN ASSISTANT

## 2024-07-16 PROCEDURE — 1036F TOBACCO NON-USER: CPT | Performed by: PHYSICIAN ASSISTANT

## 2024-07-16 PROCEDURE — 3017F COLORECTAL CA SCREEN DOC REV: CPT | Performed by: PHYSICIAN ASSISTANT

## 2024-07-16 NOTE — PROGRESS NOTES
Subjective:      Patient ID: Samra Houser is a 74 y.o. female who is here for follow up evaluation of low back pain and leg radicular pain.  She reports resolution of low back pain and leg pain with medications, physical therapy.  She is now doing a water therapy program through her gym membership.      Review Of Systems:   Constitutional: denies fever, chills, weight loss.  MSK: denies pain in other joints, muscle aches.  Neurological: denies numbness, tingling, weakness.       Past Medical History:   Diagnosis Date    Ankle injury     Arthritis     COVID-19     5/2020    Diverticulitis     Genital herpes     Hyperlipidemia     Hypertension     Osteoarthritis     Premature atrial contraction        Family History   Problem Relation Age of Onset    Heart Failure Mother     Cancer Father         lung ca       Past Surgical History:   Procedure Laterality Date    ANKLE FRACTURE SURGERY Left 08/02/2021    LEFT ANKLE OPEN REDUCTION INTERNAL FIXATION performed by Clara Rodriguez MD at [facility] OR    ANKLE SURGERY Left 01/10/2022    LEFT SYNDESMOSIS SCREW REMOVAL performed by Clara Rodriguez MD at [facility] OR    APPENDECTOMY      BLEPHAROPLASTY Bilateral 1/15/2024    BLEPHAROPLASTY - BILATERAL UPPER LIDS performed by Zhane Scherer MD at [facility]    BREAST SURGERY Bilateral     implants    CATARACT REMOVAL Bilateral     COLONOSCOPY      COLONOSCOPY N/A 03/25/2022    COLONOSCOPY POLYPECTOMY SNARE/COLD BIOPSY performed by Hitesh Chilel MD at [facility] ENDOSCOPY    DILATATION, ESOPHAGUS      ENDOSCOPY, COLON, DIAGNOSTIC  1978    EYE SURGERY Bilateral     cataract removal    FRACTURE SURGERY Left     ankle    HYSTERECTOMY (CERVIX STATUS UNKNOWN)      uterus only    JOINT REPLACEMENT Left     hip    KNEE ARTHROSCOPY Right 4/19/2023    RIGHT KNEE ARTHROSCOPY PARTIAL MEDIAL MENISCECTOMY performed by Oleksandr Jiménez MD at [facility] OR    PARTIAL HYSTERECTOMY (CERVIX NOT REMOVED)      TONSILLECTOMY      TOTAL HIP ARTHROPLASTY

## 2024-07-24 ENCOUNTER — OFFICE VISIT (OUTPATIENT)
Age: 74
End: 2024-07-24

## 2024-07-24 VITALS
WEIGHT: 160 LBS | OXYGEN SATURATION: 97 % | SYSTOLIC BLOOD PRESSURE: 134 MMHG | TEMPERATURE: 98.2 F | DIASTOLIC BLOOD PRESSURE: 79 MMHG | BODY MASS INDEX: 29.44 KG/M2 | HEART RATE: 77 BPM | HEIGHT: 62 IN

## 2024-07-24 DIAGNOSIS — J34.89 NASAL LESION: Primary | ICD-10-CM

## 2024-07-24 ASSESSMENT — ENCOUNTER SYMPTOMS
COUGH: 0
SHORTNESS OF BREATH: 0
NAUSEA: 0
CONSTIPATION: 0
CHEST TIGHTNESS: 0
ABDOMINAL PAIN: 0
DIARRHEA: 0
VOMITING: 0

## 2024-07-24 NOTE — PROGRESS NOTES
Samra Houser (:  1950) is a 74 y.o. female,New patient, here for evaluation of the following chief complaint(s):  Other (Pt has a spot on nose, she states everytime she washes her face it bleeds, doctor is on medical leave and pt. Needs a referral to derm)      ASSESSMENT/PLAN:    ICD-10-CM    1. Nasal lesion  J34.89 External Referral To Dermatology     CANCELED: Ami Scott PA, Dermatology, [facility]          Patient presents for nasal lesion.   On exam she does what appears to be a mole.   Referral placed to dermatology, patient requesting referral to external dermatology as that provider can get patient in quickly.   Follow up with PCP in 5-7 days if symptoms persist or if symptoms worsen.    SUBJECTIVE/OBJECTIVE:    History provided by:  Patient   used: No      HPI:   74 y.o. female presents with symptoms of lesion to the left nare. She states that she has had precancerous lesions to the face, but no history of skin cancer. She states that this lesion on the nose bleeds when she washes her face. She has pain that extends to internal aspect of nose. She states that she called her PCP office and they were not able to get patient in, recommended that patient come to urgent care for evaluation and dermatology referral.         Vitals:    24 1044   BP: 134/79   Site: Right Upper Arm   Position: Sitting   Cuff Size: Large Adult   Pulse: 77   Temp: 98.2 °F (36.8 °C)   TempSrc: Oral   SpO2: 97%   Weight: 72.6 kg (160 lb)   Height: 1.575 m (5' 2\")       Review of Systems   Constitutional:  Negative for fatigue and fever.   Respiratory:  Negative for cough, chest tightness and shortness of breath.    Cardiovascular:  Negative for chest pain.   Gastrointestinal:  Negative for abdominal pain, constipation, diarrhea, nausea and vomiting.   Skin:  Positive for wound. Negative for rash.       Physical Exam  Vitals and nursing note reviewed.   Constitutional:       Appearance: Normal

## 2024-08-20 ENCOUNTER — TELEPHONE (OUTPATIENT)
Dept: INTERNAL MEDICINE CLINIC | Age: 74
End: 2024-08-20

## 2024-08-20 DIAGNOSIS — A60.04 HERPES SIMPLEX VULVOVAGINITIS: ICD-10-CM

## 2024-08-20 NOTE — TELEPHONE ENCOUNTER
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 Pt is in a time crunch and needs them to be local   Any suggestions.

## 2024-08-21 RX ORDER — VALACYCLOVIR HYDROCHLORIDE 500 MG/1
TABLET, FILM COATED ORAL
Qty: 30 TABLET | Refills: 3 | Status: SHIPPED | OUTPATIENT
Start: 2024-08-21

## 2024-09-04 DIAGNOSIS — A60.04 HERPES SIMPLEX VULVOVAGINITIS: ICD-10-CM

## 2024-09-04 RX ORDER — VALACYCLOVIR HYDROCHLORIDE 500 MG/1
500 TABLET, FILM COATED ORAL PRN
Qty: 30 TABLET | Refills: 3 | OUTPATIENT
Start: 2024-09-04

## 2024-11-07 DIAGNOSIS — F51.01 PRIMARY INSOMNIA: ICD-10-CM

## 2024-11-07 RX ORDER — TRAZODONE HYDROCHLORIDE 50 MG/1
50 TABLET, FILM COATED ORAL NIGHTLY PRN
Qty: 90 TABLET | Refills: 3 | Status: SHIPPED | OUTPATIENT
Start: 2024-11-07

## (undated) DEVICE — SOLUTION IRRIGATION BAL SALT SOLUTION 15 ML STRL BSS

## (undated) DEVICE — PADDING UNDERCAST W4INXL4YD 100% COT CRIMPED FINISH WBRL II

## (undated) DEVICE — TUBING PMP L16FT MAIN DISP FOR AR-6400 AR-6475

## (undated) DEVICE — INTENDED FOR TISSUE SEPARATION, AND OTHER PROCEDURES THAT REQUIRE A SHARP SURGICAL BLADE TO PUNCTURE OR CUT.: Brand: BARD-PARKER ® STAINLESS STEEL BLADES

## (undated) DEVICE — WIPE INSTR W73XL73CM VISITEC

## (undated) DEVICE — SOLUTION IRRIG 3000ML 0.9% SOD CHL USP UROMATIC PLAS CONT

## (undated) DEVICE — BIT DRL L100MM DIA2.5MM FOR SM FRAG UNIV LOK SYS

## (undated) DEVICE — GLOVE,SURG,SENSICARE SLT,LF,PF,8: Brand: MEDLINE

## (undated) DEVICE — BIT DRL DIA3.5MM FOR SM FRAG UNIV LOK SYS

## (undated) DEVICE — CANISTER, RIGID, 1200CC: Brand: MEDLINE INDUSTRIES, INC.

## (undated) DEVICE — GLOVE SURG SZ 8 CRM LTX FREE POLYISOPRENE POLYMER BEAD ANTI

## (undated) DEVICE — MINOR SET UP: Brand: MEDLINE INDUSTRIES, INC.

## (undated) DEVICE — Device

## (undated) DEVICE — ENT I-LF: Brand: MEDLINE INDUSTRIES, INC.

## (undated) DEVICE — FORCEPS BX 240CM 2.4MM L NDL RAD JAW 4 M00513334

## (undated) DEVICE — T-DRAPE,EXTREMITY,STERILE: Brand: MEDLINE

## (undated) DEVICE — SUTURE VCRL SZ 2-0 L18IN ABSRB UD CT-1 L36MM 1/2 CIR J839D

## (undated) DEVICE — BIT DRL DIA2MM STD QUIK CONN FOR PERIARTC LOK PLT SYS

## (undated) DEVICE — STRIP,CLOSURE,WOUND,MEDI-STRIP,1/2X4: Brand: MEDLINE

## (undated) DEVICE — SOLUTION IV IRRIG 250ML ST LF 0.9% SODIUM 2F7122

## (undated) DEVICE — GLOVE SURG 9 PF CRM STRL SENSICARE PI MIC LF

## (undated) DEVICE — Z DISCONTINUED USE 2272117 DRAPE SURG 3 QTR N INVASIVE 2 LAYR DISP

## (undated) DEVICE — C-ARMOR C-ARM EQUIPMENT COVERS CLEAR STERILE UNIVERSAL FIT 12 PER CASE: Brand: C-ARMOR

## (undated) DEVICE — GLOVE,SURG,SENSICARE SLT,LF,PF,8.5: Brand: MEDLINE

## (undated) DEVICE — DECANTER BAG 9": Brand: MEDLINE INDUSTRIES, INC.

## (undated) DEVICE — SUTURE MCRYL SZ 4-0 L18IN ABSRB UD L19MM PS-2 3/8 CIR PRIM Y496G

## (undated) DEVICE — SPONGE GZ W4XL4IN COT 12 PLY TYP VII WVN C FLD DSGN STERILE

## (undated) DEVICE — BANDAGE COMPR W4INXL15FT BGE E SGL LAYERED CLP CLSR

## (undated) DEVICE — SUTURE VCRL SZ 3-0 L18IN ABSRB UD L26MM SH 1/2 CIR J864D

## (undated) DEVICE — NEEDLE HYPO 23GA L1.5IN TURQ POLYPR HUB S STL THN WALL IM

## (undated) DEVICE — BIT DRL DIA2.7MM CANN QUIK CPL

## (undated) DEVICE — COTTON UNDERCAST PADDING,CRIMPED FINISH: Brand: WEBRIL

## (undated) DEVICE — BANDAGE COMPR W6INXL4.5YD LTWT E EC SGL LAYERED CLP CLSR

## (undated) DEVICE — HOLDER RESTRAINT LIMB UNIV FOAM PR D RNG SINGLE STRP LF

## (undated) DEVICE — SUTURE MCRYL SZ 4-0 L27IN ABSRB UD L19MM PS-2 1/2 CIR PRIM Y426H

## (undated) DEVICE — SYRINGE MED 10ML LUERLOCK TIP W/O SFTY DISP

## (undated) DEVICE — TOWEL,OR,DSP,ST,BLUE,STD,4/PK,20PK/CS: Brand: MEDLINE

## (undated) DEVICE — LOWER EXTREMITY: Brand: MEDLINE INDUSTRIES, INC.

## (undated) DEVICE — GLOVE SURG SZ 65 CRM LTX FREE POLYISOPRENE POLYMER BEAD ANTI

## (undated) DEVICE — TUBING, SUCTION, 1/4" X 12', STRAIGHT: Brand: MEDLINE

## (undated) DEVICE — SUTURE ABSORBABLE MONOFILAMENT 6-0 G-1 18 IN PLN GUT 770G

## (undated) DEVICE — GLOVE SURG SZ 65 THK91MIL LTX FREE SYN POLYISOPRENE

## (undated) DEVICE — SUTURE ETHLN SZ 3-0 L18IN NONABSORBABLE BLK FS-1 L24MM 3/8 663H

## (undated) DEVICE — GLOVE SURG SZ 65 L12IN FNGR THK79MIL GRN LTX FREE

## (undated) DEVICE — NEEDLE HYPO 30GA L0.5IN BGE POLYPR HUB S STL REG BVL STR

## (undated) DEVICE — GOWN SIRUS NONREIN XL W/TWL: Brand: MEDLINE INDUSTRIES, INC.

## (undated) DEVICE — C-ARM: Brand: UNBRANDED

## (undated) DEVICE — DRESSING,GAUZE,XEROFORM,CURAD,1"X8",ST: Brand: CURAD

## (undated) DEVICE — BANDAGE COMPR W4INXL12FT E DISP ESMARCH EVEN

## (undated) DEVICE — SOLUTION IV IRRIG POUR BRL 0.9% SODIUM CHL 2F7124

## (undated) DEVICE — 3M™ STERI-STRIP™ COMPOUND BENZOIN TINCTURE 40 BAGS/CARTON 4 CARTONS/CASE C1544: Brand: 3M™ STERI-STRIP™

## (undated) DEVICE — COVER LT HNDL BLU PLAS

## (undated) DEVICE — SPONGE GZ W4XL4IN COT 12 PLY TYP VII WVN C FLD DSGN

## (undated) DEVICE — CORD ES L12FT BPLR FRCP

## (undated) DEVICE — BANDAGE COMPR W6INXL12FT SMOOTH FOR LIMB EXSANG ESMARCH

## (undated) DEVICE — NEEDLE HYPO 22GA L1 1/2IN PIVOTING SHLD FOR LUERLOCK SYR

## (undated) DEVICE — SNARES COLD OVAL 10MM THIN

## (undated) DEVICE — SYRINGE IRRIG 60ML SFT PLIABLE BLB EZ TO GRP 1 HND USE W/

## (undated) DEVICE — ENDOSCOPY KIT: Brand: MEDLINE INDUSTRIES, INC.

## (undated) DEVICE — GLOVE ORTHO 8   MSG9480

## (undated) DEVICE — CONTAINER SPEC 480ML CLR POLYSTYR 10% NEUT BUFF FRMLN ZN

## (undated) DEVICE — GLOVE SURG SZ 8 L12IN FNGR THK79MIL GRN LTX FREE

## (undated) DEVICE — KNEE ARTHROSCOPY: Brand: MEDLINE INDUSTRIES, INC.

## (undated) DEVICE — 3M™ STERI-DRAPE™ U-DRAPE 1015: Brand: STERI-DRAPE™

## (undated) DEVICE — BLADE SHV L13CM DIA4MM EXCALIBUR AGG COOLCUT

## (undated) DEVICE — C-ARM PACK: Brand: C-ARM COVER

## (undated) DEVICE — APPLICATOR,COTTON-TIP,WOOD,3,STRL: Brand: MEDLINE